# Patient Record
Sex: FEMALE | Race: WHITE | NOT HISPANIC OR LATINO | Employment: OTHER | URBAN - METROPOLITAN AREA
[De-identification: names, ages, dates, MRNs, and addresses within clinical notes are randomized per-mention and may not be internally consistent; named-entity substitution may affect disease eponyms.]

---

## 2017-10-16 ENCOUNTER — OFFICE VISIT - RIVER FALLS (OUTPATIENT)
Dept: FAMILY MEDICINE | Facility: CLINIC | Age: 67
End: 2017-10-16

## 2017-10-16 ASSESSMENT — MIFFLIN-ST. JEOR: SCORE: 933.86

## 2017-10-17 LAB
CREAT SERPL-MCNC: 0.77 MG/DL (ref 0.5–0.99)
GLUCOSE BLD-MCNC: 83 MG/DL (ref 65–99)

## 2017-10-18 ENCOUNTER — AMBULATORY - RIVER FALLS (OUTPATIENT)
Dept: FAMILY MEDICINE | Facility: CLINIC | Age: 67
End: 2017-10-18

## 2017-11-10 ENCOUNTER — AMBULATORY - RIVER FALLS (OUTPATIENT)
Dept: FAMILY MEDICINE | Facility: CLINIC | Age: 67
End: 2017-11-10

## 2017-11-13 LAB
CHOLEST SERPL-MCNC: 216 MG/DL
CHOLEST/HDLC SERPL: 2.8 {RATIO}
HDLC SERPL-MCNC: 76 MG/DL
LDLC SERPL CALC-MCNC: 126 MG/DL
NONHDLC SERPL-MCNC: 140 MG/DL
TRIGL SERPL-MCNC: 58 MG/DL

## 2018-12-10 ENCOUNTER — OFFICE VISIT - RIVER FALLS (OUTPATIENT)
Dept: FAMILY MEDICINE | Facility: CLINIC | Age: 68
End: 2018-12-10

## 2018-12-10 ASSESSMENT — MIFFLIN-ST. JEOR: SCORE: 952

## 2018-12-11 ENCOUNTER — OFFICE VISIT - RIVER FALLS (OUTPATIENT)
Dept: FAMILY MEDICINE | Facility: CLINIC | Age: 68
End: 2018-12-11

## 2019-01-30 ENCOUNTER — AMBULATORY - RIVER FALLS (OUTPATIENT)
Dept: FAMILY MEDICINE | Facility: CLINIC | Age: 69
End: 2019-01-30

## 2019-01-31 LAB
ALT SERPL W P-5'-P-CCNC: 29 UNIT/L (ref 6–29)
CHOLEST SERPL-MCNC: 209 MG/DL
CHOLEST/HDLC SERPL: 2.3 {RATIO}
HDLC SERPL-MCNC: 89 MG/DL
LDLC SERPL CALC-MCNC: 105 MG/DL
NONHDLC SERPL-MCNC: 120 MG/DL
TRIGL SERPL-MCNC: 60 MG/DL

## 2019-05-05 ENCOUNTER — OFFICE VISIT - RIVER FALLS (OUTPATIENT)
Dept: FAMILY MEDICINE | Facility: CLINIC | Age: 69
End: 2019-05-05

## 2019-10-01 ENCOUNTER — OFFICE VISIT - RIVER FALLS (OUTPATIENT)
Dept: FAMILY MEDICINE | Facility: CLINIC | Age: 69
End: 2019-10-01

## 2019-10-01 ASSESSMENT — MIFFLIN-ST. JEOR: SCORE: 970.52

## 2019-11-11 ENCOUNTER — COMMUNICATION - RIVER FALLS (OUTPATIENT)
Dept: FAMILY MEDICINE | Facility: CLINIC | Age: 69
End: 2019-11-11

## 2019-11-12 ENCOUNTER — OFFICE VISIT - RIVER FALLS (OUTPATIENT)
Dept: FAMILY MEDICINE | Facility: CLINIC | Age: 69
End: 2019-11-12

## 2019-11-12 ASSESSMENT — MIFFLIN-ST. JEOR: SCORE: 961.62

## 2019-11-30 ENCOUNTER — OFFICE VISIT - RIVER FALLS (OUTPATIENT)
Dept: FAMILY MEDICINE | Facility: CLINIC | Age: 69
End: 2019-11-30

## 2019-11-30 ASSESSMENT — MIFFLIN-ST. JEOR: SCORE: 952

## 2019-12-02 ENCOUNTER — OFFICE VISIT - RIVER FALLS (OUTPATIENT)
Dept: FAMILY MEDICINE | Facility: CLINIC | Age: 69
End: 2019-12-02

## 2019-12-02 ASSESSMENT — MIFFLIN-ST. JEOR: SCORE: 952.91

## 2019-12-23 ENCOUNTER — OFFICE VISIT - RIVER FALLS (OUTPATIENT)
Dept: FAMILY MEDICINE | Facility: CLINIC | Age: 69
End: 2019-12-23

## 2019-12-23 ASSESSMENT — MIFFLIN-ST. JEOR: SCORE: 955.97

## 2019-12-24 ENCOUNTER — COMMUNICATION - RIVER FALLS (OUTPATIENT)
Dept: FAMILY MEDICINE | Facility: CLINIC | Age: 69
End: 2019-12-24

## 2019-12-24 LAB
CHOLEST SERPL-MCNC: 219 MG/DL
CHOLEST/HDLC SERPL: 2.5 {RATIO}
HDLC SERPL-MCNC: 87 MG/DL
LDLC SERPL CALC-MCNC: 117 MG/DL
NONHDLC SERPL-MCNC: 132 MG/DL
TRIGL SERPL-MCNC: 62 MG/DL

## 2020-01-20 ENCOUNTER — COMMUNICATION - RIVER FALLS (OUTPATIENT)
Dept: FAMILY MEDICINE | Facility: CLINIC | Age: 70
End: 2020-01-20

## 2020-01-22 ENCOUNTER — OFFICE VISIT - RIVER FALLS (OUTPATIENT)
Dept: FAMILY MEDICINE | Facility: CLINIC | Age: 70
End: 2020-01-22

## 2020-01-22 ASSESSMENT — MIFFLIN-ST. JEOR: SCORE: 967.77

## 2020-01-30 ENCOUNTER — COMMUNICATION - RIVER FALLS (OUTPATIENT)
Dept: FAMILY MEDICINE | Facility: CLINIC | Age: 70
End: 2020-01-30

## 2020-04-03 ENCOUNTER — OFFICE VISIT - RIVER FALLS (OUTPATIENT)
Dept: FAMILY MEDICINE | Facility: CLINIC | Age: 70
End: 2020-04-03

## 2020-04-03 ASSESSMENT — MIFFLIN-ST. JEOR: SCORE: 951.44

## 2020-04-04 ENCOUNTER — OFFICE VISIT - RIVER FALLS (OUTPATIENT)
Dept: FAMILY MEDICINE | Facility: CLINIC | Age: 70
End: 2020-04-04

## 2020-04-06 ENCOUNTER — OFFICE VISIT - RIVER FALLS (OUTPATIENT)
Dept: FAMILY MEDICINE | Facility: CLINIC | Age: 70
End: 2020-04-06

## 2020-04-06 ASSESSMENT — MIFFLIN-ST. JEOR: SCORE: 951.44

## 2020-04-07 LAB
A/G RATIO - HISTORICAL: 1.1 MG/DL
ALBUMIN SERPL-MCNC: 3.6 G/DL
ALP SERPL-CCNC: 103 UNIT/L
ALT SERPL W P-5'-P-CCNC: 15 UNIT/L
ANION GAP SERPL CALCULATED.3IONS-SCNC: 12 MEQ/L
AST SERPL W P-5'-P-CCNC: 17 UNIT/L
BASOPHILS # BLD MANUAL: 0 CELLS/UL
BASOPHILS NFR BLD AUTO: 0 %
BILIRUB SERPL-MCNC: 0.4 MG/DL
BUN SERPL-MCNC: 6 MG/DL
BUN/CREAT RATIO - HISTORICAL: 10
CALCIUM SERPL-MCNC: 9.1 MEQ/DL
CHLORIDE BLD-SCNC: 106 MEQ/L
CO2 SERPL-SCNC: 21 MEQ/L
CREAT SERPL-MCNC: 0.62 MG/DL
EOSINOPHIL # BLD MANUAL: 0 CELLS/UL
EOSINOPHIL NFR BLD AUTO: 0 %
ERYTHROCYTE [DISTWIDTH] IN BLOOD BY AUTOMATED COUNT: 14.5 %
GFR ESTIMATE EXT - HISTORICAL: 60 ML/MIN
GLOBULIN: 3.2 G/DL
GLUCOSE BLD-MCNC: 141 MG/DL
HCT VFR BLD AUTO: 33.2 %
HGB BLD-MCNC: 11.1 G/DL
LYMPHOCYTES # BLD MANUAL: 2.1 CELLS/UL
LYMPHOCYTES NFR BLD AUTO: 11 %
MCH RBC QN AUTO: 31.7 PG
MCHC RBC AUTO-ENTMCNC: 33.4 GM/DL
MCV RBC AUTO: 95 FL
MONOCYTES # BLD MANUAL: 1.5 CELLS/UL
MONOCYTES NFR BLD AUTO: 8 %
NEUTROPHILS # BLD MANUAL: 15.3 CELLS/UL
NEUTROPHILS NFR BLD AUTO: 81 %
PLATELET # BLD AUTO: 1104 X10
PMV BLD: 9.3 FL
POTASSIUM BLD-SCNC: 3.5 MEQ/L
PROT SERPL-MCNC: 6.8 GM/DL
RBC # BLD AUTO: 3.5 X10
SODIUM SERPL-SCNC: 139 MEQ/L
WBC # BLD AUTO: 18.9 X10

## 2020-04-13 ENCOUNTER — COMMUNICATION - RIVER FALLS (OUTPATIENT)
Dept: FAMILY MEDICINE | Facility: CLINIC | Age: 70
End: 2020-04-13

## 2020-04-14 ENCOUNTER — OFFICE VISIT - RIVER FALLS (OUTPATIENT)
Dept: FAMILY MEDICINE | Facility: CLINIC | Age: 70
End: 2020-04-14

## 2020-05-05 ENCOUNTER — AMBULATORY - RIVER FALLS (OUTPATIENT)
Dept: FAMILY MEDICINE | Facility: CLINIC | Age: 70
End: 2020-05-05

## 2020-05-05 ENCOUNTER — COMMUNICATION - RIVER FALLS (OUTPATIENT)
Dept: FAMILY MEDICINE | Facility: CLINIC | Age: 70
End: 2020-05-05

## 2020-05-05 LAB
ALBUMIN UR-MCNC: NEGATIVE G/DL
BACTERIA #/AREA URNS HPF: NORMAL /[HPF]
BILIRUB UR QL STRIP: NEGATIVE
EPITHELIAL CELLS UR: NORMAL
GLUCOSE UR STRIP-MCNC: NEGATIVE MG/DL
HGB UR QL STRIP: ABNORMAL
KETONES UR STRIP-MCNC: NEGATIVE MG/DL
LEUKOCYTE ESTERASE UR QL STRIP: NEGATIVE
NITRATE UR QL: NEGATIVE
PH UR STRIP: 7 [PH] (ref 5–8)
RBC #/AREA URNS AUTO: NORMAL /[HPF]
SP GR UR STRIP: 1.01 (ref 1–1.03)
WBC #/AREA URNS AUTO: NORMAL /[HPF]

## 2020-05-07 LAB — BACTERIA SPEC CULT: NORMAL

## 2020-05-13 ENCOUNTER — OFFICE VISIT - RIVER FALLS (OUTPATIENT)
Dept: FAMILY MEDICINE | Facility: CLINIC | Age: 70
End: 2020-05-13

## 2020-06-15 ENCOUNTER — OFFICE VISIT - RIVER FALLS (OUTPATIENT)
Dept: FAMILY MEDICINE | Facility: CLINIC | Age: 70
End: 2020-06-15

## 2020-09-10 ENCOUNTER — AMBULATORY - RIVER FALLS (OUTPATIENT)
Dept: FAMILY MEDICINE | Facility: CLINIC | Age: 70
End: 2020-09-10

## 2020-09-11 ENCOUNTER — COMMUNICATION - RIVER FALLS (OUTPATIENT)
Dept: FAMILY MEDICINE | Facility: CLINIC | Age: 70
End: 2020-09-11

## 2020-09-11 LAB
ERYTHROCYTE [DISTWIDTH] IN BLOOD BY AUTOMATED COUNT: 13.5 % (ref 11–15)
HCT VFR BLD AUTO: 43.5 % (ref 35–45)
HGB BLD-MCNC: 14.5 GM/DL (ref 11.7–15.5)
MCH RBC QN AUTO: 30.7 PG (ref 27–33)
MCHC RBC AUTO-ENTMCNC: 33.3 GM/DL (ref 32–36)
MCV RBC AUTO: 92.2 FL (ref 80–100)
PLATELET # BLD AUTO: 279 10*3/UL (ref 140–400)
PMV BLD: 10.5 FL (ref 7.5–12.5)
RBC # BLD AUTO: 4.72 10*6/UL (ref 3.8–5.1)
WBC # BLD AUTO: 13 10*3/UL (ref 3.8–10.8)

## 2020-11-02 ENCOUNTER — OFFICE VISIT - RIVER FALLS (OUTPATIENT)
Dept: FAMILY MEDICINE | Facility: CLINIC | Age: 70
End: 2020-11-02

## 2020-12-10 ENCOUNTER — AMBULATORY - RIVER FALLS (OUTPATIENT)
Dept: FAMILY MEDICINE | Facility: CLINIC | Age: 70
End: 2020-12-10

## 2020-12-11 ENCOUNTER — COMMUNICATION - RIVER FALLS (OUTPATIENT)
Dept: FAMILY MEDICINE | Facility: CLINIC | Age: 70
End: 2020-12-11

## 2020-12-11 LAB
BASOPHILS # BLD MANUAL: 82 10*3/UL (ref 0–200)
BASOPHILS NFR BLD MANUAL: 1.2 %
EOSINOPHIL # BLD MANUAL: 299 10*3/UL (ref 15–500)
EOSINOPHIL NFR BLD MANUAL: 4.4 %
ERYTHROCYTE [DISTWIDTH] IN BLOOD BY AUTOMATED COUNT: 12.9 % (ref 11–15)
HCT VFR BLD AUTO: 40.4 % (ref 35–45)
HGB BLD-MCNC: 14 GM/DL (ref 11.7–15.5)
LYMPHOCYTES # BLD MANUAL: 2264 10*3/UL (ref 850–3900)
LYMPHOCYTES NFR BLD MANUAL: 33.3 %
MCH RBC QN AUTO: 32.6 PG (ref 27–33)
MCHC RBC AUTO-ENTMCNC: 34.7 GM/DL (ref 32–36)
MCV RBC AUTO: 94.2 FL (ref 80–100)
MONOCYTES # BLD MANUAL: 687 10*3/UL (ref 200–950)
MONOCYTES NFR BLD MANUAL: 10.1 %
NEUTROPHILS # BLD MANUAL: 3468 10*3/UL (ref 1500–7800)
NEUTROPHILS NFR BLD MANUAL: 51 %
PLATELET # BLD AUTO: 209 10*3/UL (ref 140–400)
PMV BLD: 10.7 FL (ref 7.5–12.5)
RBC # BLD AUTO: 4.29 10*6/UL (ref 3.8–5.1)
WBC # BLD AUTO: 6.8 10*3/UL (ref 3.8–10.8)

## 2020-12-29 ENCOUNTER — OFFICE VISIT - RIVER FALLS (OUTPATIENT)
Dept: FAMILY MEDICINE | Facility: CLINIC | Age: 70
End: 2020-12-29

## 2021-01-28 ENCOUNTER — VIRTUAL VISIT (OUTPATIENT)
Dept: BEHAVIORAL HEALTH | Facility: CLINIC | Age: 71
End: 2021-01-28
Payer: MEDICARE

## 2021-01-28 DIAGNOSIS — F43.22 ADJUSTMENT DISORDER WITH ANXIOUS MOOD: Primary | ICD-10-CM

## 2021-01-28 PROCEDURE — 90834 PSYTX W PT 45 MINUTES: CPT | Mod: 95 | Performed by: SOCIAL WORKER

## 2021-01-28 NOTE — PROGRESS NOTES
"[facility]: Integrated Behavioral Health  January 28, 2021    Shannan Koenig is a 70 year old female who is being evaluated via a telephone visit.      The patient has been notified of the following:     \"We have found that certain health care needs can be provided without the need for a face to face visit.  This service lets us provide the care you need with a short phone conversation.      I will have full access to your [facility] medical record during this entire phone call.   I will be taking notes for your medical record.     Since this is like an office visit, we will bill your insurance company for this service.  Please check with your medical insurance if this type of telephone visit/virtual care is covered.  You may be responsible for the cost of this service if insurance coverage is denied.      There are potential benefits and risks of telephone visits (e.g. limits to patient confidentiality) that differ from in-person visits.?  Confidentiality still applies for telephone services, and nobody will record the visit.  It is important to be in a quiet, private space that is free of distractions (including cell phone or other devices) during the visit.??     If during the course of the call I believe a telephone visit is not appropriate, you will not be charged for this service\"    Consent has been obtained for this service by care team member: yes.    Behavioral Health Clinician Progress Note    Patient Name: Shannan Koenig           Service Type:  Individual      Service Location:   Phone call (patient / identified key support person reached)     Session Start Time: 2:02pm  Session End Time: 2:43pm      Session Length: 38 - 52      Attendees: Client    Visit Activities (Refresh list every visit): NEW and [facility] Only    Diagnostic Assessment Date: Next Session  Treatment Plan Review Date: NA  See Flowsheets for today's PHQ-9 and MILTON-7 results  Previous PHQ-9: No flowsheet " data found.  Previous MILTON-7: No flowsheet data found.    SPIKE LEVEL:  No flowsheet data found.    DATA  Extended Session (60+ minutes): No  Interactive Complexity: No  Crisis: No  [facility] Patient: No    Treatment Objective(s) Addressed in This Session:  Target Behavior(s): disease management/lifestyle changes related to mental health    Anxiety: will experience a reduction in anxiety, will develop more effective coping skills to manage anxiety symptoms, will develop healthy cognitive patterns and beliefs and will increase ability to function adaptively  PTSD Symptom Management  Psychological distress related to Sleep Disturbance    Current Stressors / Issues:  [facility] introduced self and role to patient. Patient reported that she was referred because she is struggling with insomnia and other symptoms after a traumatic medical event that took place in April of 2020. Patient reported that she is physically fine, but mentally struggling. [facility] and patient spent the session processing through her symptoms and the traumatic event. [facility] encouraged patient to continue utilizing strategies that have been helpful such as working out.     Progress on Treatment Objective(s) / Homework:  New Objective established this session - ACTION (Actively working towards change); Intervened by reinforcing change plan / affirming steps taken    Motivational Interviewing    MI Intervention: Expressed Empathy/Understanding, Supported Autonomy, Collaboration, Evocation, Permission to raise concern or advise, Open-ended questions and Reflections: simple and complex     Change Talk Expressed by the Patient: Desire to change Ability to change Reasons to change Need to change Committment to change Activation Taking steps    Provider Response to Change Talk: E - Evoked more info from patient about behavior change, A - Affirmed patient's thoughts, decisions, or attempts at behavior change, R - Reflected patient's change talk and S - Summarized patient's change  talk statements    Also provided psychoeducation about behavioral health condition, symptoms, and treatment options    Care Plan review completed: Yes    Medication Review:  No changes to current psychiatric medication(s)    Medication Compliance:  Yes    Changes in Health Issues:   None reported    Chemical Use Review:   Substance Use: Chemical use reviewed, no active concerns identified      Tobacco Use: No current tobacco use.      Assessment: Current Emotional / Mental Status (status of significant symptoms):  Risk status (Self / Other harm or suicidal ideation)  Patient denies a history of suicidal ideation, suicide attempts, self-injurious behavior, homicidal ideation, homicidal behavior and and other safety concerns  Patient denies current fears or concerns for personal safety.  Patient denies current or recent suicidal ideation or behaviors.  Patient denies current or recent homicidal ideation or behaviors.  Patient denies current or recent self injurious behavior or ideation.  Patient denies other safety concerns.  A safety and risk management plan has not been developed at this time, however patient was encouraged to call [place] [phone] should there be a change in any of these risk factors.    Appearance:   Unable to gather due to phone visit   Eye Contact:   Unable to gather due to phone visit   Psychomotor Behavior: Unable to gather due to phone visit   Attitude:   Cooperative  Friendly  Orientation:   All  Speech   Rate / Production: Normal    Volume:  Normal   Mood:    Anxious  Normal Sad   Affect:    Appropriate   Thought Content:  Clear   Thought Form:  Coherent  Logical   Insight:    Good     Diagnoses:  1. Adjustment disorder with anxious mood        Collateral Reports Completed:  Not Applicable    Plan: (Homework, other):  Patient was given information about behavioral services and encouraged to schedule a follow up appointment with the clinic [facility] as needed.  She was also given information  about mental health symptoms and treatment options .  CD Recommendations: No indications of CD issues. DA to be completed at next session. MARIO Richardson, [facility]      ______________________________________________________________________

## 2021-02-01 ENCOUNTER — AMBULATORY - RIVER FALLS (OUTPATIENT)
Dept: FAMILY MEDICINE | Facility: CLINIC | Age: 71
End: 2021-02-01

## 2021-02-02 LAB
CHOLEST SERPL-MCNC: 212 MG/DL
CHOLEST/HDLC SERPL: 2.5 {RATIO}
HDLC SERPL-MCNC: 84 MG/DL
LDLC SERPL CALC-MCNC: 112 MG/DL
NONHDLC SERPL-MCNC: 128 MG/DL
TRIGL SERPL-MCNC: 70 MG/DL

## 2021-02-03 ENCOUNTER — COMMUNICATION - RIVER FALLS (OUTPATIENT)
Dept: FAMILY MEDICINE | Facility: CLINIC | Age: 71
End: 2021-02-03

## 2021-02-04 ENCOUNTER — VIRTUAL VISIT (OUTPATIENT)
Dept: BEHAVIORAL HEALTH | Facility: CLINIC | Age: 71
End: 2021-02-04
Payer: MEDICARE

## 2021-02-04 DIAGNOSIS — F43.22 ADJUSTMENT DISORDER WITH ANXIOUS MOOD: Primary | ICD-10-CM

## 2021-02-04 PROCEDURE — 90791 PSYCH DIAGNOSTIC EVALUATION: CPT | Mod: 95 | Performed by: SOCIAL WORKER

## 2021-02-04 ASSESSMENT — ANXIETY QUESTIONNAIRES
4. TROUBLE RELAXING: SEVERAL DAYS
GAD7 TOTAL SCORE: 6
GAD7 TOTAL SCORE: 6
7. FEELING AFRAID AS IF SOMETHING AWFUL MIGHT HAPPEN: SEVERAL DAYS
5. BEING SO RESTLESS THAT IT IS HARD TO SIT STILL: SEVERAL DAYS
1. FEELING NERVOUS, ANXIOUS, OR ON EDGE: SEVERAL DAYS
7. FEELING AFRAID AS IF SOMETHING AWFUL MIGHT HAPPEN: SEVERAL DAYS
2. NOT BEING ABLE TO STOP OR CONTROL WORRYING: SEVERAL DAYS
3. WORRYING TOO MUCH ABOUT DIFFERENT THINGS: SEVERAL DAYS
6. BECOMING EASILY ANNOYED OR IRRITABLE: NOT AT ALL
GAD7 TOTAL SCORE: 6

## 2021-02-04 ASSESSMENT — COLUMBIA-SUICIDE SEVERITY RATING SCALE - C-SSRS
5. HAVE YOU STARTED TO WORK OUT OR WORKED OUT THE DETAILS OF HOW TO KILL YOURSELF? DO YOU INTEND TO CARRY OUT THIS PLAN?: NO
TOTAL  NUMBER OF ABORTED OR SELF INTERRUPTED ATTEMPTS PAST LIFETIME: NO
4. HAVE YOU HAD THESE THOUGHTS AND HAD SOME INTENTION OF ACTING ON THEM?: NO
TOTAL  NUMBER OF ABORTED OR SELF INTERRUPTED ATTEMPTS PAST 3 MONTHS: NO
3. HAVE YOU BEEN THINKING ABOUT HOW YOU MIGHT KILL YOURSELF?: NO
ATTEMPT LIFETIME: NO
5. HAVE YOU STARTED TO WORK OUT OR WORKED OUT THE DETAILS OF HOW TO KILL YOURSELF? DO YOU INTEND TO CARRY OUT THIS PLAN?: NO
1. IN THE PAST MONTH, HAVE YOU WISHED YOU WERE DEAD OR WISHED YOU COULD GO TO SLEEP AND NOT WAKE UP?: NO
2. HAVE YOU ACTUALLY HAD ANY THOUGHTS OF KILLING YOURSELF LIFETIME?: NO
1. IN THE PAST MONTH, HAVE YOU WISHED YOU WERE DEAD OR WISHED YOU COULD GO TO SLEEP AND NOT WAKE UP?: NO
4. HAVE YOU HAD THESE THOUGHTS AND HAD SOME INTENTION OF ACTING ON THEM?: NO
ATTEMPT PAST THREE MONTHS: NO
2. HAVE YOU ACTUALLY HAD ANY THOUGHTS OF KILLING YOURSELF?: NO
6. HAVE YOU EVER DONE ANYTHING, STARTED TO DO ANYTHING, OR PREPARED TO DO ANYTHING TO END YOUR LIFE?: NO
TOTAL  NUMBER OF INTERRUPTED ATTEMPTS LIFETIME: NO
TOTAL  NUMBER OF INTERRUPTED ATTEMPTS PAST 3 MONTHS: NO
6. HAVE YOU EVER DONE ANYTHING, STARTED TO DO ANYTHING, OR PREPARED TO DO ANYTHING TO END YOUR LIFE?: NO

## 2021-02-04 ASSESSMENT — PATIENT HEALTH QUESTIONNAIRE - PHQ9
10. IF YOU CHECKED OFF ANY PROBLEMS, HOW DIFFICULT HAVE THESE PROBLEMS MADE IT FOR YOU TO DO YOUR WORK, TAKE CARE OF THINGS AT HOME, OR GET ALONG WITH OTHER PEOPLE: SOMEWHAT DIFFICULT
SUM OF ALL RESPONSES TO PHQ QUESTIONS 1-9: 3
SUM OF ALL RESPONSES TO PHQ QUESTIONS 1-9: 3

## 2021-02-04 NOTE — PROGRESS NOTES
[facility]  Provider Name:  Annetta Stockton     Credentials:  [facility], [facility]    PATIENT'S NAME: Shannan Koenig  PREFERRED NAME: Aileen  PRONOUNS:       MRN: [id number]  : 1950  ADDRESS: [address] [place], [place] [address]   ACCT. NUMBER:  [id number]  DATE OF SERVICE: 21  START TIME: 10:03am  END TIME: 11:00am  PREFERRED PHONE: [phone]  May we leave a program related message: Yes  SERVICE MODALITY:  Video Visit:      Provider verified identity through the following two step process.  Patient provided:  Patient     Telemedicine Visit: The patient's condition can be safely assessed and treated via synchronous audio and visual telemedicine encounter.      Reason for Telemedicine Visit: Services only offered telehealth    Originating Site (Patient Location): Patient's home    Distant Site (Provider Location): [facility] MENTAL [facility] & ADDICTION [facility]    Consent:  The patient/guardian has verbally consented to: the potential risks and benefits of telemedicine (video visit) versus in person care; bill my insurance or make self-payment for services provided; and responsibility for payment of non-covered services.     Patient would like the video invitation sent by:  My Chart    Mode of Communication:  Video Conference via [employer]    As the provider I attest to compliance with applicable laws and regulations related to telemedicine.    UNIVERSAL ADULT Mental Health DIAGNOSTIC ASSESSMENT      Identifying Information:  Patient is a 70 year old, .  The pronoun use throughout this assessment reflects the patient's chosen pronoun.  Patient was referred for an assessment by primary care provider.  Patient attended the session alone.     Chief Complaint:   The reason for seeking services at this time is: Patient reported that she was referred because she is struggling with insomnia and other symptoms after a traumatic medical  "event that took place in April of 2020. Patient reported that she is physically fine, but mentally struggling. Patient reported that she used to struggle with nightmares, but has not had them in a few months.     The problem(s) began in spring of 2020 after traumatic medical experience. Patient has attempted to resolve these concerns in the past through outpatient therapy.    Social/Family History:  Patient reported they grew up in [place] until age 5 and then moved up to [place] after.  They were raised by biological parents until age 5. Patient's father passed away when patient was 5 years old. Patient's mother remarried when patient was 14 years old and then step-father passed away when patient was 21 years old. Patient's mother passed away when she was 88 years old. Patient reported having 5 siblings, but her brother passed away when patient was 22 years old. Patient reported that their childhood had \"struggles\".  Patient described their current relationships with family of origin as \"really close\" and that they are always in contact with each other.      The patient describes their cultural background as: Yazdanism and Sabianist.  Cultural influences and impact on patient's life structure, values, norms, and healthcare: none reported.  Contextual influences on patient's health include: Covid-19 and recent medical event.    These factors will be addressed in the Preliminary Treatment plan.  Patient identified their preferred language to be English. Patient reported they does not need the assistance of an  or other support involved in therapy.     Patient reported had no significant delays in developmental tasks.   Patient's highest education level was college graduate. Patient identified the following learning problems: none reported.  Modifications will not be used to assist communication in therapy.  Patient reports they are not  able to understand written materials.    Patient reported the " following relationship history: previous divorce in 1992 and 7 year relationship following.  Patient's current relationship status is  for 16 years.   Patient identified their sexual orientation as heterosexual.  Patient reported having two daughters from first marriage. Patient identified siblings, adult child, friends and spouse as part of their support system.  Patient identified the quality of these relationships as stable and meaningful.     Patient's current living/housing situation involves staying in own home/apartment.  They live with her  and they report that housing is stable.     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 Patient reports their finances are obtained through employment and spouse.  Patient does not identify finances as a current stressor.      Patient reported that they have not been involved with the legal system.  Patient denies being on probation / parole / under the jurisdiction of the court.    Patient's Strengths and Limitations:  Patient identified the following strengths or resources that will help them succeed in treatment: Christian / Buddhism, commitment to health and well being, community involvement, exercise routine, roni / spirituality, friends / good social support, family support, insight, intelligence and motivation. Things that may interfere with the patient's success in treatment include: none identified.     Personal and Family Medical History:  Patient does report a family history of mental health concerns.  Patient reports family history is not on file.     Patient does report Mental Health Diagnosis and/or Treatment.  Patient Patient reported the following previous diagnoses which include(s): an Eating Disorder. Patient reported that she had anorexia when was she was 11 years old. Patient's current reported symptoms began after a medical event in spring of 2020.   Patient has received mental health services in the  past: hospitalization and outpatient therapy.  Psychiatric Hospitalizations: when patient was 11 years old for an eating disorder.  Patient denies a history of civil commitment.  Currently, patient is not receiving other mental health services.  These include none.     Patient has had a physical exam to rule out medical causes for current symptoms.  Date of last physical exam was within the past year. Client was encouraged to follow up with PCP if symptoms were to develop. The patient has a [facility] Primary Care Provider, who is named No primary care provider on file...  Patient reports no current medical concerns.  Patient denies any issues with pain..   There are not significant appetite / nutritional concerns / weight changes.   Patient does not report a history of head injury / trauma / cognitive impairment.    Patient reports current meds as:   No outpatient medications have been marked as taking for the 2/4/21 encounter (Virtual Visit) with nAnetta Stockton LICSW.       Medication Adherence:  Patient reports taking prescribed medications as prescribed.    Patient Allergies:  Not on File    Medical History:  No past medical history on file.      Current Mental Status Exam:   Appearance:  Appropriate    Eye Contact:  Good   Psychomotor:  Normal       Gait / station:  no problem  Attitude / Demeanor: Cooperative  Friendly Pleasant  Speech      Rate / Production: Normal/ Responsive      Volume:  Normal  volume      Language:  intact  Mood:   Normal  Affect:   Appropriate    Thought Content: Clear  Rumination   Thought Process: Coherent       Associations: No loosening of associations  Insight:   Good   Judgment:  Intact   Orientation:  All  Attention/concentration: Good    Rating Scales:    PHQ9:    PHQ-9 SCORE 2/4/2021   PHQ-9 Total Score MyChart 3 (Minimal depression)   PHQ-9 Total Score 3   ;    GAD7:    MILTON-7 SCORE 2/4/2021   Total Score 6 (mild anxiety)   Total Score 6     CGI:     First:Considering your  total clinical experience with this particular patient population, how severe are the patient's symptoms at this time?: 5 (2/4/2021  3:13 PM)  ;    Most recentNo data recorded    Substance Use:  Patient did report a family history of substance use concerns; see medical history section for details.  Patient has not received chemical dependency treatment in the past.  Patient has not ever been to detox.      Patient is not currently receiving any chemical dependency treatment. Patient reported the following problems as a result of their substance use: NA.    Patient reports using alcohol 1 times per day and has 1 glasses of wine at a time.   Patient denies using tobacco.  Patient denies using marijuana.  Patient reports using caffeine 3 times per day and drinks 1 at a time.   Patient reports using/abusing the following substance(s). Patient reported no other substance use.     CAGE- AID:  No flowsheet data found.    Substance Use: No symptoms    Based on the negative CAGE score and clinical interview there  are not indications of drug or alcohol abuse.      Significant Losses / Trauma / Abuse / Neglect Issues:   Patient did not serve in the .    There are indications or report of significant loss, trauma, abuse or neglect issues related to: recent medical event in spring 2020, father's death when patient was 5, brother's death when patient was 22, step-father's death when patient was 21, mother's death in 2008, and sister's hospitalizations over the years due to mental health.    Concerns for possible neglect are not present.     Safety Assessment:   Current Safety Concerns:  [place] Suicide Severity Rating Scale (Lifetime/Recent)  [place] Suicide Severity Rating (Lifetime/Recent) 2/4/2021   1. Wish to be Dead (Lifetime) No   1. Wish to be Dead (Recent) No   2. Non-Specific Active Suicidal Thoughts (Lifetime) No   2. Non-Specific Active Suicidal Thoughts (Recent) No   3. Active Suicidal Ideation with any  Methods (Not Plan) Without Intent to Act (Lifetime) No   3. Active Suicidal Ideation with any Methods (Not Plan) Without Intent to Act (Recent) No   4. Active Suicidal Ideation with Some Intent to Act, Without Specific Plan (Lifetime) No   4. Active Suicidal Ideation with Some Intent to Act, Without Specific Plan (Recent) No   5. Active Suicidal Ideation with Specific Plan and Intent (Lifetime) No   5. Active Suicidal Ideation with Specific Plan and Intent (Recent) No   Actual Attempt (Lifetime) No   Actual Attempt (Past 3 Months) No   Has subject engaged in non-suicidal self-injurious behavior? (Lifetime) No   Has subject engaged in non-suicidal self-injurious behavior? (Past 3 Months) No   Interrupted Attempts (Lifetime) No   Interrupted Attempts (Past 3 Months) No   Aborted or Self-Interrupted Attempt (Lifetime) No   Aborted or Self-Interrupted Attempt (Past 3 Months) No   Preparatory Acts or Behavior (Lifetime) No   Preparatory Acts or Behavior (Past 3 Months) No     Patient denies current homicidal ideation and behaviors.  Patient denies current self-injurious ideation and behaviors.    Patient denied risk behaviors associated with substance use.  Patient denies any high risk behaviors associated with mental health symptoms.  Patient reports the following current concerns for their personal safety: None.  Patient reports there are not  firearms in the house.      History of Safety Concerns:  Patient denied a history of homicidal ideation.     Patient denied a history of personal safety concerns.    Patient denied a history of assaultive behaviors.    Patient denied a history of sexual assault behaviors.     Patient denied a history of risk behaviors associated with substance use.  Patient denies any history of high risk behaviors associated with mental health symptoms.  Patient reports the following protective factors: positive relationships positive social network and positive family connections,  forward/future oriented thinking, dedication to family/friends, daily obligations, effective problem-solving skills, committment to well-being, sense of meaning, positive social skills and strong sense of self-worth/esteem    Risk Plan:  See Recommendations for Safety and Risk Management Plan    Review of Symptoms per patient report:  Depression: Change in sleep and Ruminations  Pat:  No Symptoms  Psychosis: No Symptoms  Anxiety: Excessive worry, Nervousness, Fears/phobias  , Sleep disturbance and Ruminations  Panic:  No symptoms  Post Traumatic Stress Disorder:  Experienced traumatic event  , Reexperiencing of trauma and Increased arousal   Eating Disorder: No Symptoms  ADD / ADHD:  No symptoms  Conduct Disorder: No symptoms  Autism Spectrum Disorder: No symptoms  Obsessive Compulsive Disorder: No Symptoms    Patient reports the following compulsive behaviors and treatment history: No Symptoms.      Diagnostic Criteria:   A. The development of emotional or behavioral symptoms in response to an identifiable stressor(s) occurring within 3 months of the onset of the stressor(s)  B. These symptoms or behaviors are clinically significant, as evidenced by one or both of the following:       - Marked distress that is out of proportion to the severity/intensity of the stressor (with consideration for external context & culture)  C. The stress-related disturbance does not meet criteria for another disorder & is not not an exacerbation of another mental disorder  D. The symptoms do not represent normal bereavement  E. Once the stressor or its consequences have terminated, the symptoms do not persist for more than an additional 6 months       * Adjustment Disorder with Anxiety: The predominant manfestations are symptoms such as nervousness, worry, or jitteriness, or, in children separation anxiety from major attachment figures    Functional Status:  Patient reports the following functional impairments: health maintenance  and self-care.     WHODAS: No flowsheet data found.  Nonprogrammatic care:  Patient is requesting basic services to address current mental health concerns.    Clinical Summary:  1. Reason for assessment: worsening of symptoms.  2. Psychosocial, Cultural and Contextual Factors: recent medical event and Covid-19.  3. Principal DSM5 Diagnoses  (Sustained by DSM5 Criteria Listed Above):   Adjustment Disorders  309.24 (F43.22) With anxiety.  4. Other Diagnoses that is relevant to services:   NA.  5. Provisional Diagnosis:  Adjustment Disorders  309.24 (F43.22) With anxiety as evidenced by symptoms present . RULE OUT of PTSD.  6. Prognosis: Expect Improvement.  7. Likely consequences of symptoms if not treated: worsening of symptoms.  8. Client strengths include:  caring, creative, educated, empathetic, employed, goal-focused, good listener, has a previous history of therapy, insightful, intelligent, motivated, open to learning, open to suggestions / feedback, responsible parent, support of family, friends and providers, supportive, wants to learn, willing to ask questions and willing to relate to others .     Recommendations:     1. Plan for Safety and Risk Management:   Recommended that patient call 911 or go to the local ED should there be a change in any of these risk factors..          Report to child / adult protection services was NA.     2. Patient's identified mental health concerns with a cultural influence will be addressed by agreed upon treatment plan.     3. Initial Treatment will focus on:    Anxiety - decrease in anxious symptoms  Adjustment Difficulties related to: medical event.     4. Resources/Service Plan:    services are not indicated.   Modifications to assist communication are not indicated.   Additional disability accommodations are not indicated.      5. Collaboration:   Collaboration / coordination of treatment will be initiated with the following  support professionals: primary care  physician.      6.  Referrals:   The following referral(s) will be initiated: Outpatient [facility].     A Release of Information has been obtained for the following: NA.    7. JUNA:    JUAN:  Discussed the general effects of drugs and alcohol on health and well-being. Provider gave patient printed information about the effects of chemical use on their health and well being.      8. Records:   These were not available for review at time of assessment.   Information in this assessment was obtained from the medical record and  provided by patient who is a good historian.    Patient will have open access to their mental health medical record.        Provider Name/ Credentials:  MARIO Richardson, [facility]  February 4, 2021

## 2021-02-05 ASSESSMENT — PATIENT HEALTH QUESTIONNAIRE - PHQ9: SUM OF ALL RESPONSES TO PHQ QUESTIONS 1-9: 3

## 2021-02-05 ASSESSMENT — ANXIETY QUESTIONNAIRES: GAD7 TOTAL SCORE: 6

## 2021-02-11 ENCOUNTER — VIRTUAL VISIT (OUTPATIENT)
Dept: BEHAVIORAL HEALTH | Facility: CLINIC | Age: 71
End: 2021-02-11
Payer: MEDICARE

## 2021-02-11 DIAGNOSIS — F43.22 ADJUSTMENT DISORDER WITH ANXIOUS MOOD: Primary | ICD-10-CM

## 2021-02-11 PROCEDURE — 90832 PSYTX W PT 30 MINUTES: CPT | Mod: 95 | Performed by: SOCIAL WORKER

## 2021-02-11 NOTE — PROGRESS NOTES
"[facility]: Integrated Behavioral Health  February 11, 2021    Shannan Koenig is a 70 year old female who is being evaluated via a telephone visit.      The patient has been notified of the following:     \"We have found that certain health care needs can be provided without the need for a face to face visit.  This service lets us provide the care you need with a short phone conversation.      I will have full access to your [place] medical record during this entire phone call.   I will be taking notes for your medical record.     Since this is like an office visit, we will bill your insurance company for this service.  Please check with your medical insurance if this type of telephone visit/virtual care is covered.  You may be responsible for the cost of this service if insurance coverage is denied.      There are potential benefits and risks of telephone visits (e.g. limits to patient confidentiality) that differ from in-person visits.?  Confidentiality still applies for telephone services, and nobody will record the visit.  It is important to be in a quiet, private space that is free of distractions (including cell phone or other devices) during the visit.??     If during the course of the call I believe a telephone visit is not appropriate, you will not be charged for this service\"    Consent has been obtained for this service by care team member: yes.    Behavioral Health Clinician Progress Note    Patient Name: Shannan Koenig           Service Type:  Individual      Service Location:   Phone call (patient / identified key support person reached)     Session Start Time: 8:38am  Session End Time: 9:06am      Session Length: 16 - 37      Attendees: Client    Visit Activities (Refresh list every visit): [facility] Only    Diagnostic Assessment Date: 2/4/2021  Treatment Plan Review Date: Next Session  See Flowsheets for today's PHQ-9 and MILTON-7 results  Previous PHQ-9:   PHQ-9 SCORE " 2/4/2021   PHQ-9 Total Score MyChart 3 (Minimal depression)   PHQ-9 Total Score 3     Previous MILTON-7:   MILTON-7 SCORE 2/4/2021   Total Score 6 (mild anxiety)   Total Score 6       SPIKE LEVEL:  No flowsheet data found.    DATA  Extended Session (60+ minutes): No  Interactive Complexity: No  Crisis: No  [facility] Patient: No    Treatment Objective(s) Addressed in This Session:  Target Behavior(s): disease management/lifestyle changes related to mental health    Anxiety: will experience a reduction in anxiety, will develop more effective coping skills to manage anxiety symptoms, will develop healthy cognitive patterns and beliefs and will increase ability to function adaptively  PTSD Symptom Management  Psychological distress related to Sleep Disturbance    Current Stressors / Issues:  Patient reported that she has been doing okay. She reported she has been paying attention to her triggers. Patient also reported that her  wants her to just talk to an , but patient really doesn't want to, so she is processing this decision. [facility] and patient spent session processing where she is at and affirming where she is at.     Progress on Treatment Objective(s) / Homework:  Satisfactory progress - ACTION (Actively working towards change); Intervened by reinforcing change plan / affirming steps taken    Motivational Interviewing    MI Intervention: Expressed Empathy/Understanding, Supported Autonomy, Collaboration, Evocation, Permission to raise concern or advise, Open-ended questions and Reflections: simple and complex     Change Talk Expressed by the Patient: Desire to change Ability to change Reasons to change Need to change Committment to change Activation Taking steps    Provider Response to Change Talk: E - Evoked more info from patient about behavior change, A - Affirmed patient's thoughts, decisions, or attempts at behavior change, R - Reflected patient's change talk and S - Summarized patient's change talk  statements    Also provided psychoeducation about behavioral health condition, symptoms, and treatment options    Care Plan review completed: Yes    Medication Review:  No changes to current psychiatric medication(s)    Medication Compliance:  Yes    Changes in Health Issues:   None reported    Chemical Use Review:   Substance Use: Chemical use reviewed, no active concerns identified      Tobacco Use: No current tobacco use.      Assessment: Current Emotional / Mental Status (status of significant symptoms):  Risk status (Self / Other harm or suicidal ideation)  Patient denies a history of suicidal ideation, suicide attempts, self-injurious behavior, homicidal ideation, homicidal behavior and and other safety concerns  Patient denies current fears or concerns for personal safety.  Patient denies current or recent suicidal ideation or behaviors.  Patient denies current or recent homicidal ideation or behaviors.  Patient denies current or recent self injurious behavior or ideation.  Patient denies other safety concerns.  A safety and risk management plan has not been developed at this time, however patient was encouraged to call [place] [id number] should there be a change in any of these risk factors.    Appearance:   Unable to gather due to phone visit   Eye Contact:   Unable to gather due to phone visit   Psychomotor Behavior: Unable to gather due to phone visit   Attitude:   Cooperative  Friendly  Orientation:   All  Speech   Rate / Production: Normal    Volume:  Normal   Mood:    Anxious  Normal Sad   Affect:    Appropriate   Thought Content:  Clear   Thought Form:  Coherent  Logical   Insight:    Good     Diagnoses:  1. Adjustment disorder with anxious mood        Collateral Reports Completed:  Not Applicable    Plan: (Homework, other):  Patient was given information about behavioral services and encouraged to schedule a follow up appointment with the clinic [facility] as needed.  She was also given information about  mental health symptoms and treatment options .  CD Recommendations: No indications of CD issues. Treatment Plan to be completed at next session. MARIO Richardson, [facility]      ______________________________________________________________________

## 2021-02-25 ENCOUNTER — VIRTUAL VISIT (OUTPATIENT)
Dept: BEHAVIORAL HEALTH | Facility: CLINIC | Age: 71
End: 2021-02-25
Payer: MEDICARE

## 2021-02-25 DIAGNOSIS — F43.22 ADJUSTMENT DISORDER WITH ANXIOUS MOOD: Primary | ICD-10-CM

## 2021-02-25 PROCEDURE — 90832 PSYTX W PT 30 MINUTES: CPT | Mod: 95 | Performed by: SOCIAL WORKER

## 2021-02-25 NOTE — PROGRESS NOTES
"[facility]: Integrated Behavioral Health  February 25, 2021    Shannan Koenig is a 70 year old female who is being evaluated via a telephone visit.      The patient has been notified of the following:     \"We have found that certain health care needs can be provided without the need for a face to face visit.  This service lets us provide the care you need with a short phone conversation.      I will have full access to your [place] medical record during this entire phone call.   I will be taking notes for your medical record.     Since this is like an office visit, we will bill your insurance company for this service.  Please check with your medical insurance if this type of telephone visit/virtual care is covered.  You may be responsible for the cost of this service if insurance coverage is denied.      There are potential benefits and risks of telephone visits (e.g. limits to patient confidentiality) that differ from in-person visits.?  Confidentiality still applies for telephone services, and nobody will record the visit.  It is important to be in a quiet, private space that is free of distractions (including cell phone or other devices) during the visit.??     If during the course of the call I believe a telephone visit is not appropriate, you will not be charged for this service\"    Consent has been obtained for this service by care team member: yes.    Behavioral Health Clinician Progress Note    Patient Name: Shannan Koenig           Service Type:  Individual      Service Location:   Phone call (patient / identified key support person reached)     Session Start Time: 10:03am  Session End Time: 10:30am      Session Length: 16 - 37      Attendees: Client    Visit Activities (Refresh list every visit): [facility] Only    Diagnostic Assessment Date: 2/4/2021  Treatment Plan Review Date: 5/25/2021  See Flowsheets for today's PHQ-9 and MILTON-7 results  Previous PHQ-9:   PHQ-9 SCORE " 2/4/2021   PHQ-9 Total Score MyChart 3 (Minimal depression)   PHQ-9 Total Score 3     Previous MILTON-7:   MILTON-7 SCORE 2/4/2021   Total Score 6 (mild anxiety)   Total Score 6       SPIKE LEVEL:  No flowsheet data found.    DATA  Extended Session (60+ minutes): No  Interactive Complexity: No  Crisis: No  [facility] Patient: No    Treatment Objective(s) Addressed in This Session:  Target Behavior(s): disease management/lifestyle changes related to mental health    Anxiety: will experience a reduction in anxiety, will develop more effective coping skills to manage anxiety symptoms, will develop healthy cognitive patterns and beliefs and will increase ability to function adaptively  PTSD Symptom Management  Psychological distress related to Sleep Disturbance    Current Stressors / Issues:  Patient reported that her  and herself were able to get the first round of the covid-19 vaccine, which has decreased a lot of patient's fears. Patient reported that over the past few days she has struggling with wanting answers about what happened again and is starting to worry about the anniversary coming up soon. C and patient spent session processing through her desire to have answers and about what to expect. C encouraged patient to continue utilizing strategies that have been helpful to patient to reduce anxiety and stress.     Progress on Treatment Objective(s) / Homework:  Satisfactory progress - ACTION (Actively working towards change); Intervened by reinforcing change plan / affirming steps taken    Motivational Interviewing    MI Intervention: Expressed Empathy/Understanding, Supported Autonomy, Collaboration, Evocation, Permission to raise concern or advise, Open-ended questions and Reflections: simple and complex     Change Talk Expressed by the Patient: Desire to change Ability to change Reasons to change Need to change Committment to change Activation Taking steps    Provider Response to Change Talk: E - Evoked more info  from patient about behavior change, A - Affirmed patient's thoughts, decisions, or attempts at behavior change, R - Reflected patient's change talk and S - Summarized patient's change talk statements    Also provided psychoeducation about behavioral health condition, symptoms, and treatment options    Care Plan review completed: Yes    Medication Review:  No changes to current psychiatric medication(s)    Medication Compliance:  Yes    Changes in Health Issues:   None reported    Chemical Use Review:   Substance Use: Chemical use reviewed, no active concerns identified      Tobacco Use: No current tobacco use.      Assessment: Current Emotional / Mental Status (status of significant symptoms):  Risk status (Self / Other harm or suicidal ideation)  Patient denies a history of suicidal ideation, suicide attempts, self-injurious behavior, homicidal ideation, homicidal behavior and and other safety concerns  Patient denies current fears or concerns for personal safety.  Patient denies current or recent suicidal ideation or behaviors.  Patient denies current or recent homicidal ideation or behaviors.  Patient denies current or recent self injurious behavior or ideation.  Patient denies other safety concerns.  A safety and risk management plan has not been developed at this time, however patient was encouraged to call [place] [phone] should there be a change in any of these risk factors.    Appearance:   Unable to gather due to phone visit   Eye Contact:   Unable to gather due to phone visit   Psychomotor Behavior: Unable to gather due to phone visit   Attitude:   Cooperative  Friendly  Orientation:   All  Speech   Rate / Production: Normal    Volume:  Normal   Mood:    Anxious  Normal Sad   Affect:    Appropriate   Thought Content:  Clear   Thought Form:  Coherent  Logical   Insight:    Good     Diagnoses:  1. Adjustment disorder with anxious mood        Collateral Reports Completed:  Not Applicable    Plan:  (Homework, other):  Patient was given information about behavioral services and encouraged to schedule a follow up appointment with the clinic [facility] as needed.  She was also given information about mental health symptoms and treatment options .  CD Recommendations: No indications of CD issues. Treatment Plan to be completed at next session. Annetta Stockton, [facility], [facility]      ______________________________________________________________________                                                Treatment Plan    Client's Name: Shannan Koenig  YOB: 1950    Date: 2/25/2021    DSM-V Diagnoses: Adjustment Disorders  309.24 (F43.22) With anxiety  Psychosocial / Contextual Factors: Covid-19 and traumatic medical experience  WHODAS: No flowsheet data found.    Referral / Collaboration:  Referral to another professional/service is not indicated at this time..    Anticipated number of session or this episode of care: As needed      MeasurableTreatment Goal(s) related to diagnosis / functional impairment(s)  Goal 1: Client will have a reduction in intrusive symptoms    I will know I've met my goal when symptoms have been manageable for 4 consecutive weeks.      Objective #A (Client Action)    Client will have a reduction in triggers that remind herself of trauma.  Status: New - Date: 2/25/2021     Intervention(s)  Therapist will help patient identify and understand triggers.    Objective #B  Client will use thought-stopping strategy daily to reduce intrusive thoughts.  Status: New - Date: 2/25/2021     Intervention(s)  Therapist will teach patient thought-stopping strategies to reduce intrusive thoughts through discussion, role-play, and/or other activities.    Objective #C  Client will use relaxation strategies three times per day to reduce the physical symptoms of anxiety.  Status: New - Date: 2/25/2021     Intervention(s)  Therapist will teach patient relaxation strategies to practice through teaching, modeling,  and/or other activities.      Parent / Guardian has reviewed and agreed to the above plan.      Annetta Stockton, [facility]  February 25, 2021

## 2021-03-07 ENCOUNTER — HEALTH MAINTENANCE LETTER (OUTPATIENT)
Age: 71
End: 2021-03-07

## 2021-03-15 ENCOUNTER — AMBULATORY - RIVER FALLS (OUTPATIENT)
Dept: FAMILY MEDICINE | Facility: CLINIC | Age: 71
End: 2021-03-15

## 2021-03-15 ENCOUNTER — OFFICE VISIT - RIVER FALLS (OUTPATIENT)
Dept: FAMILY MEDICINE | Facility: CLINIC | Age: 71
End: 2021-03-15

## 2021-03-17 LAB — SARS-COV-2 RNA RESP QL NAA+PROBE: NEGATIVE

## 2021-04-23 ENCOUNTER — OFFICE VISIT - RIVER FALLS (OUTPATIENT)
Dept: FAMILY MEDICINE | Facility: CLINIC | Age: 71
End: 2021-04-23

## 2021-06-01 ENCOUNTER — OFFICE VISIT - RIVER FALLS (OUTPATIENT)
Dept: FAMILY MEDICINE | Facility: CLINIC | Age: 71
End: 2021-06-01

## 2021-06-01 ASSESSMENT — MIFFLIN-ST. JEOR: SCORE: 989.09

## 2021-09-08 ENCOUNTER — COMMUNICATION - RIVER FALLS (OUTPATIENT)
Dept: FAMILY MEDICINE | Facility: CLINIC | Age: 71
End: 2021-09-08

## 2021-10-11 ENCOUNTER — COMMUNICATION - RIVER FALLS (OUTPATIENT)
Dept: FAMILY MEDICINE | Facility: CLINIC | Age: 71
End: 2021-10-11

## 2021-10-11 ENCOUNTER — HEALTH MAINTENANCE LETTER (OUTPATIENT)
Age: 71
End: 2021-10-11

## 2021-10-12 ENCOUNTER — OFFICE VISIT - RIVER FALLS (OUTPATIENT)
Dept: FAMILY MEDICINE | Facility: CLINIC | Age: 71
End: 2021-10-12

## 2021-11-09 ENCOUNTER — AMBULATORY - RIVER FALLS (OUTPATIENT)
Dept: FAMILY MEDICINE | Facility: CLINIC | Age: 71
End: 2021-11-09

## 2021-12-03 ENCOUNTER — TRANSFERRED RECORDS (OUTPATIENT)
Dept: MULTI SPECIALTY CLINIC | Facility: CLINIC | Age: 71
End: 2021-12-03

## 2021-12-03 ENCOUNTER — OFFICE VISIT - RIVER FALLS (OUTPATIENT)
Dept: FAMILY MEDICINE | Facility: CLINIC | Age: 71
End: 2021-12-03

## 2021-12-03 ASSESSMENT — MIFFLIN-ST. JEOR: SCORE: 956.09

## 2022-01-03 ENCOUNTER — COMMUNICATION - RIVER FALLS (OUTPATIENT)
Dept: FAMILY MEDICINE | Facility: CLINIC | Age: 72
End: 2022-01-03

## 2022-01-03 ENCOUNTER — LAB REQUISITION (OUTPATIENT)
Dept: LAB | Facility: CLINIC | Age: 72
End: 2022-01-03
Payer: MEDICARE

## 2022-01-03 ENCOUNTER — OFFICE VISIT - RIVER FALLS (OUTPATIENT)
Dept: FAMILY MEDICINE | Facility: CLINIC | Age: 72
End: 2022-01-03

## 2022-01-03 ENCOUNTER — AMBULATORY - RIVER FALLS (OUTPATIENT)
Dept: FAMILY MEDICINE | Facility: CLINIC | Age: 72
End: 2022-01-03

## 2022-01-03 DIAGNOSIS — U07.1 COVID-19: ICD-10-CM

## 2022-01-03 PROCEDURE — U0005 INFEC AGEN DETEC AMPLI PROBE: HCPCS | Mod: ORL | Performed by: INTERNAL MEDICINE

## 2022-01-05 LAB — SARS-COV-2 RNA RESP QL NAA+PROBE: NEGATIVE

## 2022-01-07 LAB — SARS-COV-2 RNA RESP QL NAA+PROBE: NEGATIVE

## 2022-01-18 ENCOUNTER — OFFICE VISIT - RIVER FALLS (OUTPATIENT)
Dept: FAMILY MEDICINE | Facility: CLINIC | Age: 72
End: 2022-01-18

## 2022-01-18 ENCOUNTER — LAB REQUISITION (OUTPATIENT)
Dept: LAB | Facility: CLINIC | Age: 72
End: 2022-01-18
Payer: MEDICARE

## 2022-01-18 ENCOUNTER — AMBULATORY - RIVER FALLS (OUTPATIENT)
Dept: FAMILY MEDICINE | Facility: CLINIC | Age: 72
End: 2022-01-18

## 2022-01-18 DIAGNOSIS — U07.1 COVID-19: ICD-10-CM

## 2022-01-18 PROCEDURE — U0005 INFEC AGEN DETEC AMPLI PROBE: HCPCS | Mod: ORL | Performed by: FAMILY MEDICINE

## 2022-01-19 LAB — SARS-COV-2 RNA RESP QL NAA+PROBE: POSITIVE

## 2022-01-20 ENCOUNTER — COMMUNICATION - RIVER FALLS (OUTPATIENT)
Dept: FAMILY MEDICINE | Facility: CLINIC | Age: 72
End: 2022-01-20

## 2022-01-20 LAB — SARS-COV-2 RNA RESP QL NAA+PROBE: POSITIVE

## 2022-02-12 VITALS
TEMPERATURE: 98.7 F | DIASTOLIC BLOOD PRESSURE: 80 MMHG | WEIGHT: 115.12 LBS | HEART RATE: 75 BPM | TEMPERATURE: 97.6 F | HEART RATE: 106 BPM | SYSTOLIC BLOOD PRESSURE: 124 MMHG | SYSTOLIC BLOOD PRESSURE: 132 MMHG | DIASTOLIC BLOOD PRESSURE: 70 MMHG | HEIGHT: 61 IN | BODY MASS INDEX: 21.74 KG/M2 | WEIGHT: 117.08 LBS | HEIGHT: 61 IN | OXYGEN SATURATION: 97 % | BODY MASS INDEX: 22.11 KG/M2

## 2022-02-12 VITALS
OXYGEN SATURATION: 96 % | HEIGHT: 61 IN | HEIGHT: 61 IN | SYSTOLIC BLOOD PRESSURE: 136 MMHG | HEART RATE: 98 BPM | BODY MASS INDEX: 21.37 KG/M2 | HEIGHT: 61 IN | SYSTOLIC BLOOD PRESSURE: 122 MMHG | HEART RATE: 87 BPM | DIASTOLIC BLOOD PRESSURE: 78 MMHG | TEMPERATURE: 98.8 F | RESPIRATION RATE: 16 BRPM | TEMPERATURE: 96.7 F | OXYGEN SATURATION: 96 % | HEART RATE: 105 BPM | HEART RATE: 72 BPM | WEIGHT: 115.6 LBS | TEMPERATURE: 100.3 F | BODY MASS INDEX: 21.34 KG/M2 | DIASTOLIC BLOOD PRESSURE: 78 MMHG | TEMPERATURE: 98.5 F | DIASTOLIC BLOOD PRESSURE: 74 MMHG | SYSTOLIC BLOOD PRESSURE: 130 MMHG | WEIGHT: 113.2 LBS | OXYGEN SATURATION: 95 % | HEIGHT: 61 IN | DIASTOLIC BLOOD PRESSURE: 88 MMHG | SYSTOLIC BLOOD PRESSURE: 128 MMHG | WEIGHT: 113 LBS | BODY MASS INDEX: 21.34 KG/M2 | BODY MASS INDEX: 21.83 KG/M2 | WEIGHT: 113 LBS

## 2022-02-12 VITALS
HEIGHT: 61 IN | HEART RATE: 81 BPM | SYSTOLIC BLOOD PRESSURE: 104 MMHG | BODY MASS INDEX: 21.34 KG/M2 | WEIGHT: 121.2 LBS | RESPIRATION RATE: 16 BRPM | TEMPERATURE: 98.1 F | OXYGEN SATURATION: 97 % | DIASTOLIC BLOOD PRESSURE: 72 MMHG | BODY MASS INDEX: 23.09 KG/M2

## 2022-02-12 VITALS
WEIGHT: 113 LBS | HEIGHT: 61 IN | BODY MASS INDEX: 21.34 KG/M2 | SYSTOLIC BLOOD PRESSURE: 134 MMHG | HEART RATE: 93 BPM | DIASTOLIC BLOOD PRESSURE: 83 MMHG

## 2022-02-12 VITALS
HEART RATE: 88 BPM | SYSTOLIC BLOOD PRESSURE: 140 MMHG | BODY MASS INDEX: 22.71 KG/M2 | TEMPERATURE: 98.7 F | DIASTOLIC BLOOD PRESSURE: 82 MMHG | OXYGEN SATURATION: 97 % | HEIGHT: 61 IN | WEIGHT: 120.3 LBS

## 2022-02-12 VITALS
SYSTOLIC BLOOD PRESSURE: 122 MMHG | OXYGEN SATURATION: 98 % | DIASTOLIC BLOOD PRESSURE: 80 MMHG | WEIGHT: 114 LBS | BODY MASS INDEX: 21.9 KG/M2 | HEART RATE: 86 BPM | TEMPERATURE: 97.7 F

## 2022-02-12 VITALS
BODY MASS INDEX: 21.14 KG/M2 | HEIGHT: 61 IN | TEMPERATURE: 99.9 F | HEIGHT: 61 IN | HEART RATE: 82 BPM | SYSTOLIC BLOOD PRESSURE: 121 MMHG | SYSTOLIC BLOOD PRESSURE: 119 MMHG | BODY MASS INDEX: 21.34 KG/M2 | WEIGHT: 112 LBS | SYSTOLIC BLOOD PRESSURE: 113 MMHG | BODY MASS INDEX: 21.34 KG/M2 | DIASTOLIC BLOOD PRESSURE: 73 MMHG | TEMPERATURE: 98.4 F | HEIGHT: 61 IN | HEIGHT: 61 IN | HEART RATE: 99 BPM | BODY MASS INDEX: 21.14 KG/M2 | HEIGHT: 61 IN | HEART RATE: 107 BPM | OXYGEN SATURATION: 96 % | DIASTOLIC BLOOD PRESSURE: 80 MMHG | BODY MASS INDEX: 21.34 KG/M2 | TEMPERATURE: 99 F | WEIGHT: 112 LBS | DIASTOLIC BLOOD PRESSURE: 70 MMHG

## 2022-02-12 VITALS
TEMPERATURE: 98 F | BODY MASS INDEX: 21.5 KG/M2 | DIASTOLIC BLOOD PRESSURE: 80 MMHG | SYSTOLIC BLOOD PRESSURE: 114 MMHG | WEIGHT: 113.9 LBS | OXYGEN SATURATION: 97 % | HEART RATE: 97 BPM | HEIGHT: 61 IN

## 2022-02-12 VITALS
WEIGHT: 109 LBS | SYSTOLIC BLOOD PRESSURE: 114 MMHG | TEMPERATURE: 97.9 F | HEIGHT: 61 IN | BODY MASS INDEX: 20.58 KG/M2 | HEART RATE: 88 BPM | DIASTOLIC BLOOD PRESSURE: 88 MMHG | RESPIRATION RATE: 16 BRPM

## 2022-02-12 VITALS — BODY MASS INDEX: 21.34 KG/M2 | HEIGHT: 61 IN

## 2022-02-12 VITALS — BODY MASS INDEX: 22.92 KG/M2 | HEIGHT: 61 IN

## 2022-02-15 NOTE — PROGRESS NOTES
Patient:   GUNNAR DOLL            MRN: [id number]            FIN: [id number]               Age:   71 years     Sex:  Female     :  1950   Associated Diagnoses:   GERD (gastroesophageal reflux disease); Genital HSV; MILTON (generalized anxiety disorder); Chronic insomnia; Wellness examination; Osteoporosis; Family history of colon cancer; Asthma; Urge incontinence; Thrombocytosis; MVP (mitral valve prolapse); Dyslipidemia   Author:   Pollo Terry MD      Visit Information   Visit type:  Annual exam.    Accompanied by:  No one.    Source of history:  Self.    History limitation:  None.       Chief Complaint   12/3/2021 10:16 AM [facility]   Medicare AWV.      History of Present Illness    The patient is a healthy 71-year-old with history of gastroesophageal reflux disease, genital HSV on suppressive therapy, anxiety disorder, chronic insomnia, osteoporosis, family history of colon cancer in two sisters (one at a young age and one older), asthma currently not requiring any medication, mitral valve prolapse, and dyslipidemia.    She complains of incontinence with urgency that can happen quite suddenly.  She does not describe stress type incontinence; symptoms are nocturnal problems.  Nocturia once per night.       On complete review of systems she has chronic bilateral tinnitus, brushes and flosses daily, occasional heartburn, joint pain, allergies, history of blood transfusion, eczema since childhood which she treats.       No significant positives on the Health Risk Assessment form.  She has some stress about her  s progressive dementia.    GDS short form score is 0.  She drinks two glasses of wine 5 or 6 times per week.    Health History is reviewed.    She has not had Reclast for her osteoporosis in a couple of years.     She is due for colonoscopy.     Patient had COVID booster and flu shot this year.           Review of Systems          See HPI       Health Status   Allergies:    Allergic Reactions  (Selected)  Severity Not Documented  Codeine (No reactions were documented)  Latex (No reactions were documented)  Macrobid (Rash)  Nonallergic Reactions (Selected)  Mild  Augmentin (Vomiting)   Medications:  (Selected)   Prescriptions  Prescribed  Clobetasol (Eqv-Temovate) 0.05% topical cream: 1 shantel, Topical, bid, # 45 gm, 0 Refill(s), Pharmacy: [facility], APPLY 1 APPLICATION  TOPICALLY TWICE DAILY, 60.75, in, 06/01/21 10:37:00 CDT, Height Measured, 120.3, lb, 06/01/21 10:37:00 CDT, Weight Measured  Myrbetriq 25 mg oral tablet, extended release: = 1 tab(s) ( 25 mg ), Oral, daily, # 30 tab(s), 5 Refill(s), Type: Maintenance, Pharmacy: [facility] #[id number], 1 tab(s) Oral daily, 60.5, in, 12/03/21 10:16:00 CST, Height Measured, 113.9, lb, 12/03/21 10:16:00 CST, Weight Measured  Spacer: Spacer, See Instructions, Instructions: use as directed with inhaler., Supply, # 1 EA, 0 Refill(s), Type: Maintenance, Pharmacy: [facility] #[id number], use as directed with inhaler.  acyclovir 400 mg oral tablet: = 1 tab(s), Oral, bid, # 180 tab(s), 3 Refill(s), Pharmacy: [facility], TAKE 1 TABLET BY MOUTH  TWICE DAILY, 60.75, in, 10/12/21 16:29:00 CDT, Height Measured, 120.3, lb, 06/01/21 10:37:00 CDT, Weight Measured  acyclovir 400 mg oral tablet: = 1 tab(s), Oral, bid, # 180 tab(s), 3 Refill(s), Pharmacy: [facility], TAKE 1 TABLET BY MOUTH  TWICE DAILY, 60.75, in, 10/12/21 16:29:00 CDT, Height Measured, 120.3, lb, 06/01/21 10:37:00 CDT, Weight Measured  busPIRone 7.5 mg oral tablet: = 1 tab(s) ( 7.5 mg ), Oral, bid, # 180 tab(s), 3 Refill(s), Type: Maintenance, [id number], 1 tab(s) Oral bid,x90 day(s), 60.75, in, 10/12/21 16:29:00 CDT, Height Measured, 120.3, lb, 06/01/21 10:37:00 CDT, Weight Measured  lovastatin 40 mg oral tablet: = 1 tab(s), Oral, daily, # 90 tab(s), 0 Refill(s), Type: Maintenance, Pharmacy: [facility], 1 tab(s) Oral daily, 60.75, in,  10/12/21 16:29:00 CDT, Height Measured, 120.3, lb, 06/01/21 10:37:00 CDT, Weight Measured  Documented Medications  Documented  Caltrate Gummy Bites 250 mg-400 intl units oral tablet, chewable: See Instructions, Instructions: 5 gummies Chewed, 0 Refill(s), Type: Maintenance  Reclast 5 mg/100 mL intravenous solution: ( 5 mg ), IV, once, Instructions: Annual in January, 0 Refill(s), Type: Maintenance  copper: 0 Refill(s), Type: Maintenance  melatonin 10 mg oral tablet: = 1 tab(s) ( 10 mg ), Oral, hs, 0 Refill(s), Type: Maintenance,    Medications          *denotes recorded medication          Spacer: See Instructions, use as directed with inhaler., 1 EA, 0 Refill(s).          acyclovir 400 mg oral tablet: 1 tab(s), Oral, bid, 180 tab(s), 3 Refill(s).          acyclovir 400 mg oral tablet: 1 tab(s), Oral, bid, 180 tab(s), 3 Refill(s).          busPIRone 7.5 mg oral tablet: 7.5 mg, 1 tab(s), Oral, bid, for 90 day(s), 180 tab(s), 3 Refill(s).          *Caltrate Gummy Bites 250 mg-400 intl units oral tablet, chewable: See Instructions, 5 gummies Chewed, 0 Refill(s).          Clobetasol (Eqv-Temovate) 0.05% topical cream: 1 shantel, Topical, bid, 45 gm, 0 Refill(s).          *copper: 0 Refill(s).          lovastatin 40 mg oral tablet: 1 tab(s), Oral, daily, 90 tab(s), 0 Refill(s).          *melatonin 10 mg oral tablet: 10 mg, 1 tab(s), Oral, hs, 0 Refill(s).          Myrbetriq 25 mg oral tablet, extended release: 25 mg, 1 tab(s), Oral, daily, 30 tab(s), 5 Refill(s).          *Reclast 5 mg/100 mL intravenous solution: 5 mg, IV, once, Annual in January, 0 Refill(s).       Problem list:    All Problems  Asthma / SNOMED CT 208870298 / Confirmed  Chronic insomnia / SNOMED CT 818926373 / Confirmed  Dyslipidemia / SNOMED CT 2938397927 / Confirmed  Eczema / SNOMED CT 52526586 / Confirmed  Family history of colon cancer / SNOMED CT 750184067 / Confirmed  MILTON (generalized anxiety disorder) / SNOMED CT 30512453 / Confirmed  Genital HSV /  SNOMED CT 13451140 / Confirmed  GERD (gastroesophageal reflux disease) / SNOMED CT 357231261 / Confirmed  MVP (mitral valve prolapse) / SNOMED CT 1736216214 / Confirmed  Osteoporosis / SNOMED CT 710294585 / Confirmed  Thickened endometrium / SNOMED CT 6924209048 / Confirmed  Thrombocytosis / SNOMED CT 59413511 / Confirmed  Resolved: Inpatient stay / SNOMED CT 989652807  Resolved: Pregnancy / SNOMED CT 347562619  Resolved: Pregnancy / SNOMED CT 874667197  Resolved: Smoking 1/2 pack a day or less / SNOMED CT 665638301  Resolved: Stress-induced cardiomyopathy / SNOMED CT 5864423689      Histories   Past Medical History:    Active  GERD (gastroesophageal reflux disease) (741648354)  Chronic insomnia (584664777)  Asthma (685866563)  MVP (mitral valve prolapse) (3829952669)  Eczema (24922397)  Genital HSV (70174795)  Osteoporosis (172550676)  Dyslipidemia (4511722333)  Resolved  Inpatient stay (006874563): Onset on 3/22/2020 at 69 years.  Resolved on 3/23/2020 at 69 years.  Comments:  3/23/2020 CDT 1:08 PM CDT - [facility] [place], [place] - Acute appendicitis, transferred to [place], [place].  Pregnancy (379323269): Onset on 12/5/1978 at 28 years.  Resolved on 9/5/1979 at 28 years.  Pregnancy (478536897): Onset on 4/1/1975 at 24 years.  Resolved on 12/30/1975 at 25 years.  Smoking 1/2 pack a day or less (037366501):  Resolved.  Stress-induced cardiomyopathy (8995270124):  Resolved.   Family History:    Dementia  Mother  Hypertension  Sister  Bleeding disorder  Brother  Allergic rhinitis  Sister  High blood pressure  Sister  Arthritis  Aunt (M)  Mother  Sister  Alcoholism  Sister  Cancer of colon  Sister (Ghislaine): onset at 55 years.  Sister: onset at 82 .  Hypercholesterolemia  Sister  Kidney disease  Grandmother (P)  Sister  Depression  Sister  Mother  Seizure  Brother  Obesity  Sister  Breast Cancer  Mother: onset at 72 .     Procedure history:    Appendectomy (580434368) on 3/23/2020 at 69  Years.  Extracapsular cataract extraction and insertion of intraocular lens (860643310) on 11/2/2017 at 67 Years.  Comments:  12/14/2017 10:40 AM CST - [facility] Gayatri  Right.  Extracapsular cataract extraction and insertion of intraocular lens (807171270) on 10/19/2017 at 67 Years.  Comments:  11/21/2017 12:20 PM CST - [facility]  Left.  Colonoscopy (416142701) on 6/10/2016 at 65 Years.  Comments:  10/16/2017 2:21 PM CDT - Pollo Terry MD  Every 5 years for family history  Colonoscopy (236482375) on 4/11/2001 at 50 Years.  H/O: tubal ligation (887444842).  Caesarean section (83312880).  Comments:  10/16/2017 2:09 PM CDT - Dayanara SELBY, [place]  x 2  Extraction of wisdom tooth (521380980).  History of tonsillectomy (6064192972).  H/O: blood transfusion (371874978).   Social History:        Electronic Cigarette/Vaping Assessment            Electronic Cigarette Use: Never.      Alcohol Assessment: Current            Wine (5 oz), Daily, Ready to change: No.      Tobacco Assessment: Current            Cigarettes, 2 per day.  Ready to change: Yes.            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      Home and Environment Assessment            Marital status:  (Living together).  Spouse/Partner name: Bebo.  Living situation:               Home/Independent.  Injuries/Abuse/Neglect in household: No.  Feels unsafe at home: No.  Family/Friends               available for support: Yes.      Nutrition and Health Assessment            Type of diet: Regular.  Wants to lose weight: No.  Sleeping concerns: No.  Feels highly stressed: No.      Exercise and Physical Activity Assessment: Regular exercise            Exercise  frequency: 3-4 times/week.  Exercise type: Curves.      Sexual Assessment            Sexually active: Yes.  Identifies as female, Sexual orientation: Straight or heterosexual.  History of STD:               Yes.  Contraceptive Use Details: None.  History of sexual abuse: No.        Physical Examination   Vital Signs   12/3/2021 10:16 AM CST Temperature Tympanic 98.0 DegF    Peripheral Pulse Rate 97 bpm    HR Method Electronic    Systolic Blood Pressure 114 mmHg    Diastolic Blood Pressure 80 mmHg    Mean Arterial Pressure 91 mmHg    BP Site Right arm    BP Method Manual    Oxygen Saturation 97 %      Measurements from flowsheet : Measurements   12/3/2021 10:16 AM CST Height Measured - Standard 60.5 in    Weight Measured - Standard 113.9 lb    BSA 1.48 m2    Body Mass Index 21.88 kg/m2      General:  Alert and oriented, No acute distress.    Eye:  Normal conjunctiva.    HENT:  Normocephalic, Normal hearing.    Neck:  Supple, Non-tender, No carotid bruit, No lymphadenopathy, No thyromegaly.    Respiratory:  Lungs are clear to auscultation, Respirations are non-labored.    Cardiovascular:  Normal rate, Regular rhythm, No murmur, No gallop, Normal peripheral perfusion, No edema.         Arterial pulses: Bilateral, Carotid, Posterior tibial, Within normal limits.    Gastrointestinal:  Soft, Non-tender, No organomegaly.    Lymphatics:  No lymphadenopathy neck, axilla, groin.    Musculoskeletal:  No deformity, Normal gait.    Integumentary:  No pallor.    Neurologic:  No focal deficits.    Cognition and Speech:  Speech clear and coherent, Functional cognition intact.    Psychiatric:  Cooperative, Appropriate mood & affect.       Review / Management   ECG interpretation:  Time  10/16/2017 2:56:00 PM, Rate  1  beats per minute, Within normal limits.       Impression and Plan   Diagnosis     GERD (gastroesophageal reflux disease) (GRW20-MR K21.9).     Genital HSV (OYG39-AK A60.00).     MILTON (generalized anxiety disorder)  (NIO84-BD F41.1).     Chronic insomnia (SNE46-ST F51.04).     Wellness examination (GSI60-YE Z00.00).     Osteoporosis (GKI47-PG M81.0).     Family history of colon cancer (SJK60-MO Z80.0).     Asthma (FVX26-HH J45.909).     Urge incontinence (YGW46-JF N39.41).     Thrombocytosis (ZCV26-VB D47.3).     MVP (mitral valve prolapse) (IJA32-EQ I34.1).     Dyslipidemia (IDT12-RZ E78.5).     Course:  Progressing as expected.    Patient Instructions:       Counseled: Patient, Regarding diagnosis, Regarding treatment, Regarding medications, Verbalized understanding.    Summary:  Urge incontinence requesting treatment. .    Orders     Orders (Selected)   Outpatient Orders  Ordered  Referral (Request): 12/03/21 10:43:00 CST, Referred to: Urology, Reason for referral: In, Urge incontinence  Return to Clinic (Request): RFV: Annual CBC and lipids, Lipids one time  Return to Clinic (Request): RFV: Wellness exam, Return in one year  Prescriptions  Prescribed  Clobetasol (Eqv-Temovate) 0.05% topical cream: 1 shantel, Topical, bid, # 45 gm, 0 Refill(s), Pharmacy: [facility], APPLY 1 APPLICATION  TOPICALLY TWICE DAILY, 60.75, in, 06/01/21 10:37:00 CDT, Height Measured, 120.3, lb, 06/01/21 10:37:00 CDT, Weight Measured  Myrbetriq 25 mg oral tablet, extended release: = 1 tab(s) ( 25 mg ), Oral, daily, # 30 tab(s), 5 Refill(s), Type: Maintenance, Pharmacy: [facility] #[id number], 1 tab(s) Oral daily, 60.5, in, 12/03/21 10:16:00 CST, Height Measured, 113.9, lb, 12/03/21 10:16:00 CST, Weight Measured  Spacer: Spacer, See Instructions, Instructions: use as directed with inhaler., Supply, # 1 EA, 0 Refill(s), Type: Maintenance, Pharmacy: [facility] #[id number], use as directed with inhaler.  acyclovir 400 mg oral tablet: = 1 tab(s), Oral, bid, # 180 tab(s), 3 Refill(s), [facility], TAKE 1 TABLET BY MOUTH  TWICE DAILY, 60.75, in, 10/12/21 16:29:00 CDT, Height Measured, 120.3, lb, 06/01/21 10:37:00 CDT,  Weight Measured  acyclovir 400 mg oral tablet: = 1 tab(s), Oral, bid, # 180 tab(s), 3 Refill(s), Pharmacy: [facility], TAKE 1 TABLET BY MOUTH  TWICE DAILY, 60.75, in, 10/12/21 16:29:00 CDT, Height Measured, 120.3, lb, 06/01/21 10:37:00 CDT, Weight Measured  busPIRone 7.5 mg oral tablet: = 1 tab(s) ( 7.5 mg ), Oral, bid, # 180 tab(s), 3 Refill(s), Type: Maintenance, Pharmacy: [facility] #[id number], 1 tab(s) Oral bid,x90 day(s), 60.75, in, 10/12/21 16:29:00 CDT, Height Measured, 120.3, lb, 06/01/21 10:37:00 CDT, Weight Measured  lovastatin 40 mg oral tablet: = 1 tab(s), Oral, daily, # 90 tab(s), 0 Refill(s), Type: Maintenance, Pharmacy: [facility], 1 tab(s) Oral daily, 60.75, in, 10/12/21 16:29:00 CDT, Height Measured, 120.3, lb, 06/01/21 10:37:00 CDT, Weight Measured  Documented Medications  Documented  Caltrate Gummy Bites 250 mg-400 intl units oral tablet, chewable: See Instructions, Instructions: 5 gummies Chewed, 0 Refill(s), Type: Maintenance  Reclast 5 mg/100 mL intravenous solution: ( 5 mg ), IV, once, Instructions: Annual in January, 0 Refill(s), Type: Maintenance  copper: 0 Refill(s), Type: Maintenance  melatonin 10 mg oral tablet: = 1 tab(s) ( 10 mg ), Oral, hs, 0 Refill(s), Type: Maintenance.     Annual Reclast.     Annual Flu and Mammo.     Shingrix and Adacel recommended.     Referral for colonoscopy.

## 2022-02-15 NOTE — NURSING NOTE
Infusion order faxed to [facility] [phone] at [phone]. Confirmation received at 4832

## 2022-02-15 NOTE — PROGRESS NOTES
Chief Complaint    f/u anxiety. Verbal consent given for video visit.  History of Present Illness       Visit Information       Visit type: Video Visit via [employer] or [employer].       Participants in room during visit: 1       Location of patient: Home       Location of provider: [address] office (Clinic office or Home office)       Video Start Time: 1010       Video End Time:   1030       Today's visit was conducted via video conference due to the COVID-19 pandemic. The patient's consent to proceed with a video visit has been obtained and documented.       Shannan is doing better.  She has had anxiety since a critical illness with perforated appendicitis in late March 2020.  MILTON-7 score currently 4.  Generally sleeping well but had a sleepless night last night with rumination.  Denies depressed mood or anhedonia.  Needs her BuSpar refilled though she has contemplated tapering off of it she is decided to wait till spring.  Has a history of HSV and dyslipidemia on medication.  She feels that her health is currently good.  She has some distress due to the coronavirus epidemic.  Review of Systems       No fever, chills, cough, dyspnea, abdominal pain, headache, myalgia, abdominal pain.  Physical Exam   Vitals & Measurements    HT: 60.75 in        Patient appears comfortable and in no distress.  Alert and oriented.  Affect normal.  No increased work of breathing.  Assessment/Plan       Dyslipidemia (E78.5)          Stable         Ordered:          66047 office outpatient visit 25 minutes (Charge), Quantity: 1, Genital HSV  Dyslipidemia  MILTON (generalized anxiety disorder)                MILTON (generalized anxiety disorder) (F41.1)          Associated with a critical illness.  Improved with BuSpar.         Ordered:          40972 office outpatient visit 25 minutes (Charge), Quantity: 1, Genital HSV  Dyslipidemia  MILTON (generalized anxiety disorder)                Genital HSV (A60.00)          On suppressive therapy          Ordered:          04075 office outpatient visit 25 minutes (Charge), Quantity: 1, Genital HSV  Dyslipidemia  MILTON (generalized anxiety disorder)                Orders:         acyclovir, See Instructions, Instructions: TAKE 1 TABLET BY MOUTH TWO TIMES DAILY, # 180 tab(s), 3 Refill(s), Type: Soft Stop, Pharmacy: [facility], TAKE 1 TABLET BY MOUTH TWO TIMES DAILY, 60.75, in, 11/02/20 9:52:00 CST, Height Measured, 112, lb, 04..., (Ordered)         busPIRone, = 1 tab(s) ( 15 mg ), Oral, bid, # 180 tab(s), 3 Refill(s), Type: Maintenance, Pharmacy: [facility], Dosage Change, 1 tab(s) Oral bid,x90 day(s), 60.75, in, 11/02/20 9:52:00 CST, Height Measured, 112, lb, 04/06/20 10:57:00 CDT, Weight Measured, (Ordered)         lovastatin, = 1 tab(s) ( 40 mg ), PO, Daily, # 90 tab(s), 3 Refill(s), Type: Maintenance, Pharmacy: [facility], 1 tab(s) Oral daily, 60.75, in, 11/02/20 9:52:00 CST, Height Measured, 112, lb, 04/06/20 10:57:00 CDT, Weight Measured, (Ordered)  Patient Information     Name:GUNNAR DOLL      Address:      [address] [place] [id number]     Sex:Female     YOB: 1950     [phone]     Emergency Contact:YUKI DOLL     MRN:[id number]     FIN:[id number]     Location:[facility]     Date of Service:11/02/2020      Primary Care Physician:       Pollo Terry MD, [phone]      Attending Physician:       Pollo Terry MD, [phone]  Problem List/Past Medical History    Ongoing     Asthma     Chronic insomnia     Dyslipidemia     Eczema     Family history of colon cancer     MILTON (generalized anxiety disorder)     Genital HSV     GERD (gastroesophageal reflux disease)     MVP (mitral valve prolapse)     Osteoporosis     Thickened endometrium     Thrombocytosis    Historical     Inpatient stay       Comments: [place], [place] - Acute appendicitis, transferred to [place], [place].     Pregnancy      Pregnancy     Smoking 1/2 pack a day or less     Stress-induced cardiomyopathy  Procedure/Surgical History     Appendectomy (03/23/2020)     Extracapsular cataract extraction and insertion of intraocular lens (11/02/2017)      Comments: Right..     Extracapsular cataract extraction and insertion of intraocular lens (10/19/2017)      Comments: Left..     Colonoscopy (06/10/2016)      Comments: Every 5 years for family history.     Colonoscopy (04/11/2001)     Caesarean section      Comments: x 2.     Extraction of wisdom tooth     H/O: blood transfusion     H/O: tubal ligation     History of tonsillectomy  Medications    acyclovir 400 mg oral tablet, See Instructions, 3 refills    albuterol 90 mcg/inh inhalation aerosol, 2 puff(s), Inhale, qid, PRN, 1 refills    Benadryl    busPIRone 15 mg oral tablet, 15 mg= 1 tab(s), Oral, bid, 3 refills    clobetasol 0.05% topical cream, 1 shantel, Topical, bid    copper    fluconazole 150 mg oral tablet, 150 mg= 1 tab(s), Oral, daily    hydrocortisone 1% topical cream, 1 shantel, Topical, tid    lovastatin 40 mg oral tablet, 40 mg= 1 tab(s), Oral, daily, 3 refills    Spacer, See Instructions  Allergies    Augmentin (Vomiting)    Latex    Macrobid (rash)    codeine  Social History    Smoking Status     Former smoker     Alcohol - Current      Wine (5 oz), Daily, Ready to change: No.     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     Exercise - Regular exercise      Exercise frequency: 3-4 times/week. Exercise type: Curves.     Home/Environment      Marital status:  (Living together). Spouse/Partner name: Bebo. Living situation: Home/Independent. Injuries/Abuse/Neglect in household: No. Feels unsafe at home: No. Family/Friends available for support: Yes.     Nutrition/Health      Type of diet: Regular. Wants to lose weight: No. Sleeping concerns: No. Feels highly stressed: No.     Sexual       Sexually active: Yes. Identifies as female, Sexual orientation: Straight or heterosexual. History of STD: Yes. Contraceptive Use Details: None. History of sexual abuse: No.     Substance Abuse - Denies Substance Abuse      Never     Tobacco - Current      Cigarettes, 2 per day. Ready to change: Yes.  Family History    Alcoholism: Sister.    Allergic rhinitis: Sister.    Arthritis: Mother, Sister and Aunt (M).    Bleeding disorder: Brother.    Breast Cancer: Mother (Dx at 72).    Cancer of colon: Sister (Dx at 55 years).    Dementia: Mother.    Depression: Mother and Sister.    High blood pressure: Sister.    Hypercholesterolemia: Sister.    Hypertension: Sister.    Kidney disease: Sister and Grandmother (P).    Obesity: Sister.    Seizure: Brother.  Immunizations      Vaccine Date Status          pneumococcal (PPSV23) 12/23/2019 Given          influenza virus vaccine, inactivated 10/03/2019 Recorded          influenza 10/22/2018 Recorded              Comments : [10/24/2018] [place] [facility]          influenza virus vaccine, inactivated 10/12/2017 Recorded          ZOS, shingles 03/16/2010 Recorded          ZOS, shingles 03/04/2010 Recorded          tetanus/diphth/pertuss (Tdap) adult/adol 01/07/2009 Recorded          ZOS, shingles 03/16/2004 Recorded  Lab Results          Lab Results (Last 4 results within 90 days)           WBC: 13 High [3.8  - 10.8] (09/10/20 10:09:00)          RBC: 4.72 [3.8  - 5.1] (09/10/20 10:09:00)          Hgb: 14.5 gm/dL [11.7 gm/dL - 15.5 gm/dL] (09/10/20 10:09:00)          Hct: 43.5 % [35 % - 45 %] (09/10/20 10:09:00)          MCV: 92.2 fL [80 fL - 100 fL] (09/10/20 10:09:00)          MCH: 30.7 pg [27 pg - 33 pg] (09/10/20 10:09:00)          MCHC: 33.3 gm/dL [32 gm/dL - 36 gm/dL] (09/10/20 10:09:00)          RDW: 13.5 % [11 % - 15 %] (09/10/20 10:09:00)          Platelet: 279 [140  - 400] (09/10/20 10:09:00)          MPV: 10.5 fL [7.5 fL - 12.5 fL] (09/10/20  10:09:00)

## 2022-02-15 NOTE — NURSING NOTE
Comprehensive Intake Entered On:  4/4/2020 1:28 PM CDT    Performed On:  4/4/2020 1:28 PM CDT by Barbara Michel               Summary   Chief Complaint :   Discuss pain meds.  Had telemedicine visit yesterday with JDL.     Height Measured :   60.75 in(Converted to: 5 ft 1 in, 154.30 cm)    Barbara Michel - 4/4/2020 1:28 PM CDT

## 2022-02-15 NOTE — TELEPHONE ENCOUNTER
---------------------  From: Kisha Baca CMA (eRx Pool ([id number]_[facility]))   To: RAYMUNDO Message Pool ([address]_[place]);     Sent: 10/6/2021 1:54:29 PM CDT  Subject: FW: Medication Management   Due Date/Time: 10/6/2021 10:20:00 AM CDT     Last visit 6/1/21 [facility] November    Last filled 2/22/21 #45gm      ------------------------------------------  From: [facility]  To: Pollo Terry MD  Sent: October 5, 2021 10:20:04 AM CDT  Subject: Medication Management  Due: October 2, 2021 3:21:24 PM CDT     ** On Hold Pending Signature **     Drug: clobetasol topical (Clobetasol (Eqv-Temovate) 0.05% topical cream), APPLY 1 APPLICATION TOPICALLY TWICE DAILY  Quantity: 45 gm  Days Supply: 90  Refills: 0  Substitutions Allowed  Notes from Pharmacy: 3 * 15 GM = 45 GM Total - Ref: [id number]     Dispensed Drug: clobetasol topical (Clobetasol (Eqv-Temovate) 0.05% topical cream), APPLY 1 APPLICATION TOPICALLY TWICE DAILY  Quantity: 45 gm  Days Supply: 90  Refills: 0  Substitutions Allowed  Notes from Pharmacy:  ---------------------------------------------------------------  From: Chico/Nell ENCISO ([facility] Message Pool ([id number]_[place]))   To: Pollo Terry MD;     Sent: 10/7/2021 6:51:34 AM CDT  Subject: FW: Medication Management   Due Date/Time: 10/7/2021 10:20:00 AM CDT---------------------  From: Pollo Terry MD   To: [facility]    Sent: 10/7/2021 8:03:36 AM CDT  Subject: FW: Medication Management     ** Submitted: **  Complete:clobetasol topical (clobetasol 0.05% topical cream)   Signed by Pollo Terry MD  10/7/2021 1:03:00 PM [facility]    ** Approved with modifications: **  clobetasol topical (CLOBETASOL  0.05%  CRE)  APPLY 1 APPLICATION  TOPICALLY TWICE DAILY  Qty:  45 gm        Days Supply:  90        Refills:  0          Substitutions Allowed     Route To Pharmacy - [facility]

## 2022-02-15 NOTE — TELEPHONE ENCOUNTER
GUNNAR DOLL : 1950  2020 MRN: [id number]  AFTER HOURS PHONE NOTE: [phone]  Time Paged: 7:25 a.m.   Time Call Retuned: 7:28 a.m.   S: I spoke with the patient.  She reports that she requested refills of her medications but her amitriptyline was not refilled.  She reports that she has been taking three tablets each day.  Her prescription instructions say two tablets each day.    P: I have approved 30 day supply but notified her that she will need to talk with her primary physician about ongoing refills and whether increased dose is appropriate.    D:  2019  T:  2019                                                 Jl Hand MD/sq

## 2022-02-15 NOTE — TELEPHONE ENCOUNTER
---------------------  From: Holly Rosa LPN   To: [facility] Message Pool (32224_[place]);     Sent: 4/9/2020 11:56:51 AM CDT  Subject: Call back     PCP:   Dagoberto SANDS    Time of Call:  _ 1129       Person Calling:  _ Pt  Phone number:  _[phone]      Note:    patient calling back.     Please advise message for patient    Last office visit and reason:  _

## 2022-02-15 NOTE — TELEPHONE ENCOUNTER
---------------------  From: Nola Pacheco LPN (Phone Messages Pool (32224_[facility]))   Sent: 12/15/2020 10:14:08 AM CST  Subject: General Message       Phone Message Template      PCP:   [facility]     Time of Call:  0956       Person Calling:  Aileen  Phone number:  [phone]  Returned call at: 10:00, notified patient that they were printed and should have been sent out on 12/11/20    Note:   Patient stated that she did labs last week and has not received a letter with results yet.     Last office visit and reason:  11/02/20 medication refill.

## 2022-02-15 NOTE — PROGRESS NOTES
"Chief Complaint    Pt c/o feeling a sensation upper abdominal under rib cage x one month ago and off and on.  She thinks she had a CT scan that showed a hiatal hernia and wonders if that could be it.  History of Present Illness       Patient has had 4 episodes in the past month of a sensation in her upper upper abdomen \"like something rolling over.\"  Not painful.  Its very brief.  No diarrhea, constipation, nausea, vomiting, weight loss, dysuria, frequency, urgency.  No rectal bleeding.  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  Physical Exam   Vitals & Measurements    T: 98.1  F (Tympanic)  HR: 81 (Peripheral)  RR: 16  BP: 104/72  SpO2: 97%     WT: 121.2 lb        Patient appears comfortable.  Alert and oriented.  In good spirits.  Chest clear Picardi exam regular no murmur.  No edema.  Abdomen soft nontender no mass organomegaly.  Lower midline contact me..  Assessment/Plan       Sensation of gaseous abdominal fullness (R14.0)         Symptoms do not sound worrisome.  History and exam otherwise benign.  Patient will call if symptoms continue and would work it up further with a CT scan.         Ordered:          16071 office o/p est hi 40-54 min (Charge), Quantity: 1, Sensation of gaseous abdominal fullness                Orders:         busPIRone, See Instructions, Instructions: 0.5 tab(s) Oral bid 90 day(s), # 90 EA, 3 Refill(s), Type: Maintenance, Pharmacy: [facility], Dosage Change, 60.75, in, 11/02/20 9:52:00 CST, Height Measured, 112, lb, 04/06/20 10:57:00 CDT, Weight Measured, (Ordered)  Patient Information     Name:GUNNAR DOLL      Address:      [address] [place] [address] [address] [place], [place] [id number]     Sex:Female     YOB: 1950     [phone]     Emergency Contact:YUKI DOLL     MRN:[id number]     FIN:[id number]     Location:[facility]     Date of Service:04/23/2021    "   Primary Care Physician:       Pollo Terry MD, [phone]      Attending Physician:       Pollo Terry MD, [phone]  Problem List/Past Medical History    Ongoing     Asthma     Chronic insomnia     Dyslipidemia     Eczema     Family history of colon cancer     MILTON (generalized anxiety disorder)     Genital HSV     GERD (gastroesophageal reflux disease)     MVP (mitral valve prolapse)     Osteoporosis     Thickened endometrium     Thrombocytosis    Historical     Inpatient stay       Comments: [facility], [place] - Acute appendicitis, transferred to [place], [place].     Pregnancy     Pregnancy     Smoking 1/2 pack a day or less     Stress-induced cardiomyopathy  Procedure/Surgical History     Appendectomy (03/23/2020)     Extracapsular cataract extraction and insertion of intraocular lens (11/02/2017)      Comments: Right..     Extracapsular cataract extraction and insertion of intraocular lens (10/19/2017)      Comments: Left..     Colonoscopy (06/10/2016)      Comments: Every 5 years for family history.     Colonoscopy (04/11/2001)     Caesarean section      Comments: x 2.     Extraction of wisdom tooth     H/O: blood transfusion     H/O: tubal ligation     History of tonsillectomy  Medications    acyclovir 400 mg oral tablet, See Instructions, 3 refills    albuterol 90 mcg/inh inhalation aerosol, 2 puff(s), Inhale, qid, PRN, 1 refills    busPIRone 15 mg oral tablet, See Instructions, 3 refills    Caltrate Gummy Bites 250 mg-400 intl units oral tablet, chewable, See Instructions    clobetasol 0.05% topical cream, 1 shantel, Topical, bid    copper    lovastatin 40 mg oral tablet, 40 mg= 1 tab(s), Oral, daily, 3 refills    melatonin 10 mg oral tablet, 10 mg= 1 tab(s), Oral, hs    Spacer, See Instructions  Allergies    Augmentin (Vomiting)    Latex    Macrobid (rash)    codeine  Social History    Smoking Status     Former smoker     Alcohol - Current      Wine (5 oz), Daily, Ready to  change: No.     Electronic Cigarette/Vaping      Electronic Cigarette Use: Never.     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     Exercise - Regular exercise      Exercise frequency: 3-4 times/week. Exercise type: Curves.     Home/Environment      Marital status:  (Living together). Spouse/Partner name: Bebo. Living situation: Home/Independent. Injuries/Abuse/Neglect in household: No. Feels unsafe at home: No. Family/Friends available for support: Yes.     Nutrition/Health      Type of diet: Regular. Wants to lose weight: No. Sleeping concerns: No. Feels highly stressed: No.     Sexual      Sexually active: Yes. Identifies as female, Sexual orientation: Straight or heterosexual. History of STD: Yes. Contraceptive Use Details: None. History of sexual abuse: No.     Substance Abuse - Denies Substance Abuse      Never     Tobacco - Current      Former smoker, quit more than 30 days ago  Family History    Alcoholism: Sister.    Allergic rhinitis: Sister.    Arthritis: Mother, Sister and Aunt (M).    Bleeding disorder: Brother.    Breast Cancer: Mother (Dx at 72).    Cancer of colon: Sister (Dx at 55 years).    Dementia: Mother.    Depression: Mother and Sister.    High blood pressure: Sister.    Hypercholesterolemia: Sister.    Hypertension: Sister.    Kidney disease: Sister and Grandmother (P).    Obesity: Sister.    Seizure: Brother.  Immunizations      Vaccine Date Status          SARS-CoV-2 (COVID-19) Moderna-1273 02/17/2021 Recorded          influenza virus vaccine, inactivated 09/09/2020 Recorded          pneumococcal (PPSV23) 12/23/2019 Given          influenza virus vaccine, inactivated 10/03/2019 Recorded          influenza 10/22/2018 Recorded              Comments : [10/24/2018] Received at [facility]/pharmacy, [place], [place]          influenza virus vaccine, inactivated 10/12/2017 Recorded          ZOS, shingles 03/16/2010  Recorded          ZOS, shingles 03/04/2010 Recorded          tetanus/diphth/pertuss (Tdap) adult/adol 01/07/2009 Recorded          ZOS, shingles 03/16/2004 Recorded  Lab Results          Lab Results (Last 4 results within 90 days)           Cholesterol: 212 mg/dL High (02/01/21 09:04:00)          Non-HDL Cholesterol: 128 (02/01/21 09:04:00)          HDL: 84 mg/dL (02/01/21 09:04:00)          Cholesterol/HDL Ratio: 2.5 (02/01/21 09:04:00)          LDL: 112 High (02/01/21 09:04:00)          Triglyceride: 70 mg/dL (02/01/21 09:04:00)          Coronavirus SARS-CoV-2 (COVID-19) TR: Negative (03/15/21 14:00:00)

## 2022-02-15 NOTE — TELEPHONE ENCOUNTER
---------------------  From: Pollo Terry MD   To: Appointment Pool ([id number]_[place]);     Sent: 5/12/2020 3:45:06 PM CDT  Subject: General Message   Actions: Notify the patient for appointment      Schedule  GYN with Dr. Guillen.

## 2022-02-15 NOTE — PROGRESS NOTES
Chief Complaint    Sore throat, headache, congestion, drainage, sinus pressure.  Ongoing x7 days.  History of Present Illness      Having a headache. Has some laryngitis. Has pressure frontal area. Has post nasal drip. Decreased appetite. Last sinus infection was 30 years ago. Has a cough with sneezing and blowing her nose.  Review of Systems      No fevers      No vomiting      No tooth pain  Physical Exam   Vitals & Measurements    T: 97.7   F (Tympanic)  HR: 86(Peripheral)  BP: 122/80  SpO2: 98%     WT: 114.0 lb       General: No acute distress.      HENT: Tympanic membranes are clear, No pharyngeal erythema. Nares normal      Neck: No lymphadenopathy.      Respiratory: Lungs are clear to auscultation.      Cardiovascular: Normal rate, Regular rhythm.      Musculoskeletal: Normal gait.      Vomited with augmentin  Assessment/Plan      Sinusitis: Discussed NETI pot with Sudafed and chlorpheniramine.  If not improving over the next several days could start doxycycline.  Follow-up if not improving.  Patient Information     Name:GUNNAR DOLL      Address:      [address] [place] [address]       [place] [address]     Sex:Female     YOB: 1950     [phone]     Emergency Contact:[facility], CONTACT     MRN:[id number]     FIN:[id number]     Location:[facility]     Date of Service:05/05/2019      Primary Care Physician:       Pollo Terry MD, [phone]      Attending Physician:       Sanket Jaeger MD, [phone]  Problem List/Past Medical History    Ongoing     Asthma     Chronic insomnia     Eczema     Family history of colon cancer     Genital HSV     GERD (gastroesophageal reflux disease)     MVP (mitral valve prolapse)     Osteoporosis     Smoking 1/2 pack a day or less    Historical     Pregnancy     Pregnancy  Medications        Selenium  mcg oral tablet: 0 Refill(s).        Zantac 150: 150 mg, po, bid, 0 Refill(s).        copper: 0 Refill(s).         ibuprofen 400 mg oral tablet: 400 mg, 1 tab(s), po, q6 hrs, 0 Refill(s).        hydrocortisone 1% topical cream: 1 shantel, top, tid, PRN for eczema, 0 Refill(s).        nortriptyline 10 mg oral capsule: 20 mg, 2 cap(s), po, hs, 20 mg daily, 180 cap(s), 3 Refill(s).        lovastatin 40 mg oral tablet: 40 mg, 1 tab(s), PO, Daily, 90 tab(s), 3 Refill(s).        acyclovir 400 mg oral tablet: See Instructions, TAKE 1 TABLET BY MOUTH TWO  TIMES DAILY, 180 tab(s), 3 Refill(s).        aspirin 81 mg oral tablet: 81 mg, 1 tab(s), Oral, daily, 30 tab(s), 0 Refill(s).        Reclast 5 mg/100 mL intravenous solution: 5 mg, IV, qyear, infused over at least 15 minutes.  Annually in January starting 2019, 1 EA, 0 Refill(s).         Allergies    Augmentin (Vomiting)    Latex    Macrobid (rash)    codeine

## 2022-02-15 NOTE — TELEPHONE ENCOUNTER
GUNNAR DOLL. : 1950  2020 MEN [id number]  PHONE NOTE:  I spoke with the patient on the phone.  Her white count is 13,000 and CBC is otherwise normal.  This was done in follow up to elevated white count and platelets.  Her platelets are now normal and her white count is improved.  She tells me she feels  marvelous .  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    P:  We will repeat CBC in a few months.    D:  2020  T:  2020                                                Pollo Terry MD, FACP/sq

## 2022-02-15 NOTE — TELEPHONE ENCOUNTER
---------------------  From: Marley SELBYLiz   Sent: 4/27/2020 9:51:44 AM CDT  Subject: Trazadone request     PCP:   [facility]      Time of Call:  0916       Person Calling:  Patient  Phone number:  [phone]    Returned call at: 0935    Note:   Patient called stating she received a small supply of Trazodone from the ED and requests [facility] fill. Patient was informed [facility] is furloughed and offered visit w/ another provider. She declined at this time, but will call back if need be. She was seen in ED this weekend for UTI and was put on Keflex x7 days. Still c/o urgency, although improving.    Last office visit and reason:  4/14/20 Anxiety w/ [facility], was to f/u in 2 weeks.

## 2022-02-15 NOTE — TELEPHONE ENCOUNTER
---------------------  From: Flower Wang CMA   Sent: 1/25/2021 11:34:45 AM CST  Subject: MC call     Pt LM at 0955 in regards to last appt. Wondering if was AWV. Also questions on COVID vaccine. LM for pt per her request at 1115. RTC updated in chart for next November since [facility] filled all meds x 1 year last Nov at med check. Also LM with info on how to get updates on vaccine via website and phone #.

## 2022-02-15 NOTE — TELEPHONE ENCOUNTER
---------------------  From: Chico/Nell ENCISO (Phone [facility] (02824_[place]))   To: Pollo Terry MD;     Sent: 4/15/2021 9:31:10 AM CDT  Subject: General Message     Phone Message    PCP: [facility]    Time of Call: 0921    Phone Number: [phone]    Returned call at: n/a    Note: Patient calls stating that she has been decreasing Buspar over the last few weeks. Patient states that as on 4/17, pt will be starting to take 1.5 tabs or 7.5mg for a week. Patient is wondering where she needs to go with it from here- if she stops it?    Please advise.     [facility]armacy[facility]    Last office visit and reason: 3/15/21 phone visit-cough     Transferred to: [facility]---------------------  From: Pollo Terry MD   To: Phone Messages Pool (32224_[place]);     Sent: 4/15/2021 10:06:47 AM CDT  Subject: RE: General Message     ok to stop at that pointPatient notified.

## 2022-02-15 NOTE — TELEPHONE ENCOUNTER
Entered by Kisha Baca CMA on December 03, 2019 9:26:24 AM CST  ---------------------  From: Kisha Baca CMA   To: [facility] #[id number]    Sent: 12/3/2019 9:26:24 AM CST  Subject: Medication Management     ** Not Approved: Patient needs appointment, due for appt **  fluticasone nasal (FLUTICASONE 50MCG NASAL SP (120) RX)  SHAKE LIQUID AND USE 2 SPRAYS IN EACH NOSTRIL DAILY  Qty:  48 mL        Days Supply:  30        Refills:  0          Substitutions Allowed     Route To Pharmacy - [facility][id number]   Note from Pharmacy:  **Patient requests 90 days supply**  Signed by Kisha Baca CMA            Entered by Kisha Baca CMA on December 02, 2019 12:21:28 PM CST  1:45pm appt with GTG 12/2/19      ------------------------------------------  From: [facility] #[id number]  To: Sanket Gill MD  Sent: November 30, 2019 1:32:02 PM CST  Subject: Medication Management  Due: December 1, 2019 1:32:02 PM CST    ** On Hold Pending Signature **  Drug: fluticasone nasal (fluticasone 50 mcg/inh nasal spray)  2 SPRAY(S) NASAL DAILY  Quantity: 1 unknown unit  Days Supply: 0  Refills: 0  Substitutions Allowed  Notes from Pharmacy: **Patient requests 90 days supply**    Dispensed Drug: fluticasone nasal (fluticasone 50 mcg/inh nasal spray)  SHAKE LIQUID AND USE 2 SPRAYS IN EACH NOSTRIL DAILY  Quantity: 48 mL  Days Supply: 30  Refills: 0  Substitutions Allowed  Notes from Pharmacy: **Patient requests 90 days supply**  ------------------------------------------

## 2022-02-15 NOTE — NURSING NOTE
Quick Intake Entered On:  12/23/2019 10:25 AM CST    Performed On:  12/23/2019 10:25 AM CST by Pollo Terry MD               Summary   Height Measured :   60.75 in(Converted to: 5 ft 1 in, 154.30 cm)    Systolic Blood Pressure :   130 mmHg   Diastolic Blood Pressure :   78 mmHg   Mean Arterial Pressure :   95 mmHg   BP Site :   Right arm   BP Method :   Manual   Pollo Terry MD - 12/23/2019 10:25 AM CST

## 2022-02-15 NOTE — TELEPHONE ENCOUNTER
---------------------  From: Nola Mccullough CMA (Phone Messages Pool (32224_[facility]))   To: [facility] Message Pool ([address]);     Sent: 3/22/2021 9:10:07 AM CDT  Subject: phone note- Weaning off Buspirone     Phone Message    PCP:    [facility]      Time of Call:  9:06 am       Person Calling:  Shannan  Phone number:  [phone]    Note:  Patient called asking if she could wean off her Buspirone? Wondering how she can wean off?  Thinks it is messing with her sleep.     Last office visit and reason:  3/15/2021 cough    Transferred to: [facility] message pool---------------------  From: Nell Galloway ([facility] Message Pool ([facility]224[facility]))   To: Pollo Terry MD;     Sent: 3/22/2021 9:38:12 AM CDT  Subject: FW: phone note- Weaning off Buspirone---------------------  From: Pollo Terry MD   To: [facility] Message Pool (32224_[place]);     Sent: 3/22/2021 11:46:11 AM CDT  Subject: RE: phone note- Weaning off Buspirone     Decrease from one BID to 1/2 TID, then BID in 1-2 weeksPatient notified and agreed.

## 2022-02-15 NOTE — TELEPHONE ENCOUNTER
---------------------  From: Holly Rosa LPN   Sent: 6/15/2020 8:28:17 AM CDT  Subject: Rash     PCP:   Dagoberto SANDS     Time of Call:  _ 0759       Person Calling:  _ Pt  Phone number:  _ [phone]    Returned call at: _ 2872    Note:   _ Pt LM stating that she has a rash on her chest that started Friday. It started as a bug bite and now is a raised itchy red rash. She is taking Benadryl and states this is not even helping.     advised a video visit and transferred to scheduling.

## 2022-02-15 NOTE — NURSING NOTE
Comprehensive Intake Entered On:  3/15/2021 10:50 AM CDT    Performed On:  3/15/2021 10:49 AM CDT by Sharon Cuevas CMA               Summary   Chief Complaint :   Discuss COVID sx from this weekend- c/o productive cough, sneezing, nasal congestion, dizziness and HA. Verbal consent obtained for a phone visit.    Height Measured :   60.75 in(Converted to: 5 ft 1 in, 154.30 cm)    Sharon Cuevas CMA - 3/15/2021 10:49 AM CDT   Health Status   Allergies Verified? :   Yes   Medication History Verified? :   Yes   Pre-Visit Planning Status :   Completed   Tobacco Use? :   Former smoker   Sharon Cuevas CMA - 3/15/2021 10:49 AM CDT   Consents   Consent for Immunization Exchange :   Consent Granted   Consent for Immunizations to Providers :   Consent Granted   Sharon Cuevas CMA - 3/15/2021 10:49 AM CDT   Meds / Allergies   (As Of: 3/15/2021 10:50:34 AM CDT)   Allergies (Active)   Augmentin  Estimated Onset Date:   Unspecified ; Reactions:   Vomiting ; Created By:   Barbara Michel; Reaction Status:   Active ; Category:   Drug ; Substance:   Augmentin ; Type:   Sensitivity ; Severity:   Mild ; Updated By:   Barbara Michle; Reviewed Date:   12/29/2020 1:30 PM CST      codeine  Estimated Onset Date:   Unspecified ; Created By:   Travon Nichole CMA; Reaction Status:   Active ; Category:   Drug ; Substance:   codeine ; Type:   Allergy ; Updated By:   Travon Nichole CMA; Reviewed Date:   12/29/2020 1:30 PM CST      Latex  Estimated Onset Date:   Unspecified ; Created By:   Travon Nichole CMA; Reaction Status:   Active ; Category:   Drug ; Substance:   Latex ; Type:   Allergy ; Updated By:   Travon Nichole CMA; Reviewed Date:   12/29/2020 1:30 PM CST      Macrobid  Estimated Onset Date:   Unspecified ; Reactions:   rash ; Created By:   Travon Nichole CMA; Reaction Status:   Active ; Category:   Drug ; Substance:   Macrobid ; Type:   Allergy ; Updated By:   Travon Nichole CMA; Reviewed Date:   12/29/2020 1:30 PM CST         Medication List   (As Of: 3/15/2021 10:50:34 AM CDT)   Prescription/Discharge Order    clobetasol topical  :   clobetasol topical ; Status:   Prescribed ; Ordered As Mnemonic:   clobetasol 0.05% topical cream ; Simple Display Line:   1 shantel, Topical, bid, 45 gm, 0 Refill(s) ; Ordering Provider:   Pollo Terry MD; Catalog Code:   clobetasol topical ; Order Dt/Tm:   2/22/2021 10:32:16 AM CST          acyclovir  :   acyclovir ; Status:   Prescribed ; Ordered As Mnemonic:   acyclovir 400 mg oral tablet ; Simple Display Line:   See Instructions, TAKE 1 TABLET BY MOUTH TWO  TIMES DAILY, 180 tab(s), 3 Refill(s) ; Ordering Provider:   Pollo Terry MD; Catalog Code:   acyclovir ; Order Dt/Tm:   11/2/2020 10:23:21 AM CST          lovastatin  :   lovastatin ; Status:   Prescribed ; Ordered As Mnemonic:   lovastatin 40 mg oral tablet ; Simple Display Line:   40 mg, 1 tab(s), PO, Daily, 90 tab(s), 3 Refill(s) ; Ordering Provider:   Pollo Terry MD; Catalog Code:   lovastatin ; Order Dt/Tm:   11/2/2020 10:22:50 AM CST          busPIRone  :   busPIRone ; Status:   Prescribed ; Ordered As Mnemonic:   busPIRone 15 mg oral tablet ; Simple Display Line:   15 mg, 1 tab(s), Oral, bid, for 90 day(s), 180 tab(s), 3 Refill(s) ; Ordering Provider:   Pollo Terry MD; Catalog Code:   busPIRone ; Order Dt/Tm:   11/2/2020 10:21:27 AM CST          albuterol  :   albuterol ; Status:   Prescribed ; Ordered As Mnemonic:   albuterol 90 mcg/inh inhalation aerosol ; Simple Display Line:   2 puff(s), Inhale, qid, PRN: for shortness of breath or wheezing, 1 EA, 1 Refill(s) ; Ordering Provider:   Sanket Gill MD; Catalog Code:   albuterol ; Order Dt/Tm:   1/22/2020 3:17:33 PM CST          Miscellaneous Rx Supply  :   Miscellaneous Rx Supply ; Status:   Prescribed ; Ordered As Mnemonic:   Spacer ; Simple Display Line:   See Instructions, use as directed with inhaler., 1 EA, 0 Refill(s) ; Ordering Provider:   Sanket Gill MD;  Catalog Code:   Miscellaneous Rx Supply ; Order Dt/Tm:   1/22/2020 3:17:33 PM CST            Home Meds    calcium-vitamin D  :   calcium-vitamin D ; Status:   Documented ; Ordered As Mnemonic:   Caltrate Gummy Bites 250 mg-400 intl units oral tablet, chewable ; Simple Display Line:   See Instructions, 5 gummies Chewed, 0 Refill(s) ; Catalog Code:   calcium-vitamin D ; Order Dt/Tm:   12/29/2020 1:50:17 PM CST          copper  :   copper ; Status:   Documented ; Ordered As Mnemonic:   copper ; Simple Display Line:   0 Refill(s) ; Catalog Code:   copper ; Order Dt/Tm:   10/16/2017 2:04:52 PM CDT          hydrocortisone topical  :   hydrocortisone topical ; Status:   Completed ; Ordered As Mnemonic:   hydrocortisone 1% topical cream ; Simple Display Line:   1 shantel, top, tid, PRN for eczema, 0 Refill(s) ; Catalog Code:   hydrocortisone topical ; Order Dt/Tm:   10/16/2017 2:05:31 PM CDT            ID Risk Screen   Recent Travel History :   No recent travel   Family Member Travel History :   No recent travel   Other Exposure to Infectious Disease :   Unknown   COVID-19 Testing Status :   Positive COVID-19 test greater than 30 days ago   Sharon Cuevas CMA - 3/15/2021 10:49 AM CDT

## 2022-02-15 NOTE — NURSING NOTE
Comprehensive Intake Entered On:  5/13/2020 1:11 PM CDT    Performed On:  5/13/2020 1:10 PM CDT by Flower Wang CMA               Summary   Chief Complaint :   Consult per JDL for abdominal pressure/noted thickness of endometrial lining last month.    Height Measured :   60.75 in(Converted to: 5 ft 1 in, 154.30 cm)    Systolic Blood Pressure :   121 mmHg   Diastolic Blood Pressure :   80 mmHg   Mean Arterial Pressure :   94 mmHg   Peripheral Pulse Rate :   99 bpm   BP Site :   Left arm   Pulse Site :   Radial artery   BP Method :   Electronic   HR Method :   Electronic   Temperature Tympanic :   99.0 DegF(Converted to: 37.2 DegC)    Flower Wang CMA - 5/13/2020 1:10 PM CDT   Health Status   Allergies Verified? :   Yes   Medication History Verified? :   Yes   Pre-Visit Planning Status :   Completed   Flower Wang CMA - 5/13/2020 1:10 PM CDT   Meds / Allergies   (As Of: 5/13/2020 1:11:49 PM CDT)   Allergies (Active)   Augmentin  Estimated Onset Date:   Unspecified ; Reactions:   Vomiting ; Created By:   Barbara Michel; Reaction Status:   Active ; Category:   Drug ; Substance:   Augmentin ; Type:   Sensitivity ; Severity:   Mild ; Updated By:   Barbara Michel; Reviewed Date:   4/14/2020 8:57 AM CDT      codeine  Estimated Onset Date:   Unspecified ; Created By:   Travon Nichole CMA; Reaction Status:   Active ; Category:   Drug ; Substance:   codeine ; Type:   Allergy ; Updated By:   Travon Nichole CMA; Reviewed Date:   4/14/2020 8:57 AM CDT      Latex  Estimated Onset Date:   Unspecified ; Created By:   Travon Nichole CMA; Reaction Status:   Active ; Category:   Drug ; Substance:   Latex ; Type:   Allergy ; Updated By:   Travon Nichole CMA; Reviewed Date:   4/14/2020 8:57 AM CDT      Macrobid  Estimated Onset Date:   Unspecified ; Reactions:   rash ; Created By:   Travon Nichole CMA; Reaction Status:   Active ; Category:   Drug ; Substance:   Macrobid ; Type:   Allergy ; Updated By:   Travon Nichole CMA;  Reviewed Date:   4/14/2020 8:57 AM CDT        Medication List   (As Of: 5/13/2020 1:11:49 PM CDT)   Prescription/Discharge Order    acyclovir  :   acyclovir ; Status:   Prescribed ; Ordered As Mnemonic:   acyclovir 400 mg oral tablet ; Simple Display Line:   See Instructions, TAKE 1 TABLET BY MOUTH TWO  TIMES DAILY, 180 tab(s), 3 Refill(s) ; Ordering Provider:   Pollo Terry MD; Catalog Code:   acyclovir ; Order Dt/Tm:   12/23/2019 10:26:09 AM CST          albuterol  :   albuterol ; Status:   Prescribed ; Ordered As Mnemonic:   albuterol 90 mcg/inh inhalation aerosol ; Simple Display Line:   2 puff(s), Inhale, qid, PRN: for shortness of breath or wheezing, 1 EA, 1 Refill(s) ; Ordering Provider:   Sanket Gill MD; Catalog Code:   albuterol ; Order Dt/Tm:   1/22/2020 3:17:33 PM CST          busPIRone  :   busPIRone ; Status:   Prescribed ; Ordered As Mnemonic:   busPIRone 10 mg oral tablet ; Simple Display Line:   10 mg, 1 tab(s), Oral, bid, for 30 day(s), 60 tab(s), 4 Refill(s) ; Ordering Provider:   Pollo Terry MD; Catalog Code:   busPIRone ; Order Dt/Tm:   5/12/2020 12:59:04 PM CDT          lovastatin  :   lovastatin ; Status:   Prescribed ; Ordered As Mnemonic:   lovastatin 40 mg oral tablet ; Simple Display Line:   40 mg, 1 tab(s), PO, Daily, 90 tab(s), 3 Refill(s) ; Ordering Provider:   Pollo Terry MD; Catalog Code:   lovastatin ; Order Dt/Tm:   12/23/2019 10:26:41 AM CST          Miscellaneous Rx Supply  :   Miscellaneous Rx Supply ; Status:   Prescribed ; Ordered As Mnemonic:   Spacer ; Simple Display Line:   See Instructions, use as directed with inhaler., 1 EA, 0 Refill(s) ; Ordering Provider:   Sanket Gill MD; Catalog Code:   Miscellaneous Rx Supply ; Order Dt/Tm:   1/22/2020 3:17:33 PM CST          zoledronic acid  :   zoledronic acid ; Status:   Prescribed ; Ordered As Mnemonic:   Reclast 5 mg/100 mL intravenous solution ; Simple Display Line:   5 mg, IV, qyear, infused over at  least 15 minutes.  Annually in January starting 2019, 1 EA, 0 Refill(s) ; Ordering Provider:   Pollo Terry MD; Catalog Code:   zoledronic acid ; Order Dt/Tm:   12/14/2018 3:50:21 PM CST            Home Meds    clobetasol topical  :   clobetasol topical ; Status:   Documented ; Ordered As Mnemonic:   clobetasol 0.025% topical cream ; Simple Display Line:   Topical, bid, 0 Refill(s) ; Catalog Code:   clobetasol topical ; Order Dt/Tm:   11/12/2019 12:10:20 PM CST          copper  :   copper ; Status:   Documented ; Ordered As Mnemonic:   copper ; Simple Display Line:   0 Refill(s) ; Catalog Code:   copper ; Order Dt/Tm:   10/16/2017 2:04:52 PM CDT          diphenhydrAMINE  :   diphenhydrAMINE ; Status:   Documented ; Ordered As Mnemonic:   Benadryl ; Simple Display Line:   0 Refill(s) ; Catalog Code:   diphenhydrAMINE ; Order Dt/Tm:   12/23/2019 10:10:57 AM CST          fluconazole  :   fluconazole ; Status:   Documented ; Ordered As Mnemonic:   fluconazole 150 mg oral tablet ; Simple Display Line:   150 mg, 1 tab(s), Oral, daily, for 14 day(s), 14 tab(s), 0 Refill(s) ; Catalog Code:   fluconazole ; Order Dt/Tm:   4/2/2020 11:16:20 AM CDT          hydrocortisone topical  :   hydrocortisone topical ; Status:   Documented ; Ordered As Mnemonic:   hydrocortisone 1% topical cream ; Simple Display Line:   1 shantel, top, tid, PRN for eczema, 0 Refill(s) ; Catalog Code:   hydrocortisone topical ; Order Dt/Tm:   10/16/2017 2:05:31 PM CDT          selenium  :   selenium ; Status:   Documented ; Ordered As Mnemonic:   Selenium  mcg oral tablet ; Simple Display Line:   0 Refill(s) ; Catalog Code:   selenious acid ; Order Dt/Tm:   10/16/2017 2:04:59 PM CDT            ID Risk Screen   Recent Travel History :   No recent travel   Family Member Travel History :   No recent travel   Other Exposure to Infectious Disease :   Unknown   Flower Wang CMA - 5/13/2020 1:10 PM CDT

## 2022-02-15 NOTE — NURSING NOTE
Comprehensive Intake Entered On:  4/6/2020 11:05 AM CDT    Performed On:  4/6/2020 10:57 AM CDT by Nell Galloway               Summary   Chief Complaint :   Pt here today to get sutures and staples out. [facility] lab work. Patient is getting better, pain is still there but not as severe.   Weight Measured :   112.0 lb(Converted to: 112 lb 0 oz, 50.80 kg)    Height Measured :   60.75 in(Converted to: 5 ft 1 in, 154.30 cm)    Body Mass Index :   21.33 kg/m2   Body Surface Area :   1.47 m2   Systolic Blood Pressure :   119 mmHg   Diastolic Blood Pressure :   70 mmHg   Mean Arterial Pressure :   86 mmHg   Nell Galloway - 4/6/2020 10:57 AM CDT   Peripheral Pulse Rate :   82 bpm   Nell Galloway - 4/6/2020 11:07 AM CDT     BP Site :   Right arm   BP Method :   Electronic   HR Method :   Manual   Temperature Tympanic :   98.4 DegF(Converted to: 36.9 DegC)    Nell Galloway - 4/6/2020 10:57 AM CDT   Health Status   Allergies Verified? :   Yes   Medication History Verified? :   Yes   Medical History Verified? :   Yes   Pre-Visit Planning Status :   Completed   Tobacco Use? :   Former smoker   Nell Galloway - 4/6/2020 10:57 AM CDT   Consents   Consent for Immunization Exchange :   Consent Granted   Consent for Immunizations to Providers :   Consent Granted   Nell Galloway - 4/6/2020 10:57 AM CDT   Meds / Allergies   (As Of: 4/6/2020 11:05:54 AM CDT)   Allergies (Active)   Augmentin  Estimated Onset Date:   Unspecified ; Reactions:   Vomiting ; Created By:   Barbara Michel; Reaction Status:   Active ; Category:   Drug ; Substance:   Augmentin ; Type:   Sensitivity ; Severity:   Mild ; Updated By:   Barbara Michel; Reviewed Date:   4/6/2020 11:01 AM CDT      codeine  Estimated Onset Date:   Unspecified ; Created By:   Travon Nichole CMA; Reaction Status:   Active ; Category:   Drug ; Substance:   codeine ; Type:   Allergy ; Updated By:   Travon Nichole CMA; Reviewed Date:   4/6/2020 11:01 AM  CDT      Latex  Estimated Onset Date:   Unspecified ; Created By:   Travon Nichole CMA; Reaction Status:   Active ; Category:   Drug ; Substance:   Latex ; Type:   Allergy ; Updated By:   Travon Nichole CMA; Reviewed Date:   4/6/2020 11:01 AM CDT      Macrobid  Estimated Onset Date:   Unspecified ; Reactions:   rash ; Created By:   Travon Nichole CMA; Reaction Status:   Active ; Category:   Drug ; Substance:   Macrobid ; Type:   Allergy ; Updated By:   Travon Nichole CMA; Reviewed Date:   4/6/2020 11:01 AM CDT        Medication List   (As Of: 4/6/2020 11:05:54 AM CDT)   Prescription/Discharge Order    ALPRAZolam  :   ALPRAZolam ; Status:   Prescribed ; Ordered As Mnemonic:   Xanax 0.25 mg oral tablet ; Simple Display Line:   0.25 mg, 1 tab(s), Oral, hs, for 5 day(s), PRN: for anxiety, 5 tab(s), 0 Refill(s) ; Ordering Provider:   Olga Francois MD; Catalog Code:   ALPRAZolam ; Order Dt/Tm:   4/4/2020 2:07:54 PM CDT          albuterol  :   albuterol ; Status:   Prescribed ; Ordered As Mnemonic:   albuterol 90 mcg/inh inhalation aerosol ; Simple Display Line:   2 puff(s), Inhale, qid, PRN: for shortness of breath or wheezing, 1 EA, 1 Refill(s) ; Ordering Provider:   Sanket Gill MD; Catalog Code:   albuterol ; Order Dt/Tm:   1/22/2020 3:17:33 PM CST          Miscellaneous Rx Supply  :   Miscellaneous Rx Supply ; Status:   Prescribed ; Ordered As Mnemonic:   Spacer ; Simple Display Line:   See Instructions, use as directed with inhaler., 1 EA, 0 Refill(s) ; Ordering Provider:   Sanket Gill MD; Catalog Code:   Miscellaneous Rx Supply ; Order Dt/Tm:   1/22/2020 3:17:33 PM CST          acyclovir  :   acyclovir ; Status:   Prescribed ; Ordered As Mnemonic:   acyclovir 400 mg oral tablet ; Simple Display Line:   See Instructions, TAKE 1 TABLET BY MOUTH TWO  TIMES DAILY, 180 tab(s), 3 Refill(s) ; Ordering Provider:   Pollo Terry MD; Catalog Code:   acyclovir ; Order Dt/Tm:   12/23/2019 10:26:09 AM CST           lovastatin  :   lovastatin ; Status:   Prescribed ; Ordered As Mnemonic:   lovastatin 40 mg oral tablet ; Simple Display Line:   40 mg, 1 tab(s), PO, Daily, 90 tab(s), 3 Refill(s) ; Ordering Provider:   Pollo Terry MD; Catalog Code:   lovastatin ; Order Dt/Tm:   12/23/2019 10:26:41 AM CST          zoledronic acid  :   zoledronic acid ; Status:   Prescribed ; Ordered As Mnemonic:   Reclast 5 mg/100 mL intravenous solution ; Simple Display Line:   5 mg, IV, qyear, infused over at least 15 minutes.  Annually in January starting 2019, 1 EA, 0 Refill(s) ; Ordering Provider:   Pollo Terry MD; Catalog Code:   zoledronic acid ; Order Dt/Tm:   12/14/2018 3:50:21 PM CST            Home Meds    amoxicillin-clavulanate  :   amoxicillin-clavulanate ; Status:   Documented ; Ordered As Mnemonic:   Augmentin 875 mg-125 mg oral tablet ; Simple Display Line:   1 tab(s), Oral, q12 hrs, for 14, 28 tab(s), 0 Refill(s) ; Catalog Code:   amoxicillin-clavulanate ; Order Dt/Tm:   4/2/2020 11:15:58 AM CDT          fluconazole  :   fluconazole ; Status:   Documented ; Ordered As Mnemonic:   fluconazole 150 mg oral tablet ; Simple Display Line:   150 mg, 1 tab(s), Oral, daily, for 14 day(s), 14 tab(s), 0 Refill(s) ; Catalog Code:   fluconazole ; Order Dt/Tm:   4/2/2020 11:16:20 AM CDT          oxyCODONE  :   oxyCODONE ; Status:   Documented ; Ordered As Mnemonic:   oxyCODONE 5 mg oral tablet ; Simple Display Line:   1-2 tab(s), Oral, q4 hrs, 0 Refill(s) ; Catalog Code:   oxyCODONE ; Order Dt/Tm:   4/2/2020 11:17:09 AM CDT          diphenhydrAMINE  :   diphenhydrAMINE ; Status:   Documented ; Ordered As Mnemonic:   Benadryl ; Simple Display Line:   0 Refill(s) ; Catalog Code:   diphenhydrAMINE ; Order Dt/Tm:   12/23/2019 10:10:57 AM CST          clobetasol topical  :   clobetasol topical ; Status:   Documented ; Ordered As Mnemonic:   clobetasol 0.025% topical cream ; Simple Display Line:   Topical, bid, 0 Refill(s) ; Catalog Code:    clobetasol topical ; Order Dt/Tm:   11/12/2019 12:10:20 PM CST          copper  :   copper ; Status:   Documented ; Ordered As Mnemonic:   copper ; Simple Display Line:   0 Refill(s) ; Catalog Code:   copper ; Order Dt/Tm:   10/16/2017 2:04:52 PM CDT          hydrocortisone topical  :   hydrocortisone topical ; Status:   Documented ; Ordered As Mnemonic:   hydrocortisone 1% topical cream ; Simple Display Line:   1 shantel, top, tid, PRN for eczema, 0 Refill(s) ; Catalog Code:   hydrocortisone topical ; Order Dt/Tm:   10/16/2017 2:05:31 PM CDT          selenium  :   selenium ; Status:   Documented ; Ordered As Mnemonic:   Selenium  mcg oral tablet ; Simple Display Line:   0 Refill(s) ; Catalog Code:   selenious acid ; Order Dt/Tm:   10/16/2017 2:04:59 PM CDT

## 2022-02-15 NOTE — PROGRESS NOTES
Chief Complaint    c/o R wrist pain x 2 days.  History of Present Illness       Shannan was in her usual state of good health until she developed right wrist pain and swelling at 10 PM on May 30, 2021.  She is treated with ibuprofen and ice which have both been helpful.  She has a history of osteoarthritis.  She had previous surgery on the right wrist.  She tapered off antianxiety medications without further problems.  Review of Systems       No fever, chills, headache, myalgia.  Physical Exam   Vitals & Measurements    T: 98.7  F (Tympanic)  HR: 88 (Peripheral)  BP: 140/82  SpO2: 97%     HT: 60.75 in  WT: 120.3 lb  BMI: 22.92        Patient is alert and oriented.  In good spirits.  Osteoarthritic changes of both hands and wrists.  Tenderness at the base of the right thumb and associated wrist area with some swelling and minimal erythema.  Assessment/Plan       Acute pain of right wrist (M25.531)         Acute wrist pain with X-ray showing OA and soft tissue abnormalities. Ortho referral.         Ordered:          95269 office o/p est mod 30-39 min (Charge), Quantity: 1, Acute pain of right wrist  Chronic insomnia  MILTON (generalized anxiety disorder)          XR Wrist Min 3 Views Right (Request), Priority: STAT, Acute pain of right wrist                Chronic insomnia (F51.04)         Improved.         Ordered:          [id number] office o/p est mod 30-39 min (Charge), Quantity: 1, Acute pain of right wrist  Chronic insomnia  MILTON (generalized anxiety disorder)                MILTON (generalized anxiety disorder) (F41.1)          Doing well off medications.         Ordered:          [id number] office o/p est mod 30-39 min (Charge), Quantity: 1, Acute pain of right wrist  Chronic insomnia  MILTON (generalized anxiety disorder)                Orders:         busPIRone, See Instructions, Instructions: 0.5 tab(s) Oral bid 90 day(s), # 90 EA, 3 Refill(s), Type: Maintenance, Pharmacy: [facility], Dosage Change, 60.75, in,  11/02/20 9:52:00 CST, Height Measured, 112, lb, 04/06/20 10:57:00 CDT, Weight Measured, (Completed)  Patient Information     Name:GUNNAR DOLL      Address:      [address] [place] [address]       [place], [place] [id number]     Sex:Female     YOB: 1950     [phone]     Emergency Contact:YUKI DOLL     MRN:[id number]     FIN:[id number]     Location:[facility]     Date of Service:06/01/2021      Primary Care Physician:       Pollo Terry MD, [phone]      Attending Physician:       Pollo Terry MD, [phone]  Problem List/Past Medical History    Ongoing     Asthma     Chronic insomnia     Dyslipidemia     Eczema     Family history of colon cancer     MILTON (generalized anxiety disorder)     Genital HSV     GERD (gastroesophageal reflux disease)     MVP (mitral valve prolapse)     Osteoporosis     Thickened endometrium     Thrombocytosis    Historical     Inpatient stay       Comments: [place], [place] - Acute appendicitis, transferred to [place], [place].     Pregnancy     Pregnancy     Smoking 1/2 pack a day or less     Stress-induced cardiomyopathy  Procedure/Surgical History     Appendectomy (03/23/2020)     Extracapsular cataract extraction and insertion of intraocular lens (11/02/2017)      Comments: Right..     Extracapsular cataract extraction and insertion of intraocular lens (10/19/2017)      Comments: Left..     Colonoscopy (06/10/2016)      Comments: Every 5 years for family history.     Colonoscopy (04/11/2001)     Caesarean section      Comments: x 2.     Extraction of wisdom tooth     H/O: blood transfusion     H/O: tubal ligation     History of tonsillectomy  Medications    acyclovir 400 mg oral tablet, See Instructions, 3 refills    albuterol 90 mcg/inh inhalation aerosol, 2 puff(s), Inhale, qid, PRN, 1 refills    Caltrate Gummy Bites 250 mg-400 intl units oral tablet, chewable, See Instructions    clobetasol 0.05% topical cream, 1 shantel, Topical,  bid    copper    lovastatin 40 mg oral tablet, 40 mg= 1 tab(s), Oral, daily, 3 refills    melatonin 10 mg oral tablet, 10 mg= 1 tab(s), Oral, hs    Spacer, See Instructions  Allergies    Augmentin (Vomiting)    Latex    Macrobid (rash)    codeine  Social History    Smoking Status     Former smoker     Alcohol - Current      Wine (5 oz), Daily, Ready to change: No.     Electronic Cigarette/Vaping      Electronic Cigarette Use: Never.     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     Exercise - Regular exercise      Exercise frequency: 3-4 times/week. Exercise type: Curves.     Home/Environment      Marital status:  (Living together). Spouse/Partner name: Bebo. Living situation: Home/Independent. Injuries/Abuse/Neglect in household: No. Feels unsafe at home: No. Family/Friends available for support: Yes.     Nutrition/Health      Type of diet: Regular. Wants to lose weight: No. Sleeping concerns: No. Feels highly stressed: No.     Sexual      Sexually active: Yes. Identifies as female, Sexual orientation: Straight or heterosexual. History of STD: Yes. Contraceptive Use Details: None. History of sexual abuse: No.     Substance Abuse - Denies Substance Abuse      Never     Tobacco - Current      Former smoker, quit more than 30 days ago  Family History    Alcoholism: Sister.    Allergic rhinitis: Sister.    Arthritis: Mother, Sister and Aunt (M).    Bleeding disorder: Brother.    Breast Cancer: Mother (Dx at 72).    Cancer of colon: Sister (Dx at 55 years).    Dementia: Mother.    Depression: Mother and Sister.    High blood pressure: Sister.    Hypercholesterolemia: Sister.    Hypertension: Sister.    Kidney disease: Sister and Grandmother (P).    Obesity: Sister.    Seizure: Brother.  Lab Results          Lab Results (Last 4 results within 90 days)          Coronavirus SARS-CoV-2 (COVID-19) TR: Negative (03/15/21  14:00:00)  Immunizations          Scheduled Immunizations          Dose Date(s)          influenza virus vaccine, inactivated          10/03/2019, 09/09/2020          SARS-CoV-2 (COVID-19) Moderna-1273          02/17/2021          tetanus/diphth/pertuss (Tdap) adult/adol          01/07/2009          ZOS, shingles          03/04/2010, 03/16/2010          Other Immunizations          influenza          10/22/2018          influenza virus vaccine, inactivated          10/12/2017          pneumococcal (PPSV23)          12/23/2019          ZOS, shingles          03/16/2004  Diagnostic Results    Soft tissue swelling, possible ligament injury, OA, hardware.

## 2022-02-15 NOTE — PROGRESS NOTES
Patient:   GUNNAR DOLL            MRN: [id number]            FIN: [id number]               Age:   70 years     Sex:  Female     :  1950   Associated Diagnoses:   Exposure to viral disease; Cough   Author:   Jl Hand MD      Visit Information      Date of Service: 03/15/2021 08:37 am  Performing Location: [facility]  Encounter#: [id number]      Primary Care Provider (PCP):  Pollo Terry MD    NPI# [id number]      Referring Provider:  Jl Hand MD    NPI# [id number]   Visit type:  Telephone Encounter.    Source of history:  Patient.    Location of patient:  home  Call Start Time:   1052 am  Call End Time:    _1058 am      Chief Complaint   3/15/2021 10:49 AM CDT   Discuss COVID sx from this weekend- c/o productive cough, sneezing, nasal congestion, dizziness and HA. Verbal consent obtained for a phone visit.   _      History of Present Illness   Today's visit was conducted via telephone due to the COVID-19 pandemic. Patient's consent to telephone visit was obtained and documented.      Reason for visit:    patient with cough that was productive starting yesterday  no high fevers  sneezing noted  feels lightheaded and dizzy  no change in smell or taste  notes that she had COVID in November  symptoms are milder than when she had COVID  notes she has gatherings with family scheduled so would like to be tested      Impression and Plan   Diagnosis     Exposure to viral disease ([id number]-FF Z20.828).     Cough ([id number] R05).     Course:  new symptoms, agree with COVID testing, will remain isolated and follow up as needed, will await results and assess symptoms and have patient determine if she can do second COVID vaccine this Wednesday based on those results..    Orders     Orders (Selected)   Outpatient Orders  Completed  SARS-CoV-2 RNA (COVID-19), Qualitative NAAT (Request): Exposure to viral disease  Cough  Runny nose  Headache.        Health Status   Allergies:    Allergic  Reactions (Selected)  Severity Not Documented  Codeine (No reactions were documented)  Latex (No reactions were documented)  Macrobid (Rash)  Nonallergic Reactions (Selected)  Mild  Augmentin (Vomiting)   Medications:  (Selected)   Prescriptions  Prescribed  Spacer: Spacer, See Instructions, Instructions: use as directed with inhaler., Supply, # 1 EA, 0 Refill(s), Type: Maintenance, [facility]: [id number], use as directed with inhaler.  acyclovir 400 mg oral tablet: See Instructions, Instructions: TAKE 1 TABLET BY MOUTH TWO  TIMES DAILY, # 180 tab(s), 3 Refill(s), Type: Soft Stop, Pharmacy: [facility], TAKE 1 TABLET BY MOUTH TWO  TIMES DAILY, 60.75, in, 11/02/20 9:52:00 CST, Height Measured, 112, lb, 04...  albuterol 90 mcg/inh inhalation aerosol: 2 puff(s), Inhale, qid, PRN: for shortness of breath or wheezing, # 1 EA, 1 Refill(s), Type: Maintenance, [facility]acy: [facility] #[id number], 2 puff(s) Inhale qid,PRN:for shortness of breath or wheezing  busPIRone 15 mg oral tablet: = 1 tab(s) ( 15 mg ), Oral, bid, # 180 tab(s), 3 Refill(s), Type: Maintenance, Pharmacy: [facility], Dosage Change, 1 tab(s) Oral bid,x90 day(s), 60.75, in, 11/02/20 9:52:00 CST, Height Measured, 112, lb, 04/06/20 10:57:00 CDT, Weight Measured...  clobetasol 0.05% topical cream: 1 shantel, Topical, bid, # 45 gm, 0 Refill(s), Type: Maintenance, Pharmacy: [facility], 1 shantel Topical bid, 60.75, in, 11/02/20 9:52:00 CST, Height Measured, 112, lb, 04/06/20 10:57:00 CDT, Weight Measured  lovastatin 40 mg oral tablet: = 1 tab(s) ( 40 mg ), PO, Daily, # 90 tab(s), 3 Refill(s), Type: Maintenance, Pharmacy: [facility], 1 tab(s) Oral daily, 60.75, in, 11/02/20 9:52:00 CST, Height Measured, 112, lb, 04/06/20 10:57:00 CDT, Weight Measured  Documented Medications  Documented  Caltrate Gummy Bites 250 mg-400 intl units oral tablet, chewable: See Instructions, Instructions: 5 gummies Chewed, 0 Refill(s),  Type: Maintenance  copper: 0 Refill(s), Type: Maintenance   Problem list:    All Problems  Asthma / SNOMED CT 892413074 / Confirmed  Dyslipidemia / SNOMED CT 0979599282 / Confirmed  Eczema / SNOMED CT 22728233 / Confirmed  Thickened endometrium / SNOMED CT 3977191364 / Confirmed  Family history of colon cancer / SNOMED CT 404148421 / Confirmed  GERD (gastroesophageal reflux disease) / SNOMED CT 515576991 / Confirmed  MILTON (generalized anxiety disorder) / SNOMED CT 39618366 / Confirmed  Genital HSV / SNOMED 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 / Confirmed  MVP (mitral valve prolapse) / SNOMED CT 6657413847 / Confirmed  Osteoporosis / SNOMED CT 519511940 / Confirmed  Chronic insomnia / SNOMED CT 420343785 / Confirmed  Thrombocytosis / SNOMED CT 97030219 / Confirmed      Histories   Past Medical History:    Active  GERD (gastroesophageal reflux disease) (SNOMED CT 106980855)  Chronic insomnia (SNOMED CT 820857670)  Asthma (SNOMED CT 272969015)  MVP (mitral valve prolapse) (SNOMED CT 4821704906)  Eczema (SNOMED CT 54910058)  Genital HSV (SNOMED CT 33402694)  Osteoporosis (SNOMED CT 024220004)  Dyslipidemia (SNOMED CT 9294822328)  Resolved  Inpatient stay (SNOMED CT 836724287): Onset on 3/22/2020 at 69 years.  Resolved on 3/23/2020 at 69 years.  Comments:  3/23/2020 CDT 1:08 PM CDT - [place], [place] - Acute appendicitis, transferred to [place], [place].  Pregnancy (SNOMED CT 274954713): Onset on 12/5/1978 at 28 years.  Resolved on 9/5/1979 at 28 years.  Pregnancy (SNOMED CT 729184764): Onset on 4/1/1975 at 24 years.  Resolved on 12/30/1975 at 25 years.  Smoking 1/2 pack a day or less (SNOMED CT 802868446):  Resolved.  Stress-induced cardiomyopathy (SNOMED CT 7877772179):  Resolved.   Family History:    Dementia  Mother  Hypertension  Sister  Bleeding disorder  Brother  Allergic rhinitis  Sister  High blood pressure  Sister  Arthritis  Aunt (M)  Mother  Sister  Alcoholism  Sister  Cancer of colon  Sister (Ghislaine):  onset at 55 years.  Hypercholesterolemia  Sister  Kidney disease  Grandmother (P)  Sister  Depression  Sister  Mother  Seizure  Brother  Obesity  Sister  Breast Cancer  Mother: onset at 72 .     Procedure history:    Appendectomy ([id number]) on 3/23/2020 at 69 Years.  Extracapsular cataract extraction and insertion of intraocular lens ([id number]) on 11/2/2017 at 67 Years.  Comments:  12/14/2017 10:40 AM CST - Gayatri Mckinnon  Right.  Extracapsular cataract extraction and insertion of intraocular lens ([id number]) on 10/19/2017 at 67 Years.  Comments:  11/21/2017 12:20 PM Gayatri Stephenson  Left.  Colonoscopy ([id number]) on 6/10/2016 at 65 Years.  Comments:  10/16/2017 2:21 PM CDT - Pollo Terry MD  Every 5 years for family history  Colonoscopy ([id number]) on 4/11/2001 at 50 Years.  H/O: tubal ligation (926763567).  Caesarean section (94178861).  Comments:  10/16/2017 2:09 PM CDT - Dayanara SELBY, Travon  x 2  Extraction of wisdom tooth (542134269).  History of tonsillectomy (9268631650).  H/O: blood transfusion (207908332).   Social History:        Electronic Cigarette/Vaping Assessment            Electronic Cigarette Use: Never.      Alcohol Assessment: Current            Wine (5 oz), Daily, Ready to change: No.      Tobacco Assessment: Current            Cigarettes, 2 per day.  Ready to change: Yes.            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      Home and Environment Assessment            Marital status:  (Living together).  Spouse/Partner name: Bebo.  Living situation:               Home/Independent.  Injuries/Abuse/Neglect in household: No.  Feels unsafe at home: No.  Family/Friends                available for support: Yes.      Nutrition and Health Assessment            Type of diet: Regular.  Wants to lose weight: No.  Sleeping concerns: No.  Feels highly stressed: No.      Exercise and Physical Activity Assessment: Regular exercise            Exercise frequency: 3-4 times/week.  Exercise type: Curves.      Sexual Assessment            Sexually active: Yes.  Identifies as female, Sexual orientation: Straight or heterosexual.  History of STD:               Yes.  Contraceptive Use Details: None.  History of sexual abuse: No.        Physical Examination   Measurements from flowsheet : Measurements   3/15/2021 10:49 AM CDT   Height Measured - Standard                60.75 in        Review / Management   Results review:  Lab results   2/1/2021 9:04 AM CST Cholesterol 212 mg/dL  HI    Non-    HDL 84 mg/dL    Chol/HDL Ratio 2.5      HI    Triglyceride 70 mg/dL   12/10/2020 10:54 AM CST WBC 6.8    RBC 4.29    Hgb 14.0 gm/dL    Hct 40.4 %    MCV 94.2 fL    MCH 32.6 pg    MCHC 34.7 gm/dL    RDW 12.9 %    Platelet 209    MPV 10.7 fL    Lymphs 33.3 %    Abs Lymphs 2,264    Neutrophils 51 %    Abs Neutrophils 3,468    Monocytes 10.1 %    Abs Monocytes 687    Eosinophils 4.4 %    Abs Eosinophils 299    Basophils 1.2 %    Abs Basophils 82   .

## 2022-02-15 NOTE — NURSING NOTE
Medicare Visit Entered On:  12/23/2019 10:15 AM CST    Performed On:  12/23/2019 10:04 AM CST by Travon Nichole CMA               Summary   Chief Complaint :   Pt here for an AWV and fasting labwork.   Weight Measured :   113 lb(Converted to: 113 lb 0 oz, 51.26 kg)    Height Measured :   60.75 in(Converted to: 5 ft 1 in, 154.30 cm)    Body Mass Index :   21.52 kg/m2   Body Surface Area :   1.48 m2   Systolic Blood Pressure :   142 mmHg (HI)    Diastolic Blood Pressure :   82 mmHg (HI)    Mean Arterial Pressure :   102 mmHg   Peripheral Pulse Rate :   72 bpm   BP Site :   Right arm   Pulse Site :   Radial artery   Temperature Tympanic :   98.8 DegF(Converted to: 37.1 DegC)    Respiratory Rate :   16 br/min   Travon Nichole CMA - 12/23/2019 10:04 AM CST   Health Status   Allergies Verified? :   Yes   Medication History Verified? :   Yes   Medical History Verified? :   Yes   Pre-Visit Planning Status :   Completed   Tobacco Use? :   Current every day smoker   Tobacco Cessation Review :   Not ready to quit   Travon Nichole CMA - 12/23/2019 10:04 AM CST   Meds / Allergies   (As Of: 12/23/2019 10:15:48 AM CST)   Allergies (Active)   Augmentin  Estimated Onset Date:   Unspecified ; Reactions:   Vomiting ; Created By:   Barbara Michel; Reaction Status:   Active ; Category:   Drug ; Substance:   Augmentin ; Type:   Sensitivity ; Severity:   Mild ; Updated By:   Barbara Michel; Reviewed Date:   12/23/2019 10:11 AM CST      codeine  Estimated Onset Date:   Unspecified ; Created By:   Travon Nichole CMA; Reaction Status:   Active ; Category:   Drug ; Substance:   codeine ; Type:   Allergy ; Updated By:   Travon Nichole CMA; Reviewed Date:   12/23/2019 10:11 AM CST      Latex  Estimated Onset Date:   Unspecified ; Created By:   Travon Nichole CMA; Reaction Status:   Active ; Category:   Drug ; Substance:   Latex ; Type:   Allergy ; Updated By:   Travon Nichole CMA; Reviewed Date:   12/23/2019 10:11 AM CST      Macrobid   Estimated Onset Date:   Unspecified ; Reactions:   rash ; Created By:   Travon Nichole CMA; Reaction Status:   Active ; Category:   Drug ; Substance:   Macrobid ; Type:   Allergy ; Updated By:   Travon Nichole CMA; Reviewed Date:   12/23/2019 10:11 AM CST        Medication List   (As Of: 12/23/2019 10:15:48 AM CST)   Prescription/Discharge Order    aspirin  :   aspirin ; Status:   Prescribed ; Ordered As Mnemonic:   aspirin 81 mg oral tablet ; Simple Display Line:   81 mg, 1 tab(s), Oral, daily, 30 tab(s), 0 Refill(s) ; Ordering Provider:   Pollo Terry MD; Catalog Code:   aspirin ; Order Dt/Tm:   12/10/2018 12:12:54 PM CST          fluticasone nasal  :   fluticasone nasal ; Status:   Prescribed ; Ordered As Mnemonic:   fluticasone 50 mcg/inh nasal spray ; Simple Display Line:   2 spray(s), Nasal, daily, 1 EA, 0 Refill(s) ; Ordering Provider:   Sanket Gill MD; Catalog Code:   fluticasone nasal ; Order Dt/Tm:   11/30/2019 1:16:20 PM CST          nortriptyline  :   nortriptyline ; Status:   Prescribed ; Ordered As Mnemonic:   nortriptyline 10 mg oral capsule ; Simple Display Line:   See Instructions, 3 cap(s) Oral qhs, 270 EA, 3 Refill(s) ; Ordering Provider:   Pollo Terry MD; Catalog Code:   nortriptyline ; Order Dt/Tm:   10/21/2019 7:33:18 AM CDT          zoledronic acid  :   zoledronic acid ; Status:   Prescribed ; Ordered As Mnemonic:   Reclast 5 mg/100 mL intravenous solution ; Simple Display Line:   5 mg, IV, qyear, infused over at least 15 minutes.  Annually in January starting 2019, 1 EA, 0 Refill(s) ; Ordering Provider:   Pollo Terry MD; Catalog Code:   zoledronic acid ; Order Dt/Tm:   12/14/2018 3:50:21 PM CST          acyclovir  :   acyclovir ; Status:   Prescribed ; Ordered As Mnemonic:   acyclovir 400 mg oral tablet ; Simple Display Line:   See Instructions, TAKE 1 TABLET BY MOUTH TWO  TIMES DAILY, 180 tab(s), 3 Refill(s) ; Ordering Provider:   Pollo Terry MD; Catalog Code:    acyclovir ; Order Dt/Tm:   12/10/2018 11:43:28 AM CST          lovastatin  :   lovastatin ; Status:   Prescribed ; Ordered As Mnemonic:   lovastatin 40 mg oral tablet ; Simple Display Line:   40 mg, 1 tab(s), PO, Daily, 90 tab(s), 3 Refill(s) ; Ordering Provider:   Pollo Terry MD; Catalog Code:   lovastatin ; Order Dt/Tm:   12/10/2018 11:42:38 AM CST            Home Meds    raNITIdine  :   raNITIdine ; Status:   Completed ; Ordered As Mnemonic:   Zantac 150 ; Simple Display Line:   150 mg, po, bid, 0 Refill(s) ; Catalog Code:   raNITIdine ; Order Dt/Tm:   10/16/2017 2:05:09 PM CDT          diphenhydrAMINE  :   diphenhydrAMINE ; Status:   Documented ; Ordered As Mnemonic:   Benadryl ; Simple Display Line:   0 Refill(s) ; Catalog Code:   diphenhydrAMINE ; Order Dt/Tm:   12/23/2019 10:10:57 AM CST          omeprazole  :   omeprazole ; Status:   Documented ; Ordered As Mnemonic:   PriLOSEC OTC 20 mg oral delayed release tablet ; Simple Display Line:   20 mg, 1 tab(s), Oral, daily, 0 Refill(s) ; Catalog Code:   omeprazole ; Order Dt/Tm:   12/23/2019 10:10:52 AM CST          clobetasol topical  :   clobetasol topical ; Status:   Documented ; Ordered As Mnemonic:   clobetasol 0.025% topical cream ; Simple Display Line:   Topical, bid, 0 Refill(s) ; Catalog Code:   clobetasol topical ; Order Dt/Tm:   11/12/2019 12:10:20 PM CST          copper  :   copper ; Status:   Documented ; Ordered As Mnemonic:   copper ; Simple Display Line:   0 Refill(s) ; Catalog Code:   copper ; Order Dt/Tm:   10/16/2017 2:04:52 PM CDT          selenium  :   selenium ; Status:   Documented ; Ordered As Mnemonic:   Selenium  mcg oral tablet ; Simple Display Line:   0 Refill(s) ; Catalog Code:   selenious acid ; Order Dt/Tm:   10/16/2017 2:04:59 PM CDT          hydrocortisone topical  :   hydrocortisone topical ; Status:   Documented ; Ordered As Mnemonic:   hydrocortisone 1% topical cream ; Simple Display Line:   1 shantel, top, tid, PRN for  eczema, 0 Refill(s) ; Catalog Code:   hydrocortisone topical ; Order Dt/Tm:   10/16/2017 2:05:31 PM CDT          ibuprofen  :   ibuprofen ; Status:   Documented ; Ordered As Mnemonic:   ibuprofen 400 mg oral tablet ; Simple Display Line:   400 mg, 1 tab(s), po, q6 hrs, 0 Refill(s) ; Catalog Code:   ibuprofen ; Order Dt/Tm:   10/16/2017 2:05:19 PM CDT            Social History   Social History   (As Of: 12/23/2019 10:15:48 AM CST)   Alcohol:        Current, Wine (5 oz), 1 glass with dinner   (Last Updated: 10/18/2017 10:44:20 AM CDT by Clair Wilkinson)          Tobacco:        Former smoker, quit more than 30 days ago, Cigarettes   Comments:  12/23/2019 10:11 AM - Travon Nichole CMA: Pt quit 11/23/19   (Last Updated: 12/23/2019 10:11:47 AM CST by Travon Nichole CMA)   5-9 cigarettes (between 1/4 to 1/2 pack)/day in last 30 days   (Last Updated: 10/16/2017 2:17:53 PM CDT by Pollo Terry MD)          Substance Abuse:  Denies Substance Abuse      (Last Updated: 12/10/2018 2:56:27 PM CST by Cherelle Bateman )         Employment/School:        Employed, 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   (Last Updated: 10/18/2017 10:43:49 AM CDT by Clair Wilkinson)          Home/Environment:        Marital status:  (Living together).  Spouse/Partner name: Bebo.   (Last Updated: 10/18/2017 10:43:59 AM CDT by Clair Wilkinson)          Exercise:  Regular exercise      Exercise frequency: 3-4 times/week.  Exercise type: Curves.   (Last Updated: 12/10/2018 2:56:57 PM CST by Cherelle Bateman)            Geriatric Depression Screening   Geriatric Depression Satisfied Life :   Yes   Geriatric Depression Dropped Activities :   No   Geriatric Depression Life Empty :   No   Geriatric Depression Bored :   No   Geriatric Depression Good Spirits :   Yes   Geriatric Depression Afraid Bad Things :   No   Geriatric Depression Feel Happy :   Yes   Geriatric Depression Feel Helpless :   No   Geriatric Depression Prefer to Stay Home :    No   Geriatric Depression Memory Problems :   No   Geriatric Depression Wonderful Be Alive :   Yes   Geriatric Depression Feel Worthless :   No   Geriatric Depression Situation Hopeless :   No   Geriatric Depression Others Better Off :   No   Geriatric Depression Full of Energy :   Yes   Geriatric Depression Total Score :   0    Travon Nichole CMA - 12/23/2019 10:04 AM CST   Hearing and Vision Screening   Audiogram Result Right Ear :   Fail   Audiogram Result Left Ear :   Fail   Hearing Screen Comments :   Pt declines any further eval at this time   Aftab Nichole CMAle - 12/23/2019 10:04 AM CST   Home Safety Screen   Emergency Numbers Kept/Updated :   Yes   Aware of Smoking Dangers :   Yes   Smoke Alarms/Fire Extinguisher Available :   Yes   Household Members Fire Safety Knowledge :   Yes   Firearms Unloaded and Secure :   Yes   Floor Rugs Removed or Fastened :   No   Mats in Bathtub/Shower :   No   Stairway Rails or Banisters :   Yes   Outdoor Clutter Safety :   Yes   Indoor Clutter Safety :   Yes   Electrical Cord Safety :   Yes   Aftab iNchole CMAle - 12/23/2019 10:04 AM CST   Thompson Fall Risk   History of Fall in Last 3 Months Thompson :   No   Aftab Nichole CMAle - 12/23/2019 10:04 AM CST   Functional Assessment   Focused Functional Assessment Grid   Bathing :   Independent (2)   Dressing :   Independent (2)   Toileting :   Independent (2)   Transferring Bed or Chair :   Independent (2)   Continence :   Independent (2)   Feeding :   Independent (2)   Dayanara SELBY Kyle - 12/23/2019 10:04 AM CST   ADL Index Score :   12    Capable of Shopping :   Yes   Capable of Walking :   Yes   Capable of Housekeeping :   Yes   Capable of Managing Medications :   Yes   Capable of Handling Finances :   Yes   Travon Nichole CMA - 12/23/2019 10:04 AM CST

## 2022-02-15 NOTE — PROGRESS NOTES
Patient:   GUNNAR DOLL            MRN: [id number]            FIN: [id number]               Age:   67 years     Sex:  Female     :  1950   Associated Diagnoses:   Chronic insomnia; Genital HSV; GERD (gastroesophageal reflux disease); Bilateral cataracts; MVP (mitral valve prolapse); Eczema; BPPV (benign paroxysmal positional vertigo); Family history of colon cancer   Author:   Pollo Terry MD      Visit Information      Date of Service: 10/16/2017 01:52 pm  Performing Location: [facility]  Encounter#: [id number]      Primary Care Provider (PCP):  Pollo Terry MD    NPI# [id number]      Referring Provider:  Pollo Terry MD    NPI# [id number]   Visit type:  New patient evaluation, Preoperative.    Accompanied by:  No one.    Source of history:  Self.    History limitation:  None.       Chief Complaint   10/16/2017 1:58 PM CDT   New pt here for Preop Px for Cataracts surgery Left eye will be done 10/19 and Right eye on  by Dr Amaya at [facility].  Pt also requesting PPD testing for employment.      History of Present Illness   The patient is a generally healthy 67 year old here for a preop for cataract surgery in both eyes.  She may have macular holes as well.  She takes amitriptyline for chronic insomnia.  She has episodic BPPV.  She has treated it at home predominantly.  Her sister had colon cancer, and she has had a colonoscopy every five years.  She has mitral valve prolapse.  She has never had chest pain, dyspnea, or other associated symptoms.  She has occasional heartburn due to acid reflux.  She is on chronic suppressive therapy for genital herpes.  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  No history of coronary artery disease, congestive heart failure, snoring, or sleep apnea.  No history of problems with anesthesia or sedation or surgical bleeding.    On complete review of systems she has tinnitus, wears glasses or  contacts, has a history of asthma, urinary urgency and occasional incontinence, history of genital herpes, prior blood transfusion, chicken pox disease and vaccination, and joint and muscle pain.  Review of systems is otherwise negative.         Review of Systems          See HPI       Health Status   Allergies:    Allergic Reactions (Selected)  Severity Not Documented  Codeine (No reactions were documented)  Latex (No reactions were documented)  Macrobid (Rash)   Medications:  (Selected)   Documented Medications  Documented  Selenium  mcg oral tablet: 0 Refill(s), Type: Maintenance  Zantac 150: ( 150 mg ), po, bid, 0 Refill(s), Type: Maintenance  acyclovir 400 mg oral tablet: 1 tab(s) ( 400 mg ), po, tid, 0 Refill(s), Type: Maintenance  copper: 0 Refill(s), Type: Maintenance  hydrocortisone 1% topical cream: 1 shantel, top, tid, Instructions: PRN for eczema, 0 Refill(s), Type: Maintenance  ibuprofen 400 mg oral tablet: 1 tab(s) ( 400 mg ), po, q6 hrs, 0 Refill(s), Type: Maintenance  lovastatin 20 mg oral tablet: 1 tab(s) ( 20 mg ), PO, Daily, # 30 tab(s), 0 Refill(s), Type: Maintenance  nortriptyline 10 mg oral capsule: 1 cap(s) ( 10 mg ), po, qid, Instructions: 20 mg daily, 0 Refill(s), Type: Maintenance   Problem list:    All Problems  Asthma / SNOMED CT 652977928 / Confirmed  Eczema / SNOMED CT 07300442 / Confirmed  Family history of colon cancer / SNOMED CT 544975806 / Confirmed  GERD (gastroesophageal reflux disease) / SNOMED CT 738732634 / Confirmed  Genital HSV / SNOMED CT 40630679 / Confirmed  MVP (mitral valve prolapse) / SNOMED CT 7747455256 / Confirmed  Chronic insomnia / SNOMED CT 799834519 / Confirmed      Histories   Past Medical History:    No active or resolved past medical history items have been selected or recorded.   Family History:    Cancer of colon  Sister (Ghislaine): onset at 55 years.  Breast Cancer  Mother     Procedure history:    Colonoscopy (647621397) in 2016 at 66  Years.  Comments:  10/16/2017 2:21 PM - Pollo Terry MD  Every 5 years for family history  H/O: tubal ligation (863627562).  Caesarean section (13200117).  Comments:  10/16/2017 2:09 PM - Dayanara SELBYTravon  x 2  Extraction of wisdom tooth (111253908).  History of tonsillectomy (8961246789).  H/O: blood transfusion (767128173).   Social History:        Alcohol Assessment            Current, Wine (5 oz), Daily                     Comments:                      10/16/2017 - Dayanara SELBYTravon                     with dinner      Tobacco Assessment            5-9 cigarettes (between 1/4 to 1/2 pack)/day in last 30 days            10 or more cigarettes (1/2 pack or more)/day in last 30 days, Cigarettes      Employment and Education Assessment            Employed                     Comments:                      10/16/2017 - Pollo Terry MD                     Nurse at [employer]      Home and Environment Assessment            Marital status:  (Living together).        Physical Examination   Vital Signs   10/16/2017 1:58 PM CDT Temperature Tympanic 97.9 DegF    Peripheral Pulse Rate 88 bpm    Pulse Site Radial artery    Respiratory Rate 16 br/min    Systolic Blood Pressure 114 mmHg    Diastolic Blood Pressure 88 mmHg    Mean Arterial Pressure 97 mmHg    BP Site Right arm      Measurements from flowsheet : Measurements   10/16/2017 1:58 PM CDT Height Measured - Standard 60.5 in    Weight Measured - Standard 109 lb    BSA 1.45 m2    Body Mass Index 20.93 kg/m2      General:  Alert and oriented, No acute distress.    Eye:  Normal conjunctiva.    HENT:  Normocephalic, Normal hearing, Oral mucosa is moist, No pharyngeal erythema.    Neck:  Supple, Non-tender, No carotid bruit, No jugular venous distention, No lymphadenopathy, No thyromegaly.    Respiratory:  Lungs are clear to auscultation, Respirations are non-labored.    Cardiovascular:  Normal rate, Regular rhythm, No murmur, No gallop, Normal peripheral  perfusion, No edema.    Gastrointestinal:  Soft, Non-tender.    Musculoskeletal:  No deformity, Normal gait.    Integumentary:  No pallor.    Neurologic:  Normal sensory, Normal motor function, No focal deficits, Cranial Nerves II-XII are grossly intact.    Cognition and Speech:  Speech clear and coherent, Functional cognition intact.    Psychiatric:  Cooperative, Appropriate mood & affect.       Review / Management   ECG interpretation:  Time  10/16/2017 2:56:00 PM, Rate  1  beats per minute, Within normal limits.       Impression and Plan   Diagnosis     Chronic insomnia (SKY35-EO F51.04).     Genital HSV (VTH74-FE A60.00).     GERD (gastroesophageal reflux disease) (EWO75-VZ K21.9).     Bilateral cataracts (YVU36-JP H26.9).     MVP (mitral valve prolapse) (QKK05-RJ I34.1).     Eczema (CDE66-AH L30.9).     BPPV (benign paroxysmal positional vertigo) (ZWA68-PF H81.10).     Family history of colon cancer (OBE47-TB Z80.0).     Course:  Progressing as expected.    Summary:  Stable for cataract surgery.    Orders     Orders (Selected)   Outpatient Orders  Ordered  Physical Therapy (Request): Instructions: Canalith repositioining for BPPV, BPPV (benign paroxysmal positional vertigo)  Return to Clinic (Request): RFV: Fasting lipids  Ordered (In Transit)  Basic Metabolic Panel* ([facility]): Specimen Type: Serum, Collection Date: 10/16/17 14:18:00 CDT  Documented Medications  Documented  Selenium  mcg oral tablet: 0 Refill(s), Type: Maintenance  Zantac 150: ( 150 mg ), po, bid, 0 Refill(s), Type: Maintenance  acyclovir 400 mg oral tablet: 1 tab(s) ( 400 mg ), po, tid, 0 Refill(s), Type: Maintenance  copper: 0 Refill(s), Type: Maintenance  hydrocortisone 1% topical cream: 1 shantel, top, tid, Instructions: PRN for eczema, 0 Refill(s), Type: Maintenance  ibuprofen 400 mg oral tablet: 1 tab(s) ( 400 mg ), po, q6 hrs, 0 Refill(s), Type: Maintenance  lovastatin 20 mg oral tablet: 1 tab(s) ( 20 mg ), PO, Daily, # 30 tab(s), 0  Refill(s), Type: Maintenance  nortriptyline 10 mg oral capsule: 1 cap(s) ( 10 mg ), po, qid, Instructions: 20 mg daily, 0 Refill(s), Type: Maintenance.

## 2022-02-15 NOTE — LETTER
(Inserted Image. Unable to display)   December 24, 2019      GUNNAR DOLL      [address]   [place], [place] [id number]        Dear GUNNAR,    Thank you for selecting [facility] ([facility]viously [facility] for your healthcare needs.  Below you will find the results of your recent tests done at our clinic.     Results are acceptable.      Result Name Current Result Previous Result Reference Range   Cholesterol (mg/dL) ((H)) 219 12/23/2019 ((H)) 209 1/30/2019  - <200   Non-HDL Cholesterol ((H)) 132 12/23/2019  120 1/30/2019  - <130   HDL (mg/dL)  87 12/23/2019  89 1/30/2019 >50 -    Cholesterol/HDL Ratio  2.5 12/23/2019  2.3 1/30/2019  - <5.0   LDL ((H)) 117 12/23/2019 ((H)) 105 1/30/2019    Triglyceride (mg/dL)  62 12/23/2019  60 1/30/2019  - <150       Please contact me or my assistant at [phone] if you have any questions.     Sincerely,        Pollo Terry MD

## 2022-02-15 NOTE — NURSING NOTE
Comprehensive Intake Entered On:  5/5/2019 9:05 AM CDT    Performed On:  5/5/2019 9:02 AM CDT by Barbara Michel               Summary   Chief Complaint :   Sore throat, headache, congestion, drainage, sinus pressure.  Ongoing x7 days.    Weight Measured :   114.0 lb(Converted to: 114 lb 0 oz, 51.71 kg)    Systolic Blood Pressure :   122 mmHg   Diastolic Blood Pressure :   80 mmHg   Mean Arterial Pressure :   94 mmHg   Peripheral Pulse Rate :   86 bpm   Temperature Tympanic :   97.7 DegF(Converted to: 36.5 DegC)  (LOW)    Oxygen Saturation :   98 %   Barbara Michel - 5/5/2019 9:02 AM CDT   Health Status   Allergies Verified? :   Yes   Medication History Verified? :   Yes   Tobacco Use? :   Current every day smoker   Barbara Michel - 5/5/2019 9:02 AM CDT   Meds / Allergies   (As Of: 5/5/2019 9:05:58 AM CDT)   Allergies (Active)   Augmentin  Estimated Onset Date:   Unspecified ; Reactions:   Vomiting ; Created By:   Barbara Michel; Reaction Status:   Active ; Category:   Drug ; Substance:   Augmentin ; Type:   Sensitivity ; Severity:   Mild ; Updated By:   Barbara Michel; Reviewed Date:   5/5/2019 9:04 AM CDT      codeine  Estimated Onset Date:   Unspecified ; Created By:   Travon Nichole CMA; Reaction Status:   Active ; Category:   Drug ; Substance:   codeine ; Type:   Allergy ; Updated By:   Travon Nichole CMA; Reviewed Date:   10/16/2017 2:52 PM CDT      Latex  Estimated Onset Date:   Unspecified ; Created By:   Travon Nichole CMA; Reaction Status:   Active ; Category:   Drug ; Substance:   Latex ; Type:   Allergy ; Updated By:   Travon Nichole CMA; Reviewed Date:   10/16/2017 2:52 PM CDT      Macrobid  Estimated Onset Date:   Unspecified ; Reactions:   rash ; Created By:   Travon Nichole CMA; Reaction Status:   Active ; Category:   Drug ; Substance:   Macrobid ; Type:   Allergy ; Updated By:   Travon Nichole CMA; Reviewed Date:   10/16/2017 2:52 PM CDT        Medication List   (As Of: 5/5/2019  9:05:58 AM CDT)   Prescription/Discharge Order    zoledronic acid  :   zoledronic acid ; Status:   Prescribed ; Ordered As Mnemonic:   Reclast 5 mg/100 mL intravenous solution ; Simple Display Line:   5 mg, IV, qyear, infused over at least 15 minutes.  Annually in January starting 2019, 1 EA, 0 Refill(s) ; Ordering Provider:   Polol Terry MD; Catalog Code:   zoledronic acid ; Order Dt/Tm:   12/14/2018 3:50:21 PM          aspirin  :   aspirin ; Status:   Prescribed ; Ordered As Mnemonic:   aspirin 81 mg oral tablet ; Simple Display Line:   81 mg, 1 tab(s), Oral, daily, 30 tab(s), 0 Refill(s) ; Ordering Provider:   Pollo Terry MD; Catalog Code:   aspirin ; Order Dt/Tm:   12/10/2018 12:12:54 PM          acyclovir  :   acyclovir ; Status:   Prescribed ; Ordered As Mnemonic:   acyclovir 400 mg oral tablet ; Simple Display Line:   See Instructions, TAKE 1 TABLET BY MOUTH TWO  TIMES DAILY, 180 tab(s), 3 Refill(s) ; Ordering Provider:   Pollo Terry MD; Catalog Code:   acyclovir ; Order Dt/Tm:   12/10/2018 11:43:28 AM          nortriptyline  :   nortriptyline ; Status:   Prescribed ; Ordered As Mnemonic:   nortriptyline 10 mg oral capsule ; Simple Display Line:   20 mg, 2 cap(s), po, hs, 20 mg daily, 180 cap(s), 3 Refill(s) ; Ordering Provider:   Pollo Terry MD; Catalog Code:   nortriptyline ; Order Dt/Tm:   12/10/2018 11:43:01 AM          buPROPion  :   buPROPion ; Status:   Completed ; Ordered As Mnemonic:   Zyban 150 mg/12 hours oral tablet, extended release ; Simple Display Line:   See Instructions, 1 tab(s) PO daily for one week then BID, 180 EA, 0 Refill(s) ; Ordering Provider:   Pollo Terry MD; Catalog Code:   buPROPion ; Order Dt/Tm:   12/10/2018 11:44:12 AM          lovastatin  :   lovastatin ; Status:   Prescribed ; Ordered As Mnemonic:   lovastatin 40 mg oral tablet ; Simple Display Line:   40 mg, 1 tab(s), PO, Daily, 90 tab(s), 3 Refill(s) ; Ordering Provider:   Pollo Terry MD; Catalog  Code:   lovastatin ; Order Dt/Tm:   12/10/2018 11:42:38 AM            Home Meds    copper  :   copper ; Status:   Documented ; Ordered As Mnemonic:   copper ; Simple Display Line:   0 Refill(s) ; Catalog Code:   copper ; Order Dt/Tm:   10/16/2017 2:04:52 PM          hydrocortisone topical  :   hydrocortisone topical ; Status:   Documented ; Ordered As Mnemonic:   hydrocortisone 1% topical cream ; Simple Display Line:   1 shantel, top, tid, PRN for eczema, 0 Refill(s) ; Catalog Code:   hydrocortisone topical ; Order Dt/Tm:   10/16/2017 2:05:31 PM          ibuprofen  :   ibuprofen ; Status:   Documented ; Ordered As Mnemonic:   ibuprofen 400 mg oral tablet ; Simple Display Line:   400 mg, 1 tab(s), po, q6 hrs, 0 Refill(s) ; Catalog Code:   ibuprofen ; Order Dt/Tm:   10/16/2017 2:05:19 PM          raNITIdine  :   raNITIdine ; Status:   Documented ; Ordered As Mnemonic:   Zantac 150 ; Simple Display Line:   150 mg, po, bid, 0 Refill(s) ; Catalog Code:   raNITIdine ; Order Dt/Tm:   10/16/2017 2:05:09 PM          selenium  :   selenium ; Status:   Documented ; Ordered As Mnemonic:   Selenium  mcg oral tablet ; Simple Display Line:   0 Refill(s) ; Catalog Code:   selenious acid ; Order Dt/Tm:   10/16/2017 2:04:59 PM

## 2022-02-15 NOTE — TELEPHONE ENCOUNTER
Entered by Erika Beltran RN on November 02, 2021 4:06:55 PM CDT  ---------------------  From: Erika Beltran RN   To: [facility]    Sent: 11/2/2021 4:06:54 PM CDT  Subject: Medication Management     ** Submitted: **  Order:lovastatin (lovastatin 40 mg oral tablet)  1 tab(s)  Oral  daily  Qty:  90 tab(s)        Days Supply:  90        Refills:  0          Substitutions Allowed     Route To Pharmacy - [facility]    Signed by Erika Beltran RN  11/2/2021 9:06:00 PM [facility]    ** Submitted: **  Complete:lovastatin (lovastatin 40 mg oral tablet)   Signed by Erika Beltran RN  11/2/2021 9:06:00 PM [facility]    ** Not Approved:  **  lovastatin (Lovastatin 40 MG Oral Tablet)  TAKE 1 TABLET BY MOUTH  DAILY  Qty:  90 tab(s)        Days Supply:  90        Refills:  3          Substitutions Allowed     Route To Pharmacy - [facility]   Note from Pharmacy:  Requesting 1 year supply  Signed by Erika Beltran RN            ------------------------------------------  From: [facility]  To: Pollo Terry MD  Sent: October 31, 2021 8:27:03 PM CDT  Subject: Medication Management  Due: October 21, 2021 8:18:07 PM CDT     ** On Hold Pending Signature **     Drug: lovastatin (lovastatin 40 mg oral tablet), TAKE 1 TABLET BY MOUTH DAILY  Quantity: 90 tab(s)  Days Supply: 90  Refills: 3  Substitutions Allowed  Notes from Pharmacy: - First Attempt Ref: [id number]     Dispensed Drug: lovastatin (lovastatin 40 mg oral tablet), TAKE 1 TABLET BY MOUTH DAILY  Quantity: 90 tab(s)  Days Supply: 90  Refills: 3  Substitutions Allowed  Notes from Pharmacy: Requesting 1 year supply  ------------------------------------------Medication Refill    PCP:   WICHO    Name of med requested:  Lovastatin    Date of last office visit and reason:  10/12/21 [facility]  Date of last Med Check / Px:   10/12/21    Date of last labs pertaining to med:  2/1/21    RTC order in chart:  yes, up to date    For Protocol refill, has patient been  contacted:

## 2022-02-15 NOTE — PROGRESS NOTES
Chief Complaint    Was treated for sinus infection with antibiotics.  Now feeling worse.  History of Present Illness      Patient is here with persistent sinus pressure, congestion, postnasal drainage, cough since treatment for a sinus infection with cefuroxime.  She also has some mild vertiginous sensations.  She denies fever or chills.  She has been fatigued.  She is quite frustrated with her failure to improve.  Review of Systems      See HPI.  All other review of systems negative.  Physical Exam   Vitals & Measurements    T: 98.5   F (Tympanic)  HR: 98(Peripheral)  BP: 122/74  SpO2: 96%     HT: 60.5 in  WT: 113.0 lb  BMI: 21.7       Alert, oriented, no acute distress       Normal extraocular movements       Ear canals patent, TMs clear       Slight maxillary tenderness        Throat is clear with posterior pharyngeal drainage        Neck is supple without adenopathy        Lungs are clear        Heart has a regular rate and rhythm        Negative Romberg        Cooperative, appropriate affect  Assessment/Plan       Acute maxillary sinusitis (J01.00)        Persistent sinus symptoms despite treatment antibiotics.  Will change antibiotics to doxycycline 100 mg twice daily for 10 days as this worked fairly well for the past.  I have also prescribed fluticasone nasal spray.  She will follow-up if symptoms not improving.  May need CT scan of the sinuses if not improving        Orders:         doxycycline, = 1 cap(s), Oral, bid, # 20 cap(s), 0 Refill(s), Type: Soft Stop, Pharmacy: [facility] [id number], 1 cap(s) po bid, (Ordered)         fluticasone nasal, = 2 spray(s), Nasal, daily, # 1 EA, 0 Refill(s), Type: Soft Stop, Pharmacy: [facility] [id number], 2 spray(s) Nasal daily, (Ordered)  Patient Information     Name:GUNNAR DOLL      Address:      [address] [place] [address] [address] [place], [place] [id number]     Sex:Female     YOB: 1950     [phone]     Emergency Contact:[facility] EMERGENC[facility],  CONTACT     MRN:[id number]     FIN:[id number]     Location:[facility]     Date of Service:11/30/2019      Primary Care Physician:       Pollo Terry MD, [phone]      Attending Physician:       Sanket Gill MD, [phone]  Problem List/Past Medical History    Ongoing     Asthma     Chronic insomnia     Eczema     Family history of colon cancer     Genital HSV     GERD (gastroesophageal reflux disease)     MVP (mitral valve prolapse)     Osteoporosis     Smoking 1/2 pack a day or less    Historical     Pregnancy     Pregnancy  Procedure/Surgical History     Extracapsular cataract extraction and insertion of intraocular lens (11/02/2017)      Comments: Right..     Extracapsular cataract extraction and insertion of intraocular lens (10/19/2017)      Comments: Left..     Colonoscopy (2016)      Comments: Every 5 years for family history.     Caesarean section      Comments: x 2.     Extraction of wisdom tooth     H/O: blood transfusion     H/O: tubal ligation     History of tonsillectomy  Medications    acyclovir 400 mg oral tablet, See Instructions, 3 refills    aspirin 81 mg oral tablet, 81 mg= 1 tab(s), Oral, daily    clobetasol 0.025% topical cream, Topical, bid    copper    doxycycline monohydrate 100 mg oral capsule, 1 cap(s), Oral, bid    fluticasone 50 mcg/inh nasal spray, 2 spray(s), Nasal, daily    hydrocortisone 1% topical cream, 1 shantel, Topical, tid    ibuprofen 400 mg oral tablet, 400 mg= 1 tab(s), Oral, q6 hrs    lovastatin 40 mg oral tablet, 40 mg= 1 tab(s), Oral, daily, 3 refills    nortriptyline 10 mg oral capsule, See Instructions, 3 refills    Reclast 5 mg/100 mL intravenous solution, 5 mg, IV, qyear    Selenium  mcg oral tablet    Zantac 150, 150 mg, Oral, bid  Allergies    Augmentin (Vomiting)    Latex    Macrobid (rash)    codeine  Social History    Smoking Status - 11/12/2019     Current every day smoker     Alcohol      Current, Wine (5 oz), 1 glass with  dinner, 10/18/2017     Employment/School      Employed, 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 Exercise type: [employer]., 12/10/2018     Home/Environment      Marital status:  (Living together). Spouse/Partner name: Bebo., 10/18/2017     Substance Abuse - Denies Substance Abuse, 12/10/2018     Tobacco      5-9 cigarettes (between 1/4 to 1/2 pack)/day in last 30 days, 10/16/2017      10 or more cigarettes (1/2 pack or more)/day in last 30 days, Cigarettes, 10/16/2017  Family History    Arthritis: Aunt (M).    Breast Cancer: Mother.    Cancer of colon: Sister (Dx at 55 years).    High blood pressure: Sister.    Kidney disease: Sister and Grandmother (P).  Immunizations      Vaccine Date Status Comments      influenza virus vaccine, inactivated 10/03/2019 Recorded      influenza 10/22/2018 Recorded [10/24/2018] Received at [facility]/pharmacy, [place], [place]      influenza virus vaccine, inactivated 10/12/2017 Recorded      ZOS, shingles 03/16/2010 Recorded      ZOS, shingles 03/04/2010 Recorded      tetanus/diphth/pertuss (Tdap) adult/adol 01/07/2009 Recorded      ZOS, shingles 03/16/2004 Recorded

## 2022-02-15 NOTE — TELEPHONE ENCOUNTER
---------------------  From: Che Maldonado LPN (eRx Pool (32224_[facility]))   To: [facility] Message Pool ([address]_[place]);     Sent: 12/1/2021 10:26:32 AM CST  Subject: FW: Medication Management   Due Date/Time: 12/1/2021 8:23:00 AM CST     Medication Refill needing approval    PCP:   [facility]    Medication:   acyclovir 400mg 1 tab PO BID  Last Filled:  11/2/20    Quantity:  180  Refills:  3  CSA on file?   n/a     Date of last office visit and reason:   10/12/21 [place]  Date of last labs pertaining to condition:  n/a    Note:  Please advise on refill    Return to Clinic order placed?  yes- pt has appt with [facility] 12/3/21    Resource:   pharmacy          ------------------------------------------  From: [facility]  To: Pollo Terry MD  Sent: November 25, 2021 8:23:52 AM CST  Subject: Medication Management  Due: November 16, 2021 3:37:48 PM CST     ** On Hold Pending Signature **     Drug: acyclovir (acyclovir 400 mg oral tablet), TAKE 1 TABLET BY MOUTH TWICE DAILY  Quantity: 180 tab(s)  Days Supply: 90  Refills: 3  Substitutions Allowed  Notes from Pharmacy: - Ref: [id number]     Dispensed Drug: acyclovir (acyclovir 400 mg oral tablet), TAKE 1 TABLET BY MOUTH TWICE DAILY  Quantity: 180 tab(s)  Days Supply: 90  Refills: 0  Substitutions Allowed  Notes from Pharmacy:     ** On Hold Pending Signature **     Drug: acyclovir (acyclovir 400 mg oral tablet), TAKE 1 TABLET BY MOUTH TWICE DAILY  Quantity: 180 tab(s)  Days Supply: 90  Refills: 3  Substitutions Allowed  Notes from Pharmacy: - 2nd Attempt Ref: [id number]     Dispensed Drug: acyclovir (acyclovir 400 mg oral tablet), TAKE 1 TABLET BY MOUTH TWICE DAILY  Quantity: 180 tab(s)  Days Supply: 90  Refills: 0  Substitutions Allowed  Notes from Pharmacy:  ---------------------------------------------------------------  From: Chico/Nell ENCISO ([facility] Message Pool ([address]_[place]))   To: Pollo Terry MD;     Sent: 12/2/2021 6:59:21 AM CST  Subject: FW:  Medication Management   Due Date/Time: 12/2/2021 8:23:00 AM CST---------------------  From: Pollo Terry MD   To: [facility]    Sent: 12/2/2021 7:53:28 AM CST  Subject: FW: Medication Management     ** Submitted: **  Complete:acyclovir (acyclovir 400 mg oral tablet)   Signed by Pollo Terry MD  12/2/2021 1:53:00 PM [facility]    ** Approved with modifications: **  acyclovir (ACYCLOVIR  400MG  TAB)  TAKE 1 TABLET BY MOUTH  TWICE DAILY  Qty:  180 tab(s)        Days Supply:  90        Refills:  3          Substitutions Allowed     Route To Pharmacy - [facility]       ** Approved with modifications: **  acyclovir (ACYCLOVIR  400MG  TAB)  TAKE 1 TABLET BY MOUTH  TWICE DAILY  Qty:  180 tab(s)        Days Supply:  90        Refills:  3          Substitutions Allowed     Route To Pharmacy - [facility]

## 2022-02-15 NOTE — PROGRESS NOTES
Patient:   GUNNAR DOLL            MRN: [id number]            FIN: [id number]               Age:   69 years     Sex:  Female     :  1950   Associated Diagnoses:   Endometrial hyperplasia   Author:   Tristen Guillen MD      Visit Information      Date of Service: 2020 12:57 pm  Performing Location: [facility]  Encounter#: [id number]      Primary Care Provider (PCP):  Pollo Terry MD    NPI# [id number]      Referring Provider:  Tristen Guillen MD    NPI# [id number]      Chief Complaint   2020 1:10 PM CDT    Consult per [facility] for abdominal pressure/noted thickness of endometrial lining last month.      Interval History   The patient is a 69-year-old, para 2, delivered by  x two female who is in today for evaluation of endometrial thickening on CT scan.  She has been treated for ruptured appendicitis with fairly long-term antibiotics.  Had urinary tract infection type symptoms but recently had negative UA.  Her urinary tract infection symptoms are resolving.  She had CT scan on two occasions to evaluate some right-sided pain at the lower rib margin laterally and a bit anterior.  She had fairly extensive evaluation and the pain seems to be resolving without certain diagnosis.  Incidentally, the thickened endometrium was seen on CT scan and she is referred for further consultation.  She states that she has had some minimal clear discharge over the past day but otherwise has not been troubled by discharge such as blood or anything irritating to suggest yeast infection after her long-term antibiotics.  She has never been on HRT.  Her last period was probably at about age 49.  She currently has no GYN symptoms or pelvic pain.        Review of Systems   Review  of systems is negative except as documented under interval history.      Health Status   Allergies:    Allergic Reactions (Selected)  Severity Not Documented  Codeine (No reactions were documented)  Latex (No reactions were  documented)  Macrobid (Rash)  Nonallergic Reactions (Selected)  Mild  Augmentin (Vomiting)   Medications:  (Selected)   Prescriptions  Prescribed  Reclast 5 mg/100 mL intravenous solution: ( 5 mg ), IV, qyear, Instructions: infused over at least 15 minutes.  Annually in January starting 2019, # 1 EA, 0 Refill(s), Type: Soft Stop, other reason (Rx)  Spacer: Spacer, See Instructions, Instructions: use as directed with inhaler., Supply, # 1 EA, 0 Refill(s), Type: Maintenance, Pharmacy: [facility] #[id number], use as directed with inhaler.  acyclovir 400 mg oral tablet: See Instructions, Instructions: TAKE 1 TABLET BY MOUTH TWO  TIMES DAILY, # 180 tab(s), 3 Refill(s), Type: Soft Stop, Pharmacy: [facility], TAKE 1 TABLET BY MOUTH TWO  TIMES DAILY  albuterol 90 mcg/inh inhalation aerosol: 2 puff(s), Inhale, qid, PRN: for shortness of breath or wheezing, # 1 EA, 1 Refill(s), Type: Maintenance, Pharmacy: [facility] #[id number], 2 puff(s) Inhale qid,PRN:for shortness of breath or wheezing  busPIRone 10 mg oral tablet: = 1 tab(s) ( 10 mg ), Oral, bid, # 60 tab(s), 4 Refill(s), Type: Maintenance, Pharmacy: [facility], 1 tab(s) Oral bid,x30 day(s)  lovastatin 40 mg oral tablet: = 1 tab(s) ( 40 mg ), PO, Daily, # 90 tab(s), 3 Refill(s), Type: Maintenance, Pharmacy: [facility], 1 tab(s) Oral daily  Documented Medications  Documented  Benadryl: 0 Refill(s), Type: Maintenance  Selenium  mcg oral tablet: 0 Refill(s), Type: Maintenance  clobetasol 0.025% topical cream: Topical, bid, 0 Refill(s), Type: Maintenance  copper: 0 Refill(s), Type: Maintenance  fluconazole 150 mg oral tablet: = 1 tab(s) ( 150 mg ), Oral, daily, # 14 tab(s), 0 Refill(s), Type: Maintenance  hydrocortisone 1% topical cream: 1 shantel, top, tid, Instructions: PRN for eczema, 0 Refill(s), Type: Maintenance   Problem list:    All Problems  GERD (gastroesophageal reflux disease) / SNOMED CT 062530932 / Confirmed  Chronic  insomnia / SNOMED CT 421177999 / Confirmed  Asthma / SNOMED CT 810624354 / Confirmed  MVP (mitral valve prolapse) / SNOMED CT 9744229957 / Confirmed  Eczema / SNOMED CT 19774164 / Confirmed  Genital HSV / SNOMED CT 88929563 / Confirmed  Family history of colon cancer / SNOMED CT 086644821 / Confirmed  Osteoporosis / SNOMED CT 555105994 / Confirmed  Dyslipidemia / SNOMED CT 2342157122 / Confirmed  Thrombocytosis / SNOMED CT 12807164 / Confirmed  Thickened endometrium / SNOMED CT 9623283233 / Confirmed  Resolved: Smoking 1/2 pack a day or less / SNOMED CT 966641868  Resolved: Pregnancy / SNOMED CT 075041840  Resolved: Pregnancy / SNOMED CT 432667424  Resolved: Inpatient stay / SNOMED CT 150262058  Resolved: Stress-induced cardiomyopathy / SNOMED CT 6345517287      Histories   Past Medical History:    Active  GERD (gastroesophageal reflux disease) (929166446)  Chronic insomnia (778170953)  Asthma (907723345)  MVP (mitral valve prolapse) (4778894700)  Eczema (18203123)  Genital HSV (73680171)  Osteoporosis (912186909)  Dyslipidemia (2099764992)  Resolved  Inpatient stay (089852979): Onset on 3/22/2020 at 69 years.  Resolved on 3/23/2020 at 69 years.  Comments:  3/23/2020 CDT 1:08 PM CDT - Pr[facility], [place] - Acute appendicitis, transferred to [place], [place].  Pregnancy (975034073): Onset on 12/5/1978 at 28 years.  Resolved on 9/5/1979 at 28 years.  Pregnancy (563320848): Onset on 4/1/1975 at 24 years.  Resolved on 12/30/1975 at 25 years.  Smoking 1/2 pack a day or less (423261118):  Resolved.  Stress-induced cardiomyopathy (2142025382):  Resolved.   Family History:    Dementia  Mother  Hypertension  Sister  Bleeding disorder  Brother  Allergic rhinitis  Sister  High blood pressure  Sister  Arthritis  Aunt (M)  Mother  Sister  Alcoholism  Sister  Cancer of colon  Sister (Ghislaine): onset at 55 years.  Hypercholesterolemia  Sister  Kidney disease  Grandmother  (P)  Sister  Depression  Sister  Mother  Seizure  Brother  Obesity  Sister  Breast Cancer  Mother: onset at 72 .     Procedure history:    Appendectomy (623627537) on 3/23/2020 at 69 Years.  Extracapsular cataract extraction and insertion of intraocular lens (311389896) on 11/2/2017 at 67 Years.  Comments:  12/14/2017 10:40 AM [facility] - Pratibha Gayatri  Right.  Extracapsular cataract extraction and insertion of intraocular lens (785084708) on 10/19/2017 at 67 Years.  Comments:  11/21/2017 12:20 PM Gayatri Stephenson  Left.  Colonoscopy (821897120) on 6/10/2016 at 65 Years.  Comments:  10/16/2017 2:21 PM CDT - Pollo Terry MD  Every 5 years for family history  Colonoscopy (733452418) on 4/11/2001 at 50 Years.  H/O: tubal ligation (745228157).  Caesarean section (74203402).  Comments:  10/16/2017 2:09 PM [facility] - Travon Nichole CMA  x 2  Extraction of wisdom tooth (134196512).  History of tonsillectomy (4723226456).  H/O: blood transfusion (699701807).   Social History:        Alcohol Assessment: Current            Wine (5 oz), Daily, Ready to change: No.      Tobacco Assessment: Current            Cigarettes, 2 per day.  Ready to change: Yes.            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      Home and Environment Assessment            Marital status:  (Living together).  Spouse/Partner name: Bebo.  Living situation:               Home/Independent.  Injuries/Abuse/Neglect in household: No.  Feels unsafe at home: No.  Family/Friends               available for support: Yes.      Nutrition and Health Assessment            Type of diet: Regular.  Wants  to lose weight: No.  Sleeping concerns: No.  Feels highly stressed: No.      Exercise and Physical Activity Assessment: Regular exercise            Exercise frequency: 3-4 times/week.  Exercise type: Curves.      Sexual Assessment            Sexually active: Yes.  Identifies as female, Sexual orientation: Straight or heterosexual.  History of STD:               Yes.  Contraceptive Use Details: None.  History of sexual abuse: No.        Physical Examination   Vital Signs   2020 1:10 PM CDT Temperature Tympanic 99.0 DegF    Peripheral Pulse Rate 99 bpm    Pulse Site Radial artery    HR Method Electronic    Systolic Blood Pressure 121 mmHg    Diastolic Blood Pressure 80 mmHg    Mean Arterial Pressure 94 mmHg    BP Site Left arm    BP Method Electronic      General:  CT scans were reviewed.  One of the studies suggested that the thickened endometrium could have been fluid rather than being able to differentiate it from more solid tissue.  I performed ultrasound to characterize the uterus..       Review / Management   Radiology results   Ultrasound, Vaginal probe pelvic ultrasound is performed.  The uterus is normal menopausal size at about 7 cm.  It is retroflexed and there is obvious  scar.  Beyond the  scar is an echolucency with very thin surrounding endometrium, 1 mm or less, with no projecting echoes within the echolucency which measures 9 mm across.  The myometrium otherwise appears normal.  The ovaries are not seen with certainty with no pelvic masses seen.     Vaginal examination finds no fluid at this time.  The cervix appears to be closed chronically.      Impression and Plan   Diagnosis     Endometrial hyperplasia (FHR78-LK N85.00).     Fluid in endometrial cavity related to stenotic cervix with no suspicious endometrium seen on ultrasound..     Plan:  The patient was advised that no endometrial biopsy is indicated.  She seemed to understand her situation well and no further pursuit of  this fluid or endometrial finding on CT scan is necessary..    Patient Instructions:       Counseled: Patient, Regarding diagnosis, Regarding treatment, Verbalized understanding.

## 2022-02-15 NOTE — NURSING NOTE
Comprehensive Intake Entered On:  4/14/2020 8:57 AM CDT    Performed On:  4/14/2020 8:49 AM CDT by Nell Galloway               Summary   Chief Complaint :   Verbal consent given. f/u ED stay- saturday. 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 c/o high anxiety- not being able to sleep, pt is very emotional. Patient states that healing is going well. She cannot eat very much. Still has discomfort in ribs.    Height Measured :   60.75 in(Converted to: 5 ft 1 in, 154.30 cm)    Nell Galloway - 4/14/2020 8:49 AM CDT   Health Status   Allergies Verified? :   Yes   Medication History Verified? :   Yes   Medical History Verified? :   Yes   Pre-Visit Planning Status :   N/A   Tobacco Use? :   Former smoker   Nell Galloway - 4/14/2020 8:49 AM CDT   Consents   Consent for Immunization Exchange :   Consent Granted   Consent for Immunizations to Providers :   Consent Granted   Nell Galloway - 4/14/2020 8:49 AM CDT   Meds / Allergies   (As Of: 4/14/2020 8:57:29 AM CDT)   Allergies (Active)   Augmentin  Estimated Onset Date:   Unspecified ; Reactions:   Vomiting ; Created By:   Barbara Michel; Reaction Status:   Active ; Category:   Drug ; Substance:   Augmentin ; Type:   Sensitivity ; Severity:   Mild ; Updated By:   Barbara Michel; Reviewed Date:   4/14/2020 8:57 AM CDT      codeine  Estimated Onset Date:   Unspecified ; Created By:   rTavon Nichole CMA; Reaction Status:   Active ; Category:   Drug ; Substance:   codeine ; Type:   Allergy ; Updated By:   Travon Nichole CMA; Reviewed Date:   4/14/2020 8:57 AM CDT      Latex  Estimated Onset Date:   Unspecified ; Created By:   Travon Nichole CMA; Reaction Status:   Active ; Category:   Drug ; Substance:   Latex ; Type:   Allergy ; Updated By:   Travon Nichole CMA; Reviewed Date:   4/14/2020 8:57 AM CDT      Macrobid  Estimated Onset Date:   Unspecified ; Reactions:   rash ; Created By:   Travon Nichole CMA; Reaction Status:   Active ; Category:   Drug ;  Substance:   Macrobid ; Type:   Allergy ; Updated By:   Travon Nichole CMA; Reviewed Date:   4/14/2020 8:57 AM CDT        Medication List   (As Of: 4/14/2020 8:57:29 AM CDT)   Prescription/Discharge Order    albuterol  :   albuterol ; Status:   Prescribed ; Ordered As Mnemonic:   albuterol 90 mcg/inh inhalation aerosol ; Simple Display Line:   2 puff(s), Inhale, qid, PRN: for shortness of breath or wheezing, 1 EA, 1 Refill(s) ; Ordering Provider:   Sanket Gill MD; Catalog Code:   albuterol ; Order Dt/Tm:   1/22/2020 3:17:33 PM CST          Miscellaneous Rx Supply  :   Miscellaneous Rx Supply ; Status:   Prescribed ; Ordered As Mnemonic:   Spacer ; Simple Display Line:   See Instructions, use as directed with inhaler., 1 EA, 0 Refill(s) ; Ordering Provider:   Sanket Gill MD; Catalog Code:   Miscellaneous Rx Supply ; Order Dt/Tm:   1/22/2020 3:17:33 PM CST          acyclovir  :   acyclovir ; Status:   Prescribed ; Ordered As Mnemonic:   acyclovir 400 mg oral tablet ; Simple Display Line:   See Instructions, TAKE 1 TABLET BY MOUTH TWO  TIMES DAILY, 180 tab(s), 3 Refill(s) ; Ordering Provider:   Pollo Terry MD; Catalog Code:   acyclovir ; Order Dt/Tm:   12/23/2019 10:26:09 AM CST          lovastatin  :   lovastatin ; Status:   Prescribed ; Ordered As Mnemonic:   lovastatin 40 mg oral tablet ; Simple Display Line:   40 mg, 1 tab(s), PO, Daily, 90 tab(s), 3 Refill(s) ; Ordering Provider:   Pollo Terry MD; Catalog Code:   lovastatin ; Order Dt/Tm:   12/23/2019 10:26:41 AM CST          zoledronic acid  :   zoledronic acid ; Status:   Prescribed ; Ordered As Mnemonic:   Reclast 5 mg/100 mL intravenous solution ; Simple Display Line:   5 mg, IV, qyear, infused over at least 15 minutes.  Annually in January starting 2019, 1 EA, 0 Refill(s) ; Ordering Provider:   Pollo Terry MD; Catalog Code:   zoledronic acid ; Order Dt/Tm:   12/14/2018 3:50:21 PM CST            Home Meds    amoxicillin-clavulanate  :    amoxicillin-clavulanate ; Status:   Processing ; Ordered As Mnemonic:   Augmentin 875 mg-125 mg oral tablet ; Action Display:   Complete ; Catalog Code:   amoxicillin-clavulanate ; Order Dt/Tm:   4/14/2020 8:53:12 AM CDT          fluconazole  :   fluconazole ; Status:   Documented ; Ordered As Mnemonic:   fluconazole 150 mg oral tablet ; Simple Display Line:   150 mg, 1 tab(s), Oral, daily, for 14 day(s), 14 tab(s), 0 Refill(s) ; Catalog Code:   fluconazole ; Order Dt/Tm:   4/2/2020 11:16:20 AM CDT          oxyCODONE  :   oxyCODONE ; Status:   Processing ; Ordered As Mnemonic:   oxyCODONE 5 mg oral tablet ; Action Display:   Complete ; Catalog Code:   oxyCODONE ; Order Dt/Tm:   4/14/2020 8:53:34 AM CDT          diphenhydrAMINE  :   diphenhydrAMINE ; Status:   Documented ; Ordered As Mnemonic:   Benadryl ; Simple Display Line:   0 Refill(s) ; Catalog Code:   diphenhydrAMINE ; Order Dt/Tm:   12/23/2019 10:10:57 AM CST          clobetasol topical  :   clobetasol topical ; Status:   Documented ; Ordered As Mnemonic:   clobetasol 0.025% topical cream ; Simple Display Line:   Topical, bid, 0 Refill(s) ; Catalog Code:   clobetasol topical ; Order Dt/Tm:   11/12/2019 12:10:20 PM CST          copper  :   copper ; Status:   Documented ; Ordered As Mnemonic:   copper ; Simple Display Line:   0 Refill(s) ; Catalog Code:   copper ; Order Dt/Tm:   10/16/2017 2:04:52 PM CDT          hydrocortisone topical  :   hydrocortisone topical ; Status:   Documented ; Ordered As Mnemonic:   hydrocortisone 1% topical cream ; Simple Display Line:   1 shantel, top, tid, PRN for eczema, 0 Refill(s) ; Catalog Code:   hydrocortisone topical ; Order Dt/Tm:   10/16/2017 2:05:31 PM CDT          selenium  :   selenium ; Status:   Documented ; Ordered As Mnemonic:   Selenium  mcg oral tablet ; Simple Display Line:   0 Refill(s) ; Catalog Code:   selenious acid ; Order Dt/Tm:   10/16/2017 2:04:59 PM [facility]

## 2022-02-15 NOTE — TELEPHONE ENCOUNTER
---------------------  From: Valerie Thrasher CMA (Phone Messages Pool ([address]))   To: [email or web address] [facility] ([address]);     Sent: 8/3/2020 8:34:34 AM CDT  Subject: medication refill request     Phone message    PCP: [facility]     Person calling: Shannan  Phone number: [phone]  Time message left: 0812  Return call time: _    Reason: Shannan called and left a message requesting a refill on her  clobetasol propionate topical cream 0.05%  45gm tube and it can be sent to either [facility] or "[facility]. She is requesting a call back when done and to let her know   which pharmacy it was sent to. Please advise on refill, medication is only documented on med list and with a different strength.    LOV: 6/15/20 rash with GTG         Transferred to: [employer] [place]            HARSHIL Thrasher CMA---------------------  From: Nell Galloway ([employer] Message Pool ([address]224_[place] [facility] [place]))   To: Pollo Terry MD;     Sent: 8/3/2020 8:46:06 AM CDT  Subject: FW: medication refill request---------------------  From: Pollo Terry MD   To: [facility] Message Pool ([address]);     Sent: 8/3/2020 9:14:38 AM CDT  Subject: RE: medication refill request     okMedication sent to pharmacy. Patient notified.

## 2022-02-15 NOTE — LETTER
(Inserted Image. Unable to display)   December 11, 2020      GUNNAR DLOL      [address]   [place], [place] [id number]        Dear GUNNAR,    Thank you for selecting [facility] ([facility]viously [facility]) for your healthcare needs.  Below you will find the results of your recent tests done at our clinic.     Normal. Lisa Gillespie!      Result Name Current Result Previous Result Reference Range   WBC  6.8 12/10/2020 ((H)) 13.0 9/10/2020 3.8 - 10.8   RBC  4.29 12/10/2020  4.72 9/10/2020 3.80 - 5.10   Hgb (gm/dL)  14.0 12/10/2020  14.5 9/10/2020 11.7 - 15.5   Hct (%)  40.4 12/10/2020  43.5 9/10/2020 35.0 - 45.0   MCV (fL)  94.2 12/10/2020  92.2 9/10/2020 80.0 - 100.0   MCH (pg)  32.6 12/10/2020  30.7 9/10/2020 27.0 - 33.0   MCHC (gm/dL)  34.7 12/10/2020  33.3 9/10/2020 32.0 - 36.0   RDW (%)  12.9 12/10/2020  13.5 9/10/2020 11.0 - 15.0   Platelet  209 12/10/2020  279 9/10/2020 140 - 400   MPV (fL)  10.7 12/10/2020  10.5 9/10/2020 7.5 - 12.5   Lymphocytes (%)  33.3 12/10/2020     Abs Lymphocytes  2,264 12/10/2020  850 - 3,900   Neutrophils (%)  51 12/10/2020     Abs Neutrophils  3,468 12/10/2020  1,500 - 7,800   Monocytes (%)  10.1 12/10/2020     Abs Monocytes  687 12/10/2020  200 - 950   Eosinophils (%)  4.4 12/10/2020     Abs Eosinophils  299 12/10/2020  15 - 500   Basophils (%)  1.2 12/10/2020     Abs Basophils  82 12/10/2020  0 - 200       Please contact me or my assistant at [phone] if you have any questions.     Sincerely,        Pollo Terry MD

## 2022-02-15 NOTE — NURSING NOTE
Comprehensive Intake Entered On:  10/1/2019 4:06 PM CDT    Performed On:  10/1/2019 3:58 PM CDT by Nell Galloway               Summary   Chief Complaint :   c/o lesions on head/neck, itchiness    Weight Measured :   117.08 lb(Converted to: 117 lb 1 oz, 53.11 kg)    Height Measured :   60.5 in(Converted to: 5 ft 0 in, 153.67 cm)    Body Mass Index :   22.49 kg/m2   Body Surface Area :   1.5 m2   Systolic Blood Pressure :   124 mmHg   Diastolic Blood Pressure :   80 mmHg   Mean Arterial Pressure :   95 mmHg   Peripheral Pulse Rate :   75 bpm   BP Site :   Right arm   Pulse Site :   Radial artery   BP Method :   Manual   HR Method :   Manual   Temperature Tympanic :   97.6 DegF(Converted to: 36.4 DegC)  (LOW)    Nell Galloway - 10/1/2019 3:58 PM CDT   Health Status   Allergies Verified? :   Yes   Medication History Verified? :   Yes   Medical History Verified? :   Yes   Pre-Visit Planning Status :   Completed   Tobacco Use? :   Current every day smoker   Nell Galloway - 10/1/2019 3:58 PM CDT   Consents   Consent for Immunization Exchange :   Consent Granted   Consent for Immunizations to Providers :   Consent Granted   Nell Galloway - 10/1/2019 3:58 PM CDT   Meds / Allergies   (As Of: 10/1/2019 4:06:20 PM CDT)   Allergies (Active)   Augmentin  Estimated Onset Date:   Unspecified ; Reactions:   Vomiting ; Created By:   Barbara Michel; Reaction Status:   Active ; Category:   Drug ; Substance:   Augmentin ; Type:   Sensitivity ; Severity:   Mild ; Updated By:   Barbara Michel; Reviewed Date:   10/1/2019 4:01 PM CDT      codeine  Estimated Onset Date:   Unspecified ; Created By:   Travon Nichole CMA; Reaction Status:   Active ; Category:   Drug ; Substance:   codeine ; Type:   Allergy ; Updated By:   Travon Nichole CMA; Reviewed Date:   10/1/2019 4:01 PM CDT      Latex  Estimated Onset Date:   Unspecified ; Created By:   Travon Nichole CMA; Reaction Status:   Active ; Category:   Drug ; Substance:    Latex ; Type:   Allergy ; Updated By:   Travon Nichole CMA; Reviewed Date:   10/1/2019 4:01 PM CDT      Macrobid  Estimated Onset Date:   Unspecified ; Reactions:   rash ; Created By:   Travon Nichole CMA; Reaction Status:   Active ; Category:   Drug ; Substance:   Macrobid ; Type:   Allergy ; Updated By:   Travon Nichole CMA; Reviewed Date:   10/1/2019 4:01 PM CDT        Medication List   (As Of: 10/1/2019 4:06:20 PM CDT)   Prescription/Discharge Order    nortriptyline  :   nortriptyline ; Status:   Prescribed ; Ordered As Mnemonic:   nortriptyline 10 mg oral capsule ; Simple Display Line:   30 mg, 3 cap(s), po, hs, 20 mg daily, 21 cap(s), 0 Refill(s) ; Ordering Provider:   Jl Hand MD; Catalog Code:   nortriptyline ; Order Dt/Tm:   8/17/2019 7:31:35 AM          buPROPion 150 mg/12 hours (SR) oral tablet, extended release  :   buPROPion 150 mg/12 hours (SR) oral tablet, extended release ; Status:   Processing ; Ordered As Mnemonic:   buPROPion 150 mg/12 hours (SR) oral tablet, extended release ; Ordering Provider:   Pollo Terry MD; Action Display:   Complete ; Catalog Code:   buPROPion ; Order Dt/Tm:   10/1/2019 4:03:54 PM          doxycycline  :   doxycycline ; Status:   Processing ; Ordered As Mnemonic:   doxycycline hyclate 100 mg oral tablet ; Ordering Provider:   Sanket Jaeger MD; Action Display:   Complete ; Catalog Code:   doxycycline ; Order Dt/Tm:   10/1/2019 4:03:38 PM          zoledronic acid  :   zoledronic acid ; Status:   Prescribed ; Ordered As Mnemonic:   Reclast 5 mg/100 mL intravenous solution ; Simple Display Line:   5 mg, IV, qyear, infused over at least 15 minutes.  Annually in January starting 2019, 1 EA, 0 Refill(s) ; Ordering Provider:   Pollo Terry MD; Catalog Code:   zoledronic acid ; Order Dt/Tm:   12/14/2018 3:50:21 PM          aspirin  :   aspirin ; Status:   Prescribed ; Ordered As Mnemonic:   aspirin 81 mg oral tablet ; Simple Display Line:   81 mg, 1 tab(s), Oral,  daily, 30 tab(s), 0 Refill(s) ; Ordering Provider:   Pollo Terry MD; Catalog Code:   aspirin ; Order Dt/Tm:   12/10/2018 12:12:54 PM          acyclovir  :   acyclovir ; Status:   Prescribed ; Ordered As Mnemonic:   acyclovir 400 mg oral tablet ; Simple Display Line:   See Instructions, TAKE 1 TABLET BY MOUTH TWO  TIMES DAILY, 180 tab(s), 3 Refill(s) ; Ordering Provider:   Pollo Terry MD; Catalog Code:   acyclovir ; Order Dt/Tm:   12/10/2018 11:43:28 AM          lovastatin  :   lovastatin ; Status:   Prescribed ; Ordered As Mnemonic:   lovastatin 40 mg oral tablet ; Simple Display Line:   40 mg, 1 tab(s), PO, Daily, 90 tab(s), 3 Refill(s) ; Ordering Provider:   Pollo Terry MD; Catalog Code:   lovastatin ; Order Dt/Tm:   12/10/2018 11:42:38 AM            Home Meds    copper  :   copper ; Status:   Documented ; Ordered As Mnemonic:   copper ; Simple Display Line:   0 Refill(s) ; Catalog Code:   copper ; Order Dt/Tm:   10/16/2017 2:04:52 PM          hydrocortisone topical  :   hydrocortisone topical ; Status:   Documented ; Ordered As Mnemonic:   hydrocortisone 1% topical cream ; Simple Display Line:   1 shantel, top, tid, PRN for eczema, 0 Refill(s) ; Catalog Code:   hydrocortisone topical ; Order Dt/Tm:   10/16/2017 2:05:31 PM          ibuprofen  :   ibuprofen ; Status:   Documented ; Ordered As Mnemonic:   ibuprofen 400 mg oral tablet ; Simple Display Line:   400 mg, 1 tab(s), po, q6 hrs, 0 Refill(s) ; Catalog Code:   ibuprofen ; Order Dt/Tm:   10/16/2017 2:05:19 PM          raNITIdine  :   raNITIdine ; Status:   Documented ; Ordered As Mnemonic:   Zantac 150 ; Simple Display Line:   150 mg, po, bid, 0 Refill(s) ; Catalog Code:   raNITIdine ; Order Dt/Tm:   10/16/2017 2:05:09 PM          selenium  :   selenium ; Status:   Documented ; Ordered As Mnemonic:   Selenium  mcg oral tablet ; Simple Display Line:   0 Refill(s) ; Catalog Code:   selenious acid ; Order Dt/Tm:   10/16/2017 2:04:59 PM

## 2022-02-15 NOTE — NURSING NOTE
Sources of Information:  [X ] Patient, family member, or caregiver (Please list):  [ ] Hospital discharge summary  [ ] Hospital fax  [ ] List of recent hospitalizations or ED visits  [ ] Other:     Discharged From: [facility]  Discharge Date: 4/25/2020    Diagnosis/Problem: Dysuria    Medication Changes: [ X] Yes [ ] No  Keflex for UTI   Medication List Updated: [ ] Yes [X ] No    Needs Referral or Lab: [ ] Yes [X ] No    Needs Follow-up Appointment:  [ ] Within 7 days of discharge (highly complex visit)  [ ] Within 14 days of discharge (moderately complex visit)    Appointment Made With: Will follow-up after antibiotic, if symptoms persist.   Date:    Additional Information Needed and Requested:  [ ] Yes:  [ ] No

## 2022-02-15 NOTE — LETTER
(Inserted Image. Unable to display)   January 22, 2019      GUNNAR DOLL  [address]   [place], [place] [id number]        Dear GUNNAR,      Thank you for selecting [facility] for your healthcare needs.      Our records indicate you are due for the following services:     Fasting Lab Tests ~ Please do not eat or drink anything 10 hours prior to your scheduled appointment time.  (Water and any medications that you may need are allowed unless directed otherwise.)    If you had your labs done at another facility or with Direct Access Lab Testing at [facility], please bring in a copy of the results to your next visit, mail a copy, or drop off a copy of your results to your Healthcare Provider.      To schedule an appointment or if you have further questions, please contact your primary clinic:   [facility]       [phone]   [facility]       [phone]              [facility]     [phone]      Powered by [employer]    Sincerely,    Pollo Teryr MD, FACP

## 2022-02-15 NOTE — TELEPHONE ENCOUNTER
---------------------  From: Chico/Nell ENCISO ([employer] Message Pool ([id number] - [place]))   To: Pollo Terry MD;     Sent: 4/3/2020 3:28:47 PM CDT  Subject: Suture/ Staple Removal     Patient called at 1527. Patient stated that in the Ed they did tests and they all came back normal. Patient has Pleural Effusion from surgery that was causing all the pain and pt states she is doing much better now. Patient states she was suppose to et her sutures and staples removed today but they weren't. Patient wondering if she should come back in next week to get them removed. Please Advise.---------------------  From: Pollo Terry MD   To: Melinta Pool ([id number] - [place]);     Sent: 4/3/2020 3:31:29 PM CDT  Subject: RE: Suture/ Staple Removal     YesPatient notified and transferred to scheduling.

## 2022-02-15 NOTE — PROGRESS NOTES
"Chief Complaint    Verbal consent for video visit. Goes my \"Aileen\". Insomnia medication, serious currently, but been a problem since March. PTSD referral.  History of Present Illness       Visit Information       Visit type: Video Visit via [employer] or [employer].       Participants in room during visit: 1       Location of patient: Home       Location of provider: [address] provider office    (Clinic office or Home office)       Video Start Time: 1420       Video End Time:   1440       Today's visit was conducted via video conference due to the COVID-19 pandemic. The patient's consent to proceed with a video visit has been obtained and documented.       Patient has had insomnia since March 2020 after a traumatic hospitalization about which she is still think so frequently.  She was doing better she tells me until October and has had increased problems.  She thinks she is sleeping about 3 hours a night.  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  Generally goes to bed at 9 or 10 occasionally is exhausted and goes to bed earlier to ill effect.  She was troubled by anxiety and earlier this year was started on buspirone to good effect.  She does not feel anxious during the daytime.  She does get sleepy during the daytime and will nod off if she is reading in the late afternoon or evening.  She enjoys reading.  The lockdown has been hard for her.  Review of Systems       No fever, chills, cough, dyspnea, headache, myalgia, suicidal thoughts, and depressed mood.  Physical Exam       Patient appears comfortable and in no distress.  Alert and oriented.  Speech is fluent.  Thinking is clear.  Eyes appear normal.  No facial asymmetry.  No increased work of breathing.  Assessment/Plan       Chronic insomnia (F51.04)          Discussed that the treatment of choice for insomnia is CBT-I and we will try to arrange this for her.         Ordered:          18147 office outpatient " visit 15 minutes (Charge), Quantity: 1, Chronic insomnia  PTSD (post-traumatic stress disorder)  MILTON (generalized anxiety disorder)          Referral (Request), 12/29/20 14:33:00 CST, Referred to: Behavioral Health, Referred to: [facility], Reason for referral: PTSD and insomnia. Needs conitive and behavioral therapy for insomnia., Chronic insomnia  PTSD (post-traumatic stress disorder)  MILTON (generalized anxie...                MILTON (generalized anxiety disorder) (F41.1)          Continue BuSpar.  No significant association with insomnia for this drug.         Ordered:          [id number] office outpatient visit 15 minutes (Charge), Quantity: 1, Chronic insomnia  PTSD (post-traumatic stress disorder)  MILTON (generalized anxiety disorder)          Referral (Request), 12/29/20 14:33:00 CST, Referred to: Behavioral Health, Referred to: [facility], Reason for referral: PTSD and insomnia. Needs conitive and behavioral therapy for insomnia., Chronic insomnia  PTSD (post-traumatic stress disorder)  MILTON (generalized anxie...                PTSD (post-traumatic stress disorder) (F43.10)          Believe she is suffering from posttraumatic stress disorder.  Referred for mental health.         Ordered:          [id number] office outpatient visit 15 minutes (Charge), Quantity: 1, Chronic insomnia  PTSD (post-traumatic stress disorder)  MILTON (generalized anxiety disorder)          Referral (Request), 12/29/20 14:33:00 CST, Referred to: Behavioral Health, Referred to: [facility], Reason for referral: PTSD and insomnia. Needs conitive and behavioral therapy for insomnia., Chronic insomnia  PTSD (post-traumatic stress disorder)  MILTON (generalized anxie...           Patient Information     Name:GUNNAR DOLL      Address:      [address] [place] [address] [place], [place] [id number]     Sex:Female     YOB: 1950     [phone]     Emergency Contact:YUKI DOLL     MRN:[id number]     FIN:[id number]     Location:[facility]     Date of Service:12/29/2020      Primary Care Physician:       Pollo Terry MD, [phone]      Attending Physician:       Pollo Terry MD, [phone]  Problem List/Past Medical History    Ongoing     Asthma     Chronic insomnia     Dyslipidemia     Eczema     Family history of colon cancer     MILTON (generalized anxiety disorder)     Genital HSV     GERD (gastroesophageal reflux disease)     MVP (mitral valve prolapse)     Osteoporosis     Thickened endometrium     Thrombocytosis    Historical     Inpatient stay       Comments: [facility], [place] - Acute appendicitis, transferred to [place], [place].     Pregnancy     Pregnancy     Smoking 1/2 pack a day or less     Stress-induced cardiomyopathy  Procedure/Surgical History     Appendectomy (03/23/2020)     Extracapsular cataract extraction and insertion of intraocular lens (11/02/2017)      Comments: Right..     Extracapsular cataract extraction and insertion of intraocular lens (10/19/2017)      Comments: Left..     Colonoscopy (06/10/2016)      Comments: Every 5 years for family history.     Colonoscopy (04/11/2001)     Caesarean section      Comments: x 2.     Extraction of wisdom tooth     H/O: blood transfusion     H/O: tubal ligation     History of tonsillectomy  Medications    acyclovir 400 mg oral tablet, See Instructions, 3 refills    albuterol 90 mcg/inh inhalation aerosol, 2 puff(s), Inhale, qid, PRN, 1 refills    busPIRone 15 mg oral tablet, 15 mg= 1 tab(s), Oral, bid, 3 refills    Caltrate Gummy Bites 250 mg-400 intl units oral tablet, chewable, See Instructions    clobetasol 0.05% topical cream, 1 shantel, Topical, bid    copper    hydrocortisone 1% topical cream, 1 shantel, Topical, tid    lovastatin 40 mg oral tablet, 40 mg= 1 tab(s), Oral, daily, 3 refills    Spacer, See Instructions  Allergies    Augmentin (Vomiting)    Latex    Macrobid (rash)    codeine  Social History    Smoking Status     Former smoker      Alcohol - Current      Wine (5 oz), Daily, Ready to change: No.     Electronic Cigarette/Vaping      Electronic Cigarette Use: Never.     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     Exercise - Regular exercise      Exercise frequency: 3-4 times/week. Exercise type: Curves.     Home/Environment      Marital status:  (Living together). Spouse/Partner name: Bebo. Living situation: Home/Independent. Injuries/Abuse/Neglect in household: No. Feels unsafe at home: No. Family/Friends available for support: Yes.     Nutrition/Health      Type of diet: Regular. Wants to lose weight: No. Sleeping concerns: No. Feels highly stressed: No.     Sexual      Sexually active: Yes. Identifies as female, Sexual orientation: Straight or heterosexual. History of STD: Yes. Contraceptive Use Details: None. History of sexual abuse: No.     Substance Abuse - Denies Substance Abuse      Never     Tobacco - Current      Former smoker, quit more than 30 days ago  Family History    Alcoholism: Sister.    Allergic rhinitis: Sister.    Arthritis: Mother, Sister and Aunt (M).    Bleeding disorder: Brother.    Breast Cancer: Mother (Dx at 72).    Cancer of colon: Sister (Dx at 55 years).    Dementia: Mother.    Depression: Mother and Sister.    High blood pressure: Sister.    Hypercholesterolemia: Sister.    Hypertension: Sister.    Kidney disease: Sister and Grandmother (P).    Obesity: Sister.    Seizure: Brother.  Immunizations      Vaccine Date Status          influenza virus vaccine, inactivated 09/09/2020 Recorded          pneumococcal (PPSV23) 12/23/2019 Given          influenza virus vaccine, inactivated 10/03/2019 Recorded          influenza 10/22/2018 Recorded              Comments : [10/24/2018] Received at [facility]/pharmacy, [place], [place]          influenza virus vaccine, inactivated 10/12/2017 Recorded          ZOS, shingles 03/16/2010 Recorded           ZOS, shingles 03/04/2010 Recorded          tetanus/diphth/pertuss (Tdap) adult/adol 01/07/2009 Recorded          ZOS shingles 03/16/2004 Recorded  Lab Results          Lab Results (Last 4 results within 90 days)           WBC: 6.8 [3.8  - 10.8] (12/10/20 10:54:00)          RBC: 4.29 [3.8  - 5.1] (12/10/20 10:54:00)          Hgb: 14 gm/dL [11.7 gm/dL - 15.5 gm/dL] (12/10/20 10:54:00)          Hct: 40.4 % [35 % - 45 %] (12/10/20 10:54:00)          MCV: 94.2 fL [80 fL - 100 fL] (12/10/20 10:54:00)          MCH: 32.6 pg [27 pg - 33 pg] (12/10/20 10:54:00)          MCHC: 34.7 gm/dL [32 gm/dL - 36 gm/dL] (12/10/20 10:54:00)          RDW: 12.9 % [11 % - 15 %] (12/10/20 10:54:00)          Platelet: 209 [140  - 400] (12/10/20 10:54:00)          MPV: 10.7 fL [7.5 fL - 12.5 fL] (12/10/20 10:54:00)          Lymphocytes: 33.3 % (12/10/20 10:54:00)          Abs Lymphocytes: 2264 [850  - 3900] (12/10/20 10:54:00)          Neutrophils: 51 % (12/10/20 10:54:00)          Abs Neutrophils: 3468 [1500  - 7800] (12/10/20 10:54:00)          Monocytes: 10.1 % (12/10/20 10:54:00)          Abs Monocytes: 687 [200  - 950] (12/10/20 10:54:00)          Eosinophils: 4.4 % (12/10/20 10:54:00)          Abs Eosinophils: 299 [15  - 500] (12/10/20 10:54:00)          Basophils: 1.2 % (12/10/20 10:54:00)          Abs Basophils: 82 [0  - 200] (12/10/20 10:54:00)

## 2022-02-15 NOTE — PROGRESS NOTES
Patient:   GUNNAR DOLL            MRN: [id number]            FIN: [id number]               Age:   69 years     Sex:  Female     :  1950   Associated Diagnoses:   Acute maxillary sinusitis   Author:   Pollo Terry MD      Visit Information   Visit type:  Acute visit.    Accompanied by:  No one.    Source of history:  Self.    History limitation:  None.       Chief Complaint   2019 12:04 PM CST  Patient is here today c/o sinus drainage, coughing, metalic tasting while eating        History of Present Illness             The patient presents with sinus problem.  The sinus problem is located in the maxillary sinus.  The sinus problem is characterized by nasal congestion, rhinorrhea and facial pain.  The severity of the sinus problem is moderate.  The sinus problem not is improving.  The sinus problem has lasted for 2 week(s).  Relieving factors consist of Neti pot.  Associated symptoms consist of ear pain, denies fever, denies cough, denies dizziness, denies eye pain and denies sore throat.        Review of Systems   Constitutional:  No fever, No chills.    Eye:  Negative.    Ear/Nose/Mouth/Throat:  Negative except as documented in history of present illness.    Respiratory:  Shortness of breath, No sputum production.       Health Status   Allergies:    Allergic Reactions (Selected)  Severity Not Documented  Codeine (No reactions were documented)  Latex (No reactions were documented)  Macrobid (Rash)  Nonallergic Reactions (Selected)  Mild  Augmentin (Vomiting)   Medications:  (Selected)   Prescriptions  Prescribed  Reclast 5 mg/100 mL intravenous solution: ( 5 mg ), IV, qyear, Instructions: infused over at least 15 minutes.  Annually in January starting 2019, # 1 EA, 0 Refill(s), Type: Soft Stop, other reason (Rx)  acyclovir 400 mg oral tablet: See Instructions, Instructions: TAKE 1 TABLET BY MOUTH TWO  TIMES DAILY, # 180 tab(s), 3 Refill(s), Type: Soft Stop, Pharmacy: [facility], TAKE 1  TABLET BY MOUTH TWO  TIMES DAILY  aspirin 81 mg oral tablet: = 1 tab(s) ( 81 mg ), Oral, daily, # 30 tab(s), 0 Refill(s), Type: Maintenance, OTC (Rx)  cefuroxime 250 mg oral tablet: = 1 tab(s) ( 250 mg ), Oral, bid, x 14 day(s), # 28 tab(s), 0 Refill(s), Type: Acute, Pharmacy: [facility] #[id number], 1 tab(s) Oral bid,x14 day(s)  lovastatin 40 mg oral tablet: = 1 tab(s) ( 40 mg ), PO, Daily, # 90 tab(s), 3 Refill(s), Type: Maintenance, Pharmacy: [email or web address], 1 tab(s) Oral daily  nortriptyline 10 mg oral capsule: See Instructions, Instructions: 3 cap(s) Oral qhs, # 270 EA, 3 Refill(s), Type: Maintenance, Pharmacy: [facility]  Documented Medications  Documented  Selenium  mcg oral tablet: 0 Refill(s), Type: Maintenance  Zantac 150: ( 150 mg ), po, bid, 0 Refill(s), Type: Maintenance  clobetasol 0.025% topical cream: Topical, bid, 0 Refill(s), Type: Maintenance  copper: 0 Refill(s), Type: Maintenance  hydrocortisone 1% topical cream: 1 shantel, top, tid, Instructions: PRN for eczema, 0 Refill(s), Type: Maintenance  ibuprofen 400 mg oral tablet: 1 tab(s) ( 400 mg ), po, q6 hrs, 0 Refill(s), Type: Maintenance   Problem list:    All Problems  Asthma / SNOMED CT 596833327 / Confirmed  Chronic insomnia / SNOMED CT 184622302 / Confirmed  Eczema / SNOMED CT 45088337 / Confirmed  Family history of colon cancer / SNOMED CT 782697163 / Confirmed  Genital HSV / SNOMED CT 35949559 / Confirmed  GERD (gastroesophageal reflux disease) / SNOMED CT 755962734 / Confirmed  MVP (mitral valve prolapse) / SNOMED CT 7624514415 / Confirmed  Osteoporosis / SNOMED CT 156291467 / Confirmed  Smoking 1/2 pack a day or less / SNOMED CT 576336619 / Confirmed  Resolved: Pregnancy / SNOMED CT 024181066  Resolved: Pregnancy / SNOMED CT 719824059      Histories   Past Medical History:    Active  GERD (gastroesophageal reflux disease) (333636968)  Chronic insomnia (606048139)  Asthma (881572545)  MVP (mitral valve prolapse)  (9466533663)  Eczema (44312495)  Genital HSV (08970423)  Resolved  Pregnancy (134756724): Onset on 4/3/1978 at 27 years.  Resolved in 1979 at 28 years.  Pregnancy (500164643): Onset on 4/3/1974 at 23 years.  Resolved in 1975 at 24 years.   Family History:    High blood pressure  Sister  Arthritis  Aunt (M)  Cancer of colon  Sister (Ghislaine): onset at 55 years.  Kidney disease  Grandmother (P)  Sister  Breast Cancer  Mother     Procedure history:    Extracapsular cataract extraction and insertion of intraocular lens (562078652) on 11/2/2017 at 67 Years.  Comments:  12/14/2017 10:40 AM CST - Gayatri Mckinnon  Right.  Extracapsular cataract extraction and insertion of intraocular lens (171588765) on 10/19/2017 at 67 Years.  Comments:  11/21/2017 12:20 PM CST - Gayatri Mckinnon  Left.  Colonoscopy (138009331) in 2016 at 66 Years.  Comments:  10/16/2017 2:21 PM CDT - Pollo Terry MD  Every 5 years for family history  H/O: tubal ligation (041292894).  Caesarean section (32462491).  Comments:  10/16/2017 2:09 PM CDT - Dayanara CMA, [place]  x 2  Extraction of wisdom tooth (839624033).  History of tonsillectomy (6731767451).  H/O: blood transfusion (416368277).   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      Home and Environment Assessment            Marital status:  (Living together).  Spouse/Partner name: Bebo.      Exercise and Physical Activity Assessment: Regular exercise            Exercise frequency: 3-4 times/week.  Exercise type: Curves.        Physical Examination   Vital Signs   11/12/2019 12:04 PM CST Temperature  Tympanic 98.7 DegF    Peripheral Pulse Rate 106 bpm  HI    HR Method Electronic    Systolic Blood Pressure 132 mmHg  HI    Diastolic Blood Pressure 70 mmHg    Mean Arterial Pressure 91 mmHg    BP Site Right arm    BP Method Manual    Oxygen Saturation 97 %      Measurements from flowsheet : Measurements   11/12/2019 12:04 PM CST Height Measured - Standard 60.5 in    Weight Measured - Standard 115.12 lb    BSA 1.49 m2    Body Mass Index 22.11 kg/m2      General:  Alert and oriented, No acute distress.    Eye:  Normal conjunctiva.    HENT:  Normocephalic, Tympanic membranes are clear, Normal hearing, Oral mucosa is moist, No pharyngeal erythema, No sinus tenderness.    Neck:  Supple, Non-tender, No lymphadenopathy, No thyromegaly.    Respiratory:  Lungs are clear to auscultation, Respirations are non-labored.       Impression and Plan   Diagnosis     Acute maxillary sinusitis ([id number]-XR J01.00).     Course:  Not improving.    Orders     Orders (Selected)   Prescriptions  Prescribed  cefuroxime 250 mg oral tablet: = 1 tab(s) ( 250 mg ), Oral, bid, x 14 day(s), # 28 tab(s), 0 Refill(s), Type: Acute, Pharmacy: [facility] #[id number], 1 tab(s) Oral bid,x14 day(s).     Reviewed symptomatic measures including decongestants. Return if fever or not improving..

## 2022-02-15 NOTE — TELEPHONE ENCOUNTER
---------------------  From: Liz Roach CMA   To: [facility] Pool ([id number]_[facility]);     Cc: Care Coordinators Pool ([facility]);      Sent: 4/3/2020 8:56:57 AM CDT  Subject: Right side pain     PCP:   WICHO      Time of Call:  0838       Person Calling:  Patient  Phone number:  [phone]    Note:   Patient states she was discharged on 4/1/20 following ruptured appendix. She is now experiencing discomfort on the right side of abdomen. Describes this as a stabbing pain. Wonders if she should come in sooner than 1340 today?    Last office visit and reason:  1/22/20 SOB/Asthma w/ GTG---------------------  From: Chico/Nell ENCISO ([employer] Message Pool ([id number]_[place]))   To: Pollo Terry MD;     Sent: 4/3/2020 9:07:00 AM CDT  Subject: FW: Right side pain---------------------  From: Pollo Terry MD   To: [facility] Pool ([id number]_[place]);     Sent: 4/3/2020 9:40:04 AM CDT  Subject: RE: Right side pain     Has she called her surgeon. He may wish to see her.

## 2022-02-15 NOTE — NURSING NOTE
Received Med request from [facility] for Lovastatin 40 mg tabs. Called patient at 0857 and notified her that she is due for an annual exam. Patient stated she has about 2-3 weeks left of her med and can wait until she is seen by [facility] for refills. Patient transferred to scheduling.

## 2022-02-15 NOTE — PROGRESS NOTES
Chief Complaint    Verbal consetn given for video visit. c/o rash that started on right shoulder over the weekend.  Does take benadryl at night. Tried applying ice and hydrocoritsone cream on area   Visit Information   Visit type: Video Visit via [employer] or [employer].   Participants in room during visit: Patient   Location of patient: Home   Location of provider: Home office    Video Start Time: 0922   Video End Time:   0935   Today's visit was conducted via video conference due to the COVID-19 pandemic. The patient's consent to proceed with a video visit has been obtained and documented.  History of Present Illness      Patient has a 3-day history of an erythematous, slightly raised, pruritic rash on her right anterior chest.  Started Friday evening.  She thought she may have a standing insect bite.  The erythematous area has spread.  She has been using clobetasol and diphenhydramine without much success.  She denies any new exposures.  She started buspirone and mid April.  She does not recall any possible exposures to plants.  The rash was not preceded by any pain.  There are no vesicles on the rash.  Review of Systems      See HPI.  All other review of systems negative.  Physical Exam   Vitals & Measurements    HT: 60.75 in       She appears well, no distress       There appears to be an erythematous patch on the right anterior chest from sternum to slightly above the clavicle  Assessment/Plan       Pruritic rash (L28.2)         Etiology unclear         Since she has had no improvement with topical steroids and trial of prednisone 20 mg daily along with hydroxyzine 25 3 times daily will be initiated.  If no improvement over the next 72 hours she will need a clinic visit.         Ordered:          hydrOXYzine, = 1 tab(s) ( 25 mg ), Oral, tid, x 7 day(s), PRN: for itching, # 21 tab(s), 0 Refill(s), Type: Acute, Pharmacy: [facility] #[id number], 1 tab(s) Oral tid,x7 day(s),PRN:for itching, 60.75, in,  06/15/20 9:11:00 CDT, Height Measured, 112, lb, 04/06/..., (Ordered)          predniSONE, = 1 tab(s) ( 20 mg ), Oral, daily, x 7 day(s), # 7 tab(s), 0 Refill(s), Type: Acute, Pharmacy: [facility] #[id number], 1 tab(s) Oral daily,x7 day(s), 60.75, in, 06/15/20 9:11:00 CDT, Height Measured, 112, lb, 04/06/20 10:57:00 CDT, Weight Measured, (Ordered)           Patient Information     Name:GUNNAR DOLL      Address:      [address] [place] [address]       [place], [place] [id number]     Sex:Female     YOB: 1950     [phone]     Emergency Contact:YUKI DOLL     MRN:[id number]     FIN:[id number]     Location[facility]     Date of Service:06/15/2020      Primary Care Physician:       Pollo Terry MD, [phone]      Attending Physician:       Sanket Gill MD, [phone]  Problem List/Past Medical History    Ongoing     Asthma     Chronic insomnia     Dyslipidemia     Eczema     Family history of colon cancer     Genital HSV     GERD (gastroesophageal reflux disease)     MVP (mitral valve prolapse)     Osteoporosis     Thickened endometrium     Thrombocytosis    Historical     Inpatient stay       Comments: [place], [place] - Acute appendicitis, transferred to [place], [place].     Pregnancy     Pregnancy     Smoking 1/2 pack a day or less     Stress-induced cardiomyopathy  Procedure/Surgical History     Appendectomy (03/23/2020)     Extracapsular cataract extraction and insertion of intraocular lens (11/02/2017)      Comments: Right..     Extracapsular cataract extraction and insertion of intraocular lens (10/19/2017)      Comments: Left..     Colonoscopy (06/10/2016)      Comments: Every 5 years for family history.     Colonoscopy (04/11/2001)     Caesarean section      Comments: x 2.     Extraction of wisdom tooth     H/O: blood transfusion     H/O: tubal ligation     History of tonsillectomy  Medications    acyclovir 400 mg oral tablet, See  Instructions, 3 refills    albuterol 90 mcg/inh inhalation aerosol, 2 puff(s), Inhale, qid, PRN, 1 refills    Benadryl    busPIRone 10 mg oral tablet, 10 mg= 1 tab(s), Oral, bid    clobetasol 0.025% topical cream, Topical, bid    copper    fluconazole 150 mg oral tablet, 150 mg= 1 tab(s), Oral, daily    hydrocortisone 1% topical cream, 1 shantel, Topical, tid    hydrOXYzine hydrochloride 25 mg oral tablet, 25 mg= 1 tab(s), Oral, tid, PRN    lovastatin 40 mg oral tablet, 40 mg= 1 tab(s), Oral, daily, 3 refills    predniSONE 20 mg oral tablet, 20 mg= 1 tab(s), Oral, daily    Spacer, See Instructions  Allergies    Augmentin (Vomiting)    Latex    Macrobid (rash)    codeine  Social History    Smoking Status - 04/14/2020     Former smoker     Alcohol - Current, 12/23/2019      Wine (5 oz), Daily, Ready to change: No., 12/23/2019     Employment/School      Part time, 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 Exercise type: Curves., 12/10/2018     Home/Environment      Marital status:  (Living together). Spouse/Partner name: Bebo. Living situation: Home/Independent. Injuries/Abuse/Neglect in household: No. Feels unsafe at home: No. Family/Friends available for support: Yes., 12/23/2019     Nutrition/Health      Type of diet: Regular. Wants to lose weight: No. Sleeping concerns: No. Feels highly stressed: No., 12/23/2019     Sexual      Sexually active: Yes. Identifies as female, Sexual orientation: Straight or heterosexual. History of STD: Yes. Contraceptive Use Details: None. History of sexual abuse: No., 12/23/2019     Substance Abuse - Denies Substance Abuse, 12/10/2018      Never, 12/23/2019     Tobacco - Current, 12/23/2019      Cigarettes, 2 per day. Ready to change: Yes., 12/23/2019      Former smoker, quit more than 30 days ago, Cigarettes, 12/23/2019  Family  History    Alcoholism: Sister.    Allergic rhinitis: Sister.    Arthritis: Mother, Sister and Aunt (M).    Bleeding disorder: Brother.    Breast Cancer: Mother (Dx at 72).    Cancer of colon: Sister (Dx at 55 years).    Dementia: Mother.    Depression: Mother and Sister.    High blood pressure: Sister.    Hypercholesterolemia: Sister.    Hypertension: Sister.    Kidney disease: Sister and Grandmother (P).    Obesity: Sister.    Seizure: Brother.  Immunizations      Vaccine Date Status          pneumococcal (PPSV23) 12/23/2019 Given          influenza virus vaccine, inactivated 10/03/2019 Recorded          influenza 10/22/2018 Recorded              Comments : [10/24/2018] [place] [facility]          influenza virus vaccine, inactivated 10/12/2017 Recorded          ZOS, shingles 03/16/2010 Recorded          ZOS, shingles 03/04/2010 Recorded          tetanus/diphth/pertuss (Tdap) adult/adol 01/07/2009 Recorded          ZOS, shingles 03/16/2004 Recorded  Lab Results          Lab Results (Last 4 results within 90 days)           Sodium Level TR: 139 mEq/L (04/04/20 14:46:00)          Potassium Level TR: 3.5 mEq/L (04/04/20 14:46:00)          Chloride TR: 106 mEq/L (04/04/20 14:46:00)          CO2 TR: 21 mEq/L (04/04/20 14:46:00)          Anion Gap TR: 12 mEq/L (04/04/20 14:46:00)          Glucose Level TR: 141 mg/dL (04/04/20 14:46:00)          BUN TR: 6 mg/dL (04/04/20 14:46:00)          Creatinine TR: 0.62 mg/dL (04/04/20 14:46:00)          BUN/Creatinine Ratio TR: 10 (04/04/20 14:46:00)          eGFR TR: 60 mL/min (04/04/20 14:46:00)          Calcium TR: 9.1 mEq/dL (04/04/20 14:46:00)          Bilirubin Total TR: 0.4 mg/dL (04/04/20 14:46:00)          Alkaline Phosphatase TR: 103 unit/L (04/04/20 14:46:00)          AST TR: 17 unit/L (04/04/20 14:46:00)          ALT TR: 15 unit/L (04/04/20 14:46:00)          Protein Total TR: 6.8 gm/dL (04/04/20 14:46:00)          Albumin Level TR: 3.6 g/dL (04/04/20  14:46:00)          Globulin TR: 3.2 g/dL (04/04/20 14:46:00)          A/G Ratio TR: 1.1 mg/dL (04/04/20 14:46:00)          WBC TR: 18.9 x10^3/uL (04/04/20 14:46:00)          RBC TR: 3.5 x10^6/uL (04/04/20 14:46:00)          Hgb TR: 11.1 g/dL (04/04/20 14:46:00)          Hct TR: 33.2 % (04/04/20 14:46:00)          MCV TR: 95 fL (04/04/20 14:46:00)          MCH TR: 31.7 pg (04/04/20 14:46:00)          MCHC TR: 33.4 gm/dL (04/04/20 14:46:00)          RDW TR: 14.5 % (04/04/20 14:46:00)          Platelet TR: 1104 x10^3/uL (04/04/20 14:46:00)          MPV (Mean Platelet Volume) TR: 9.3 fL (04/04/20 14:46:00)          Lymphocytes TR: 11 % (04/04/20 14:46:00)          Absolute Lymphocytes TR: 2.1 cells/uL (04/04/20 14:46:00)          Neutrophils TR: 81 % (04/04/20 14:46:00)          Absolute Neutrophils TR: 15.3 cells/uL (04/04/20 14:46:00)          Monocytes TR: 8 % (04/04/20 14:46:00)          Absolute Monocytes TR: 1.5 cells/uL (04/04/20 14:46:00)          Eosinophils TR: 0 % (04/04/20 14:46:00)          Absolute Eosinophils TR: 0 cells/uL (04/04/20 14:46:00)          Basophils TR: 0 % (04/04/20 14:46:00)          Absolute Basophils TR: 0 cells/uL (04/04/20 14:46:00)          UA pH: 7 [5  - 8] (05/05/20 16:45:00)          UA Specific Gravity: 1.01 [1.001  - 1.035] (05/05/20 16:45:00)          UA Glucose: NEGATIVE [NEGATIVE  - NEGATIVE] (05/05/20 16:45:00)          UA Bilirubin: NEGATIVE [NEGATIVE  - NEGATIVE] (05/05/20 16:45:00)          UA Ketones: NEGATIVE [NEGATIVE  - NEGATIVE] (05/05/20 16:45:00)          Urine Occult Blood: TRACE Abnormal [NEGATIVE  - NEGATIVE] (05/05/20 16:45:00)          UA Protein: NEGATIVE [NEGATIVE  - NEGATIVE] (05/05/20 16:45:00)          UA Nitrite: NEGATIVE [NEGATIVE  - NEGATIVE] (05/05/20 16:45:00)          UA Leukocyte Esterase: NEGATIVE [NEGATIVE  - NEGATIVE] (05/05/20 16:45:00)          UA Epithelial Cells: Few [None  - Few] (05/05/20 16:59:00)          UA WBC: 0-2 [None  - 5] (05/05/20  16:59:00)          UA RBC: None Seen [None  - 2] (05/05/20 16:59:00)          UA Bacteria: Moderate [None  - Few] (05/05/20 16:59:00)          Culture Urine: See comment (05/05/20 16:49:00)

## 2022-02-15 NOTE — TELEPHONE ENCOUNTER
---------------------  From: Nell Galloway ([facility] Message Pool ([id number]_[place]))   To: Pollo Terry MD;     Sent: 5/12/2020 10:14:17 AM CDT  Subject: Multiple     Spoke to pt at 1011. Patient stated that she only has a few pills left of Buspirone. Patient states that she knows she has refills at [facility] if she fills through [facility], they are free. Patient also wondering that since her urine culture came back negative if [facility] should see the gyn sooner as maybe the pressure is due to her uterine lining thickening. Patient stated that [facility] had told her that this can cause cancer. Patient wondering if she could get a referral to see a gyn sooner as she is getting worried that something is going on.---------------------  From: Pollo Terry MD   To: [facility] Message Pool ([id number]_[place]);     Sent: 5/12/2020 10:20:15 AM CDT  Subject: RE: Multiple     OK to refill Buspirone at pharmacy of choice. When is GYN scheduled?Not scheduled yet. Patient told me [facility] wanted her to wait a while due to COVID but pt is getting worried and does not think it should wait much longer.---------------------  From: Nell Galloway ([employer] Pool ([address]_[place]))   To: Pollo Terry MD;     Sent: 5/12/2020 10:24:08 AM CDT  Subject: FW: Multiple---------------------  From: Pollo Terry MD   To: [facility] Message Pool ([id number]_[place]);     Sent: 5/12/2020 11:12:24 AM CDT  Subject: RE: Multiple     Schedule GYN visit.Patient notified and transferred to  to schedule appointment with Dr. Guillen.

## 2022-02-15 NOTE — NURSING NOTE
Comprehensive Intake Entered On:  4/3/2020 9:41 AM CDT    Performed On:  4/3/2020 9:30 AM CDT by Nell Galloway               Summary   Chief Complaint :   f/u hospital- acute appendicitis. c/o severe pain by right ribs that started last night- cannot take deep breaths. Rash on back.    Weight Measured :   112 lb(Converted to: 112 lb 0 oz, 50.80 kg)    Height Measured :   60.75 in(Converted to: 5 ft 1 in, 154.30 cm)    Body Mass Index :   21.33 kg/m2   Body Surface Area :   1.47 m2   Systolic Blood Pressure :   113 mmHg   Diastolic Blood Pressure :   73 mmHg   Mean Arterial Pressure :   86 mmHg   Peripheral Pulse Rate :   107 bpm (HI)    BP Site :   Right arm   BP Method :   Electronic   HR Method :   Electronic   Temperature Tympanic :   99.9 DegF(Converted to: 37.7 DegC)    Oxygen Saturation :   96 %   Nell Galloway - 4/3/2020 9:30 AM CDT   Health Status   Allergies Verified? :   Yes   Medication History Verified? :   Yes   Medical History Verified? :   Yes   Pre-Visit Planning Status :   Completed   Tobacco Use? :   Former smoker   Nell Galloway - 4/3/2020 9:30 AM CDT   Consents   Consent for Immunization Exchange :   Consent Granted   Consent for Immunizations to Providers :   Consent Granted   Nell Galloway - 4/3/2020 9:30 AM CDT   Meds / Allergies   (As Of: 4/3/2020 9:41:21 AM CDT)   Allergies (Active)   Augmentin  Estimated Onset Date:   Unspecified ; Reactions:   Vomiting ; Created By:   Barbara Michel; Reaction Status:   Active ; Category:   Drug ; Substance:   Augmentin ; Type:   Sensitivity ; Severity:   Mild ; Updated By:   Barbara Michel; Reviewed Date:   4/3/2020 9:37 AM CDT      codeine  Estimated Onset Date:   Unspecified ; Created By:   Travon Nichole CMA; Reaction Status:   Active ; Category:   Drug ; Substance:   codeine ; Type:   Allergy ; Updated By:   Travon Nichole CMA; Reviewed Date:   4/3/2020 9:37 AM CDT      Latex  Estimated Onset Date:   Unspecified ; Created By:    Travon Nichole CMA; Reaction Status:   Active ; Category:   Drug ; Substance:   Latex ; Type:   Allergy ; Updated By:   Travon Nichole CMA; Reviewed Date:   4/3/2020 9:37 AM CDT      Macrobid  Estimated Onset Date:   Unspecified ; Reactions:   rash ; Created By:   Travon Nichole CMA; Reaction Status:   Active ; Category:   Drug ; Substance:   Macrobid ; Type:   Allergy ; Updated By:   Travon Nichole CMA; Reviewed Date:   4/3/2020 9:37 AM CDT        Medication List   (As Of: 4/3/2020 9:41:21 AM CDT)   Prescription/Discharge Order    albuterol  :   albuterol ; Status:   Prescribed ; Ordered As Mnemonic:   albuterol 90 mcg/inh inhalation aerosol ; Simple Display Line:   2 puff(s), Inhale, qid, PRN: for shortness of breath or wheezing, 1 EA, 1 Refill(s) ; Ordering Provider:   Sanket Gill MD; Catalog Code:   albuterol ; Order Dt/Tm:   1/22/2020 3:17:33 PM CST          Miscellaneous Rx Supply  :   Miscellaneous Rx Supply ; Status:   Prescribed ; Ordered As Mnemonic:   Spacer ; Simple Display Line:   See Instructions, use as directed with inhaler., 1 EA, 0 Refill(s) ; Ordering Provider:   Sanket Gill MD; Catalog Code:   Miscellaneous Rx Supply ; Order Dt/Tm:   1/22/2020 3:17:33 PM CST          acyclovir  :   acyclovir ; Status:   Prescribed ; Ordered As Mnemonic:   acyclovir 400 mg oral tablet ; Simple Display Line:   See Instructions, TAKE 1 TABLET BY MOUTH TWO  TIMES DAILY, 180 tab(s), 3 Refill(s) ; Ordering Provider:   Pollo Terry MD; Catalog Code:   acyclovir ; Order Dt/Tm:   12/23/2019 10:26:09 AM CST          lovastatin  :   lovastatin ; Status:   Prescribed ; Ordered As Mnemonic:   lovastatin 40 mg oral tablet ; Simple Display Line:   40 mg, 1 tab(s), PO, Daily, 90 tab(s), 3 Refill(s) ; Ordering Provider:   Pollo Terry MD; Catalog Code:   lovastatin ; Order Dt/Tm:   12/23/2019 10:26:41 AM CST          nortriptyline  :   nortriptyline ; Status:   Processing ; Ordered As Mnemonic:    nortriptyline 10 mg oral capsule ; Ordering Provider:   Pollo Terry MD; Action Display:   Complete ; Catalog Code:   nortriptyline ; Order Dt/Tm:   4/3/2020 9:36:28 AM CDT          zoledronic acid  :   zoledronic acid ; Status:   Prescribed ; Ordered As Mnemonic:   Reclast 5 mg/100 mL intravenous solution ; Simple Display Line:   5 mg, IV, qyear, infused over at least 15 minutes.  Annually in January starting 2019, 1 EA, 0 Refill(s) ; Ordering Provider:   Pollo Terry MD; Catalog Code:   zoledronic acid ; Order Dt/Tm:   12/14/2018 3:50:21 PM CST          aspirin  :   aspirin ; Status:   Processing ; Ordered As Mnemonic:   aspirin 81 mg oral tablet ; Ordering Provider:   Pollo Terry MD; Action Display:   Complete ; Catalog Code:   aspirin ; Order Dt/Tm:   4/3/2020 9:35:39 AM CDT            Home Meds    amoxicillin-clavulanate  :   amoxicillin-clavulanate ; Status:   Documented ; Ordered As Mnemonic:   Augmentin 875 mg-125 mg oral tablet ; Simple Display Line:   1 tab(s), Oral, q12 hrs, for 14, 28 tab(s), 0 Refill(s) ; Catalog Code:   amoxicillin-clavulanate ; Order Dt/Tm:   4/2/2020 11:15:58 AM CDT          fluconazole  :   fluconazole ; Status:   Documented ; Ordered As Mnemonic:   fluconazole 150 mg oral tablet ; Simple Display Line:   150 mg, 1 tab(s), Oral, daily, for 14 day(s), 14 tab(s), 0 Refill(s) ; Catalog Code:   fluconazole ; Order Dt/Tm:   4/2/2020 11:16:20 AM CDT          oxyCODONE  :   oxyCODONE ; Status:   Documented ; Ordered As Mnemonic:   oxyCODONE 5 mg oral tablet ; Simple Display Line:   1-2 tab(s), Oral, q4 hrs, 0 Refill(s) ; Catalog Code:   oxyCODONE ; Order Dt/Tm:   4/2/2020 11:17:09 AM CDT          diphenhydrAMINE  :   diphenhydrAMINE ; Status:   Documented ; Ordered As Mnemonic:   Benadryl ; Simple Display Line:   0 Refill(s) ; Catalog Code:   diphenhydrAMINE ; Order Dt/Tm:   12/23/2019 10:10:57 AM CST          clobetasol topical  :   clobetasol topical ; Status:   Documented ;  Ordered As Mnemonic:   clobetasol 0.025% topical cream ; Simple Display Line:   Topical, bid, 0 Refill(s) ; Catalog Code:   clobetasol topical ; Order Dt/Tm:   11/12/2019 12:10:20 PM CST          copper  :   copper ; Status:   Documented ; Ordered As Mnemonic:   copper ; Simple Display Line:   0 Refill(s) ; Catalog Code:   copper ; Order Dt/Tm:   10/16/2017 2:04:52 PM CDT          hydrocortisone topical  :   hydrocortisone topical ; Status:   Documented ; Ordered As Mnemonic:   hydrocortisone 1% topical cream ; Simple Display Line:   1 shantel, top, tid, PRN for eczema, 0 Refill(s) ; Catalog Code:   hydrocortisone topical ; Order Dt/Tm:   10/16/2017 2:05:31 PM CDT          ibuprofen  :   ibuprofen ; Status:   Completed ; Ordered As Mnemonic:   ibuprofen 400 mg oral tablet ; Simple Display Line:   400 mg, 1 tab(s), po, q6 hrs, 0 Refill(s) ; Catalog Code:   ibuprofen ; Order Dt/Tm:   10/16/2017 2:05:19 PM CDT          selenium  :   selenium ; Status:   Documented ; Ordered As Mnemonic:   Selenium  mcg oral tablet ; Simple Display Line:   0 Refill(s) ; Catalog Code:   selenious acid ; Order Dt/Tm:   10/16/2017 2:04:59 PM CDT

## 2022-02-15 NOTE — TELEPHONE ENCOUNTER
---------------------  From: Dinora Vallejo RN (Phone Messages Pool ([id number]_[facility]))   To: [employer] Message Pool ([id number]);     Sent: 9/8/2021 11:23:14 AM CDT  Subject: Buspirone     Time of Call:  1115       Person Calling:  patient  Phone number:  [phone]    Note:   Patient wanted JDL to know she restarted Buspirone 15 mg BID yesterday. She had some at home on hand. She did this due to the stress of care taking for her .    Last office visit and reason:  Wrist pain 6/1/22---------------------  From: Chico/Nell ENCISO ([employer] Message Pool ([id number]))   To: Pollo Terry MD;     Sent: 9/16/2021 11:40:18 AM CDT  Subject: FW: Buspirone?   ?   ---------------------  From: Pollo Terry  To: Nell Golden MA ([employer] Message Pool ([id number]_[facility]))  Sent: September 18, 2021 3:44 PM CEST  Subject: RE: Buspirone  ???  OK. I woulk like to have a visit in 4-6 weeks. Virtual Ok.RTC placed.

## 2022-02-15 NOTE — TELEPHONE ENCOUNTER
---------------------  From: Camryn Sandoval CMA   Sent: 3/15/2021 3:35:17 PM CDT  Subject: [facility] testing     Seen for COVID testing at [facility] per [facility]    O2 Sat = 97%  (Children under 12 do not require O2 sat)    Specimen sent to:  [facility]    PUI form faxed to: [facility].

## 2022-02-15 NOTE — TELEPHONE ENCOUNTER
---------------------  From: Che Maldonado LPN (Phone Messages Pool ([id number]_[facility]))   To: Advanced Practice Provider [place] ([id number]_[facility]);     Sent: 5/6/2020 11:34:16 AM CDT  Subject: UA results     JDL out of clinic until Monday.     Phone Message    PCP:   JDL      Time of Call:  10:33am       Person Calling:  pt  Phone number:  [phone] OK to LM     Note:   Pt LM stating she dropped off a urine specimen last night. Pt says on her [facility] chart it shows moderate bacteria. Pt asking if JDL is going to prescribe something.    Please advise    Last office visit and reason:  4/14/20 ED follow up anxiety---------------------  From: Bharat Horan PA-C (Advanced Practice Provider [place] ([id number]))   To: Phone [employer] Pool ([id number]_[place] [facility]);     Sent: 5/6/2020 2:19:55 PM CDT  Subject: RE: UA results     Unless fever or chills, flank pain etc., would prefer to wait for the culture before treating.    PEDRO for pt asking if she has fever/chills or flank pain.CORRECT PHONE # [phone]    Called pt and she does not have any fever/chills or flank pain. Pt says she only has bladder pain that goes away with Advil. Pt informed we will wait culture results and call her with appropriate treatment.

## 2022-02-15 NOTE — TELEPHONE ENCOUNTER
---------------------  From: Chico/Nell ENCISO (Phone Messages Pool ([id number]_[facility]))   Sent: 3/15/2021 8:36:50 AM CDT  Subject: covid sx     Phone Message    PCP: WICHO    Time of Call: 0830    Phone Number: [phone]    Returned call at: n/a    Note: Patient calls stating that she has a covid shot schedule for Wednesday, however, she has a stuffy nose, HA, cough. Patient wondering what to do. Told pt I would recommend a video visit with a provider. Told pt she is not going to want to get the shot if she is sick because it could potentially make her more sick. Patient agreed and was transferred to scheduling.

## 2022-02-15 NOTE — LETTER
(Inserted Image. Unable to display)   November 05, 2021  GUNNAR DOLL  [address] [place] [address]        Dear GUNNAR,    Thank you for selecting [facility] for your healthcare needs.    Our records indicate you are due for the following services:     Annual Wellness Visit    (FYI   Regarding office visits: In some instances, a video visit or telephone visit may be offered as an option.)    To schedule an appointment or if you have further questions, please contact your clinic at [phone].    Powered by [employer]    Sincerely,    Pollo Terry MD, FACP

## 2022-02-15 NOTE — PROGRESS NOTES
Chief Complaint    follow up from visit 11/30/19, developed fever over the weekend.  still taking doxycycline and nasal spray. would like CXR done.  History of Present Illness      Patient is here for a follow up from her last visit on 11/0/19.  She is currently being treated for a sinus infection with doxycycline and fluticasone nasal spray, has been treated for sinus infections for the past month.  She started to develop a fever over the weekend after being seen in clinic, increased fatigue and coughing more.  Also hurts to breath, has burning sensation when taking deep breaths.  She would like to have a CXR done to rule out pneumonia.  Review of Systems           See HPI.  All other review of systems negative.              Physical Exam   Vitals & Measurements    T: 100.3   F (Tympanic)  HR: 105(Peripheral)  BP: 136/88  SpO2: 95%     HT: 60.5 in  WT: 113.2 lb  BMI: 21.74       General: Alert and oriented, No acute distress.      Eye: Pupils are equal, round and reactive to light, Normal conjunctiva.      HENT: Tympanic membranes are clear, Oral mucosa is moist, No pharyngeal erythema.      Sinus: Bilateral, Maxillary sinus, Tenderness, Discharge.      Throat: Within normal limits.      Neck: Supple, No lymphadenopathy.      Respiratory: Lungs are clear to auscultation, Respirations are non-labored.      Cardiovascular: Normal rate, Regular rhythm.      Integumentary: Warm, No rash.      Psychiatric: Cooperative, Appropriate mood & affect, Normal judgment         Assessment/Plan       1. Cough (R05)         CXR ordered.       2. Pneumonia (J18.9)        CXR revealed possible right sided pneumonia.  Will switch from doxycycline to levofloxacin 750mg x5days, should also help with sinusitis.  Continue with ibuprofen for fever/pain relief, plenty of rest, drink fluids to keep hydrated.  Slowly return to normal routine when feeling better.  Follow up if not improving or symptoms worsen.      I, Madelyn Weber MA, acted  solely as a scribe for, and in the presence of Dr. Sanket Gill who performed the services.             I, Sanket Gill MD, personally performed the services described in this documentation.  The documentation was scribed in my presence and is both accurate and complete.                Patient Information     Name:GUNNAR DOLL      Address:      [address] [place] [address] [place], [place] [id number]     Sex:Female     YOB: 1950     [phone]     Emergency Contact:[facility], CONTACT     MRN:[id number]     FIN:[id number]     Location:[facility]     Date of Service:12/02/2019      Primary Care Physician:       Pollo Terry MD, [phone]      Attending Physician:       Sanket Gill MD, [phone]  Problem List/Past Medical History    Ongoing     Asthma     Chronic insomnia     Eczema     Family history of colon cancer     Genital HSV     GERD (gastroesophageal reflux disease)     MVP (mitral valve prolapse)     Osteoporosis     Smoking 1/2 pack a day or less    Historical     Pregnancy     Pregnancy  Procedure/Surgical History     Extracapsular cataract extraction and insertion of intraocular lens (11/02/2017)            Comments: Right..     Extracapsular cataract extraction and insertion of intraocular lens (10/19/2017)            Comments: Left..     Colonoscopy (2016)            Comments: Every 5 years for family history.     Caesarean section            Comments: x 2.     Extraction of wisdom tooth           H/O: blood transfusion           H/O: tubal ligation           History of tonsillectomy        Medications    acyclovir 400 mg oral tablet, See Instructions, 3 refills    aspirin 81 mg oral tablet, 81 mg= 1 tab(s), Oral, daily    clobetasol 0.025% topical cream, Topical, bid    copper    fluticasone 50 mcg/inh nasal spray, 2 spray(s), Nasal, daily    hydrocortisone 1% topical cream, 1 shantel, Topical, tid    ibuprofen 400 mg oral tablet, 400  mg= 1 tab(s), Oral, q6 hrs    levoFLOXacin 750 mg oral tablet, 750 mg= 1 tab(s), Oral, q 24 hrs    lovastatin 40 mg oral tablet, 40 mg= 1 tab(s), Oral, daily, 3 refills    nortriptyline 10 mg oral capsule, See Instructions, 3 refills    Reclast 5 mg/100 mL intravenous solution, 5 mg, IV, qyear    Selenium  mcg oral tablet    Zantac 150, 150 mg, Oral, bid  Allergies    Augmentin (Vomiting)    Latex    Macrobid (rash)    codeine  Social History    Smoking Status - 12/02/2019     Current every day smoker     Alcohol      Current, Wine (5 oz), 1 glass with dinner, 10/18/2017     Employment/School      Employed, 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 Exercise type: [employer]., 12/10/2018     Home/Environment      Marital status:  (Living together). Spouse/Partner name: Bebo., 10/18/2017     Substance Abuse - Denies Substance Abuse, 12/10/2018     Tobacco      5-9 cigarettes (between 1/4 to 1/2 pack)/day in last 30 days, 10/16/2017      10 or more cigarettes (1/2 pack or more)/day in last 30 days, Cigarettes, 10/16/2017  Family History    Arthritis: Aunt (M).    Breast Cancer: Mother.    Cancer of colon: Sister (Dx at 55 years).    High blood pressure: Sister.    Kidney disease: Sister and Grandmother (P).  Immunizations      Vaccine Date Status Comments      influenza virus vaccine, inactivated 10/03/2019 Recorded      influenza 10/22/2018 Recorded [10/24/2018] Received at [facility]/pharmacy, [place], [place]      influenza virus vaccine, inactivated 10/12/2017 Recorded      ZOS, shingles 03/16/2010 Recorded      ZOS, shingles 03/04/2010 Recorded      tetanus/diphth/pertuss (Tdap) adult/adol 01/07/2009 Recorded      ZOS, shingles 03/16/2004 Recorded

## 2022-02-15 NOTE — PROGRESS NOTES
Chief Complaint    Verbal consent given. f/u ED stay- saturday. 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 c/o high anxiety- not being able to sleep, pt is very emotional. Patient states that healing is going well. She cannot eat very much. Still has discomfort in ribs.  History of Present Illness      Today's visit was conducted via telephone due to the COVID-19 pandemic.  Patient's consent to telephone visit was obtained and documented.      Call Start Time:  0920      Call End Time:   1010      Patient was in the emergency room recently and overall is doing better.  She was diagnosed with adjustment disorder with anxiety.  She had a critical illness with ICU stay for ruptured appendicitis and postoperatively has had complications including anxiety, atypical chest pain, diarrhea due to antibiotics.  She was prescribed trazodone at bedtime and either gabapentin or buspirone recommended but not prescribed.  Pleuritic right chest pain and chest wall tenderness much improved.  Sleeping better with buspirone.  Wound is healed.  No fever or chills.  No nausea, vomiting, diarrhea.  She is out walking a couple of times per day.  He is restless in bed, but again sleeping better with the trazodone.  Review of Systems      No headache, myalgias, chest pressure, cough, dyspnea, edema.  Physical Exam   Vitals & Measurements    HT: 60.75 in   Assessment/Plan       Acute adjustment disorder with anxiety (F43.22)         Associated with acute illness and intensive care unit stay with delirium.  Improving.  Continue trazodone.  Will prescribe BuSpar 10 mg twice daily as recommended by psychiatry.  Recommended the patient start with a half a tablet.  Follow-up in 2 weeks.       Atypical chest pain (R07.89)         Improving.  Work-up negative.       Ruptured appendicitis (K35.32)         With associated peritonitis and sepsis.  Status post open appendectomy.  Improving.       Thickened endometrium (R93.89)        Gynecology  referral.        Thrombocytosis (D47.3)         Reactive.  CBC in a few weeks.  Patient Information     Name:GUNNAR DOLL      Address:      [address] [place] [address]       [place], [place] [id number]     Sex:Female     YOB: 1950     [phone]     Emergency Contact:YUKI DOLL     MRN:[id number]     FIN:[id number]     Location:[facility]     Date of Service:04/14/2020      Primary Care Physician:       Pollo Terry MD, [phone]      Attending Physician:       Pollo Terry MD, [phone]  Problem List/Past Medical History    Ongoing     Asthma     Chronic insomnia     Dyslipidemia     Eczema     Family history of colon cancer     Genital HSV     GERD (gastroesophageal reflux disease)     MVP (mitral valve prolapse)     Osteoporosis     Thickened endometrium     Thrombocytosis    Historical     Inpatient stay       Comments: [place], [place] - Acute appendicitis, transferred to [place], [place].     Pregnancy     Pregnancy     Smoking 1/2 pack a day or less     Stress-induced cardiomyopathy  Procedure/Surgical History     Extracapsular cataract extraction and insertion of intraocular lens (11/02/2017)      Comments: Right..     Extracapsular cataract extraction and insertion of intraocular lens (10/19/2017)      Comments: Left..     Colonoscopy (06/10/2016)      Comments: Every 5 years for family history.     Colonoscopy (04/11/2001)     Caesarean section      Comments: x 2.     Extraction of wisdom tooth     H/O: blood transfusion     H/O: tubal ligation     History of tonsillectomy  Medications    acyclovir 400 mg oral tablet, See Instructions, 3 refills    albuterol 90 mcg/inh inhalation aerosol, 2 puff(s), Inhale, qid, PRN, 1 refills    Benadryl    clobetasol 0.025% topical cream, Topical, bid    copper    fluconazole 150 mg oral tablet, 150 mg= 1 tab(s), Oral, daily    hydrocortisone 1% topical cream, 1 shantel, Topical, tid    lovastatin 40 mg  oral tablet, 40 mg= 1 tab(s), Oral, daily, 3 refills    Reclast 5 mg/100 mL intravenous solution, 5 mg, IV, qyear    Selenium  mcg oral tablet    Spacer, See Instructions  Allergies    Augmentin (Vomiting)    Latex    Macrobid (rash)    codeine  Social History    Smoking Status - 04/14/2020     Former smoker     Alcohol - Current, 12/23/2019      Wine (5 oz), Daily, Ready to change: No., 12/23/2019     Employment/School      Part time, 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 Highest education level: B.S in nursing., 12/23/2019     Exercise - Regular exercise, 12/10/2018      Exercise frequency: 3-4 times/week. Exercise type: Curves., 12/10/2018     Home/Environment      Marital status:  (Living together). Spouse/Partner name: Bebo. Living situation: Home/Independent. Injuries/Abuse/Neglect in household: No. Feels unsafe at home: No. Family/Friends available for support: Yes., 12/23/2019     Nutrition/Health      Type of diet: Regular. Wants to lose weight: No. Sleeping concerns: No. Feels highly stressed: No., 12/23/2019     Sexual      Sexually active: Yes. Identifies as female, Sexual orientation: Straight or heterosexual. History of STD: Yes. Contraceptive Use Details: None. History of sexual abuse: No., 12/23/2019     Substance Abuse - Denies Substance Abuse, 12/10/2018      Never, 12/23/2019     Tobacco - Current, 12/23/2019      Cigarettes, 2 per day. Ready to change: Yes., 12/23/2019      Former smoker, quit more than 30 days ago, Cigarettes, 12/23/2019  Family History    Alcoholism: Sister.    Allergic rhinitis: Sister.    Arthritis: Mother, Sister and Aunt (M).    Bleeding disorder: Brother.    Breast Cancer: Mother (Dx at 72).    Cancer of colon: Sister (Dx at 55 years).    Dementia: Mother.    Depression: Mother and Sister.    High blood pressure: Sister.    Hypercholesterolemia: Sister.    Hypertension: Sister.    Kidney disease: Sister and Grandmother (P).    Obesity:  Sister.    Seizure: Brother.  Immunizations      Vaccine Date Status          pneumococcal (PPSV23) 12/23/2019 Given          influenza virus vaccine, inactivated 10/03/2019 Recorded          influenza 10/22/2018 Recorded              Comments : [10/24/2018] [place] [facility]          influenza virus vaccine, inactivated 10/12/2017 Recorded          ZOS, shingles 03/16/2010 Recorded          ZOS, shingles 03/04/2010 Recorded          tetanus/diphth/pertuss (Tdap) adult/adol 01/07/2009 Recorded          ZOS, shingles 03/16/2004 Recorded  Lab Results          Lab Results (Last 4 results within 90 days)           Sodium Level TR: 139 mEq/L (04/04/20 14:46:00)          Potassium Level TR: 3.5 mEq/L (04/04/20 14:46:00)          Chloride TR: 106 mEq/L (04/04/20 14:46:00)          CO2 TR: 21 mEq/L (04/04/20 14:46:00)          Anion Gap TR: 12 mEq/L (04/04/20 14:46:00)          Glucose Level TR: 141 mg/dL (04/04/20 14:46:00)          BUN TR: 6 mg/dL (04/04/20 14:46:00)          Creatinine TR: 0.62 mg/dL (04/04/20 14:46:00)          BUN/Creatinine Ratio TR: 10 (04/04/20 14:46:00)          eGFR TR: 60 mL/min (04/04/20 14:46:00)          Calcium TR: 9.1 mEq/dL (04/04/20 14:46:00)          Bilirubin Total TR: 0.4 mg/dL (04/04/20 14:46:00)          Alkaline Phosphatase TR: 103 unit/L (04/04/20 14:46:00)          AST TR: 17 unit/L (04/04/20 14:46:00)          ALT TR: 15 unit/L (04/04/20 14:46:00)          Protein Total TR: 6.8 gm/dL (04/04/20 14:46:00)          Albumin Level TR: 3.6 g/dL (04/04/20 14:46:00)          Globulin TR: 3.2 g/dL (04/04/20 14:46:00)          A/G Ratio TR: 1.1 mg/dL (04/04/20 14:46:00)          WBC TR: 18.9 x10^3/uL (04/04/20 14:46:00)          RBC TR: 3.5 x10^6/uL (04/04/20 14:46:00)          Hgb TR: 11.1 g/dL (04/04/20 14:46:00)          Hct TR: 33.2 % (04/04/20 14:46:00)          MCV TR: 95 fL (04/04/20 14:46:00)          MCH TR: 31.7 pg (04/04/20 14:46:00)          MCHC TR: 33.4  gm/dL (04/04/20 14:46:00)          RDW TR: 14.5 % (04/04/20 14:46:00)          Platelet TR: 1104 x10^3/uL (04/04/20 14:46:00)          MPV (Mean Platelet Volume) TR: 9.3 fL (04/04/20 14:46:00)          Lymphocytes TR: 11 % (04/04/20 14:46:00)          Absolute Lymphocytes TR: 2.1 cells/uL (04/04/20 14:46:00)          Neutrophils TR: 81 % (04/04/20 14:46:00)          Absolute Neutrophils TR: 15.3 cells/uL (04/04/20 14:46:00)          Monocytes TR: 8 % (04/04/20 14:46:00)          Absolute Monocytes TR: 1.5 cells/uL (04/04/20 14:46:00)          Eosinophils TR: 0 % (04/04/20 14:46:00)          Absolute Eosinophils TR: 0 cells/uL (04/04/20 14:46:00)          Basophils TR: 0 % (04/04/20 14:46:00)          Absolute Basophils TR: 0 cells/uL (04/04/20 14:46:00)

## 2022-02-15 NOTE — NURSING NOTE
Comprehensive Intake Entered On:  12/2/2019 2:13 PM CST    Performed On:  12/2/2019 2:07 PM CST by Tia ENCISO, Madelyn               Summary   Chief Complaint :   follow up from visit 11/30/19, developed fever over the weekend.  still taking doxycycline and nasal spray. would like CXR done.   Weight Measured :   113.2 lb(Converted to: 113 lb 3 oz, 51.35 kg)    Height Measured :   60.5 in(Converted to: 5 ft 0 in, 153.67 cm)    Body Mass Index :   21.74 kg/m2   Body Surface Area :   1.48 m2   Systolic Blood Pressure :   136 mmHg (HI)    Diastolic Blood Pressure :   88 mmHg (HI)    Mean Arterial Pressure :   104 mmHg   Peripheral Pulse Rate :   105 bpm (HI)    BP Site :   Right arm   Pulse Site :   Radial artery   BP Method :   Manual   HR Method :   Manual   Temperature Tympanic :   100.3 DegF(Converted to: 37.9 DegC)    Oxygen Saturation :   95 %   Madelyn Weber MA - 12/2/2019 2:07 PM CST   Health Status   Allergies Verified? :   Yes   Medication History Verified? :   Yes   Medical History Verified? :   Yes   Pre-Visit Planning Status :   Not completed   Tobacco Use? :   Current every day smoker   Madelyn Weber MA - 12/2/2019 2:07 PM CST   Consents   Consent for Immunization Exchange :   Consent Granted   Consent for Immunizations to Providers :   Consent Granted   Madelyn Weber MA - 12/2/2019 2:07 PM CST   Meds / Allergies   (As Of: 12/2/2019 2:13:30 PM CST)   Allergies (Active)   Augmentin  Estimated Onset Date:   Unspecified ; Reactions:   Vomiting ; Created By:   Barbara Michel; Reaction Status:   Active ; Category:   Drug ; Substance:   Augmentin ; Type:   Sensitivity ; Severity:   Mild ; Updated By:   Barbara Michel; Reviewed Date:   11/12/2019 12:07 PM CST      codeine  Estimated Onset Date:   Unspecified ; Created By:   Travon Nichole CMA; Reaction Status:   Active ; Category:   Drug ; Substance:   codeine ; Type:   Allergy ; Updated By:   Travon Nichole CMA; Reviewed Date:   11/12/2019 12:07 PM  CST      Latex  Estimated Onset Date:   Unspecified ; Created By:   Travon Nichole CMA; Reaction Status:   Active ; Category:   Drug ; Substance:   Latex ; Type:   Allergy ; Updated By:   Travon Nichole CMA; Reviewed Date:   11/12/2019 12:07 PM CST      Macrobid  Estimated Onset Date:   Unspecified ; Reactions:   rash ; Created By:   Travon Nichole CMA; Reaction Status:   Active ; Category:   Drug ; Substance:   Macrobid ; Type:   Allergy ; Updated By:   Travon Nichole CMA; Reviewed Date:   11/12/2019 12:07 PM CST        Medication List   (As Of: 12/2/2019 2:13:30 PM CST)   Prescription/Discharge Order    acyclovir  :   acyclovir ; Status:   Prescribed ; Ordered As Mnemonic:   acyclovir 400 mg oral tablet ; Simple Display Line:   See Instructions, TAKE 1 TABLET BY MOUTH TWO  TIMES DAILY, 180 tab(s), 3 Refill(s) ; Ordering Provider:   Pollo Terry MD; Catalog Code:   acyclovir ; Order Dt/Tm:   12/10/2018 11:43:28 AM CST          aspirin  :   aspirin ; Status:   Prescribed ; Ordered As Mnemonic:   aspirin 81 mg oral tablet ; Simple Display Line:   81 mg, 1 tab(s), Oral, daily, 30 tab(s), 0 Refill(s) ; Ordering Provider:   Pollo Terry MD; Catalog Code:   aspirin ; Order Dt/Tm:   12/10/2018 12:12:54 PM CST          doxycycline  :   doxycycline ; Status:   Prescribed ; Ordered As Mnemonic:   doxycycline monohydrate 100 mg oral capsule ; Simple Display Line:   1 cap(s), Oral, bid, 20 cap(s), 0 Refill(s) ; Ordering Provider:   Sanket Gill MD; Catalog Code:   doxycycline ; Order Dt/Tm:   11/30/2019 1:16:20 PM CST          fluticasone nasal  :   fluticasone nasal ; Status:   Prescribed ; Ordered As Mnemonic:   fluticasone 50 mcg/inh nasal spray ; Simple Display Line:   2 spray(s), Nasal, daily, 1 EA, 0 Refill(s) ; Ordering Provider:   Sanket Gill MD; Catalog Code:   fluticasone nasal ; Order Dt/Tm:   11/30/2019 1:16:20 PM CST          lovastatin  :   lovastatin ; Status:   Prescribed ; Ordered As  Mnemonic:   lovastatin 40 mg oral tablet ; Simple Display Line:   40 mg, 1 tab(s), PO, Daily, 90 tab(s), 3 Refill(s) ; Ordering Provider:   Pollo Terry MD; Catalog Code:   lovastatin ; Order Dt/Tm:   12/10/2018 11:42:38 AM CST          nortriptyline  :   nortriptyline ; Status:   Prescribed ; Ordered As Mnemonic:   nortriptyline 10 mg oral capsule ; Simple Display Line:   See Instructions, 3 cap(s) Oral qhs, 270 EA, 3 Refill(s) ; Ordering Provider:   Pollo Terry MD; Catalog Code:   nortriptyline ; Order Dt/Tm:   10/21/2019 7:33:18 AM CDT          zoledronic acid  :   zoledronic acid ; Status:   Prescribed ; Ordered As Mnemonic:   Reclast 5 mg/100 mL intravenous solution ; Simple Display Line:   5 mg, IV, qyear, infused over at least 15 minutes.  Annually in January starting 2019, 1 EA, 0 Refill(s) ; Ordering Provider:   Pollo Terry MD; Catalog Code:   zoledronic acid ; Order Dt/Tm:   12/14/2018 3:50:21 PM CST            Home Meds    clobetasol topical  :   clobetasol topical ; Status:   Documented ; Ordered As Mnemonic:   clobetasol 0.025% topical cream ; Simple Display Line:   Topical, bid, 0 Refill(s) ; Catalog Code:   clobetasol topical ; Order Dt/Tm:   11/12/2019 12:10:20 PM CST          copper  :   copper ; Status:   Documented ; Ordered As Mnemonic:   copper ; Simple Display Line:   0 Refill(s) ; Catalog Code:   copper ; Order Dt/Tm:   10/16/2017 2:04:52 PM CDT          hydrocortisone topical  :   hydrocortisone topical ; Status:   Documented ; Ordered As Mnemonic:   hydrocortisone 1% topical cream ; Simple Display Line:   1 shantel, top, tid, PRN for eczema, 0 Refill(s) ; Catalog Code:   hydrocortisone topical ; Order Dt/Tm:   10/16/2017 2:05:31 PM CDT          ibuprofen  :   ibuprofen ; Status:   Documented ; Ordered As Mnemonic:   ibuprofen 400 mg oral tablet ; Simple Display Line:   400 mg, 1 tab(s), po, q6 hrs, 0 Refill(s) ; Catalog Code:   ibuprofen ; Order Dt/Tm:   10/16/2017 2:05:19 PM CDT           raNITIdine  :   raNITIdine ; Status:   Documented ; Ordered As Mnemonic:   Zantac 150 ; Simple Display Line:   150 mg, po, bid, 0 Refill(s) ; Catalog Code:   raNITIdine ; Order Dt/Tm:   10/16/2017 2:05:09 PM CDT          selenium  :   selenium ; Status:   Documented ; Ordered As Mnemonic:   Selenium  mcg oral tablet ; Simple Display Line:   0 Refill(s) ; Catalog Code:   selenious acid ; Order Dt/Tm:   10/16/2017 2:04:59 PM CDT            Social History   Social History   (As Of: 12/2/2019 2:13:30 PM CST)   Alcohol:        Current, Wine (5 oz), 1 glass with dinner   (Last Updated: 10/18/2017 10:44:20 AM CDT by Clair Wilkinson)          Tobacco:        10 or more cigarettes (1/2 pack or more)/day in last 30 days, Cigarettes   (Last Updated: 10/16/2017 2:07:00 PM CDT by Travon Nichole CMA)   5-9 cigarettes (between 1/4 to 1/2 pack)/day in last 30 days   (Last Updated: 10/16/2017 2:17:53 PM CDT by Pollo Terry MD)          Substance Abuse:  Denies Substance Abuse      (Last Updated: 12/10/2018 2:56:27 PM CST by Cherelle Bateman )         Employment/School:        Employed, 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   (Last Updated: 10/18/2017 10:43:49 AM CDT by Clair Wilkinson)          Home/Environment:        Marital status:  (Living together).  Spouse/Partner name: Bebo.   (Last Updated: 10/18/2017 10:43:59 AM CDT by Clair Wilkinson)          Exercise:  Regular exercise      Exercise frequency: 3-4 times/week.  Exercise type: Curves.   (Last Updated: 12/10/2018 2:56:57 PM CST by Cherelle Bateman)

## 2022-02-15 NOTE — NURSING NOTE
Comprehensive Intake Entered On:  6/15/2020 9:18 AM CDT    Performed On:  6/15/2020 9:11 AM CDT by Nola Pacheco LPN               Summary   Chief Complaint :   Verbal consetn given for video visit. c/o rash that started on right shoulder over the weekend.  Does take benadryl at night. Tried applying ice and hydrocoritsone cream on area   Height Measured :   60.75 in(Converted to: 5 ft 1 in, 154.30 cm)    Nola Pacheco LPN - 6/15/2020 9:11 AM CDT   Consents   Consent for Immunization Exchange :   Consent Granted   Consent for Immunizations to Providers :   Consent Granted   Nola aPcheco LPN - 6/15/2020 9:11 AM CDT   Meds / Allergies   (As Of: 6/15/2020 9:18:24 AM CDT)   Allergies (Active)   Augmentin  Estimated Onset Date:   Unspecified ; Reactions:   Vomiting ; Created By:   Barbara Michel; Reaction Status:   Active ; Category:   Drug ; Substance:   Augmentin ; Type:   Sensitivity ; Severity:   Mild ; Updated By:   Barbara Michel; Reviewed Date:   4/14/2020 8:57 AM CDT      codeine  Estimated Onset Date:   Unspecified ; Created By:   Travon Nichole CMA; Reaction Status:   Active ; Category:   Drug ; Substance:   codeine ; Type:   Allergy ; Updated By:   Travon Nichole CMA; Reviewed Date:   4/14/2020 8:57 AM CDT      Latex  Estimated Onset Date:   Unspecified ; Created By:   Travon Nichole CMA; Reaction Status:   Active ; Category:   Drug ; Substance:   Latex ; Type:   Allergy ; Updated By:   Travon Nichole CMA; Reviewed Date:   4/14/2020 8:57 AM CDT      Macrobid  Estimated Onset Date:   Unspecified ; Reactions:   rash ; Created By:   Travon Nichole CMA; Reaction Status:   Active ; Category:   Drug ; Substance:   Macrobid ; Type:   Allergy ; Updated By:   Travon Nichole CMA; Reviewed Date:   4/14/2020 8:57 AM CDT        Medication List   (As Of: 6/15/2020 9:18:24 AM CDT)   Prescription/Discharge Order    zoledronic acid  :   zoledronic acid ; Status:   Completed ; Ordered As Mnemonic:    Reclast 5 mg/100 mL intravenous solution ; Simple Display Line:   5 mg, IV, qyear, infused over at least 15 minutes.  Annually in January starting 2019, 1 EA, 0 Refill(s) ; Ordering Provider:   Pollo Terry MD; Catalog Code:   zoledronic acid ; Order Dt/Tm:   12/14/2018 3:50:21 PM CST          busPIRone  :   busPIRone ; Status:   Prescribed ; Ordered As Mnemonic:   busPIRone 10 mg oral tablet ; Simple Display Line:   10 mg, 1 tab(s), Oral, bid, for 5 day(s), 10 tab(s), 0 Refill(s) ; Ordering Provider:   Pollo Terry MD; Catalog Code:   busPIRone ; Order Dt/Tm:   5/15/2020 10:30:48 AM CDT          albuterol  :   albuterol ; Status:   Prescribed ; Ordered As Mnemonic:   albuterol 90 mcg/inh inhalation aerosol ; Simple Display Line:   2 puff(s), Inhale, qid, PRN: for shortness of breath or wheezing, 1 EA, 1 Refill(s) ; Ordering Provider:   Sanket Gill MD; Catalog Code:   albuterol ; Order Dt/Tm:   1/22/2020 3:17:33 PM CST          Miscellaneous Rx Supply  :   Miscellaneous Rx Supply ; Status:   Prescribed ; Ordered As Mnemonic:   Spacer ; Simple Display Line:   See Instructions, use as directed with inhaler., 1 EA, 0 Refill(s) ; Ordering Provider:   Sanket Gill MD; Catalog Code:   Miscellaneous Rx Supply ; Order Dt/Tm:   1/22/2020 3:17:33 PM CST          acyclovir  :   acyclovir ; Status:   Prescribed ; Ordered As Mnemonic:   acyclovir 400 mg oral tablet ; Simple Display Line:   See Instructions, TAKE 1 TABLET BY MOUTH TWO  TIMES DAILY, 180 tab(s), 3 Refill(s) ; Ordering Provider:   Pollo Terry MD; Catalog Code:   acyclovir ; Order Dt/Tm:   12/23/2019 10:26:09 AM CST          lovastatin  :   lovastatin ; Status:   Prescribed ; Ordered As Mnemonic:   lovastatin 40 mg oral tablet ; Simple Display Line:   40 mg, 1 tab(s), PO, Daily, 90 tab(s), 3 Refill(s) ; Ordering Provider:   Pollo Terry MD; Catalog Code:   lovastatin ; Order Dt/Tm:   12/23/2019 10:26:41 AM [facility]     hydrocortisone topical  :   hydrocortisone topical ; Status:   Documented ; Ordered As Mnemonic:   hydrocortisone 1% topical cream ; Simple Display Line:   1 shantel, top, tid, PRN for eczema, 0 Refill(s) ; Catalog Code:   hydrocortisone topical ; Order Dt/Tm:   10/16/2017 2:05:31 PM CDT          copper  :   copper ; Status:   Documented ; Ordered As Mnemonic:   copper ; Simple Display Line:   0 Refill(s) ; Catalog Code:   copper ; Order Dt/Tm:   10/16/2017 2:04:52 PM CDT          selenium  :   selenium ; Status:   Completed ; Ordered As Mnemonic:   Selenium  mcg oral tablet ; Simple Display Line:   0 Refill(s) ; Catalog Code:   selenious acid ; Order Dt/Tm:   10/16/2017 2:04:59 PM CDT          fluconazole  :   fluconazole ; Status:   Documented ; Ordered As Mnemonic:   fluconazole 150 mg oral tablet ; Simple Display Line:   150 mg, 1 tab(s), Oral, daily, for 14 day(s), 14 tab(s), 0 Refill(s) ; Catalog Code:   fluconazole ; Order Dt/Tm:   4/2/2020 11:16:20 AM CDT          diphenhydrAMINE  :   diphenhydrAMINE ; Status:   Documented ; Ordered As Mnemonic:   Benadryl ; Simple Display Line:   0 Refill(s) ; Catalog Code:   diphenhydrAMINE ; Order Dt/Tm:   12/23/2019 10:10:57 AM CST          clobetasol topical  :   clobetasol topical ; Status:   Documented ; Ordered As Mnemonic:   clobetasol 0.025% topical cream ; Simple Display Line:   Topical, bid, 0 Refill(s) ; Catalog Code:   clobetasol topical ; Order Dt/Tm:   11/12/2019 12:10:20 PM CST            ID Risk Screen   Recent Travel History :   No recent travel   Family Member Travel History :   No recent travel   Other Exposure to Infectious Disease :   Unknown   Nola Pacheco LPN - 6/15/2020 9:11 AM CDT

## 2022-02-15 NOTE — CARE COORDINATION
Patient:   GUNNAR DOLL            MRN: [id number]            FIN: [id number]               Age:   69 years     Sex:  Female     :  1950   Associated Diagnoses:   None   Author:   Sharon Cuevas CMA      Sources of Information:  [ ] Patient, family member, or caregiver (Please list):  [ x] Hospital discharge summary  [ ] Hospital fax  [ ] List of recent hospitalizations or ED visits  [ ] Other:     Discharged From: [facility]  Discharge Date: 2020    Diagnosis/Problem: Acute Appendicitis/ sepsis    Medication Changes: [x ] Yes [ ] No   Medication List Updated: [x ] Yes [ ] No  Hospital medication list reconciled with clinic medication list: Yes  Medications          *denotes recorded medication          Spacer: See Instructions, use as directed with inhaler., 1 EA, 0 Refill(s).          acyclovir 400 mg oral tablet: See Instructions, TAKE 1 TABLET BY MOUTH TWO  TIMES DAILY, 180 tab(s), 3 Refill(s).          albuterol 90 mcg/inh inhalation aerosol: 2 puff(s), Inhale, qid, PRN: for shortness of breath or wheezing, 1 EA, 1 Refill(s).          *Augmentin 875 mg-125 mg oral tablet: 1 tab(s), Oral, q12 hrs, for 14, 28 tab(s), 0 Refill(s).          aspirin 81 mg oral tablet: 81 mg, 1 tab(s), Oral, daily, 30 tab(s), 0 Refill(s).          *clobetasol 0.025% topical cream: Topical, bid, 0 Refill(s).          *copper: 0 Refill(s).          *Benadryl: 0 Refill(s).          *fluconazole 150 mg oral tablet: 150 mg, 1 tab(s), Oral, daily, for 14 day(s), 14 tab(s), 0 Refill(s).          *hydrocortisone 1% topical cream: 1 shantel, top, tid, PRN for eczema, 0 Refill(s).          lovastatin 40 mg oral tablet: 40 mg, 1 tab(s), PO, Daily, 90 tab(s), 3 Refill(s).          nortriptyline 10 mg oral capsule: See Instructions, 3 cap(s) Oral qhs, 270 EA, 3 Refill(s).          *oxyCODONE 5 mg oral tablet: 1-2 tab(s), Oral, q4 hrs, 0 Refill(s).          *Selenium  mcg oral tablet: 0 Refill(s).          Reclast 5 mg/100 mL intravenous  solution: 5 mg, IV, qyear, infused over at least 15 minutes.  Annually in January starting 2019, 1 EA, 0 Refill(s).        Needs Referral or Lab: [ x] Yes [ ] No  Needs CBC drawn at f/u visit  F/u w/ surgeon via phone call to limit clinic exposure    Needs Follow-up Appointment:  [x ] Within 7 days of discharge (highly complex visit)  [ ] Within 14 days of discharge (moderately complex visit)    Appointment Made With: [facility] (telemed visit)  Date: 4/6/2020    Called and spoke to pt-she is doing well considering-no fever and she and her  have been doing dressing changes without any issues. She is taking her abx/diflucan and I confirmed appt with [facility] on 4/6. Gave her urgent care hrs and advised to call the clinic/schedule an appt if any concerns arise prior to her appt-she expressed understanding.appt completed

## 2022-02-15 NOTE — NURSING NOTE
Comprehensive Intake Entered On:  1/22/2020 2:55 PM CST    Performed On:  1/22/2020 2:50 PM CST by Tia NECISO, Madelyn               Summary   Chief Complaint :   c/o SOB and chest tightness last night.  CXR done yesterday and was normal.  voice hoarse, sore throat today.   Weight Measured :   115.6 lb(Converted to: 115 lb 10 oz, 52.44 kg)    Height Measured :   60.75 in(Converted to: 5 ft 1 in, 154.30 cm)    Body Mass Index :   22.02 kg/m2   Body Surface Area :   1.5 m2   Systolic Blood Pressure :   128 mmHg   Diastolic Blood Pressure :   78 mmHg   Mean Arterial Pressure :   95 mmHg   Peripheral Pulse Rate :   87 bpm   BP Site :   Right arm   Pulse Site :   Radial artery   BP Method :   Manual   HR Method :   Manual   Temperature Tympanic :   96.7 DegF(Converted to: 35.9 DegC)  (LOW)    Oxygen Saturation :   96 %   Madelyn Weber MA - 1/22/2020 2:50 PM CST   Health Status   Allergies Verified? :   Yes   Medication History Verified? :   Yes   Medical History Verified? :   Yes   Pre-Visit Planning Status :   Not completed   Tobacco Use? :   Former smoker   Madelyn Weber MA - 1/22/2020 2:50 PM CST   Consents   Consent for Immunization Exchange :   Consent Granted   Consent for Immunizations to Providers :   Consent Granted   Madelyn Weber MA - 1/22/2020 2:50 PM CST   Meds / Allergies   (As Of: 1/22/2020 2:55:41 PM CST)   Allergies (Active)   Augmentin  Estimated Onset Date:   Unspecified ; Reactions:   Vomiting ; Created By:   Barbara Michel; Reaction Status:   Active ; Category:   Drug ; Substance:   Augmentin ; Type:   Sensitivity ; Severity:   Mild ; Updated By:   Barbara Michel; Reviewed Date:   12/23/2019 10:11 AM CST      codeine  Estimated Onset Date:   Unspecified ; Created By:   Travon Nichole CMA; Reaction Status:   Active ; Category:   Drug ; Substance:   codeine ; Type:   Allergy ; Updated By:   Travon Nichole CMA; Reviewed Date:   12/23/2019 10:11 AM CST      Latex  Estimated Onset Date:    Unspecified ; Created By:   Travon Nichole CMA; Reaction Status:   Active ; Category:   Drug ; Substance:   Latex ; Type:   Allergy ; Updated By:   Travon Nichole CMA; Reviewed Date:   12/23/2019 10:11 AM CST      Macrobid  Estimated Onset Date:   Unspecified ; Reactions:   rash ; Created By:   Travon Nichole CMA; Reaction Status:   Active ; Category:   Drug ; Substance:   Macrobid ; Type:   Allergy ; Updated By:   Travon Nichole CMA; Reviewed Date:   12/23/2019 10:11 AM CST        Medication List   (As Of: 1/22/2020 2:55:41 PM CST)   Prescription/Discharge Order    acyclovir  :   acyclovir ; Status:   Prescribed ; Ordered As Mnemonic:   acyclovir 400 mg oral tablet ; Simple Display Line:   See Instructions, TAKE 1 TABLET BY MOUTH TWO  TIMES DAILY, 180 tab(s), 3 Refill(s) ; Ordering Provider:   Pollo Terry MD; Catalog Code:   acyclovir ; Order Dt/Tm:   12/23/2019 10:26:09 AM CST          aspirin  :   aspirin ; Status:   Prescribed ; Ordered As Mnemonic:   aspirin 81 mg oral tablet ; Simple Display Line:   81 mg, 1 tab(s), Oral, daily, 30 tab(s), 0 Refill(s) ; Ordering Provider:   Pollo Terry MD; Catalog Code:   aspirin ; Order Dt/Tm:   12/10/2018 12:12:54 PM CST          lovastatin  :   lovastatin ; Status:   Prescribed ; Ordered As Mnemonic:   lovastatin 40 mg oral tablet ; Simple Display Line:   40 mg, 1 tab(s), PO, Daily, 90 tab(s), 3 Refill(s) ; Ordering Provider:   Pollo Terry MD; Catalog Code:   lovastatin ; Order Dt/Tm:   12/23/2019 10:26:41 AM CST          nortriptyline  :   nortriptyline ; Status:   Prescribed ; Ordered As Mnemonic:   nortriptyline 10 mg oral capsule ; Simple Display Line:   See Instructions, 3 cap(s) Oral qhs, 270 EA, 3 Refill(s) ; Ordering Provider:   Pollo Terry MD; Catalog Code:   nortriptyline ; Order Dt/Tm:   12/23/2019 10:26:55 AM CST          zoledronic acid  :   zoledronic acid ; Status:   Prescribed ; Ordered As Mnemonic:   Reclast 5 mg/100 mL intravenous solution  ; Simple Display Line:   5 mg, IV, qyear, infused over at least 15 minutes.  Annually in January starting 2019, 1 EA, 0 Refill(s) ; Ordering Provider:   Pollo Terry MD; Catalog Code:   zoledronic acid ; Order Dt/Tm:   12/14/2018 3:50:21 PM CST            Home Meds    clobetasol topical  :   clobetasol topical ; Status:   Documented ; Ordered As Mnemonic:   clobetasol 0.025% topical cream ; Simple Display Line:   Topical, bid, 0 Refill(s) ; Catalog Code:   clobetasol topical ; Order Dt/Tm:   11/12/2019 12:10:20 PM CST          copper  :   copper ; Status:   Documented ; Ordered As Mnemonic:   copper ; Simple Display Line:   0 Refill(s) ; Catalog Code:   copper ; Order Dt/Tm:   10/16/2017 2:04:52 PM CDT          diphenhydrAMINE  :   diphenhydrAMINE ; Status:   Documented ; Ordered As Mnemonic:   Benadryl ; Simple Display Line:   0 Refill(s) ; Catalog Code:   diphenhydrAMINE ; Order Dt/Tm:   12/23/2019 10:10:57 AM CST          hydrocortisone topical  :   hydrocortisone topical ; Status:   Documented ; Ordered As Mnemonic:   hydrocortisone 1% topical cream ; Simple Display Line:   1 shantel, top, tid, PRN for eczema, 0 Refill(s) ; Catalog Code:   hydrocortisone topical ; Order Dt/Tm:   10/16/2017 2:05:31 PM CDT          ibuprofen  :   ibuprofen ; Status:   Documented ; Ordered As Mnemonic:   ibuprofen 400 mg oral tablet ; Simple Display Line:   400 mg, 1 tab(s), po, q6 hrs, 0 Refill(s) ; Catalog Code:   ibuprofen ; Order Dt/Tm:   10/16/2017 2:05:19 PM CDT          selenium  :   selenium ; Status:   Documented ; Ordered As Mnemonic:   Selenium  mcg oral tablet ; Simple Display Line:   0 Refill(s) ; Catalog Code:   selenious acid ; Order Dt/Tm:   10/16/2017 2:04:59 PM CDT            Social History   Social History   (As Of: 1/22/2020 2:55:41 PM CST)   Alcohol:  Current      Wine (5 oz), Daily, Ready to change: No.   (Last Updated: 12/23/2019 1:42:16 PM CST by Gayatri Mckinnon)          Tobacco:  Current      Former  smoker, quit more than 30 days ago, Cigarettes   Comments:  12/23/2019 10:11 AM - Travon Nichole CMA: Pt quit 11/23/19   (Last Updated: 12/23/2019 10:11:47 AM CST by Travon Nichole CMA)   Cigarettes, 2 per day.  Ready to change: Yes.   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   (Last Updated: 12/23/2019 1:43:17 PM CST by Gayatri Mckinnon)          Home/Environment:        Marital status:  (Living together).  Spouse/Partner name: Bebo.  Living situation: Home/Independent.  Injuries/Abuse/Neglect in household: No.  Feels unsafe at home: No.  Family/Friends available for support: Yes.   (Last Updated: 12/23/2019 1:41:16 PM CST by Gayatri Mckinnon)          Nutrition/Health:        Type of diet: Regular.  Wants to lose weight: No.  Sleeping concerns: No.  Feels highly stressed: No.   (Last Updated: 12/23/2019 1:42:36 PM CST by Gayatri Mckinnon)          Exercise:  Regular exercise      Exercise frequency: 3-4 times/week.  Exercise type: Curves.   (Last Updated: 12/10/2018 2:56:57 PM CST by Cherelle Bateman)          Sexual:        Sexually active: Yes.  Identifies as female, Sexual orientation: Straight or heterosexual.  History of STD: Yes.  Contraceptive Use Details: None.  History of sexual abuse: No.   (Last Updated: 12/23/2019 1:41:34 PM CST by Gayatri Mckinnon)

## 2022-02-15 NOTE — TELEPHONE ENCOUNTER
---------------------  From: Coy Wood (Phone Messages Pool ([id number]_[facility]))   To: RAYMUNDO Message Pool ([id number] [phone] [id number]);     Sent: 11/11/2019 9:14:17 AM CST  Subject: Possible sinus infection     Phone Message    PCP:   [facility]                         Time of Call:  8:12am                                        Person Calling:  Pt  Phone number:  [phone]      Note:   Pt would only like to speak to [facility]'s nurse. Would like to get treated for possible sinus infection. Would like a call to discuss.     Last office visit and reason:  10/01/2019; dermatitisTCB.Number below [phone] wrong number. Contacted number in chart. Patient is using netti pot, a lot of stuff coming out while doing that, Patient is willing to do it until tomorrow. Transferred pt to scheduling.

## 2022-02-15 NOTE — NURSING NOTE
Comprehensive Intake Entered On:  10/12/2021 4:31 PM CDT    Performed On:  10/12/2021 4:29 PM CDT by Nell Galloway               Summary   Chief Complaint :   Verbal consent given for video visit. Medication refill- buspirone   Height Measured :   60.75 in(Converted to: 5 ft 1 in, 154.30 cm)    Nell Galloway - 10/12/2021 4:29 PM CDT   Health Status   Allergies Verified? :   Yes   Medication History Verified? :   Yes   Medical History Verified? :   Yes   Pre-Visit Planning Status :   Completed   Tobacco Use? :   Former smoker   Nell Galloway - 10/12/2021 4:29 PM CDT   Consents   Consent for Immunization Exchange :   Consent Granted   Consent for Immunizations to Providers :   Consent Granted   Nell Galloway - 10/12/2021 4:29 PM CDT   Meds / Allergies   (As Of: 10/12/2021 4:31:14 PM CDT)   Allergies (Active)   Augmentin  Estimated Onset Date:   Unspecified ; Reactions:   Vomiting ; Created By:   Barbara Michel; Reaction Status:   Active ; Category:   Drug ; Substance:   Augmentin ; Type:   Sensitivity ; Severity:   Mild ; Updated By:   Barbara Michel; Reviewed Date:   10/12/2021 4:30 PM CDT      codeine  Estimated Onset Date:   Unspecified ; Created By:   Travon Nichole CMA; Reaction Status:   Active ; Category:   Drug ; Substance:   codeine ; Type:   Allergy ; Updated By:   Travon Nichole CMA; Reviewed Date:   10/12/2021 4:30 PM CDT      Latex  Estimated Onset Date:   Unspecified ; Created By:   Travon Nichole CMA; Reaction Status:   Active ; Category:   Drug ; Substance:   Latex ; Type:   Allergy ; Updated By:   Travon Nichole CMA; Reviewed Date:   10/12/2021 4:30 PM CDT      Macrobid  Estimated Onset Date:   Unspecified ; Reactions:   rash ; Created By:   Travon Nichole CMA; Reaction Status:   Active ; Category:   Drug ; Substance:   Macrobid ; Type:   Allergy ; Updated By:   Travon Nichole CMA; Reviewed Date:   10/12/2021 4:30 PM CDT        Medication List   (As Of: 10/12/2021 4:31:14 PM  CDT)   Prescription/Discharge Order    acyclovir  :   acyclovir ; Status:   Prescribed ; Ordered As Mnemonic:   acyclovir 400 mg oral tablet ; Simple Display Line:   See Instructions, TAKE 1 TABLET BY MOUTH TWO  TIMES DAILY, 180 tab(s), 3 Refill(s) ; Ordering Provider:   Pollo Terry MD; Catalog Code:   acyclovir ; Order Dt/Tm:   11/2/2020 10:23:21 AM CST          albuterol  :   albuterol ; Status:   Prescribed ; Ordered As Mnemonic:   albuterol 90 mcg/inh inhalation aerosol ; Simple Display Line:   2 puff(s), Inhale, qid, PRN: for shortness of breath or wheezing, 1 EA, 1 Refill(s) ; Ordering Provider:   Sanket Gill MD; Catalog Code:   albuterol ; Order Dt/Tm:   1/22/2020 3:17:33 PM CST          clobetasol topical  :   clobetasol topical ; Status:   Prescribed ; Ordered As Mnemonic:   Clobetasol (Eqv-Temovate) 0.05% topical cream ; Simple Display Line:   1 shantel, Topical, bid, 45 gm, 0 Refill(s) ; Ordering Provider:   Pollo Terry MD; Catalog Code:   clobetasol topical ; Order Dt/Tm:   10/7/2021 8:03:32 AM CDT          lovastatin  :   lovastatin ; Status:   Prescribed ; Ordered As Mnemonic:   lovastatin 40 mg oral tablet ; Simple Display Line:   40 mg, 1 tab(s), PO, Daily, 90 tab(s), 3 Refill(s) ; Ordering Provider:   Pollo Terry MD; Catalog Code:   lovastatin ; Order Dt/Tm:   11/2/2020 10:22:50 AM CST          Miscellaneous Rx Supply  :   Miscellaneous Rx Supply ; Status:   Prescribed ; Ordered As Mnemonic:   Spacer ; Simple Display Line:   See Instructions, use as directed with inhaler., 1 EA, 0 Refill(s) ; Ordering Provider:   Sanket Gill MD; Catalog Code:   Miscellaneous Rx Supply ; Order Dt/Tm:   1/22/2020 3:17:33 PM CST            Home Meds    calcium-vitamin D  :   calcium-vitamin D ; Status:   Documented ; Ordered As Mnemonic:   Caltrate Gummy Bites 250 mg-400 intl units oral tablet, chewable ; Simple Display Line:   See Instructions, 5 gummies Chewed, 0 Refill(s) ; Catalog Code:    calcium-vitamin D ; Order Dt/Tm:   12/29/2020 1:50:17 PM CST          copper  :   copper ; Status:   Documented ; Ordered As Mnemonic:   copper ; Simple Display Line:   0 Refill(s) ; Catalog Code:   copper ; Order Dt/Tm:   10/16/2017 2:04:52 PM CDT          melatonin  :   melatonin ; Status:   Documented ; Ordered As Mnemonic:   melatonin 10 mg oral tablet ; Simple Display Line:   10 mg, 1 tab(s), Oral, hs, 0 Refill(s) ; Catalog Code:   melatonin ; Order Dt/Tm:   4/23/2021 10:37:09 AM CDT

## 2022-02-15 NOTE — PROGRESS NOTES
Chief Complaint    Pt here today to get sutures and staples out. Possible lab work. Patient is getting better, pain is still there but not as severe.  History of Present Illness      Shannan is here to have her sutures removed.  Since I last saw her April 3 and referred her to the emergency department she was seen in the ED both April 3 and 4 and had a tele-visit April 4 for insomnia.  The patient suffers from chronic insomnia and was prescribed short course of alprazolam.  She was sent to the ED because of tachycardia and and atypical right chest pain.  Pain was relieved by Toradol on both visits.  Generally feeling better.  Review of Systems      No fever, chills, cough, dyspnea, chest pressure, edema, nausea, vomiting.  Has had loose stools.  No headache or myalgia.  Physical Exam   Vitals & Measurements    T: 98.4   F (Tympanic)  HR: 82(Peripheral)  BP: 119/70     HT: 60.75 in  WT: 112.0 lb  BMI: 21.33       Patient appears comfortable.  Alert and oriented.  Not tachypneic.  Speaks in complete sentences.  HEENT exam reveals a moist oropharynx.  Chest is clear to auscultation and percussion.  Heart exam is regular no murmur or edema.  Abdomen has 19 staples in the midline scar which were removed.  A little bit of erythema at the staples above the umbilicus and a little bit of yellowish discharge about the umbilicus.  She has had this drainage persistently and it has been improving.  Assessment/Plan       Acute insomnia (G47.00)         Improved with medication.  Underlying chronic insomnia.         Ordered:          43234 office outpatient visit 25 minutes (Charge), Quantity: 1, Ruptured suppurative appendicitis  Atypical chest pain  Acute insomnia  Reactive thrombocytosis  Thickened endometrium  Acute neutrophilia  Chronic insomnia                Acute neutrophilia (D72.828)         No fever.  No evidence of peritonitis or pneumonia.  Repeat CBC in a few weeks.         Ordered:          18501 office  outpatient visit 25 minutes (Charge), Quantity: 1, Ruptured suppurative appendicitis  Atypical chest pain  Acute insomnia  Reactive thrombocytosis  Thickened endometrium  Acute neutrophilia  Chronic insomnia                Atypical chest pain (R07.89)         Likely related to her peritonitis.  Work-up including including chest CT with contrast significant only for small effusion and atelectasis at the right base.  Continue nonsteroidal anti-inflammatory as needed.         Ordered:          79167 office outpatient visit 25 minutes (Charge), Quantity: 1, Ruptured suppurative appendicitis  Atypical chest pain  Acute insomnia  Reactive thrombocytosis  Thickened endometrium  Acute neutrophilia  Chronic insomnia                Chronic insomnia (F51.04)         Ordered:          21311 office outpatient visit 25 minutes (Charge), Quantity: 1, Ruptured suppurative appendicitis  Atypical chest pain  Acute insomnia  Reactive thrombocytosis  Thickened endometrium  Acute neutrophilia  Chronic insomnia                Reactive thrombocytosis (R79.89)         Repeat a CBC in a few weeks.  Acute and likely related to her infectious process.         Ordered:          94232 office outpatient visit 25 minutes (Charge), Quantity: 1, Ruptured suppurative appendicitis  Atypical chest pain  Acute insomnia  Reactive thrombocytosis  Thickened endometrium  Acute neutrophilia  Chronic insomnia                Ruptured suppurative appendicitis (K35.32)         Staples removed after her appendectomy March 23.  Discharge about the antibiotic umbilicus improving.  Mild erythema at the upper upper staples.  Patient is to wash daily with soap and water.  Call if fever, vomiting, pain, not continue to improve.         Ordered:          73231 office outpatient visit 25 minutes (Charge), Quantity: 1, Ruptured suppurative appendicitis  Atypical chest pain  Acute insomnia  Reactive thrombocytosis  Thickened endometrium  Acute  neutrophilia  Chronic insomnia                Thickened endometrium (R93.89)         GYN consult.  Gynecologist name I can never remember Dr. Guillen's name         Ordered:          63167 office outpatient visit 25 minutes (Charge), Quantity: 1, Ruptured suppurative appendicitis  Atypical chest pain  Acute insomnia  Reactive thrombocytosis  Thickened endometrium  Acute neutrophilia  Chronic insomnia          Gynecology Consult (Request), Referred to: any, Reason: Incidental finding of thickened endometrium on CT, Thickened endometrium                Orders:         Return to Clinic (Request), RFV: CBC, Return in 4-8 weeks         Return to Clinic (Request), RFV: CXR PA and lat, Return in 4-6 weeks  Patient Information     Name:GUNNAR DOLL      Address:      [address] [place] [address]       [place], [place] [id number]     Sex:Female     YOB: 1950     [phone]     Emergency Contact:YUKI DOLL     MRN:[id number]     FIN:[id number]     Location:[facility]     Date of Service:04/06/2020      Primary Care Physician:       Pollo Terry MD, [phone]      Attending Physician:       Pollo Terry MD, [phone]  Problem List/Past Medical History    Ongoing     Asthma     Chronic insomnia     Dyslipidemia     Eczema     Family history of colon cancer     Genital HSV     GERD (gastroesophageal reflux disease)     MVP (mitral valve prolapse)     Osteoporosis     Stress-induced cardiomyopathy    Historical     Inpatient stay       Comments: [place], [place] - Acute appendicitis, transferred to [place], [place].     Pregnancy     Pregnancy     Smoking 1/2 pack a day or less  Procedure/Surgical History     Extracapsular cataract extraction and insertion of intraocular lens (11/02/2017)      Comments: Right..     Extracapsular cataract extraction and insertion of intraocular lens (10/19/2017)      Comments: Left..     Colonoscopy (06/10/2016)      Comments:  Every 5 years for family history.     Colonoscopy (04/11/2001)     Caesarean section      Comments: x 2.     Extraction of wisdom tooth     H/O: blood transfusion     H/O: tubal ligation     History of tonsillectomy  Medications    acyclovir 400 mg oral tablet, See Instructions, 3 refills    albuterol 90 mcg/inh inhalation aerosol, 2 puff(s), Inhale, qid, PRN, 1 refills    Augmentin 875 mg-125 mg oral tablet, 1 tab(s), Oral, q12 hrs    Benadryl    clobetasol 0.025% topical cream, Topical, bid    copper    fluconazole 150 mg oral tablet, 150 mg= 1 tab(s), Oral, daily    hydrocortisone 1% topical cream, 1 shantel, Topical, tid    lovastatin 40 mg oral tablet, 40 mg= 1 tab(s), Oral, daily, 3 refills    oxyCODONE 5 mg oral tablet, 1-2 tab(s), Oral, q4 hrs    Reclast 5 mg/100 mL intravenous solution, 5 mg, IV, qyear    Selenium  mcg oral tablet    Spacer, See Instructions    Xanax 0.25 mg oral tablet, 0.25 mg= 1 tab(s), Oral, hs, PRN  Allergies    Augmentin (Vomiting)    Latex    Macrobid (rash)    codeine  Social History    Smoking Status - 04/06/2020     Former smoker     Alcohol - Current, 12/23/2019      Wine (5 oz), Daily, Ready to change: No., 12/23/2019     Employment/School      Part time, 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 Highest education level: B.S in nursing., 12/23/2019     Exercise - Regular exercise, 12/10/2018      Exercise frequency: 3-4 times/week. Exercise type: Curves., 12/10/2018     Home/Environment      Marital status:  (Living together). Spouse/Partner name: Bebo. Living situation: Home/Independent. Injuries/Abuse/Neglect in household: No. Feels unsafe at home: No. Family/Friends available for support: Yes., 12/23/2019     Nutrition/Health      Type of diet: Regular. Wants to lose weight: No. Sleeping concerns: No. Feels highly stressed: No., 12/23/2019     Sexual      Sexually active: Yes. Identifies as female, Sexual orientation: Straight or heterosexual. History  "of STD: Yes. Contraceptive Use Details: None. History of sexual abuse: No., 12/23/2019     Substance Abuse - Denies Substance Abuse, 12/10/2018      Never, 12/23/2019     Tobacco - Current, 12/23/2019      Cigarettes, 2 per day. Ready to change: Yes., 12/23/2019      Former smoker, quit more than 30 days ago, Cigarettes, 12/23/2019  Family History    Alcoholism: Sister.    Allergic rhinitis: Sister.    Arthritis: Mother, Sister and Aunt (M).    Bleeding disorder: Brother.    Breast Cancer: Mother (Dx at 72).    Cancer of colon: Sister (Dx at 55 years).    Dementia: Mother.    Depression: Mother and Sister.    High blood pressure: Sister.    Hypercholesterolemia: Sister.    Hypertension: Sister.    Kidney disease: Sister and Grandmother (P).    Obesity: Sister.    Seizure: Brother.  Diagnostic Results   From April 4 glucose 141 basic metabolic profile otherwise unremarkable.  Hemoglobin 11.1, white count 18.9, platelets 1.1 million without significant change from the day before.   CT scan of the chest x-ray third revealed no PE, \"small right effusion with atelectasis\" thickened endometrium approximately 9 mm.   Chest x-ray April 4 unchanged linear atelectasis and small right effusion which is noted to have \"nearly completely resolved.  EKGs revealed sinus or sinus tachycardia unchanged.   Limited echocardiogram March 27 revealed normal left ventricular function with resolution of the LV dysfunction as previously noted.  "

## 2022-02-15 NOTE — TELEPHONE ENCOUNTER
---------------------  From: Oneida Roy CMA (Phone Messages Pool ([id number]_[facility]))   To: [employer] Message Pool ([id number]);     Sent: 10/5/2020 11:35:50 AM CDT  Subject: dose increase         Phone Message    PCP:   WICHO      Time of Call:  10:49am       Person Calling:  Aileen  Phone number:  [phone] ok to LM    Returned call at: _    Note:   Pt is wondering if she can increase her Buspirone dose. Currently takes 10mg daily but she is having extreme anxiety and not sleeping. Please advise.    Last office visit and reason:  6/15/20 rash---------------------  From: Nell Galloway ([employer] Message Pool ([id number]))   To: Pollo Terry MD;     Sent: 10/5/2020 11:36:16 AM CDT  Subject: FW: dose increase---------------------  From: Pollo Terry MD   To: [employer] Message Pool ([id number]_[place] [facility] [place]);     Sent: 10/5/2020 11:44:26 AM CDT  Subject: RE: dose increase   Actions: Notify the pharmacy with prescription(s)      Increase Buspirone to 15 mg BID. Video visit in one month.LVMTCB.Spoke to pt and patient notified. Update rx in chart.

## 2022-02-15 NOTE — PROGRESS NOTES
Chief Complaint    Discuss pain meds.  Had telemedicine visit yesterday with WICHO.  History of Present Illness      Today's visit was conducted via telephone due to the COVID-19 pandemic. Patient's consent to telephone visit was obtained and documented.      Call Start Time:   1:52pm      Call End Time:    2:11pm      total time on the phone 19 minutes      Patient is a 69-year-old female with a telemedicine visit today.       She was discharged from [facility] on April 1.  She had been hospitalized for a ruptured appendix and had been on pressors postop and developed a stress-induced cardiomyopathy.  She was discharged on a two-week course of Augmentin and fluconazole.       She was seen in clinic on April 3 (yesterday) for a postop visit and was having right sided rib pain.  She was sent to the emergency department.  That emergency department note is reviewed.  She had an EKG which had no acute ST-T changes.  She had a CT which had no PE.  Her white count was 19 she was given IV Toradol in the emergency room and that helped the right-sided rib pain but now since the medicine has worn off it is back.  She is using ice packs at home and that is helping.       She calls today because she is having trouble sleeping.  She reports that in the hospital she was given Xanax at night and that helped her sleep.  Now that she has been home she cannot sleep more than 2 to 3 hours at night.  She cannot slow her mind down.  She sometimes feels anxious.  She tried Benadryl last night but that did not help much.  She tried melatonin in the hospital and that did not do anything.  She is not sure of the dose of Xanax that she received in the hospital and I cannot access those records because she was not discharged on that medicine.  She had been on nortriptyline in the past for anxiety but had weaned herself off of it.  Review of Systems      see HPI  Physical Exam   Vitals & Measurements    HT: 60.75 in        telemedicine  Assessment/Plan       Anxiety (F41.9)         Ordered:          ALPRAZolam, = 1 tab(s) ( 0.25 mg ), Oral, hs, x 5 day(s), PRN: for anxiety, # 5 tab(s), 0 Refill(s), Type: Acute, Pharmacy: [facility] #[id number], 1 tab(s) Oral hs,x5 day(s),PRN:for anxiety, (Ordered)                Insomnia (G47.00)         chronic insomnia listed on her chart.        will try the xanax.        discussed with her that this should be a short term fix.  discussed the addictive potential of this medication.        suggested talking with her primary care provider this week regarding a long term plan.         she reports that she has a follow up visit in 2 days for staple removal         Ordered:          ALPRAZolam, = 1 tab(s) ( 0.25 mg ), Oral, hs, x 5 day(s), PRN: for anxiety, # 5 tab(s), 0 Refill(s), Type: Acute, Pharmacy: [facility] #[id number], 1 tab(s) Oral hs,x5 day(s),PRN:for anxiety, (Ordered)           Patient Information     Name:GUNNAR DOLL      Address:      [address] [place] [id number]     Sex:Female     YOB: 1950     [phone]     Emergency Contact:YUKI DOLL     MRN:[id number]     FIN:[id number]     Location:[facility]     Date of Service:04/04/2020      Primary Care Physician:       Pollo Terry MD, [phone]      Attending Physician:       Olga Francois MD, [phone]  Problem List/Past Medical History    Ongoing     Asthma     Chronic insomnia     Dyslipidemia     Eczema     Family history of colon cancer     Genital HSV     GERD (gastroesophageal reflux disease)     MVP (mitral valve prolapse)     Osteoporosis     Stress-induced cardiomyopathy    Historical     Inpatient stay       Comments: [place], [place] - Acute appendicitis, transferred to [place], [place].     Pregnancy     Pregnancy     Smoking 1/2 pack a day or less  Procedure/Surgical History     Extracapsular cataract extraction and  insertion of intraocular lens (11/02/2017)      Comments: Right..     Extracapsular cataract extraction and insertion of intraocular lens (10/19/2017)      Comments: Left..     Colonoscopy (06/10/2016)      Comments: Every 5 years for family history.     Colonoscopy (04/11/2001)     Caesarean section      Comments: x 2.     Extraction of wisdom tooth     H/O: blood transfusion     H/O: tubal ligation     History of tonsillectomy  Medications    acyclovir 400 mg oral tablet, See Instructions, 3 refills    albuterol 90 mcg/inh inhalation aerosol, 2 puff(s), Inhale, qid, PRN, 1 refills    Augmentin 875 mg-125 mg oral tablet, 1 tab(s), Oral, q12 hrs    Benadryl    clobetasol 0.025% topical cream, Topical, bid    copper    fluconazole 150 mg oral tablet, 150 mg= 1 tab(s), Oral, daily    hydrocortisone 1% topical cream, 1 shantel, Topical, tid    lovastatin 40 mg oral tablet, 40 mg= 1 tab(s), Oral, daily, 3 refills    oxyCODONE 5 mg oral tablet, 1-2 tab(s), Oral, q4 hrs    Reclast 5 mg/100 mL intravenous solution, 5 mg, IV, qyear    Selenium  mcg oral tablet    Spacer, See Instructions    Xanax 0.25 mg oral tablet, 0.25 mg= 1 tab(s), Oral, hs, PRN  Allergies    Augmentin (Vomiting)    Latex    Macrobid (rash)    codeine  Social History    Smoking Status - 04/03/2020     Former smoker     Alcohol - Current, 12/23/2019      Wine (5 oz), Daily, Ready to change: No., 12/23/2019     Employment/School      Part time, 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 Exercise type: Curves., 12/10/2018     Home/Environment      Marital status:  (Living together). Spouse/Partner name: Bebo. Living situation: Home/Independent. Injuries/Abuse/Neglect in household: No. Feels unsafe at home: No. Family/Friends available for support: Yes., 12/23/2019     Nutrition/Health      Type of  diet: Regular. Wants to lose weight: No. Sleeping concerns: No. Feels highly stressed: No., 12/23/2019     Sexual      Sexually active: Yes. Identifies as female, Sexual orientation: Straight or heterosexual. History of STD: Yes. Contraceptive Use Details: None. History of sexual abuse: No., 12/23/2019     Substance Abuse - Denies Substance Abuse, 12/10/2018      Never, 12/23/2019     Tobacco - Current, 12/23/2019      Cigarettes, 2 per day. Ready to change: Yes., 12/23/2019      Former smoker, quit more than 30 days ago, Cigarettes, 12/23/2019  Family History    Alcoholism: Sister.    Allergic rhinitis: Sister.    Arthritis: Mother, Sister and Aunt (M).    Bleeding disorder: Brother.    Breast Cancer: Mother (Dx at 72).    Cancer of colon: Sister (Dx at 55 years).    Dementia: Mother.    Depression: Mother and Sister.    High blood pressure: Sister.    Hypercholesterolemia: Sister.    Hypertension: Sister.    Kidney disease: Sister and Grandmother (P).    Obesity: Sister.    Seizure: Brother.  Immunizations      Vaccine Date Status          pneumococcal (PPSV23) 12/23/2019 Given          influenza virus vaccine, inactivated 10/03/2019 Recorded          influenza 10/22/2018 Recorded              Comments : [10/24/2018] [place] [facility]          influenza virus vaccine, inactivated 10/12/2017 Recorded          ZOS, shingles 03/16/2010 Recorded          ZOS, shingles 03/04/2010 Recorded          tetanus/diphth/pertuss (Tdap) adult/adol 01/07/2009 Recorded          ZOS, shingles 03/16/2004 Recorded

## 2022-02-15 NOTE — PROGRESS NOTES
Chief Complaint    f/u hospital- acute appendicitis. c/o severe pain by right ribs that started last night- cannot take deep breaths. Rash on back.  History of Present Illness      Shannan is here with her  for a hospital follow-up visit.  She complains of severe right-sided rib pain since yesterday worse with deep inspiration.  No chest pressure, dyspnea, palpitation, or fever.  No cough.  Patient underwent appendectomy March 23, 2024 acute perforated appendicitis with stool peritonitis.  Postoperatively her hospital stay was complicated by hypotension requiring Levophed and vasopressin, BiPAP after extubation, inflammatory abnormalities on chest x-ray and chest CT along with a small pleural effusion.  Ultimately was discharged on Augmentin and fluconazole.  Postoperatively echocardiogram showed an EF of 25% due to stress cardiomyopathy.  Also had a nonpruritic rash.  She complains of ongoing diffuse abdominal discomfort and has a bit of drainage from the midline incision from her surgery persisting.  No nausea or vomiting.  Diarrhea but not profuse.  Review of Systems      No fever or chills.  Appetite is poor.  Fatigue.  No headache or myalgia.  Physical Exam   Vitals & Measurements    T: 99.9   F (Tympanic)  HR: 107(Peripheral)  BP: 113/73  SpO2: 96%     HT: 60.75 in  WT: 112 lb  BMI: 21.33       Patient appears comfortable and in no acute distress.  Alert and oriented.  HEENT exam reveals a moist oropharynx.  Chest clear anteriorly.  Cardiac exam is regular no murmur or edema.  Exquisite tenderness over the right lower anterior ribs.  Abdomen is diffusely tender.  Dressing over midline incision when removed reveals a staple line with some nonpurulent drainage about the umbilicus.  Erythema around the port site on the right lower abdomen.  Assessment/Plan       Non-cardiac chest pain (R07.89)         Patient with stress cardiomyopathy and chest pain which seems to be atypical.  Patient is referred to work-up  to the emergency room.  Discussed case with Dr. Campuzano.         Ordered:          04739 transitional care manage service 7 day discharge (Charge), Quantity: 1, Ruptured suppurative appendicitis  Non-cardiac chest pain  Tachycardia                Ruptured suppurative appendicitis (K35.32)         Diffuse tenderness.  Referred to the emergency room.         Ordered:          90515 transitional care manage service 7 day discharge (Charge), Quantity: 1, Ruptured suppurative appendicitis  Non-cardiac chest pain  Tachycardia                Tachycardia (R00.0)         No fever.  Concern for sepsis.  Referred to the ED.  Discussed with Dr. Campuzano.         Ordered:          82404 transitional care manage service 7 day discharge (Charge), Quantity: 1, Ruptured suppurative appendicitis  Non-cardiac chest pain  Tachycardia                Orders:         aspirin, = 1 tab(s) ( 81 mg ), Oral, daily, # 30 tab(s), 0 Refill(s), Type: Maintenance, OTC (Rx), (Completed)         nortriptyline, See Instructions, Instructions: 3 cap(s) Oral qhs, # 270 EA, 3 Refill(s), Type: Maintenance, Pharmacy: [facility], 3 cap(s) Oral qhs, (Completed)  Patient Information     Name:GUNNAR DOLL      Address:      [address] [place] [address]       [place], [place] [id number]     Sex:Female     YOB: 1950     [phone]     Emergency Contact:YUKI DOLL     MRN:[id number]     FIN:[id number]     Location:[facility]     Date of Service:04/03/2020      Primary Care Physician:       Pollo Terry MD, [phone]      Attending Physician:       Pollo Terry MD, [phone]  Problem List/Past Medical History    Ongoing     Asthma     Chronic insomnia     Dyslipidemia     Eczema     Family history of colon cancer     Genital HSV     GERD (gastroesophageal reflux disease)     MVP (mitral valve prolapse)     Osteoporosis    Historical     Inpatient stay       Comments: [facility], [place] -  Acute appendicitis, transferred to [place], [place].     Pregnancy     Pregnancy     Smoking 1/2 pack a day or less  Procedure/Surgical History     Extracapsular cataract extraction and insertion of intraocular lens (11/02/2017)      Comments: Right..     Extracapsular cataract extraction and insertion of intraocular lens (10/19/2017)      Comments: Left..     Colonoscopy (06/10/2016)      Comments: Every 5 years for family history.     Colonoscopy (04/11/2001)     Caesarean section      Comments: x 2.     Extraction of wisdom tooth     H/O: blood transfusion     H/O: tubal ligation     History of tonsillectomy  Medications    acyclovir 400 mg oral tablet, See Instructions, 3 refills    albuterol 90 mcg/inh inhalation aerosol, 2 puff(s), Inhale, qid, PRN, 1 refills    Augmentin 875 mg-125 mg oral tablet, 1 tab(s), Oral, q12 hrs    Benadryl    clobetasol 0.025% topical cream, Topical, bid    copper    fluconazole 150 mg oral tablet, 150 mg= 1 tab(s), Oral, daily    hydrocortisone 1% topical cream, 1 shantel, Topical, tid    lovastatin 40 mg oral tablet, 40 mg= 1 tab(s), Oral, daily, 3 refills    oxyCODONE 5 mg oral tablet, 1-2 tab(s), Oral, q4 hrs    Reclast 5 mg/100 mL intravenous solution, 5 mg, IV, qyear    Selenium  mcg oral tablet    Spacer, See Instructions  Allergies    Augmentin (Vomiting)    Latex    Macrobid (rash)    codeine  Social History    Smoking Status - 04/03/2020     Former smoker     Alcohol - Current, 12/23/2019      Wine (5 oz), Daily, Ready to change: No., 12/23/2019     Employment/School      Part time, 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 Highest education level: B.S in nursing., 12/23/2019     Exercise - Regular exercise, 12/10/2018      Exercise frequency: 3-4 times/week. Exercise type: Curves., 12/10/2018     Home/Environment      Marital status:  (Living together). Spouse/Partner name: Bebo. Living situation: Home/Independent. Injuries/Abuse/Neglect in  household: No. Feels unsafe at home: No. Family/Friends available for support: Yes., 12/23/2019     Nutrition/Health      Type of diet: Regular. Wants to lose weight: No. Sleeping concerns: No. Feels highly stressed: No., 12/23/2019     Sexual      Sexually active: Yes. Identifies as female, Sexual orientation: Straight or heterosexual. History of STD: Yes. Contraceptive Use Details: None. History of sexual abuse: No., 12/23/2019     Substance Abuse - Denies Substance Abuse, 12/10/2018      Never, 12/23/2019     Tobacco - Current, 12/23/2019      Cigarettes, 2 per day. Ready to change: Yes., 12/23/2019      Former smoker, quit more than 30 days ago, Cigarettes, 12/23/2019  Family History    Alcoholism: Sister.    Allergic rhinitis: Sister.    Arthritis: Mother, Sister and Aunt (M).    Bleeding disorder: Brother.    Breast Cancer: Mother (Dx at 72).    Cancer of colon: Sister (Dx at 55 years).    Dementia: Mother.    Depression: Mother and Sister.    High blood pressure: Sister.    Hypercholesterolemia: Sister.    Hypertension: Sister.    Kidney disease: Sister and Grandmother (P).    Obesity: Sister.    Seizure: Brother.  Immunizations      Vaccine Date Status          pneumococcal (PPSV23) 12/23/2019 Given          influenza virus vaccine, inactivated 10/03/2019 Recorded          influenza 10/22/2018 Recorded              Comments : [10/24/2018] [place] [facility]          influenza virus vaccine, inactivated 10/12/2017 Recorded          ZOS, shingles 03/16/2010 Recorded          ZOS, shingles 03/04/2010 Recorded          tetanus/diphth/pertuss (Tdap) adult/adol 01/07/2009 Recorded          ZOS, shingles 03/16/2004 Recorded

## 2022-02-15 NOTE — TELEPHONE ENCOUNTER
---------------------  From: Camryn Sandoval CMA (Phone Messages Pool ([id number]_[facility]))   To: JDL Message Pool ([id number]_[place]);     Sent: 4/13/2020 9:41:29 AM CDT  Subject: Phone Message     Phone Message    PCP:   [facility] OC      Time of Call:  0916       Person Calling:  Pt  Phone number:  [phone]    Returned call at: 0933    Note:   Pt was seen in the ED on Saturday for panic attack and was prescribed a sleep medicine. Was told to follow up with [facility] for antianxiety medicine. Recovering from major abdominal surgery and would like to avoid the clinic at this time. Called pt back advised Dr Terry is OC today and will be back tomorrow. Not as anxious after the ED visit did visit with psychiatrist. Has a telemedicine visit scheduled for tomorrow with [facility] and okay with wait to hear from Dr Terry.    Last office visit and reason:  04/06/20 ED f/u and suture removal [facility]---------------------  From: Sharon Cuevas CMA (J Message Pool ([id number]_[place]))   To: Pollo Terry MD;     Sent: 4/13/2020 2:02:30 PM CDT  Subject: FW: Phone Message---------------------  From: Pollo Terry MD   To: [facility] Message Pool ([id number]_[place]);     Sent: 4/13/2020 4:34:03 PM CDT  Subject: RE: Phone Message     Visit tomorrow.Noted.

## 2022-02-15 NOTE — NURSING NOTE
Comprehensive Intake Entered On:  11/12/2019 12:12 PM CST    Performed On:  11/12/2019 12:04 PM CST by Nell Galloway               Summary   Chief Complaint :   Patient is here today c/o sinus drainage, coughing, metalic tasting while eating   Weight Measured :   115.12 lb(Converted to: 115 lb 2 oz, 52.22 kg)    Height Measured :   60.5 in(Converted to: 5 ft 0 in, 153.67 cm)    Body Mass Index :   22.11 kg/m2   Body Surface Area :   1.49 m2   Systolic Blood Pressure :   132 mmHg (HI)    Diastolic Blood Pressure :   70 mmHg   Mean Arterial Pressure :   91 mmHg   Peripheral Pulse Rate :   106 bpm (HI)    BP Site :   Right arm   BP Method :   Manual   HR Method :   Electronic   Temperature Tympanic :   98.7 DegF(Converted to: 37.1 DegC)    Oxygen Saturation :   97 %   Nell Galloway - 11/12/2019 12:04 PM CST   Health Status   Allergies Verified? :   Yes   Medication History Verified? :   Yes   Medical History Verified? :   Yes   Pre-Visit Planning Status :   Completed   Tobacco Use? :   Current every day smoker   Nell Galloway - 11/12/2019 12:04 PM CST   Consents   Consent for Immunization Exchange :   Consent Granted   Consent for Immunizations to Providers :   Consent Granted   Nell Galloway - 11/12/2019 12:04 PM CST   Meds / Allergies   (As Of: 11/12/2019 12:12:21 PM CST)   Allergies (Active)   Augmentin  Estimated Onset Date:   Unspecified ; Reactions:   Vomiting ; Created By:   Barbara Michel; Reaction Status:   Active ; Category:   Drug ; Substance:   Augmentin ; Type:   Sensitivity ; Severity:   Mild ; Updated By:   Barbara Michel; Reviewed Date:   11/12/2019 12:07 PM CST      codeine  Estimated Onset Date:   Unspecified ; Created By:   Travon Nichole CMA; Reaction Status:   Active ; Category:   Drug ; Substance:   codeine ; Type:   Allergy ; Updated By:   Travon Nichole CMA; Reviewed Date:   11/12/2019 12:07 PM CST      Latex  Estimated Onset Date:   Unspecified ; Created By:   Dayanara  Travon SELBY; Reaction Status:   Active ; Category:   Drug ; Substance:   Latex ; Type:   Allergy ; Updated By:   Travon Nichole CMA; Reviewed Date:   11/12/2019 12:07 PM CST      Macrobid  Estimated Onset Date:   Unspecified ; Reactions:   rash ; Created By:   Travon Nichole CMA; Reaction Status:   Active ; Category:   Drug ; Substance:   Macrobid ; Type:   Allergy ; Updated By:   Travon Nichole CMA; Reviewed Date:   11/12/2019 12:07 PM CST        Medication List   (As Of: 11/12/2019 12:12:21 PM CST)   Prescription/Discharge Order    acyclovir  :   acyclovir ; Status:   Prescribed ; Ordered As Mnemonic:   acyclovir 400 mg oral tablet ; Simple Display Line:   See Instructions, TAKE 1 TABLET BY MOUTH TWO  TIMES DAILY, 180 tab(s), 3 Refill(s) ; Ordering Provider:   Pollo Terry MD; Catalog Code:   acyclovir ; Order Dt/Tm:   12/10/2018 11:43:28 AM CST          aspirin  :   aspirin ; Status:   Prescribed ; Ordered As Mnemonic:   aspirin 81 mg oral tablet ; Simple Display Line:   81 mg, 1 tab(s), Oral, daily, 30 tab(s), 0 Refill(s) ; Ordering Provider:   Pollo Terry MD; Catalog Code:   aspirin ; Order Dt/Tm:   12/10/2018 12:12:54 PM CST          lovastatin  :   lovastatin ; Status:   Prescribed ; Ordered As Mnemonic:   lovastatin 40 mg oral tablet ; Simple Display Line:   40 mg, 1 tab(s), PO, Daily, 90 tab(s), 3 Refill(s) ; Ordering Provider:   Pollo Terry MD; Catalog Code:   lovastatin ; Order Dt/Tm:   12/10/2018 11:42:38 AM CST          nortriptyline  :   nortriptyline ; Status:   Prescribed ; Ordered As Mnemonic:   nortriptyline 10 mg oral capsule ; Simple Display Line:   See Instructions, 3 cap(s) Oral qhs, 270 EA, 3 Refill(s) ; Ordering Provider:   Pollo Terry MD; Catalog Code:   nortriptyline ; Order Dt/Tm:   10/21/2019 7:33:18 AM CDT          zoledronic acid  :   zoledronic acid ; Status:   Prescribed ; Ordered As Mnemonic:   Reclast 5 mg/100 mL intravenous solution ; Simple Display Line:   5 mg, IV,  qyear, infused over at least 15 minutes.  Annually in January starting 2019, 1 EA, 0 Refill(s) ; Ordering Provider:   Pollo Terry MD; Catalog Code:   zoledronic acid ; Order Dt/Tm:   12/14/2018 3:50:21 PM CST            Home Meds    clobetasol topical  :   clobetasol topical ; Status:   Documented ; Ordered As Mnemonic:   clobetasol 0.025% topical cream ; Simple Display Line:   Topical, bid, 0 Refill(s) ; Catalog Code:   clobetasol topical ; Order Dt/Tm:   11/12/2019 12:10:20 PM CST          copper  :   copper ; Status:   Documented ; Ordered As Mnemonic:   copper ; Simple Display Line:   0 Refill(s) ; Catalog Code:   copper ; Order Dt/Tm:   10/16/2017 2:04:52 PM CDT          hydrocortisone topical  :   hydrocortisone topical ; Status:   Documented ; Ordered As Mnemonic:   hydrocortisone 1% topical cream ; Simple Display Line:   1 shantel, top, tid, PRN for eczema, 0 Refill(s) ; Catalog Code:   hydrocortisone topical ; Order Dt/Tm:   10/16/2017 2:05:31 PM CDT          ibuprofen  :   ibuprofen ; Status:   Documented ; Ordered As Mnemonic:   ibuprofen 400 mg oral tablet ; Simple Display Line:   400 mg, 1 tab(s), po, q6 hrs, 0 Refill(s) ; Catalog Code:   ibuprofen ; Order Dt/Tm:   10/16/2017 2:05:19 PM CDT          raNITIdine  :   raNITIdine ; Status:   Documented ; Ordered As Mnemonic:   Zantac 150 ; Simple Display Line:   150 mg, po, bid, 0 Refill(s) ; Catalog Code:   raNITIdine ; Order Dt/Tm:   10/16/2017 2:05:09 PM CDT          selenium  :   selenium ; Status:   Documented ; Ordered As Mnemonic:   Selenium  mcg oral tablet ; Simple Display Line:   0 Refill(s) ; Catalog Code:   selenious acid ; Order Dt/Tm:   10/16/2017 2:04:59 PM CDT

## 2022-02-15 NOTE — NURSING NOTE
Peak Flow POC Entered On:  1/22/2020 3:15 PM CST    Performed On:  1/22/2020 3:15 PM CST by Madelyn Weber MA               Peak Flow POC   Peak Flow #1 :   210 L/min   Peak Flow #2 :   240 L/min   Peak Flow #3 :   240 L/min   Madelyn Weber MA - 1/22/2020 3:15 PM CST

## 2022-02-15 NOTE — TELEPHONE ENCOUNTER
Spoke to patient and she prefers to try the Meds that [facility] prescribed and see if they help.  If not will call back to proceed with scheduling

## 2022-02-15 NOTE — TELEPHONE ENCOUNTER
---------------------  From: Che Maldonado LPN (Phone Messages Pool ([address] [place] [address]))   To: [facility] Message Pool ([address] [place] [address]);     Sent: 5/15/2020 9:45:05 AM CDT  Subject: buspirone     Phone Message    PCP:   [facility]      Time of Call:  9:33am       Person Calling:  pt  Phone number:  [phone]    Note:   Pt LM stating she spoke with Nell earlier in the week and J was going to send in a refill of buspirone 10mg. Pt says on [facility]'s website it says it will not be arriving until Monday.     Pt is asking for a Rx for a few days worth of medication be sent to [facility].    Please advise if ok for an additional few days as medication requires provider approval.    Last office visit and reason:  _---------------------  From: Nell Galloway ([facility] Message Pool ([address] [place] [address]))   To: Pollo Terry MD;     Sent: 5/15/2020 9:52:42 AM CDT  Subject: FW: buspirone---------------------  From: Pollo Terry MD   To: [facility] Message Pool ([address] [place] [address]);     Sent: 5/15/2020 10:17:51 AM CDT  Subject: RE: buspirone     okI sent a proposal to you for this medication so you can sign off on---------------------  From: Nell Galloway ([facility] Message Pool ([address] [place] [address]))   To: Pollo Terry MD;     Sent: 5/15/2020 10:24:53 AM CDT  Subject: FW: buspirone

## 2022-02-15 NOTE — NURSING NOTE
Comprehensive Intake Entered On:  11/30/2019 12:33 PM CST    Performed On:  11/30/2019 12:32 PM CST by Barbara Michel               Summary   Chief Complaint :   Was treated for sinus infection with antibiotics.  Now feeling worse.    Weight Measured :   113.0 lb(Converted to: 113 lb 0 oz, 51.26 kg)    Height Measured :   60.5 in(Converted to: 5 ft 0 in, 153.67 cm)    Body Mass Index :   21.7 kg/m2   Body Surface Area :   1.48 m2   Systolic Blood Pressure :   122 mmHg   Diastolic Blood Pressure :   74 mmHg   Mean Arterial Pressure :   90 mmHg   Peripheral Pulse Rate :   98 bpm   Temperature Tympanic :   98.5 DegF(Converted to: 36.9 DegC)    Oxygen Saturation :   96 %   Barbara Michel - 11/30/2019 12:32 PM CST

## 2022-02-15 NOTE — PROGRESS NOTES
Chief Complaint    c/o SOB and chest tightness last night.  CXR done yesterday and was normal.  voice hoarse, sore throat today.  History of Present Illness      Patient is here with SOB and chest tightness last night.  She wasn't able to sleep well during the night.  Her O2 sats in clinic today was at 96%.  She was treated for pneumonia last month, finished antibiotics and had repeat CXR yesterday, which was normal, pneumonia resolved.  She does have history of asthma and does have concerns about recurrence, was using an inhaler years ago which seemed to help.  Denies fever or chills.  Review of Systems           See HPI.  All other review of systems negative.              Physical Exam   Vitals & Measurements    T: 96.7   F (Tympanic)  HR: 87(Peripheral)  BP: 128/78  SpO2: 96%     HT: 60.75 in  WT: 115.6 lb  BMI: 22.02           General:  Alert and oriented, No acute distress.            Eye:  Pupils are equal, round and reactive to light, Normal conjunctiva.            HENT:  Oral mucosa is moist.            Neck:  Supple.            Respiratory:  Respirations are non-labored.  LS:  clear.          Cardiovascular:  Normal rate, Regular rhythm, No edema.            Gastrointestinal:  Non-distended.            Musculoskeletal:  Normal gait.  No chest discomfort with palpation.          Integumentary:  Warm, No rash.            Psychiatric:  Cooperative, Appropriate mood & affect, Normal judgment.             Assessment/Plan       1. SOB (shortness of breath) (R06.02)       2. Asthma (J45.909)         Peak flow ordered with average of 230.  Will resume albuterol inhaler prn.  She is instructed to use inhaler with a spacer.  Prescriptions were sent in.  Follow up if SOB doesn't improve, may need to add a preventative inhaler.       Orders:         albuterol, 2 puff(s), Inhale, qid, PRN: for shortness of breath or wheezing, # 1 EA, 1 Refill(s), Type: Maintenance, Pharmacy: [facility] #[id number], 2 puff(s) Inhale  qid,PRN:for shortness of breath or wheezing, (Ordered)         Miscellaneous Rx Supply, Spacer, See Instructions, Instructions: use as directed with inhaler., Supply, # 1 EA, 0 Refill(s), Type: Maintenance, Pharmacy: [facility] #[id number], use as directed with inhaler., (Ordered)      IMadelyn MA, acted solely as a scribe for, and in the presence of Dr. Sanket Gill who performed the services.             I, Sanket Gill MD, personally performed the services described in this documentation.  The documentation was scribed in my presence and is both accurate and complete.                Patient Information     Name:GUNNAR DOLL      Address:      [address] [place] [address] [place], [place] [id number]     Sex:Female     YOB: 1950     [phone]     Emergency Contact:YUKI DOLL     MRN:[id number]     FIN:[id number]     [facility]     Date of Service:01/22/2020      Primary Care Physician:       Pollo Terry MD, [phone]      Attending Physician:       Sanket Gill MD, [phone]  Problem List/Past Medical History    Ongoing     Asthma     Chronic insomnia     Dyslipidemia     Eczema     Family history of colon cancer     Genital HSV     GERD (gastroesophageal reflux disease)     MVP (mitral valve prolapse)     Osteoporosis    Historical     Pregnancy     Pregnancy     Smoking 1/2 pack a day or less  Procedure/Surgical History     Extracapsular cataract extraction and insertion of intraocular lens (11/02/2017)      Comments: Right..     Extracapsular cataract extraction and insertion of intraocular lens (10/19/2017)      Comments: Left..     Colonoscopy (06/10/2016)      Comments: Every 5 years for family history.     Colonoscopy (04/11/2001)     Caesarean section      Comments: x 2.     Extraction of wisdom tooth     H/O: blood transfusion     H/O: tubal ligation     History of tonsillectomy  Medications    acyclovir 400 mg  oral tablet, See Instructions, 3 refills    albuterol 90 mcg/inh inhalation aerosol, 2 puff(s), Inhale, qid, PRN, 1 refills    aspirin 81 mg oral tablet, 81 mg= 1 tab(s), Oral, daily,   Not taking    Benadryl    clobetasol 0.025% topical cream, Topical, bid    copper    hydrocortisone 1% topical cream, 1 shantel, Topical, tid    ibuprofen 400 mg oral tablet, 400 mg= 1 tab(s), Oral, q6 hrs    lovastatin 40 mg oral tablet, 40 mg= 1 tab(s), Oral, daily, 3 refills    nortriptyline 10 mg oral capsule, See Instructions, 3 refills    Reclast 5 mg/100 mL intravenous solution, 5 mg, IV, qyear    Selenium  mcg oral tablet    Spacer, See Instructions  Allergies    Augmentin (Vomiting)    Latex    Macrobid (rash)    codeine  Social History    Smoking Status - 01/22/2020     Former smoker     Alcohol - Current, 12/23/2019      Wine (5 oz), Daily, Ready to change: No., 12/23/2019     Employment/School      Part time, 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 Highest education level: B.S in nursing., 12/23/2019     Exercise - Regular exercise, 12/10/2018      Exercise frequency: 3-4 times/week. Exercise type: Curves., 12/10/2018     Home/Environment      Marital status:  (Living together). Spouse/Partner name: Bebo. Living situation: Home/Independent. Injuries/Abuse/Neglect in household: No. Feels unsafe at home: No. Family/Friends available for support: Yes., 12/23/2019     Nutrition/Health      Type of diet: Regular. Wants to lose weight: No. Sleeping concerns: No. Feels highly stressed: No., 12/23/2019     Sexual      Sexually active: Yes. Identifies as female, Sexual orientation: Straight or heterosexual. History of STD: Yes. Contraceptive Use Details: None. History of sexual abuse: No., 12/23/2019     Substance Abuse - Denies Substance Abuse, 12/10/2018      Never, 12/23/2019     Tobacco - Current, 12/23/2019      Cigarettes, 2 per day. Ready to change: Yes., 12/23/2019      Former smoker, quit more  than 30 days ago, Cigarettes, 12/23/2019  Family History    Alcoholism: Sister.    Allergic rhinitis: Sister.    Arthritis: Mother, Sister and Aunt (M).    Bleeding disorder: Brother.    Breast Cancer: Mother (Dx at 72).    Cancer of colon: Sister (Dx at 55 years).    Dementia: Mother.    Depression: Mother and Sister.    High blood pressure: Sister.    Hypercholesterolemia: Sister.    Hypertension: Sister.    Kidney disease: Sister and Grandmother (P).    Obesity: Sister.    Seizure: Brother.  Immunizations      Vaccine Date Status          pneumococcal (PPSV23) 12/23/2019 Given          influenza virus vaccine, inactivated 10/03/2019 Recorded          influenza 10/22/2018 Recorded              Comments : [10/24/2018] [place] [facility]          influenza virus vaccine, inactivated 10/12/2017 Recorded          ZOS, shingles 03/16/2010 Recorded          ZOS, shingles 03/04/2010 Recorded          tetanus/diphth/pertuss (Tdap) adult/adol 01/07/2009 Recorded          ZOS, shingles 03/16/2004 Recorded  Lab Results       Lab Results (Last 4 results within 90 days)        Cholesterol: 219 mg/dL High (12/23/19 10:52:00)       Non-HDL Cholesterol: 132 High (12/23/19 10:52:00)       HDL: 87 mg/dL (12/23/19 10:52:00)       Cholesterol/HDL Ratio: 2.5 (12/23/19 10:52:00)       LDL: 117 High (12/23/19 10:52:00)       Triglyceride: 62 mg/dL (12/23/19 10:52:00)

## 2022-02-15 NOTE — TELEPHONE ENCOUNTER
---------------------  From: Erika Beltran RN (Phone Messages Pool ([id number]_[facility]))   Sent: 10/11/2021 2:36:45 PM CDT  Subject: buspirone refill       PCP:  WICHO     Time of Call: 2:20pm       Person Calling:  pt  Phone number:      Returned call at: 2:34pm    Note:  Vm received from pt, requesting refill of Buspirone.     Return call to pt, pt is due for visit for med refills; pt transferred to scheduling.    Last office visit and reason: 6/1/21 wrist pain JDL

## 2022-02-15 NOTE — TELEPHONE ENCOUNTER
---------------------  From: Chico/Nell ENCISO (Phone Messages Pool ([id number]_[place]))   To: Pollo Terry MD;     Sent: 11/12/2020 8:47:50 AM CST  Subject: FYI     Phone Message    PCP: WICHO    Time of Call: 0841    Phone Number: [phone]    Returned call at: n/a    Note: Aileen called stating that this is an FYI to JOSMAN. Patient stated that she got re tested for covid 19 and it came back positive today. Aileen states that if JDL has any questions he can call her.

## 2022-02-15 NOTE — TELEPHONE ENCOUNTER
---------------------From: Camryn Sandoval CMA (Phone Messages Pool ([id number]_[place])) To: Pollo Terry MD;   Sent: 1/20/2020 10:48:23 AM CSTSubject: Phone Message Phone MessagePCP:   JDL  Time of Call:  1040   Person Calling:  PatientPhone number:  [phone] VM okayNote:   Patient had right side pneumonia in December. Then had a physical after that visit. Dr Terry said he wanted a repeat chest x-ray, but patient hasn't heard back about when to have repeat chest x-ray done. Still having symptoms cough feel almost like asthma per patient. Please advise.Last office visit and reason:  12-23-19 annual physical with JDL.---------------------From: Pollo Terry MD To: Phone Messages [employer] ([id number]_[facility]);   Sent: 1/20/2020 1:21:54 PM CSTSubject: RE: Phone Message There is a return to clinic order for CXR. Needs to be seen if not better, but F/U CXR is usually in 4-6 weeks after a pneumonia.Spoke w/ patient who feels she does not need to be seen. CXR order printed and delivered to Amarilis Rice for scheduling.

## 2022-02-15 NOTE — TELEPHONE ENCOUNTER
---------------------  From: Jl Hand MD   To: Appointment Pool ([id number]_[place]);     Sent: 3/15/2021 10:58:04 AM CDT  Subject: needs COVID test today     patient needs to be scheduled for COVID testing this afternoonScheduled

## 2022-02-15 NOTE — NURSING NOTE
Comprehensive Intake Entered On:  6/1/2021 10:42 AM CDT    Performed On:  6/1/2021 10:37 AM CDT by Nell Galloway               Summary   Chief Complaint :   c/o R wrist pain x 2 days.    Weight Measured :   120.3 lb(Converted to: 120 lb 5 oz, 54.567 kg)    Height Measured :   60.75 in(Converted to: 5 ft 1 in, 154.30 cm)    Body Mass Index :   22.92 kg/m2   Body Surface Area :   1.53 m2   Systolic Blood Pressure :   140 mmHg (HI)    Diastolic Blood Pressure :   82 mmHg (HI)    Mean Arterial Pressure :   101 mmHg   Peripheral Pulse Rate :   88 bpm   BP Site :   Right arm   BP Method :   Electronic   HR Method :   Electronic   Temperature Tympanic :   98.7 DegF(Converted to: 37.1 DegC)    Oxygen Saturation :   97 %   Nell Galloway - 6/1/2021 10:37 AM CDT   Health Status   Allergies Verified? :   Yes   Medication History Verified? :   Yes   Medical History Verified? :   Yes   Pre-Visit Planning Status :   Completed   Tobacco Use? :   Former smoker   Nell Galloway - 6/1/2021 10:37 AM CDT   Consents   Consent for Immunization Exchange :   Consent Granted   Consent for Immunizations to Providers :   Consent Granted   Nell Galloway - 6/1/2021 10:37 AM CDT   Meds / Allergies   (As Of: 6/1/2021 10:42:47 AM CDT)   Allergies (Active)   Augmentin  Estimated Onset Date:   Unspecified ; Reactions:   Vomiting ; Created By:   Barbara Michel; Reaction Status:   Active ; Category:   Drug ; Substance:   Augmentin ; Type:   Sensitivity ; Severity:   Mild ; Updated By:   Barbara Michel; Reviewed Date:   6/1/2021 10:41 AM CDT      codeine  Estimated Onset Date:   Unspecified ; Created By:   Travon Nichole CMA; Reaction Status:   Active ; Category:   Drug ; Substance:   codeine ; Type:   Allergy ; Updated By:   Travon Nichole CMA; Reviewed Date:   6/1/2021 10:41 AM CDT      Latex  Estimated Onset Date:   Unspecified ; Created By:   Travon Nichole CMA; Reaction Status:   Active ; Category:   Drug ; Substance:    Latex ; Type:   Allergy ; Updated By:   Travon Nichole CMA; Reviewed Date:   6/1/2021 10:41 AM CDT      Macrobid  Estimated Onset Date:   Unspecified ; Reactions:   rash ; Created By:   Travon Nichole CMA; Reaction Status:   Active ; Category:   Drug ; Substance:   Macrobid ; Type:   Allergy ; Updated By:   Travon Nichole CMA; Reviewed Date:   6/1/2021 10:41 AM CDT        Medication List   (As Of: 6/1/2021 10:42:47 AM CDT)   Prescription/Discharge Order    acyclovir  :   acyclovir ; Status:   Prescribed ; Ordered As Mnemonic:   acyclovir 400 mg oral tablet ; Simple Display Line:   See Instructions, TAKE 1 TABLET BY MOUTH TWO  TIMES DAILY, 180 tab(s), 3 Refill(s) ; Ordering Provider:   Pollo Terry MD; Catalog Code:   acyclovir ; Order Dt/Tm:   11/2/2020 10:23:21 AM CST          albuterol  :   albuterol ; Status:   Prescribed ; Ordered As Mnemonic:   albuterol 90 mcg/inh inhalation aerosol ; Simple Display Line:   2 puff(s), Inhale, qid, PRN: for shortness of breath or wheezing, 1 EA, 1 Refill(s) ; Ordering Provider:   Sanket Gill MD; Catalog Code:   albuterol ; Order Dt/Tm:   1/22/2020 3:17:33 PM CST          busPIRone  :   busPIRone ; Status:   Processing ; Ordered As Mnemonic:   busPIRone 15 mg oral tablet ; Ordering Provider:   Pollo Terry MD; Action Display:   Complete ; Catalog Code:   busPIRone ; Order Dt/Tm:   6/1/2021 10:41:06 AM CDT          clobetasol topical  :   clobetasol topical ; Status:   Prescribed ; Ordered As Mnemonic:   clobetasol 0.05% topical cream ; Simple Display Line:   1 shantel, Topical, bid, 45 gm, 0 Refill(s) ; Ordering Provider:   Pollo Terry MD; Catalog Code:   clobetasol topical ; Order Dt/Tm:   2/22/2021 10:32:16 AM CST          lovastatin  :   lovastatin ; Status:   Prescribed ; Ordered As Mnemonic:   lovastatin 40 mg oral tablet ; Simple Display Line:   40 mg, 1 tab(s), PO, Daily, 90 tab(s), 3 Refill(s) ; Ordering Provider:   Pollo Terry MD; Catalog Code:    lovastatin ; Order Dt/Tm:   11/2/2020 10:22:50 AM CST          Miscellaneous Rx Supply  :   Miscellaneous Rx Supply ; Status:   Prescribed ; Ordered As Mnemonic:   Spacer ; Simple Display Line:   See Instructions, use as directed with inhaler., 1 EA, 0 Refill(s) ; Ordering Provider:   Sanket Gill MD; Catalog Code:   Miscellaneous Rx Supply ; Order Dt/Tm:   1/22/2020 3:17:33 PM CST            Home Meds    calcium-vitamin D  :   calcium-vitamin D ; Status:   Documented ; Ordered As Mnemonic:   Caltrate Gummy Bites 250 mg-400 intl units oral tablet, chewable ; Simple Display Line:   See Instructions, 5 gummies Chewed, 0 Refill(s) ; Catalog Code:   calcium-vitamin D ; Order Dt/Tm:   12/29/2020 1:50:17 PM CST          copper  :   copper ; Status:   Documented ; Ordered As Mnemonic:   copper ; Simple Display Line:   0 Refill(s) ; Catalog Code:   copper ; Order Dt/Tm:   10/16/2017 2:04:52 PM CDT          melatonin  :   melatonin ; Status:   Documented ; Ordered As Mnemonic:   melatonin 10 mg oral tablet ; Simple Display Line:   10 mg, 1 tab(s), Oral, hs, 0 Refill(s) ; Catalog Code:   melatonin ; Order Dt/Tm:   4/23/2021 10:37:09 AM CDT            ID Risk Screen   Recent Travel History :   No recent travel   Family Member Travel History :   No recent travel   Other Exposure to Infectious Disease :   Unknown   COVID-19 Testing Status :   No COVID-19 test performed   Nell Galloway 6/1/2021 10:37 AM CDT

## 2022-02-15 NOTE — TELEPHONE ENCOUNTER
"---------------------  From: Zelda Leonard RN (Phone Messages Pool ([address] [place] [address]))   To: JDL Message Pool ([address] [place] [address]);     Sent: 1/30/2020 8:34:09 AM CST  Subject: To be seen?     Phone message    PCP:   WICHO      Time of Call:  0826       Person Calling:  Pt \"Aileen\"  Phone number:  [phone]    Returned call at: _    Note:   Patient states she was in the ED on Monday and was told to f/u with PCP and ENT. Patient sees ENT tomorrow and is wondering if JDL wants to see her also.    ED note states:   Pt had CXR that was normal, given GI cocktail and this relieved her existing throat discomfort, discussed f/u with ENT and PCP as deeper conversation highlighted this has been ongoing for a month with her pneumonia.    Please notify pt if she needs to schedule with JDL.---------------------  From: Nell Galloway (JDL Message Pool ([id number]))   To: Pollo Terry MD;     Sent: 1/30/2020 8:43:16 AM CST  Subject: FW: To be seen?---------------------  From: Pollo Terry MD   To: [employer] Message Pool ([id number]);     Sent: 1/30/2020 9:06:37 AM CST  Subject: RE: To be seen?     sureCalled patient @ 0922. Patient explained that she woke up Monday morning feeling like she was chocking. Patient stated that her  brought her to ED. Patient states that she had chest XR done and it was negative. Patient states that ED doc stated that she needs to see ENT and f/u with JDL. Patient states that she feels like she does not need to f/u with JDL because there is nothing else to do. Advised patient to see ENT tomorrow and then f/u with JDL if ENT recommends it. Patient agreed and understood.  "

## 2022-02-15 NOTE — TELEPHONE ENCOUNTER
"---------------------  From: Nola Pacheco LPN   Sent: 4/20/2020 4:09:05 PM CDT  Subject: General Message       Phone Message Template      PCP:   WICHO      Time of Call:  1540       Person Calling:  \"Loreta"   Phone number:  [phone]    Returned call at: Contacted patient back to confirm that RAYMUNDODL stated 10mg BID. and to follow up in two weeks.     Note:   patient called and stated that she wanted to confirm that she could increase her Buspirone to 10mg BID. Is currently taking 5mg BID.     Acute adjustment disorder with anxiety (F43.22) Associated with acute illness and intensive care unit stay with delirium. Improving. Continue trazodone. Will prescribe BuSpar 10 mg twice daily as recommended by psychiatry. Recommended the patient start with a half a tablet. Follow-up in 2 weeks.    Last office visit and reason:  04/14/2020, telemed, anxiety.  "

## 2022-02-15 NOTE — TELEPHONE ENCOUNTER
---------------------  From: Felipe SELBY Flower   Sent: 6/25/2019 4:32:34 PM CDT  Subject: nortriptyline      Pt LM at 1614 stating [facility] does not have her refills on file for nortriptyline. She is going out of town and would like short supply sent to [facility]. Sent 1 mo to . [facility] was then called and they do have one 3 mo refill on file for nortriptyline. No further action needed at this time.Patient called back @ 1631 stating she found a full bottle of medication at home. [facility]

## 2022-02-15 NOTE — LETTER
(Inserted Image. Unable to display)   February 03, 2021      GUNNAR DOLL      [address]   [place], [place] [id number]        Dear GUNNAR,    Thank you for selecting [facility] ([facility] for your healthcare needs.  Below you will find the results of your recent tests done at our clinic.     Results are unchanged.      Result Name Current Result Previous Result Reference Range   Cholesterol (mg/dL) ((H)) 212 2/1/2021 ((H)) 219 12/23/2019  - <200   Non-HDL Cholesterol  128 2/1/2021 ((H)) 132 12/23/2019  - <130   HDL (mg/dL)  84 2/1/2021  87 12/23/2019 > OR = 50 -    Cholesterol/HDL Ratio  2.5 2/1/2021  2.5 12/23/2019  - <5.0   LDL ((H)) 112 2/1/2021 ((H)) 117 12/23/2019    Triglyceride (mg/dL)  70 2/1/2021  62 12/23/2019  - <150       Please contact me or my assistant at [phone] if you have any questions.     Sincerely,        Pollo Terry MD

## 2022-02-15 NOTE — TELEPHONE ENCOUNTER
---------------------  From: Che Maldonado LPN (Phone Messages Pool ([id number]_[place]))   To: Pollo Terry MD;     Sent: 1/22/2020 10:26:54 AM CST  Subject: results/SOB/cough     Phone Message    PCP:   WICHO      Time of Call:  10:21am       Person Calling:  pj Gallagher  Phone number:  [phone] OK to TRICIA     Returned call at: _    Note:   Pt TRICIA stating she was in yesterday afternoon for follow up chest x-ray following pneumonia back in December.     Pt asking if results are back because she is feeling SOB and her cough is back. Pt says she feels like she has asthma and might need an inhaler.    Please advise - I do see negative chest x-ray results and will notify when I call pt back. Advise eval for SOB/cough?    Last office visit and reason:  12/23/19 Annual Visit---------------------  From: Pollo Terry MD   To: Phone Messages Pool ([id number]_[place]);     Sent: 1/22/2020 10:28:59 AM CST  Subject: RE: results/SOB/cough     Needs to be seen.Phone number below incorrect. Called number in chart and spoke with pt- informed her of negative chest xray and that JDL said she should be seen for SOB/cough.    Pt asked to see GTG being JDL out of clinic- transferred to scheduling.

## 2022-02-15 NOTE — TELEPHONE ENCOUNTER
---------------------  From: Lino Short LPN   To: [facility] Message Pool ([id number]_[facility]);     Sent: 3/16/2021 4:00:54 PM CDT  Subject: General Message     Patient informed of negative covid test result.

## 2022-02-15 NOTE — TELEPHONE ENCOUNTER
---------------------  From: Erika Baugh (Phone Messages Pool ([id number]_[facility]))   Sent: 2/7/2019 8:32:11 AM CST  Subject: Health assessment form      Phone Message    PCP WICHO      Time of Call  8:19      Person Calling Pt  Phone number [phone]      Returned call at _8:22    Note  _ Pt left message stating that she is taking a new job at [employer] and needs her health assessment form filled out, sh had a physical 12/18 and they are stating that it would be fine to use that appointment.  She would like to drop form off, but wants to know how to do that.  Called pt, she states that it is an OCC health form, she starts tomorrow and will ask more questions, since there is a TB part on there and the last one she had was in August at the [facility].  Informed that she can drop it off at the  after she gets her questions and clarification answered.  If she needs to do anything different the  will let her know when she brings the form in.  Pt is okay with this and will bring in next week.    Last office visit and reason _12/10/2018 Annual visit

## 2022-02-15 NOTE — LETTER
(Inserted Image. Unable to display)   January 31, 2019      GUNNAR DOLL      [address] [place], [place] [id number]        Dear GUNNAR,    Thank you for selecting [facility] ([facility]) for your healthcare needs.  Below you will find the results of your recent tests done at our clinic.    Your results have improved.      Result Name Current Result Previous Result Reference Range   ALT/SGPT (unit/L)  29 1/30/2019  6 - 29   Cholesterol (mg/dL) ((H)) 209 1/30/2019 ((H)) 216 11/10/2017  - <200   Non-HDL Cholesterol  120 1/30/2019 ((H)) 140 11/10/2017  - <130   HDL (mg/dL)  89 1/30/2019  76 11/10/2017 >50 -    Cholesterol/HDL Ratio  2.3 1/30/2019  2.8 11/10/2017  - <5.0   LDL ((H)) 105 1/30/2019 ((H)) 126 11/10/2017    Triglyceride (mg/dL)  60 1/30/2019  58 11/10/2017  - <150       Please contact me or my assistant at [phone] if you have any questions.     Sincerely,        Pollo Terry MD

## 2022-02-15 NOTE — TELEPHONE ENCOUNTER
---------------------  From: Pollo Terry MD   To: WICHO Message Pool ([id number]_[place] - [place]);     Sent: 1/21/2020 4:09:22 PM CST  Subject: Patient Message - Results Notification   Actions: Notify the patient with results      Chest X-ray negative. Pneumonia resolved.Patient notified.

## 2022-02-15 NOTE — NURSING NOTE
Comprehensive Intake Entered On:  4/23/2021 10:40 AM CDT    Performed On:  4/23/2021 10:30 AM CDT by Malia Constantino               Summary   Temperature Tympanic :   98.1 DegF(Converted to: 36.7 DegC)    Chief Complaint :   Pt c/o feeling a sensation upper abdominal under rib cage x one month ago and off and on.  She thinks she had a CT scan that showed a hiatal hernia and wonders if that could be it.   Malia Constantino - 4/23/2021 10:45 AM CDT     Weight Measured :   121.2 lb(Converted to: 121 lb 3 oz, 54.975 kg)    Systolic Blood Pressure :   104 mmHg   Diastolic Blood Pressure :   72 mmHg   Mean Arterial Pressure :   83 mmHg   Peripheral Pulse Rate :   81 bpm   BP Site :   Right arm   BP Method :   Manual   Respiratory Rate :   16 br/min   Oxygen Saturation :   97 %   Malia Constantino - 4/23/2021 10:30 AM CDT   Health Status   Allergies Verified? :   Yes   Medication History Verified? :   Yes   Tobacco Use? :   Former smoker   Malia Constantino - 4/23/2021 10:30 AM CDT   Consents   Consent for Immunization Exchange :   Consent Granted   Consent for Immunizations to Providers :   Consent Granted   Malia Constantino - 4/23/2021 10:30 AM CDT   Meds / Allergies   (As Of: 4/23/2021 10:40:36 AM CDT)   Allergies (Active)   Augmentin  Estimated Onset Date:   Unspecified ; Reactions:   Vomiting ; Created By:   Barbara Michel; Reaction Status:   Active ; Category:   Drug ; Substance:   Augmentin ; Type:   Sensitivity ; Severity:   Mild ; Updated By:   Barbara Michel; Reviewed Date:   4/23/2021 10:35 AM CDT      codeine  Estimated Onset Date:   Unspecified ; Created By:   Travon Nichole CMA; Reaction Status:   Active ; Category:   Drug ; Substance:   codeine ; Type:   Allergy ; Updated By:   Travon Nichole CMA; Reviewed Date:   4/23/2021 10:35 AM CDT      Latex  Estimated Onset Date:   Unspecified ; Created By:   Travon Nichole CMA; Reaction Status:   Active ; Category:   Drug ; Substance:   Latex ; Type:   Allergy ;  Updated By:   Travon Nichole CMA; Reviewed Date:   4/23/2021 10:35 AM CDT      Macrobid  Estimated Onset Date:   Unspecified ; Reactions:   rash ; Created By:   Travon Nichole CMA; Reaction Status:   Active ; Category:   Drug ; Substance:   Macrobid ; Type:   Allergy ; Updated By:   Travon Nichole CMA; Reviewed Date:   4/23/2021 10:35 AM CDT        Medication List   (As Of: 4/23/2021 10:40:36 AM CDT)   Prescription/Discharge Order    clobetasol topical  :   clobetasol topical ; Status:   Prescribed ; Ordered As Mnemonic:   clobetasol 0.05% topical cream ; Simple Display Line:   1 shantel, Topical, bid, 45 gm, 0 Refill(s) ; Ordering Provider:   Pollo Terry MD; Catalog Code:   clobetasol topical ; Order Dt/Tm:   2/22/2021 10:32:16 AM CST          acyclovir  :   acyclovir ; Status:   Prescribed ; Ordered As Mnemonic:   acyclovir 400 mg oral tablet ; Simple Display Line:   See Instructions, TAKE 1 TABLET BY MOUTH TWO  TIMES DAILY, 180 tab(s), 3 Refill(s) ; Ordering Provider:   Pollo Terry MD; Catalog Code:   acyclovir ; Order Dt/Tm:   11/2/2020 10:23:21 AM CST          lovastatin  :   lovastatin ; Status:   Prescribed ; Ordered As Mnemonic:   lovastatin 40 mg oral tablet ; Simple Display Line:   40 mg, 1 tab(s), PO, Daily, 90 tab(s), 3 Refill(s) ; Ordering Provider:   Pollo Terry MD; Catalog Code:   lovastatin ; Order Dt/Tm:   11/2/2020 10:22:50 AM CST          busPIRone  :   busPIRone ; Status:   Prescribed ; Ordered As Mnemonic:   busPIRone 15 mg oral tablet ; Simple Display Line:   15 mg, 1 tab(s), Oral, bid, for 90 day(s), 180 tab(s), 3 Refill(s) ; Ordering Provider:   Pollo Terry MD; Catalog Code:   busPIRone ; Order Dt/Tm:   11/2/2020 10:21:27 AM CST          albuterol  :   albuterol ; Status:   Prescribed ; Ordered As Mnemonic:   albuterol 90 mcg/inh inhalation aerosol ; Simple Display Line:   2 puff(s), Inhale, qid, PRN: for shortness of breath or wheezing, 1 EA, 1 Refill(s) ; Ordering Provider:    Sanket Gill MD; Catalog Code:   albuterol ; Order Dt/Tm:   1/22/2020 3:17:33 PM CST          Miscellaneous Rx Supply  :   Miscellaneous Rx Supply ; Status:   Prescribed ; Ordered As Mnemonic:   Spacer ; Simple Display Line:   See Instructions, use as directed with inhaler., 1 EA, 0 Refill(s) ; Ordering Provider:   Sanket Gill MD; Catalog Code:   Miscellaneous Rx Supply ; Order Dt/Tm:   1/22/2020 3:17:33 PM CST            Home Meds    melatonin  :   melatonin ; Status:   Documented ; Ordered As Mnemonic:   melatonin 10 mg oral tablet ; Simple Display Line:   10 mg, 1 tab(s), Oral, hs, 0 Refill(s) ; Catalog Code:   melatonin ; Order Dt/Tm:   4/23/2021 10:37:09 AM CDT          calcium-vitamin D  :   calcium-vitamin D ; Status:   Documented ; Ordered As Mnemonic:   Caltrate Gummy Bites 250 mg-400 intl units oral tablet, chewable ; Simple Display Line:   See Instructions, 5 gummies Chewed, 0 Refill(s) ; Catalog Code:   calcium-vitamin D ; Order Dt/Tm:   12/29/2020 1:50:17 PM CST          copper  :   copper ; Status:   Documented ; Ordered As Mnemonic:   copper ; Simple Display Line:   0 Refill(s) ; Catalog Code:   copper ; Order Dt/Tm:   10/16/2017 2:04:52 PM CDT            ID Risk Screen   Recent Travel History :   No recent travel   Family Member Travel History :   No recent travel   Other Exposure to Infectious Disease :   Unknown   COVID-19 Testing Status :   No COVID-19 test performed   Malia Constantino - 4/23/2021 10:30 AM CDT   Social History   Social History   (As Of: 4/23/2021 10:40:36 AM CDT)   Alcohol:  Current      Wine (5 oz), Daily, Ready to change: No.   (Last Updated: 12/23/2019 1:42:16 PM CST by Gayatri Mckinnon)          Tobacco:  Current      Former smoker, quit more than 30 days ago, Cigarettes   Comments:  12/23/2019 10:11 AM - Travon Nichole CMA: Pt quit 11/23/19   (Last Updated: 12/23/2019 10:11:47 AM CST by Travon Nichole CMA)   Cigarettes, 2 per day.  Ready to change: Yes.   (Last  Updated: 12/23/2019 1:40:42 PM CST by Gayatri Mckinnon)   Former smoker, quit more than 30 days ago   (Last Updated: 12/29/2020 1:34:58 PM CST by Dinora Campuzano)          Electronic Cigarette/Vaping:        Electronic Cigarette Use: Never.   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   (Last Updated: 12/23/2019 1:43:17 PM CST by Gayatri Mckinnon)          Home/Environment:        Marital status:  (Living together).  Spouse/Partner name: Bebo.  Living situation: Home/Independent.  Injuries/Abuse/Neglect in household: No.  Feels unsafe at home: No.  Family/Friends available for support: Yes.   (Last Updated: 12/23/2019 1:41:16 PM CST by Gayatri Mciknnon)          Nutrition/Health:        Type of diet: Regular.  Wants to lose weight: No.  Sleeping concerns: No.  Feels highly stressed: No.   (Last Updated: 12/23/2019 1:42:36 PM CST by Gayatri Mckinnon)          Exercise:  Regular exercise      Exercise frequency: 3-4 times/week.  Exercise type: Curves.   (Last Updated: 12/10/2018 2:56:57 PM CST by Cherelle Bateman)          Sexual:        Sexually active: Yes.  Identifies as female, Sexual orientation: Straight or heterosexual.  History of STD: Yes.  Contraceptive Use Details: None.  History of sexual abuse: No.   (Last Updated: 12/23/2019 1:41:34 PM CST by Gayatri Mckinnon)

## 2022-02-15 NOTE — NURSING NOTE
Medicare Visit Entered On:  12/3/2021 10:23 AM CST    Performed On:  12/3/2021 10:16 AM CST by Nell Galloway               Summary   Chief Complaint :   Medicare AWV.    Weight Measured :   113.9 lb(Converted to: 113 lb 14 oz, 51.664 kg)    Height Measured :   60.5 in(Converted to: 5 ft 0 in, 153.67 cm)    Body Mass Index :   21.88 kg/m2   Body Surface Area :   1.48 m2   Systolic Blood Pressure :   114 mmHg   Diastolic Blood Pressure :   80 mmHg   Mean Arterial Pressure :   91 mmHg   Peripheral Pulse Rate :   97 bpm   BP Site :   Right arm   BP Method :   Manual   HR Method :   Electronic   Temperature Tympanic :   98.0 DegF(Converted to: 36.7 DegC)    Oxygen Saturation :   97 %   Nell Galloway - 12/3/2021 10:16 AM CST   Health Status   Allergies Verified? :   Yes   Medication History Verified? :   Yes   Medical History Verified? :   Yes   Pre-Visit Planning Status :   Completed   Tobacco Use? :   Former smoker   Nell Galloway - 12/3/2021 10:16 AM CST   Consents   Consent for Immunization Exchange :   Consent Granted   Consent for Immunizations to Providers :   Consent Granted   Nell Galloway - 12/3/2021 10:16 AM CST   Meds / Allergies   (As Of: 12/3/2021 10:23:31 AM CST)   Allergies (Active)   Augmentin  Estimated Onset Date:   Unspecified ; Reactions:   Vomiting ; Created By:   Barbara Michel; Reaction Status:   Active ; Category:   Drug ; Substance:   Augmentin ; Type:   Sensitivity ; Severity:   Mild ; Updated By:   Barbara Michel; Reviewed Date:   12/3/2021 10:20 AM CST      codeine  Estimated Onset Date:   Unspecified ; Created By:   Travon Nichole CMA; Reaction Status:   Active ; Category:   Drug ; Substance:   codeine ; Type:   Allergy ; Updated By:   Travon Nichole CMA; Reviewed Date:   12/3/2021 10:20 AM CST      Latex  Estimated Onset Date:   Unspecified ; Created By:   Travon Nichole CMA; Reaction Status:   Active ; Category:   Drug ; Substance:   Latex ; Type:   Allergy ;  Updated By:   Travon Nichole CMA; Reviewed Date:   12/3/2021 10:20 AM CST      Macrobid  Estimated Onset Date:   Unspecified ; Reactions:   rash ; Created By:   Travon Nichole CMA; Reaction Status:   Active ; Category:   Drug ; Substance:   Macrobid ; Type:   Allergy ; Updated By:   Travon Nichole CMA; Reviewed Date:   12/3/2021 10:20 AM CST        Medication List   (As Of: 12/3/2021 10:23:31 AM CST)   Prescription/Discharge Order    acyclovir  :   acyclovir ; Status:   Prescribed ; Ordered As Mnemonic:   acyclovir 400 mg oral tablet ; Simple Display Line:   1 tab(s), Oral, bid, 180 tab(s), 3 Refill(s) ; Ordering Provider:   Pollo Terry MD; Catalog Code:   acyclovir ; Order Dt/Tm:   12/2/2021 7:53:14 AM CST          acyclovir  :   acyclovir ; Status:   Prescribed ; Ordered As Mnemonic:   acyclovir 400 mg oral tablet ; Simple Display Line:   1 tab(s), Oral, bid, 180 tab(s), 3 Refill(s) ; Ordering Provider:   Pollo Terry MD; Catalog Code:   acyclovir ; Order Dt/Tm:   12/2/2021 7:53:14 AM CST          albuterol  :   albuterol ; Status:   Prescribed ; Ordered As Mnemonic:   albuterol 90 mcg/inh inhalation aerosol ; Simple Display Line:   2 puff(s), Inhale, qid, PRN: for shortness of breath or wheezing, 1 EA, 1 Refill(s) ; Ordering Provider:   Sanket Gill MD; Catalog Code:   albuterol ; Order Dt/Tm:   1/22/2020 3:17:33 PM CST          busPIRone  :   busPIRone ; Status:   Prescribed ; Ordered As Mnemonic:   busPIRone 7.5 mg oral tablet ; Simple Display Line:   7.5 mg, 1 tab(s), Oral, bid, for 90 day(s), 180 tab(s), 3 Refill(s) ; Ordering Provider:   Pollo Terry MD; Catalog Code:   busPIRone ; Order Dt/Tm:   10/12/2021 4:51:15 PM CDT          clobetasol topical  :   clobetasol topical ; Status:   Prescribed ; Ordered As Mnemonic:   Clobetasol (Eqv-Temovate) 0.05% topical cream ; Simple Display Line:   1 shantel, Topical, bid, 45 gm, 0 Refill(s) ; Ordering Provider:   Pollo Terry MD; Catalog Code:    clobetasol topical ; Order Dt/Tm:   10/7/2021 8:03:32 AM CDT          lovastatin  :   lovastatin ; Status:   Prescribed ; Ordered As Mnemonic:   lovastatin 40 mg oral tablet ; Simple Display Line:   1 tab(s), Oral, daily, 90 tab(s), 0 Refill(s) ; Ordering Provider:   Pollo Terry MD; Catalog Code:   lovastatin ; Order Dt/Tm:   11/2/2021 4:06:24 PM CDT          Miscellaneous Rx Supply  :   Miscellaneous Rx Supply ; Status:   Prescribed ; Ordered As Mnemonic:   Spacer ; Simple Display Line:   See Instructions, use as directed with inhaler., 1 EA, 0 Refill(s) ; Ordering Provider:   Sanket Gill MD; Catalog Code:   Miscellaneous Rx Supply ; Order Dt/Tm:   1/22/2020 3:17:33 PM CST            Home Meds    calcium-vitamin D  :   calcium-vitamin D ; Status:   Documented ; Ordered As Mnemonic:   Caltrate Gummy Bites 250 mg-400 intl units oral tablet, chewable ; Simple Display Line:   See Instructions, 5 gummies Chewed, 0 Refill(s) ; Catalog Code:   calcium-vitamin D ; Order Dt/Tm:   12/29/2020 1:50:17 PM CST          copper  :   copper ; Status:   Documented ; Ordered As Mnemonic:   copper ; Simple Display Line:   0 Refill(s) ; Catalog Code:   copper ; Order Dt/Tm:   10/16/2017 2:04:52 PM CDT          melatonin  :   melatonin ; Status:   Documented ; Ordered As Mnemonic:   melatonin 10 mg oral tablet ; Simple Display Line:   10 mg, 1 tab(s), Oral, hs, 0 Refill(s) ; Catalog Code:   melatonin ; Order Dt/Tm:   4/23/2021 10:37:09 AM CDT            Geriatric Depression Screening   Geriatric Depression Satisfied Life :   Yes   Geriatric Depression Dropped Activities :   No   Geriatric Depression Life Empty :   No   Geriatric Depression Bored :   No   Geriatric Depression Good Spirits :   Yes   Geriatric Depression Afraid Bad Things :   No   Geriatric Depression Feel Happy :   Yes   Geriatric Depression Feel Helpless :   No   Geriatric Depression Prefer to Stay Home :   No   Geriatric Depression Memory Problems :   No    Geriatric Depression Wonderful Be Alive :   Yes   Geriatric Depression Feel Worthless :   No   Geriatric Depression Situation Hopeless :   No   Geriatric Depression Others Better Off :   No   Geriatric Depression Full of Energy :   Yes   Geriatric Depression Total Score :   0    Nell Galloway  - 12/3/2021 10:16 AM CST   Hearing and Vision Screening   Audiogram Result Right Ear :   Pass   Audiogram Result Left Ear :   Pass   Nell Galloway  - 12/3/2021 10:16 AM CST   Home Safety Screen   Emergency Numbers Kept/Updated :   Yes   Aware of Smoking Dangers :   Yes   Smoke Alarms/Fire Extinguisher Available :   Yes   Household Members Fire Safety Knowledge :   Yes   Floor Rugs Removed or Fastened :   Yes   Mats in Bathtub/Shower :   No   Stairway Rails or Banisters :   Yes   Outdoor Clutter Safety :   Yes   Indoor Clutter Safety :   Yes   Electrical Cord Safety :   Yes   Nell Galloway  - 12/3/2021 10:16 AM CST   Advance Directives   Advance Directive :   No   Nell Galloway  - 12/3/2021 10:16 AM CST   Thompson Fall Risk   History of Fall in Last 3 Months Thompson :   No   Nell Galloway  - 12/3/2021 10:16 AM CST   Functional Assessment   Focused Functional Assessment Grid   Bathing :   Independent (2)   Dressing :   Independent (2)   Toileting :   Independent (2)   Transferring Bed or Chair :   Independent (2)   Continence :   Independent (2)   Feeding :   Independent (2)   Nell Galloway [facility] 12/3/2021 10:16 AM CST   ADL Index Score :   12    Capable of Shopping :   Yes   Capable of Walking :   Yes   Capable of Housekeeping :   Yes   Capable of Managing Medications :   Yes   Capable of Handling Finances :   Yes   Nell Galloway  - 12/3/2021 10:16 AM CST

## 2022-02-15 NOTE — PROGRESS NOTES
Patient:   GUNNAR DOLL            MRN: [id number]            FIN: [id number]               Age:   69 years     Sex:  Female     :  1950   Associated Diagnoses:   Wellness examination; Chronic insomnia; Family history of colon cancer; GERD (gastroesophageal reflux disease); RLL pneumonia; Eczema; Genital HSV; Osteoporosis; Dyslipidemia   Author:   Pollo Terry MD      Visit Information   Visit type:  Annual exam.    Accompanied by:  No one.    Source of history:  Self.    History limitation:  None.       Chief Complaint   2019 10:04 AM CST  Pt here for an AWV and fasting labwork.      History of Present Illness    The patient is here for a wellness visit.  She was treated for pneumonia on 2019 and her symptoms resolved.  No fever, chills, cough, or dyspnea.  She quit smoking at that time.  We discussed the fact she needs follow up chest x-ray in about a month.     GDS (short form) score is 0.    No significant positives on the Health Risk Assessment form.    Health History is reviewed.     On review of systems she had fever, chills, fatigue, cough and nasal congestion with the pneumonia that has since resolved.  She has chronic bilateral tinnitus, occasional heartburn though she is not taking any medication currently.  She has nocturia once per night.  She has eczema with rash and itching that she is treating with topical steroid.  She has joint pain at the base of her thumb.  Complete review of systems otherwise negative.     She is due for colonoscopy next year.           Review of Systems          See HPI       Health Status   Allergies:    Allergic Reactions (Selected)  Severity Not Documented  Codeine (No reactions were documented)  Latex (No reactions were documented)  Macrobid (Rash)  Nonallergic Reactions (Selected)  Mild  Augmentin (Vomiting)   Medications:  (Selected)   Prescriptions  Prescribed  Reclast 5 mg/100 mL intravenous solution: ( 5 mg ), IV, qyear, Instructions: infused over  at least 15 minutes.  Annually in January starting 2019, # 1 EA, 0 Refill(s), Type: Soft Stop, other reason (Rx)  acyclovir 400 mg oral tablet: See Instructions, Instructions: TAKE 1 TABLET BY MOUTH TWO  TIMES DAILY, # 180 tab(s), 3 Refill(s), Type: Soft Stop, Pharmacy: [facility], TAKE 1 TABLET BY MOUTH TWO  TIMES DAILY  aspirin 81 mg oral tablet: = 1 tab(s) ( 81 mg ), Oral, daily, # 30 tab(s), 0 Refill(s), Type: Maintenance, OTC (Rx)  lovastatin 40 mg oral tablet: = 1 tab(s) ( 40 mg ), PO, Daily, # 90 tab(s), 3 Refill(s), Type: Maintenance, Pharmacy: [facility], 1 tab(s) Oral daily  nortriptyline 10 mg oral capsule: See Instructions, Instructions: 3 cap(s) Oral qhs, # 270 EA, 3 Refill(s), Type: Maintenance, Pharmacy: [facility], 3 cap(s) Oral qhs  Documented Medications  Documented  Benadryl: 0 Refill(s), Type: Maintenance  Selenium  mcg oral tablet: 0 Refill(s), Type: Maintenance  clobetasol 0.025% topical cream: Topical, bid, 0 Refill(s), Type: Maintenance  copper: 0 Refill(s), Type: Maintenance  hydrocortisone 1% topical cream: 1 shantel, top, tid, Instructions: PRN for eczema, 0 Refill(s), Type: Maintenance  ibuprofen 400 mg oral tablet: 1 tab(s) ( 400 mg ), po, q6 hrs, 0 Refill(s), Type: Maintenance,    Medications          *denotes recorded medication          acyclovir 400 mg oral tablet: See Instructions, TAKE 1 TABLET BY MOUTH TWO  TIMES DAILY, 180 tab(s), 3 Refill(s).          aspirin 81 mg oral tablet: 81 mg, 1 tab(s), Oral, daily, 30 tab(s), 0 Refill(s).          *clobetasol 0.025% topical cream: Topical, bid, 0 Refill(s).          *copper: 0 Refill(s).          *Benadryl: 0 Refill(s).          *hydrocortisone 1% topical cream: 1 shantel, top, tid, PRN for eczema, 0 Refill(s).          *ibuprofen 400 mg oral tablet: 400 mg, 1 tab(s), po, q6 hrs, 0 Refill(s).          lovastatin 40 mg oral tablet: 40 mg, 1 tab(s), PO, Daily, 90 tab(s), 3 Refill(s).          nortriptyline 10  mg oral capsule: See Instructions, 3 cap(s) Oral qhs, 270 EA, 3 Refill(s).          *Selenium  mcg oral tablet: 0 Refill(s).          Reclast 5 mg/100 mL intravenous solution: 5 mg, IV, qyear, infused over at least 15 minutes.  Annually in January starting 2019, 1 EA, 0 Refill(s).       Problem list:    All Problems  Asthma / SNOMED CT 515566937 / Confirmed  Chronic insomnia / SNOMED CT 257808303 / Confirmed  Eczema / SNOMED CT 97701471 / Confirmed  Family history of colon cancer / SNOMED CT 660226168 / Confirmed  Genital HSV / SNOMED CT 16354336 / Confirmed  GERD (gastroesophageal reflux disease) / SNOMED CT 109601486 / Confirmed  MVP (mitral valve prolapse) / SNOMED CT 5455972513 / Confirmed  Osteoporosis / SNOMED CT 933290649 / Confirmed  Smoking 1/2 pack a day or less / SNOMED CT 685500809 / Confirmed  Resolved: Pregnancy / SNOMED CT 229509055  Resolved: Pregnancy / SNOMED CT 088021014      Histories   Past Medical History:    Active  GERD (gastroesophageal reflux disease) (738346091)  Chronic insomnia (606542773)  Asthma (240707701)  MVP (mitral valve prolapse) (2025373114)  Eczema (18912683)  Genital HSV (99536369)  Resolved  Pregnancy (431086679): Onset on 4/3/1978 at 27 years.  Resolved in 1979 at 28 years.  Pregnancy (889643839): Onset on 4/3/1974 at 23 years.  Resolved in 1975 at 24 years.  Smoking 1/2 pack a day or less (259261003):  Resolved.   Family History:    High blood pressure  Sister  Arthritis  Aunt (M)  Cancer of colon  Sister (Ghislaine): onset at 55 years.  Kidney disease  Grandmother (P)  Sister  Breast Cancer  Mother     Procedure history:    Extracapsular cataract extraction and insertion of intraocular lens (210593637) on 11/2/2017 at 67 Years.  Comments:  12/14/2017 10:40 AM Gayatri Stephenson  Right.  Extracapsular cataract extraction and insertion of intraocular lens (289698228) on 10/19/2017 at 67 Years.  Comments:  11/21/2017 12:20 PM Gayatri Stephenson  Left.  Colonoscopy  (998280385) in 2016 at 66 Years.  Comments:  10/16/2017 2:21 PM CDT - Pollo Terry MD  Every 5 years for family history  H/O: tubal ligation (395494360).  Caesarean section (64716160).  Comments:  10/16/2017 2:09 PM [facility] - Dayanara SELBY, Travon  x 2  Extraction of wisdom tooth (076827145).  History of tonsillectomy (1062949399).  H/O: blood transfusion (041931187).   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      Home and Environment Assessment            Marital status:  (Living together).  Spouse/Partner name: Bebo.      Exercise and Physical Activity Assessment: Regular exercise            Exercise frequency: 3-4 times/week.  Exercise type: Curves.        Physical Examination   Vital Signs   12/23/2019 10:25 AM CST Systolic Blood Pressure 130 mmHg    Diastolic Blood Pressure 78 mmHg    Mean Arterial Pressure 95 mmHg    BP Site Right arm    BP Method Manual   12/23/2019 10:04 AM CST Temperature Tympanic 98.8 DegF    Peripheral Pulse Rate 72 bpm    Pulse Site Radial artery    Respiratory Rate 16 br/min    Systolic Blood Pressure 142 mmHg  HI    Diastolic Blood Pressure 82 mmHg  HI    Mean Arterial Pressure 102 mmHg    BP Site Right arm      Measurements from flowsheet : Measurements   12/23/2019 10:25 AM CST Height Measured - Standard 60.75 in   12/23/2019 10:04 AM CST Height Measured - Standard 60.75 in    Weight Measured - Standard 113 lb    BSA 1.48 m2    Body Mass Index 21.52 kg/m2      General:  Alert and  oriented, No acute distress.    Eye:  Normal conjunctiva.    HENT:  Normocephalic, Normal hearing, Oral mucosa is moist, No pharyngeal erythema.    Neck:  Supple, Non-tender, No carotid bruit, No lymphadenopathy, No thyromegaly.    Respiratory:  Lungs are clear to auscultation, Respirations are non-labored.    Cardiovascular:  Normal rate, Regular rhythm, No murmur, No gallop, Normal peripheral perfusion, No edema.    Gastrointestinal:  Soft, Non-tender.    Lymphatics:  No lymphadenopathy neck, axilla, groin.    Musculoskeletal:  No deformity, Normal gait.    Integumentary:  No pallor.    Feet:  Normal pulses, Sensation intact, By monofilament exam, By vibration, Bunion right great toe.    Neurologic:  No focal deficits.    Cognition and Speech:  Speech clear and coherent, Functional cognition intact.    Psychiatric:  Cooperative, Appropriate mood & affect.       Review / Management   ECG interpretation:  Time  10/16/2017 2:56:00 PM, Rate  1  beats per minute, Within normal limits.       Impression and Plan   Diagnosis     Wellness examination (JFK55-MK Z00.00).     Chronic insomnia (VMD11-SU F51.04).     Family history of colon cancer (KNW17-BD Z80.0).     GERD (gastroesophageal reflux disease) (QLP57-YK K21.9).     RLL pneumonia (FWE33-MF J18.1).     Eczema (GGY73-MM L30.9).     Genital HSV (AOD47-CH A60.00).     Osteoporosis (XOM14-RK M81.0).     Dyslipidemia (WTI62-DS E78.5).     Course:  Progressing as expected.    Patient Instructions:       Counseled: Patient, Regarding medications, Diet, Activity, Verbalized understanding, Congratulated for quitting smoking.    Orders     Orders (Selected)   Outpatient Orders  Ordered  Return to Clinic (Request): RFV: CXR PA and lat, Return in 4-6 weeks  Return to Clinic (Request): RFV: Wellness exam, Return in 1 year  Ordered (Dispatched)  Lipid panel with reflex to direct ldl* ([facility]): Specimen Type: Serum, Collection Date: 12/23/19 10:31:00  CST  Prescriptions  Prescribed  Reclast 5 mg/100 mL intravenous solution: ( 5 mg ), IV, qyear, Instructions: infused over at least 15 minutes.  Annually in January starting 2019, # 1 EA, 0 Refill(s), Type: Soft Stop, other reason (Rx)  acyclovir 400 mg oral tablet: See Instructions, Instructions: TAKE 1 TABLET BY MOUTH TWO  TIMES DAILY, # 180 tab(s), 3 Refill(s), Type: Soft Stop, Pharmacy: [facility], TAKE 1 TABLET BY MOUTH TWO  TIMES DAILY  aspirin 81 mg oral tablet: = 1 tab(s) ( 81 mg ), Oral, daily, # 30 tab(s), 0 Refill(s), Type: Maintenance, OTC (Rx)  lovastatin 40 mg oral tablet: = 1 tab(s) ( 40 mg ), PO, Daily, # 90 tab(s), 3 Refill(s), Type: Maintenance, Pharmacy: [facility], 1 tab(s) Oral daily  nortriptyline 10 mg oral capsule: See Instructions, Instructions: 3 cap(s) Oral qhs, # 270 EA, 3 Refill(s), Type: Maintenance, Pharmacy: [facility], 3 cap(s) Oral qhs  Documented Medications  Documented  Benadryl: 0 Refill(s), Type: Maintenance  Selenium  mcg oral tablet: 0 Refill(s), Type: Maintenance  clobetasol 0.025% topical cream: Topical, bid, 0 Refill(s), Type: Maintenance  copper: 0 Refill(s), Type: Maintenance  hydrocortisone 1% topical cream: 1 shantel, top, tid, Instructions: PRN for eczema, 0 Refill(s), Type: Maintenance  ibuprofen 400 mg oral tablet: 1 tab(s) ( 400 mg ), po, q6 hrs, 0 Refill(s), Type: Maintenance.     Healthcare records from prior provider.     Annual Flu and Mammo.     Pneumovax.

## 2022-02-15 NOTE — TELEPHONE ENCOUNTER
---------------------  From: Flower Wang CMA (eRx Pool ([id number]_[place] - [place]))   To: Pollo Terry MD;     Sent: 10/18/2019 12:44:27 PM CDT  Subject: FW: Medication Management   Due Date/Time: 10/19/2019 9:04:00 AM CDT     Medication Refill needing approval    PCP:   [facility]    Medication:   nortriptyline 10mg  Last Filled:  8/17/19    Quantity:  21  Refills:  0    Date of last office visit and reason:   10/1/19 dermatitis JDL    Note:  KWL had last filled via [facility] and pt had told her she takes TID vs BID that it was originally rx'd as. Agree to continue refilling as TID?    Return to Clinic order placed?  12/2019            ** Patient matched by Flower Wang CMA on 10/18/2019 12:34:04 PM CDT **      ------------------------------------------  From: [facility]  To: Pollo Terry MD  Sent: October 18, 2019 9:04:37 AM CDT  Subject: Medication Management  Due: October 19, 2019 9:04:37 AM CDT    ** On Hold Pending Signature **  Drug: nortriptyline (Pamelor 10 mg oral capsule)  TAKE 2 CAPSULES BY MOUTH AT BEDTIME  Quantity: 180 cap(s)  Days Supply: 90  Refills: 0  Substitutions Allowed  Notes from Pharmacy: - Ref: [id number]    Dispensed Drug: nortriptyline (nortriptyline 10 mg oral capsule)  TAKE 2 CAPSULES BY MOUTH AT BEDTIME  Quantity: 180 cap(s)  Days Supply: 90  Refills: 0  Substitutions Allowed  Notes from Pharmacy:   ---------------------------------------------------------------  From: Pollo Terry MD   To: [facility]    Sent: 10/21/2019 7:33:48 AM CDT  Subject: FW: Medication Management     ** Submitted: **  Order:nortriptyline (nortriptyline 10 mg oral capsule)  See Instructions  3 cap(s) Oral qhs  Qty:  270 EA        Refills:  3          Substitutions Allowed     Route To Pharmacy - [facility]    Signed by Pollo Terry MD  10/21/2019 7:33:00 AM    ** Submitted: **  Complete:nortriptyline (nortriptyline 10 mg oral capsule)   Signed by Pollo Terry MD  10/21/2019  7:33:00 AM    ** Not Approved:  **  nortriptyline (NORTRIPTYLINE  10MG  CAP)  TAKE 2 CAPSULES BY MOUTH AT BEDTIME  Qty:  180 cap(s)        Days Supply:  90        Refills:  0          Substitutions Allowed     Route To Pharmacy - [facility]

## 2022-02-15 NOTE — TELEPHONE ENCOUNTER
---------------------  From: Rosalia Lawton RN ([employer] ([id number]_[place] - [place]))   To: Pollo Terry MD;     Sent: 6/18/2019 1:41:23 PM CDT  Subject: FW: Medication Management   Due Date/Time: 6/16/2019 10:35:00 AM CDT     Medication Refill needing approval    PCP:   [facility]    Medication:   Bupropion 150mg/12hrs  Last Filled:  12-10-18    Quantity:  180  Refills:  0    Date of last office visit and reason:   5-5-19 Sinusitis w/GJM  Date of last labs pertaining to condition:  _    Note:  Medication was completed off of patient's active med list 5-5-19. No office note or phone message stating why patient is no longer taking. Ok to refill or does patient need office visit.    Return to Clinic order placed?  due for annual exam     Resource:   eRx pool  Phone:           ------------------------------------------  From: [facility]  To: Pollo Terry MD  Sent: Nicole 15, 2019 10:35:32 AM CDT  Subject: Medication Management  Due: June 16, 2019 10:35:32 AM CDT    ** On Hold Pending Signature **  Drug: buPROPion (Zyban Advantage Pack 150 mg/12 hours oral tablet, extended release)  TAKE 1 TABLET BY MOUTH  DAILY FOR 1 WEEK, THEN 1  TABLET TWO TIMES A DAY  Quantity: 180 tab(s)    Days Supply: 90        Refills: 0  Substitutions Allowed  Notes from Pharmacy: - Ref: [id number]    Dispensed Drug: buPROPion (buPROPion 150 mg/12 hours (SR) oral tablet, extended release)  TAKE 1 TABLET BY MOUTH  DAILY FOR 1 WEEK, THEN 1  TABLET TWO TIMES A DAY  Quantity: 180 tab(s)    Days Supply: 90        Refills: 0  Substitutions Allowed  Notes from Pharmacy:   ---------------------------------------------------------------  From: Pollo Terry MD   To: [facility]    Sent: 6/18/2019 1:42:43 PM CDT  Subject: FW: Medication Management     ** Approved with modifications: **  buPROPion (BUPROPION  150MG  TAB  SMK DET)  TAKE 1 TABLET BY MOUTH  DAILY FOR 1 WEEK, THEN 1  TABLET TWO TIMES A DAY  Qty:  180 tab(s)        Days  Supply:  90        Refills:  3          Substitutions Allowed     Route To Pharmacy - [facility]

## 2022-02-15 NOTE — TELEPHONE ENCOUNTER
---------------------  From: Marley SELBY Liz   To: [employer] [facility] ([id number]_[place] - [place]);     Sent: 12/29/2020 10:16:48 AM CST  Subject: Insomnia/PTSD     PCP:   [facility]      Time of Call:  0943       Person Calling:  Patient  Phone number:  [phone] vm ok    Note:   Patient called asking for a sleep aid medication as she has been experiencing insomnia for a long time. Also, she is requesting a referral order for counseling due to PTSD. Advise    Last office visit and reason:  11/2/20 Meds w/ JDL---------------------  From: Dinora Campuzano ([employer] [facility] ([id number]_[place]))   To: Pollo Terry MD;     Sent: 12/29/2020 10:31:20 AM CST  Subject: FW: Insomnia/PTSD---------------------  From: Pollo Terry MD   To: [facility] Message Pool ([id number]_[place] - [place]);     Sent: 12/29/2020 10:36:23 AM CST  Subject: RE: Insomnia/PTSD   Actions: Notify the patient for appointment      Needs a visit. Video or Televisit OK.Time: 10:37 am  Note: Patient was notified to schedule virtual visit.Transferred to scheduling.

## 2022-02-15 NOTE — TELEPHONE ENCOUNTER
Order, notes, demographics, insurance faxed to [facility] @ [phone] by [email or web address]. They will contact patient to schedule.

## 2022-02-15 NOTE — PROGRESS NOTES
Chief Complaint    c/o lesions on head/neck, itchiness  History of Present Illness      Patient with a history of lifelong eczematous dermatitis with relatively chronic hand symptoms complains of pruritic lesions on the head and neck as well as arms for about 2 months.  She is used clobetasol in the past.  Review of Systems      No fever, chills, weight loss, sore throat, headache, cough, diarrhea, urinary complaints.  Physical Exam   Vitals & Measurements    T: 97.6   F (Tympanic)  HR: 75(Peripheral)  BP: 124/80     HT: 60.5 in  WT: 117.08 lb  BMI: 22.49       Examination reveals a healthy-appearing woman in no distress.  Some reddish raised papules macules head and neck a little bit of excoriation.  Chronic appearing dermatitis of the hands with lichenification.  Assessment/Plan       Eczema (L30.9)         Topical steroid call or return if not better.         Ordered:          clobetasol topical, 1 shantel, Topical, bid, x 14 day(s), # 45 gm, 0 Refill(s), Type: Acute, Pharmacy: [id number], 1 shantel Topical bid,x14 day(s), (Ordered)          80168 office outpatient visit 15 minutes (Charge), Quantity: 1, Eczema                Orders:         buPROPion, See Instructions, Instructions: TAKE 1 TABLET BY MOUTH DAILY FOR 1 WEEK, THEN 1 TABLET TWO TIMES A DAY, # 180 tab(s), 3 Refill(s), Type: Soft Stop, Pharmacy: [facility], TAKE 1 TABLET BY MOUTH DAILY FOR 1 WEEK, THEN 1 TABLET TWO TIMES A DAY, (Completed)  Patient Information     Name:GUNNAR DOLL      Address:      [address] [place] [address] [place] [address]     Sex:Female     YOB: 1950     [phone]     Emergency Contact:[facility], CONTACT     MRN:[id number]     FIN:[id number]     Location:[facility]     Date of Service:10/01/2019      Primary Care Physician:       Pollo Terry MD, [phone]      Attending Physician:       Pollo Terry MD, [phone]  Problem List/Past  Medical History    Ongoing     Asthma     Chronic insomnia     Eczema     Family history of colon cancer     Genital HSV     GERD (gastroesophageal reflux disease)     MVP (mitral valve prolapse)     Osteoporosis     Smoking 1/2 pack a day or less    Historical     Pregnancy     Pregnancy  Procedure/Surgical History     Extracapsular cataract extraction and insertion of intraocular lens (11/02/2017)      Comments: Right..     Extracapsular cataract extraction and insertion of intraocular lens (10/19/2017)      Comments: Left..     Colonoscopy (2016)      Comments: Every 5 years for family history.     Caesarean section      Comments: x 2.     Extraction of wisdom tooth     H/O: blood transfusion     H/O: tubal ligation     History of tonsillectomy  Medications    acyclovir 400 mg oral tablet, See Instructions, 3 refills    aspirin 81 mg oral tablet, 81 mg= 1 tab(s), Oral, daily    clobetasol 0.05% topical cream, 1 shantel, Topical, bid    copper    hydrocortisone 1% topical cream, 1 shantel, Topical, tid    ibuprofen 400 mg oral tablet, 400 mg= 1 tab(s), Oral, q6 hrs    lovastatin 40 mg oral tablet, 40 mg= 1 tab(s), Oral, daily, 3 refills    nortriptyline 10 mg oral capsule, 30 mg= 3 cap(s), Oral, hs    Reclast 5 mg/100 mL intravenous solution, 5 mg, IV, qyear    Selenium  mcg oral tablet    Zantac 150, 150 mg, Oral, bid  Allergies    Augmentin (Vomiting)    Latex    Macrobid (rash)    codeine  Social History    Smoking Status - 10/01/2019     Current every day smoker     Alcohol      Current, Wine (5 oz), 1 glass with dinner, 10/18/2017     Employment/School      Employed, 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 Exercise type: Curves., 12/10/2018     Home/Environment      Marital status:  (Living together). Spouse/Partner name: Bebo., 10/18/2017     Substance Abuse - Denies Substance Abuse,  12/10/2018     Tobacco      5-9 cigarettes (between 1/4 to 1/2 pack)/day in last 30 days, 10/16/2017      10 or more cigarettes (1/2 pack or more)/day in last 30 days, Cigarettes, 10/16/2017  Family History    Arthritis: Aunt (M).    Breast Cancer: Mother.    Cancer of colon: Sister (Dx at 55 years).    High blood pressure: Sister.    Kidney disease: Sister and Grandmother (P).  Immunizations      Vaccine Date Status Comments      influenza 10/22/2018 Recorded [10/24/2018] Received at [facility]/pharmacy, [place], [place]      influenza virus vaccine, inactivated 10/12/2017 Recorded      ZOS, shingles 03/16/2010 Recorded      ZOS, shingles 03/04/2010 Recorded      tetanus/diphth/pertuss (Tdap) adult/adol 01/07/2009 Recorded      ZOS, shingles 03/16/2004 Recorded

## 2022-02-15 NOTE — TELEPHONE ENCOUNTER
---------------------  From: Che Maldonado LPN (Phone Messages Pool ([facility]))   To: [facility] Message Pool ([id number]_[place]);     Sent: 4/8/2020 10:48:17 AM CDT  Subject: [employer] call     Phone Message    PCP:   [facility]      Time of Call:  10:21am       Person Calling:  [facility]  Phone number:  no number left    Note:   Janina LM stating pt had her appendix out on 3/23/20 and was hospitalized for 9 days. Pt discharged on 4/1/20. Per Janina pt has been to ED a few times and again this morning for atypical right sided chest pain.    Janina says pt called them this morning saying her recovery overall from surgery is going well. Per Janina pt states she had some symptoms gradually come on yesterday- gagging, no appetite and slept all day. Janina says pt denies fever or cough.    Janina says pt believes these symptoms are from the Augmentin that she has been taking for a week (pt instructed to take it for 2 weeks). Janina says per the [place] PA that is familiar with pt- the PA wants her to continue the Augmentin for now.    Janina says pt was instructed by the ED this morning to follow up with PCP and PA is also recommending follow up with PCP.    Janina says she will be contacting pt with this same information. Janina also says she is letting pt know that [facility] will be contacting her to set up a follow up appt.    Pt seen [facility] 4/6/20 for ED follow up and per note chest pain was noted. Please advise if you would like to see pt again for further follow up?    Last office visit and reason:  4/6/20 ED follow up and suture removal---------------------  From: Nell Galloway ([facility] Message Pool ([id number]_[place]))   To: Pollo Terry MD;     Sent: 4/9/2020 8:18:20 AM CDT  Subject: FW: [employer] call---------------------  From: Pollo Terry MD   To: [facility] Message Pool ([id number]_[place] - [place]);     Sent: 4/9/2020 9:55:03 AM CDT  Subject: RE: [place] call     Needs visit if  she was in ED since last visit.LVMTCB.Patient notified. Patient stated that she is doing much better now but was seen in the ED after the visit with JDL. Patient transferred to scheduling.

## 2022-02-15 NOTE — PROGRESS NOTES
"Chief Complaint    Verbal consent given for video visit. Medication refill- buspirone  History of Present Illness       Visit Information       Visit type: Video Visit via [email or web address] or [employer].       Participants in room during visit: 1       Location of patient: Home       Location of provider: Provider office    (Clinic office or Home office)       Video Start Time: 1650       Video End Time:   1700       Today's visit was conducted via video conference due to the COVID-19 pandemic. The patient's consent to proceed with a video visit has been obtained and documented.       Patient resumed buspirone after family members noted that she appeared anxious and \"meltdown\" problem. She cut it down to 7-1/2 mg twice a day due to drowsiness and feels the medication is working quite well. She is under little more stress due to her 's cognitive problems and memory problems. Otherwise is feeling well. Denies depressed mood or anhedonia. Sleeping well.  Review of Systems       No fever, chills, headache, myalgia.  Physical Exam   Vitals & Measurements    HT: 60.75 in        Patient appears comfortable and in no distress. Alert and oriented. No facial asymmetry. No increased work of breathing.  Assessment/Plan       MILTON (generalized anxiety disorder) (F41.1)         Ordered:          70824 office o/p est low 20-29 min (Charge), Quantity: 1, MILTON (generalized anxiety disorder)                Orders:         busPIRone, = 1 tab(s) ( 7.5 mg ), Oral, bid, # 180 tab(s), 3 Refill(s), Type: Maintenance, Pharmacy: [id number], 1 tab(s) Oral bid,x90 day(s), 60.75, in, 10/12/21 16:29:00 CDT, Height Measured, 120.3, lb, 06/01/21 10:37:00 CDT, Weight Measured, (Ordered)  Patient Information     Name:GUNNAR DOLL      Address:      [address] [place] [address] [address] [place], [place] [id number]     Sex:Female     YOB: 1950     [phone]     Emergency Contact:YUKI DOLL     MRN:[id number]     " FIN:[id number]     Location:[facility]     Date of Service:10/12/2021      Primary Care Physician:       Pollo Terry MD, [phone]      Attending Physician:       Pollo Terry MD, [phone]  Problem List/Past Medical History    Ongoing     Asthma     Chronic insomnia     Dyslipidemia     Eczema     Family history of colon cancer     MILTON (generalized anxiety disorder)     Genital HSV     GERD (gastroesophageal reflux disease)     MVP (mitral valve prolapse)     Osteoporosis     Thickened endometrium     Thrombocytosis    Historical     Inpatient stay       Comments: [facility], [place] - Acute appendicitis, transferred to [place], [place].     Pregnancy     Pregnancy     Smoking 1/2 pack a day or less     Stress-induced cardiomyopathy  Procedure/Surgical History     Appendectomy (03/23/2020)     Extracapsular cataract extraction and insertion of intraocular lens (11/02/2017)      Comments: Right..     Extracapsular cataract extraction and insertion of intraocular lens (10/19/2017)      Comments: Left..     Colonoscopy (06/10/2016)      Comments: Every 5 years for family history.     Colonoscopy (04/11/2001)     Caesarean section      Comments: x 2.     Extraction of wisdom tooth     H/O: blood transfusion     H/O: tubal ligation     History of tonsillectomy  Medications    acyclovir 400 mg oral tablet, See Instructions, 3 refills    albuterol 90 mcg/inh inhalation aerosol, 2 puff(s), Inhale, qid, PRN, 1 refills    busPIRone 7.5 mg oral tablet, 7.5 mg= 1 tab(s), Oral, bid, 3 refills    Caltrate Gummy Bites 250 mg-400 intl units oral tablet, chewable, See Instructions    Clobetasol (Eqv-Temovate) 0.05% topical cream, 1 shantle, Topical, bid    copper    lovastatin 40 mg oral tablet, 40 mg= 1 tab(s), Oral, daily, 3 refills    melatonin 10 mg oral tablet, 10 mg= 1 tab(s), Oral, hs    Spacer, See Instructions  Allergies    Augmentin (Vomiting)    Latex    Macrobid (rash)    codeine  Social  History    Smoking Status     Former smoker     Alcohol - Current      Wine (5 oz), Daily, Ready to change: No.     Electronic Cigarette/Vaping      Electronic Cigarette Use: Never.     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     Exercise - Regular exercise      Exercise frequency: 3-4 times/week. Exercise type: Curves.     Home/Environment      Marital status:  (Living together). Spouse/Partner name: Bebo. Living situation: Home/Independent. Injuries/Abuse/Neglect in household: No. Feels unsafe at home: No. Family/Friends available for support: Yes.     Nutrition/Health      Type of diet: Regular. Wants to lose weight: No. Sleeping concerns: No. Feels highly stressed: No.     Sexual      Sexually active: Yes. Identifies as female, Sexual orientation: Straight or heterosexual. History of STD: Yes. Contraceptive Use Details: None. History of sexual abuse: No.     Substance Abuse - Denies Substance Abuse      Never     Tobacco - Current      Former smoker, quit more than 30 days ago  Family History    Alcoholism: Sister.    Allergic rhinitis: Sister.    Arthritis: Mother, Sister and Aunt (M).    Bleeding disorder: Brother.    Breast Cancer: Mother (Dx at 72).    Cancer of colon: Sister (Dx at 55 years).    Dementia: Mother.    Depression: Mother and Sister.    High blood pressure: Sister.    Hypercholesterolemia: Sister.    Hypertension: Sister.    Kidney disease: Sister and Grandmother (P).    Obesity: Sister.    Seizure: Brother.  Immunizations          Scheduled Immunizations          Dose Date(s)          influenza virus vaccine, inactivated          10/03/2019, 09/09/2020          SARS-CoV-2 (COVID-19) Moderna-1273          02/17/2021          tetanus/diphth/pertuss (Tdap) adult/adol          01/07/2009          ZOS, shingles          03/04/2010, 03/16/2010          Other Immunizations          influenza           10/22/2018          influenza virus vaccine, inactivated          10/12/2017          pneumococcal (PPSV23)          12/23/2019          harjit COSME          03/16/2004

## 2022-02-15 NOTE — TELEPHONE ENCOUNTER
"---------------------  From: Holly Rosa LPN   To: [employer] Message Pool ([id number]_[place] - [place]);     Sent: 5/5/2020 3:59:14 PM CDT  Subject: update     PCP:   Dagoberto SANDS     Time of Call:  _ 1531       Person Calling:  _ pt, \"taras\"  Phone number:  _ [phone]      Note:   _ Pt LM stating that she was started on an antianxiety medication after having a big surgery and she has been doing great on that, although she has been having some urinary symptoms that have returned. Urgency and dysuria. Pt wondering if she can drop off a sample for UA.     okay for UA/UC?    or phone visit---------------------  From: Chico/Nell ENCISO ([employer] Message Pool ([id number] - [place]))   To: Pollo Terry MD;     Sent: 5/5/2020 4:02:05 PM CDT  Subject: FW: update---------------------  From: Pollo Terry MD   To: [employer] Pool ([id number]_[place] - [place]);     Sent: 5/5/2020 4:05:50 PM CDT  Subject: RE: update     yesPatient notified and transferred to scheduling. RTC placed.  "

## 2022-02-15 NOTE — NURSING NOTE
Generalized Anxiety Disorder Screening Entered On:  11/2/2020 11:59 AM CST    Performed On:  11/2/2020 11:59 AM CST by Nell Galloway               Generalized Anxiety Disorder Screening   MILTON Nervous, Anxious On Edge :   Several days   MILTON Control Worrying B :   Several days   MILTON Worrying Too Much :   Several days   MILTON Trouble Relaxing :   Not at all   MILTON Restless :   Not at all   MILTON Easily Annoyed/Irritable :   Not at all   MILTON Afraid :   Several days   MILTON Total Screening Score :   4    MILTON Difficulty with Work, Home, Others :   Not difficult at all   Nell Galloway - 11/2/2020 11:59 AM CST

## 2022-02-15 NOTE — TELEPHONE ENCOUNTER
"---------------------  From: Rosalia Lawton RN   To: [employer] Message Pool ([id number]_[place] - [place]);     Sent: 4/22/2021 9:06:42 AM CDT  Subject: Phone Message     Phone Message    PCP:   WICHO      Time of Call:  0830       Person Calling:  Pt  Phone number:  [phone]  VM OK    Returned call at: 0900    Note:   Pt called and left message about \"sensation\" in abdomen. Returned call - she states that she feels a \"rolling\"  around her rib cage/diaphragm. Can especially feel it when she does certain movements with her upper body. She notes that she had ruptured appendix last year and had extensive surgery - wonders if sensation is d/t the surgery/ incision. She also notes that a hiatal hernia was seen in CT scan a while ago and wonders if this could be it. No pain/SOB noted but wanting to know if RAYMUNDO recommends monitoring or office visit.  Please advise.     Last office visit and reason:  3/15/21 Telemedicine cough w/KWL---------------------  From: Chico/Nell ENCISO ([employer] Message Pool ([id number]_[place] - [place]))   To: Pollo Terry MD;     Sent: 4/22/2021 9:51:29 AM CDT  Subject: FW: Phone Message---------------------  From: oPllo Terry MD   To: [employer] Message Pool ([id number]_[place] - [place]);     Sent: 4/22/2021 10:12:17 AM CDT  Subject: RE: Phone Message     VisitPatient notified and transferred to scheduling.  "

## 2022-02-15 NOTE — PROGRESS NOTES
Patient:   GUNNAR DOLL            MRN: [id number]            FIN: [id number]               Age:   68 years     Sex:  Female     :  1950   Associated Diagnoses:   Asthma; Chronic insomnia; Eczema; Family history of colon cancer; Genital HSV; GERD (gastroesophageal reflux disease); MVP (mitral valve prolapse); Osteopenia; Smoking 1/2 pack a day or less; Wellness examination   Author:   Pollo Terry MD      Visit Information   Visit type:  Annual exam.    Accompanied by:  No one.    Source of history:  Self.    History limitation:  None.       Chief Complaint   12/10/2018 11:26 AM CST  AWV      History of Present Illness   The patient is here for a wellness visit.  She is generally very satisfied with her health.  She has a history of possible asthma but hasn t needed to use anything in many years.  She has chronic insomnia though.  She was just traveling and didn t have her medication and didn t require it.  We discussed cutting down and trying to get off of that medication.  Her dermatitis is doing well.  She uses a little bit of hydrocortisone.  She has had a colonoscopy every five years because her sister had colon cancer at age 55.  The patient has never had a polyp.  Her last colonoscopy was .  She is on suppressive therapy for genital herpes.  She takes p.r.n. over-the-counter medications for occasional heartburn from acid reflux.  She has a history of mitral valve prolapse and osteopenia but not osteoporosis.  She was treated with Boniva for five years.  She smokes less than half a pack of cigarettes daily.  She is interested in quitting.  She didn t tolerate Chantix.  We discussed trying Zyban and nicotine replacement.  She would like to try Zyban.  She has never had an abnormal Pap smear.  GDS short form score is 0.    No significant findings on the health risk assessment form.    On complete review of systems she has had chronic bilateral tinnitus without hearing loss.  On a recent trip, in a  hotel lobby, she had fallen due to uneven vicente.  She has had a blood transfusion.  She gets urinary frequency and urgency but no incontinence or nocturia.  Reviewed the lung cancer screening criteria and she doesn t fit the criteria.         Review of Systems          See HPI       Health Status   Allergies:    Allergic Reactions (Selected)  Severity Not Documented  Codeine (No reactions were documented)  Latex (No reactions were documented)  Macrobid (Rash)   Medications:  (Selected)   Prescriptions  Prescribed  Zyban 150 mg/12 hours oral tablet, extended release: See Instructions, Instructions: 1 tab(s) PO daily for one week then BID, # 180 EA, 0 Refill(s), Type: Maintenance, Pharmacy: [facility], 1 tab(s) PO daily for one week then BID  acyclovir 400 mg oral tablet: See Instructions, Instructions: TAKE 1 TABLET BY MOUTH TWO  TIMES DAILY, # 180 tab(s), 3 Refill(s), Type: Soft Stop, Pharmacy: [facility], TAKE 1 TABLET BY MOUTH TWO  TIMES DAILY  lovastatin 40 mg oral tablet: = 1 tab(s) ( 40 mg ), PO, Daily, # 90 tab(s), 3 Refill(s), Type: Maintenance, Pharmacy: [facility], 1 tab(s) Oral daily  nortriptyline 10 mg oral capsule: = 2 cap(s) ( 20 mg ), po, hs, Instructions: 20 mg daily, # 180 cap(s), 3 Refill(s), Type: Maintenance, Pharmacy: [facility], Needs appt for further refills, 2 cap(s) Oral hs,Instr:20 mg daily  Documented Medications  Documented  Selenium  mcg oral tablet: 0 Refill(s), Type: Maintenance  Zantac 150: ( 150 mg ), po, bid, 0 Refill(s), Type: Maintenance  copper: 0 Refill(s), Type: Maintenance  hydrocortisone 1% topical cream: 1 shantel, top, tid, Instructions: PRN for eczema, 0 Refill(s), Type: Maintenance  ibuprofen 400 mg oral tablet: 1 tab(s) ( 400 mg ), po, q6 hrs, 0 Refill(s), Type: Maintenance   Problem list:    All Problems  Asthma / SNOMED CT 200200520 / Confirmed  Chronic insomnia / SNOMED CT 615338459 / Confirmed  Eczema / SNOMED CT 99495315  / Confirmed  Family history of colon cancer / SNOMED CT 453414305 / Confirmed  Genital HSV / SNOMED CT 37814769 / Confirmed  GERD (gastroesophageal reflux disease) / SNOMED CT 176007843 / Confirmed  MVP (mitral valve prolapse) / SNOMED CT 7762058697 / Confirmed      Histories   Past Medical History:    Active  GERD (gastroesophageal reflux disease) (418212157)  Chronic insomnia (888479131)  Asthma (706529874)  MVP (mitral valve prolapse) (4116635822)  Eczema (15370380)  Genital HSV (23150243)   Family History:    Cancer of colon  Sister (Ghislaine): onset at 55 years.  Breast Cancer  Mother     Procedure history:    Extracapsular cataract extraction and insertion of intraocular lens (166576885) on 11/2/2017 at 67 Years.  Comments:  12/14/2017 10:40 AM [facility] - Gayatri Mckinnon  Right.  Extracapsular cataract extraction and insertion of intraocular lens (413201742) on 10/19/2017 at 67 Years.  Comments:  11/21/2017 12:20 PM Gayatri Stephenson  Left.  Colonoscopy (544575972) in 2016 at 66 Years.  Comments:  10/16/2017 2:21 PM CDT - Pollo Terry MD  Every 5 years for family history  H/O: tubal ligation (301458662).  Caesarean section (38490937).  Comments:  10/16/2017 2:09 PM CDT - Dayanara SEBLY, Travon  x 2  Extraction of wisdom tooth (678291894).  History of tonsillectomy (9745096946).  H/O: blood transfusion (762536555).   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      Home and Environment Assessment            Marital status:  (Living together).  Spouse/Partner name: Bebo.      Physical Examination   Vital Signs   12/10/2018 11:26 AM CST Peripheral Pulse Rate 93 bpm     Systolic Blood Pressure 134 mmHg  HI    Diastolic Blood Pressure 83 mmHg  HI    Mean Arterial Pressure 100 mmHg    BP Site Right arm      Measurements from flowsheet : Measurements   12/10/2018 11:26 AM CST Height Measured - Standard 60.5 in    Weight Measured - Standard 113 lb    BSA 1.48 m2    Body Mass Index 21.7 kg/m2      General:  Alert and oriented, No acute distress.    Eye:  Normal conjunctiva.    HENT:  Normocephalic, Normal hearing, Oral mucosa is moist, No pharyngeal erythema.    Neck:  Supple, Non-tender, No carotid bruit, No lymphadenopathy, No thyromegaly.    Respiratory:  Lungs are clear to auscultation, Respirations are non-labored.    Cardiovascular:  Normal rate, Regular rhythm, No murmur, No gallop, Normal peripheral perfusion, No edema.    Breast:  No mass, No tenderness, No discharge.    Gastrointestinal:  Soft, Non-tender.    Lymphatics:  No lymphadenopathy neck, axilla, groin.    Musculoskeletal:  No deformity, Normal gait.    Integumentary:  No pallor.    Feet:  Normal pulses, Sensation intact, By monofilament exam, By vibration, Bunion right great toe.    Neurologic:  No focal deficits.    Cognition and Speech:  Speech clear and coherent, Functional cognition intact.    Psychiatric:  Cooperative, Appropriate mood & affect.       Review / Management   ECG interpretation:  Time  10/16/2017 2:56:00 PM, Rate  1  beats per minute, Within normal limits.       Impression and Plan   Diagnosis     Asthma (WEK24-RY J45.909).     Chronic insomnia (FKC67-RR F51.04).     Eczema (CVD69-HS L30.9).     Family history of colon cancer (LDU76-LO Z80.0).     Genital HSV (SSG40-FL A60.00).     GERD (gastroesophageal reflux disease) (LTD45-VI K21.9).     MVP (mitral valve prolapse) (MPN45-QA I34.1).     Osteopenia (DQJ49-OO M85.80).     Smoking 1/2 pack a day or less (AYL35-UF F17.210).     Wellness examination (KXD51-XD Z00.00).     Course:  Unchanged.    Patient Instructions:       Counseled: Patient,  Regarding medications, Diet, Activity, Smoking cessation, Verbalized understanding.    Orders     Orders (Selected)   Outpatient Orders  Ordered  DEXA Scan (Request): Osteopenia  Return to Clinic (Request): RFV: Wellness exam, Return in 1 year  Return to Clinic (Request): RFV: alt and lipids, Return in 6-8 weeks  Prescriptions  Prescribed  Zyban 150 mg/12 hours oral tablet, extended release: See Instructions, Instructions: 1 tab(s) PO daily for one week then BID, # 180 EA, 0 Refill(s), Type: Maintenance, Pharmacy: [facility], 1 tab(s) PO daily for one week then BID  acyclovir 400 mg oral tablet: See Instructions, Instructions: TAKE 1 TABLET BY MOUTH TWO  TIMES DAILY, # 180 tab(s), 3 Refill(s), Type: Soft Stop, Pharmacy: [facility], TAKE 1 TABLET BY MOUTH TWO  TIMES DAILY  aspirin 81 mg oral tablet: = 1 tab(s) ( 81 mg ), Oral, daily, # 30 tab(s), 0 Refill(s), Type: Maintenance, OTC (Rx)  lovastatin 40 mg oral tablet: = 1 tab(s) ( 40 mg ), PO, Daily, # 90 tab(s), 3 Refill(s), Type: Maintenance, Pharmacy: [facility], 1 tab(s) Oral daily  nortriptyline 10 mg oral capsule: = 2 cap(s) ( 20 mg ), po, hs, Instructions: 20 mg daily, # 180 cap(s), 3 Refill(s), Type: Maintenance, Pharmacy: [facility], Needs appt for further refills, 2 cap(s) Oral hs,Instr:20 mg daily  Documented Medications  Documented  Selenium  mcg oral tablet: 0 Refill(s), Type: Maintenance  Zantac 150: ( 150 mg ), po, bid, 0 Refill(s), Type: Maintenance  copper: 0 Refill(s), Type: Maintenance  hydrocortisone 1% topical cream: 1 shantel, top, tid, Instructions: PRN for eczema, 0 Refill(s), Type: Maintenance  ibuprofen 400 mg oral tablet: 1 tab(s) ( 400 mg ), po, q6 hrs, 0 Refill(s), Type: Maintenance.     Healthcare records from prior provider.

## 2022-02-15 NOTE — LETTER
(Inserted Image. Unable to display)         December 24, 2020        GUNNAR DOLL  [address] [place], [place] [id number]        Dear GUNNAR,    Thank you for selecting [facility] for your healthcare needs.    Our records indicate you are due for the following services:     Annual Wellness Visit    (FYI   Regarding office visits: In some instances, a video visit or telephone visit may be offered as an option.)    To schedule an appointment or if you have further questions, please contact your clinic at [phone].    Powered by [employer] and [employer]    Sincerely,    Pollo Terry MD, FACP

## 2022-02-15 NOTE — NURSING NOTE
"Comprehensive Intake Entered On:  2020 1:35 PM CST    Performed On:  2020 1:28 PM CST by Dinora Campuzano               Summary   Chief Complaint :   Verbal consent for video visit. Goes my \"Aileen\". Insomnia medication, serious currently, but been a problem since March. PTSD referral.   Liane Campuzanobeth - 2020 1:51 PM CST     Health Status   Allergies Verified? :   Yes   Medication History Verified? :   Yes   Medical History Verified? :   Yes   Pre-Visit Planning Status :   Completed   Tobacco Use? :   Former smoker   Justen Dinora - 2020 1:28 PM CST   Demographics   Last Name :   Arya   Address :   [address] [place] [address] [employer]   First Name :   Shannan Mota Initial :   M   Responsible Party Date of Birth () :   1950 CDT   City :   [place]   State :   [place]   Zip Code :   [id number]   Contact Relationship to Patient (other) :   Patient   Liane Campuzanobeth - 2020 1:28 PM CST   Providers Grid   Provider Name :    Dr. ARELY Amaya           Provider Specialty :    dentist   eye             Dinora Campuzano - 2020 1:28 PM CST  CampuzanoDinora - 2020 1:28 PM CST        Ancillary Services Grid   Name :    [facility]              Type of Service :    Pharmacy                Justen Dinora - 2020 1:28 PM CST         Consents   Consent for Immunization Exchange :   Consent Granted   Consent for Immunizations to Providers :   Consent Granted   Magda Campuzanozabeth - 2020 1:28 PM CST   Meds / Allergies   (As Of: 2020 1:35:17 PM CST)   Allergies (Active)   Augmentin  Estimated Onset Date:   Unspecified ; Reactions:   Vomiting ; Created By:   Barbara Michel; Reaction Status:   Active ; Category:   Drug ; Substance:   Augmentin ; Type:   Sensitivity ; Severity:   Mild ; Updated By:   Barbara Michel; Reviewed Date:   2020 1:30 PM CST      codeine  Estimated Onset Date:   Unspecified ; Created By:   Travon Nichole CMA; Reaction Status:   Active ; " Category:   Drug ; Substance:   codeine ; Type:   Allergy ; Updated By:   Travon Nichole CMA; Reviewed Date:   12/29/2020 1:30 PM CST      Latex  Estimated Onset Date:   Unspecified ; Created By:   Travon Nichole CMA; Reaction Status:   Active ; Category:   Drug ; Substance:   Latex ; Type:   Allergy ; Updated By:   Travon Nichole CMA; Reviewed Date:   12/29/2020 1:30 PM CST      Macrobid  Estimated Onset Date:   Unspecified ; Reactions:   rash ; Created By:   Travon Nichole CMA; Reaction Status:   Active ; Category:   Drug ; Substance:   Macrobid ; Type:   Allergy ; Updated By:   Travon Nichole CMA; Reviewed Date:   12/29/2020 1:30 PM CST        Medication List   (As Of: 12/29/2020 1:35:17 PM CST)   Prescription/Discharge Order    acyclovir  :   acyclovir ; Status:   Prescribed ; Ordered As Mnemonic:   acyclovir 400 mg oral tablet ; Simple Display Line:   See Instructions, TAKE 1 TABLET BY MOUTH TWO  TIMES DAILY, 180 tab(s), 3 Refill(s) ; Ordering Provider:   Pollo Terry MD; Catalog Code:   acyclovir ; Order Dt/Tm:   11/2/2020 10:23:21 AM CST          albuterol  :   albuterol ; Status:   Prescribed ; Ordered As Mnemonic:   albuterol 90 mcg/inh inhalation aerosol ; Simple Display Line:   2 puff(s), Inhale, qid, PRN: for shortness of breath or wheezing, 1 EA, 1 Refill(s) ; Ordering Provider:   Sanket Gill MD; Catalog Code:   albuterol ; Order Dt/Tm:   1/22/2020 3:17:33 PM CST          busPIRone  :   busPIRone ; Status:   Prescribed ; Ordered As Mnemonic:   busPIRone 15 mg oral tablet ; Simple Display Line:   15 mg, 1 tab(s), Oral, bid, for 90 day(s), 180 tab(s), 3 Refill(s) ; Ordering Provider:   Pollo Terry MD; Catalog Code:   busPIRone ; Order Dt/Tm:   11/2/2020 10:21:27 AM CST          clobetasol topical  :   clobetasol topical ; Status:   Prescribed ; Ordered As Mnemonic:   clobetasol 0.05% topical cream ; Simple Display Line:   1 shantel, Topical, bid, 45 gm, 0 Refill(s) ; Ordering Provider:   Mara  Pollo BECKMAN; Catalog Code:   clobetasol topical ; Order Dt/Tm:   8/3/2020 9:22:10 AM CDT          lovastatin  :   lovastatin ; Status:   Prescribed ; Ordered As Mnemonic:   lovastatin 40 mg oral tablet ; Simple Display Line:   40 mg, 1 tab(s), PO, Daily, 90 tab(s), 3 Refill(s) ; Ordering Provider:   Pollo Terry MD; Catalog Code:   lovastatin ; Order Dt/Tm:   11/2/2020 10:22:50 AM CST          Miscellaneous Rx Supply  :   Miscellaneous Rx Supply ; Status:   Prescribed ; Ordered As Mnemonic:   Spacer ; Simple Display Line:   See Instructions, use as directed with inhaler., 1 EA, 0 Refill(s) ; Ordering Provider:   Sanket Gill MD; Catalog Code:   Miscellaneous Rx Supply ; Order Dt/Tm:   1/22/2020 3:17:33 PM CST            Home Meds    copper  :   copper ; Status:   Documented ; Ordered As Mnemonic:   copper ; Simple Display Line:   0 Refill(s) ; Catalog Code:   copper ; Order Dt/Tm:   10/16/2017 2:04:52 PM CDT          diphenhydrAMINE  :   diphenhydrAMINE ; Status:   Documented ; Ordered As Mnemonic:   Benadryl ; Simple Display Line:   0 Refill(s) ; Catalog Code:   diphenhydrAMINE ; Order Dt/Tm:   12/23/2019 10:10:57 AM CST          fluconazole  :   fluconazole ; Status:   Processing ; Ordered As Mnemonic:   fluconazole 150 mg oral tablet ; Action Display:   Complete ; Catalog Code:   fluconazole ; Order Dt/Tm:   12/29/2020 1:32:25 PM CST          hydrocortisone topical  :   hydrocortisone topical ; Status:   Documented ; Ordered As Mnemonic:   hydrocortisone 1% topical cream ; Simple Display Line:   1 shantel, top, tid, PRN for eczema, 0 Refill(s) ; Catalog Code:   hydrocortisone topical ; Order Dt/Tm:   10/16/2017 2:05:31 PM CDT            ID Risk Screen   Recent Travel History :   Last travel within 21 days   Family Member Travel History :   Last travel within 21 days   Other Exposure to Infectious Disease :   Unknown   COVID-19 Testing Status :   Positive COVID-19 test in the last 30 days   Dinora Campuzano  12/29/2020 1:28 PM CST   Social History   Social History   (As Of: 12/29/2020 1:35:17 PM CST)   Alcohol:  Current      Wine (5 oz), Daily, Ready to change: No.   (Last Updated: 12/23/2019 1:42:16 PM CST by Gayatri Mckinnon)          Tobacco:  Current      Former smoker, quit more than 30 days ago, Cigarettes   Comments:  12/23/2019 10:11 AM - Travon Nichole CMA: Pt quit 11/23/19   (Last Updated: 12/23/2019 10:11:47 AM CST by Travon Nichole CMA)   Cigarettes, 2 per day.  Ready to change: Yes.   (Last Updated: 12/23/2019 1:40:42 PM CST by Gayatri Mckinnon)   Former smoker, quit more than 30 days ago   (Last Updated: 12/29/2020 1:34:58 PM CST by Dinora Campuzano)          Electronic Cigarette/Vaping:        Electronic Cigarette Use: Never.   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   (Last Updated: 12/23/2019 1:43:17 PM CST by Gayatri Mckinnon)          Home/Environment:        Marital status:  (Living together).  Spouse/Partner name: Bebo.  Living situation: Home/Independent.  Injuries/Abuse/Neglect in household: No.  Feels unsafe at home: No.  Family/Friends available for support: Yes.   (Last Updated: 12/23/2019 1:41:16 PM CST by Gayatri Mckinnon)          Nutrition/Health:        Type of diet: Regular.  Wants to lose weight: No.  Sleeping concerns: No.  Feels highly stressed: No.   (Last Updated: 12/23/2019 1:42:36 PM CST by Gayatri Mckinnon)          Exercise:  Regular exercise      Exercise frequency: 3-4 times/week.  Exercise type: Curves.   (Last Updated: 12/10/2018 2:56:57 PM CST by Cherelle Bateman)          Sexual:        Sexually active: Yes.  Identifies as female, Sexual orientation: Straight or heterosexual.  History of STD: Yes.   Contraceptive Use Details: None.  History of sexual abuse: No.   (Last Updated: 12/23/2019 1:41:34 PM CST by Gayatri Mckinnon)

## 2022-02-15 NOTE — NURSING NOTE
Comprehensive Intake Entered On:  11/2/2020 9:58 AM CST    Performed On:  11/2/2020 9:52 AM CST by Nell Galloway               Summary   Chief Complaint :   f/u anxiety. Verbal consent given for video visit.    Height Measured :   60.75 in(Converted to: 5 ft 1 in, 154.30 cm)    Nell Galloway - 11/2/2020 9:52 AM CST   Health Status   Allergies Verified? :   Yes   Medication History Verified? :   Yes   Medical History Verified? :   Yes   Pre-Visit Planning Status :   Completed   Tobacco Use? :   Former smoker   Nell Galloway - 11/2/2020 9:52 AM CST   Consents   Consent for Immunization Exchange :   Consent Granted   Consent for Immunizations to Providers :   Consent Granted   Nell Galloway - 11/2/2020 9:52 AM CST   Meds / Allergies   (As Of: 11/2/2020 9:58:27 AM CST)   Allergies (Active)   Augmentin  Estimated Onset Date:   Unspecified ; Reactions:   Vomiting ; Created By:   Barbara Michel; Reaction Status:   Active ; Category:   Drug ; Substance:   Augmentin ; Type:   Sensitivity ; Severity:   Mild ; Updated By:   Barbara Michel; Reviewed Date:   11/2/2020 9:53 AM CST      codeine  Estimated Onset Date:   Unspecified ; Created By:   Travon Nichole CMA; Reaction Status:   Active ; Category:   Drug ; Substance:   codeine ; Type:   Allergy ; Updated By:   Travon Nichole CMA; Reviewed Date:   11/2/2020 9:53 AM CST      Latex  Estimated Onset Date:   Unspecified ; Created By:   Travon Nichole CMA; Reaction Status:   Active ; Category:   Drug ; Substance:   Latex ; Type:   Allergy ; Updated By:   Travon Nichole CMA; Reviewed Date:   11/2/2020 9:53 AM CST      Macrobid  Estimated Onset Date:   Unspecified ; Reactions:   rash ; Created By:   Travon Nichole CMA; Reaction Status:   Active ; Category:   Drug ; Substance:   Macrobid ; Type:   Allergy ; Updated By:   Travon Nichole CMA; Reviewed Date:   11/2/2020 9:53 AM CST        Medication List   (As Of: 11/2/2020 9:58:27 AM CST)    Prescription/Discharge Order    acyclovir  :   acyclovir ; Status:   Prescribed ; Ordered As Mnemonic:   acyclovir 400 mg oral tablet ; Simple Display Line:   See Instructions, TAKE 1 TABLET BY MOUTH TWO  TIMES DAILY, 180 tab(s), 3 Refill(s) ; Ordering Provider:   Pollo Terry MD; Catalog Code:   acyclovir ; Order Dt/Tm:   12/23/2019 10:26:09 AM CST          albuterol  :   albuterol ; Status:   Prescribed ; Ordered As Mnemonic:   albuterol 90 mcg/inh inhalation aerosol ; Simple Display Line:   2 puff(s), Inhale, qid, PRN: for shortness of breath or wheezing, 1 EA, 1 Refill(s) ; Ordering Provider:   Sanket Gill MD; Catalog Code:   albuterol ; Order Dt/Tm:   1/22/2020 3:17:33 PM CST          busPIRone  :   busPIRone ; Status:   Prescribed ; Ordered As Mnemonic:   busPIRone 15 mg oral tablet ; Simple Display Line:   15 mg, 1 tab(s), Oral, bid, for 30 day(s), 60 tab(s), 0 Refill(s) ; Ordering Provider:   Pollo Terry MD; Catalog Code:   busPIRone ; Order Dt/Tm:   10/5/2020 1:10:33 PM CDT          clobetasol topical  :   clobetasol topical ; Status:   Prescribed ; Ordered As Mnemonic:   clobetasol 0.05% topical cream ; Simple Display Line:   1 shantel, Topical, bid, 45 gm, 0 Refill(s) ; Ordering Provider:   Pollo Terry MD; Catalog Code:   clobetasol topical ; Order Dt/Tm:   8/3/2020 9:22:10 AM CDT          lovastatin  :   lovastatin ; Status:   Prescribed ; Ordered As Mnemonic:   lovastatin 40 mg oral tablet ; Simple Display Line:   40 mg, 1 tab(s), PO, Daily, 90 tab(s), 3 Refill(s) ; Ordering Provider:   Pollo Terry MD; Catalog Code:   lovastatin ; Order Dt/Tm:   12/23/2019 10:26:41 AM CST          Miscellaneous Rx Supply  :   Miscellaneous Rx Supply ; Status:   Prescribed ; Ordered As Mnemonic:   Spacer ; Simple Display Line:   See Instructions, use as directed with inhaler., 1 EA, 0 Refill(s) ; Ordering Provider:   Sanket Gill MD; Catalog Code:   Miscellaneous Rx Supply ; Order Dt/Tm:    1/22/2020 3:17:33 PM CST            Home Meds    copper  :   copper ; Status:   Documented ; Ordered As Mnemonic:   copper ; Simple Display Line:   0 Refill(s) ; Catalog Code:   copper ; Order Dt/Tm:   10/16/2017 2:04:52 PM CDT          diphenhydrAMINE  :   diphenhydrAMINE ; Status:   Documented ; Ordered As Mnemonic:   Benadryl ; Simple Display Line:   0 Refill(s) ; Catalog Code:   diphenhydrAMINE ; Order Dt/Tm:   12/23/2019 10:10:57 AM CST          fluconazole  :   fluconazole ; Status:   Documented ; Ordered As Mnemonic:   fluconazole 150 mg oral tablet ; Simple Display Line:   150 mg, 1 tab(s), Oral, daily, for 14 day(s), 14 tab(s), 0 Refill(s) ; Catalog Code:   fluconazole ; Order Dt/Tm:   4/2/2020 11:16:20 AM CDT          hydrocortisone topical  :   hydrocortisone topical ; Status:   Documented ; Ordered As Mnemonic:   hydrocortisone 1% topical cream ; Simple Display Line:   1 shantel, top, tid, PRN for eczema, 0 Refill(s) ; Catalog Code:   hydrocortisone topical ; Order Dt/Tm:   10/16/2017 2:05:31 PM CDT            ID Risk Screen   Recent Travel History :   No recent travel   Family Member Travel History :   No recent travel   Other Exposure to Infectious Disease :   Unknown   Nell Galloway - 11/2/2020 9:52 AM CST

## 2022-02-17 ENCOUNTER — COMMUNICATION - RIVER FALLS (OUTPATIENT)
Dept: FAMILY MEDICINE | Facility: CLINIC | Age: 72
End: 2022-02-17

## 2022-02-17 ENCOUNTER — OFFICE VISIT - RIVER FALLS (OUTPATIENT)
Dept: FAMILY MEDICINE | Facility: CLINIC | Age: 72
End: 2022-02-17

## 2022-03-02 VITALS — BODY MASS INDEX: 21.88 KG/M2 | HEIGHT: 61 IN | BODY MASS INDEX: 21.88 KG/M2 | HEIGHT: 61 IN

## 2022-03-02 NOTE — TELEPHONE ENCOUNTER
---------------------  From: Pollo Terry MD   To: Appointment Pool ([id number]);     Cc: [facility] Message Pool ([id number]);      Sent: 1/3/2022 12:03:42 PM CST  Subject: General Message   Actions: Notify the patient for appointment      Drive-up COVID PCR today please.Patient  transferred.Pt scheduled for 3pm.

## 2022-03-02 NOTE — NURSING NOTE
Comprehensive Intake Entered On:  1/3/2022 11:44 AM CST    Performed On:  1/3/2022 11:41 AM CST by Nell Galloway               Summary   Chief Complaint :   Verbal consent given for telephone visit. c/o abdominal pain, HA, muscle aches, fatigue, runny nose, diarrhea since 12/30/21. Wanting covid test.    Advance Directive :   No   Height Measured :   60.5 in(Converted to: 5 ft 0 in, 153.67 cm)    Nell Galloway - 1/3/2022 11:41 AM CST   Health Status   Tobacco Use? :   Former smoker   Nell Galloway - 1/3/2022 11:41 AM CST   Consents   Consent for Immunization Exchange :   Consent Granted   Consent for Immunizations to Providers :   Consent Granted   Nell Galloway - 1/3/2022 11:41 AM CST   Meds / Allergies   (As Of: 1/3/2022 11:44:49 AM CST)   Allergies (Active)   Augmentin  Estimated Onset Date:   Unspecified ; Reactions:   Vomiting ; Created By:   Barbara Michel; Reaction Status:   Active ; Category:   Drug ; Substance:   Augmentin ; Type:   Sensitivity ; Severity:   Mild ; Updated By:   Barbara Michel; Reviewed Date:   1/3/2022 11:44 AM CST      codeine  Estimated Onset Date:   Unspecified ; Created By:   Travon Nichole CMA; Reaction Status:   Active ; Category:   Drug ; Substance:   codeine ; Type:   Allergy ; Updated By:   Travon Nichole CMA; Reviewed Date:   1/3/2022 11:44 AM CST      Latex  Estimated Onset Date:   Unspecified ; Created By:   Travon Nichole CMA; Reaction Status:   Active ; Category:   Drug ; Substance:   Latex ; Type:   Allergy ; Updated By:   Travon Nichole CMA; Reviewed Date:   1/3/2022 11:44 AM CST      Macrobid  Estimated Onset Date:   Unspecified ; Reactions:   rash ; Created By:   Travon Nichole CMA; Reaction Status:   Active ; Category:   Drug ; Substance:   Macrobid ; Type:   Allergy ; Updated By:   Travon Nichole CMA; Reviewed Date:   1/3/2022 11:44 AM CST        Medication List   (As Of: 1/3/2022 11:44:49 AM CST)   Prescription/Discharge Order    Miscellaneous  Rx Supply  :   Miscellaneous Rx Supply ; Status:   Prescribed ; Ordered As Mnemonic:   Spacer ; Simple Display Line:   See Instructions, use as directed with inhaler., 1 EA, 0 Refill(s) ; Ordering Provider:   Sanket Gill MD; Catalog Code:   Miscellaneous Rx Supply ; Order Dt/Tm:   1/22/2020 3:17:33 PM CST          mirabegron  :   mirabegron ; Status:   Prescribed ; Ordered As Mnemonic:   Myrbetriq 25 mg oral tablet, extended release ; Simple Display Line:   25 mg, 1 tab(s), Oral, daily, 30 tab(s), 5 Refill(s) ; Ordering Provider:   Pollo Terry MD; Catalog Code:   mirabegron ; Order Dt/Tm:   12/3/2021 10:47:55 AM CST          lovastatin  :   lovastatin ; Status:   Prescribed ; Ordered As Mnemonic:   lovastatin 40 mg oral tablet ; Simple Display Line:   1 tab(s), Oral, daily, 90 tab(s), 0 Refill(s) ; Ordering Provider:   Pollo Terry MD; Catalog Code:   lovastatin ; Order Dt/Tm:   11/2/2021 4:06:24 PM CDT          clobetasol topical  :   clobetasol topical ; Status:   Prescribed ; Ordered As Mnemonic:   Clobetasol (Eqv-Temovate) 0.05% topical cream ; Simple Display Line:   1 shantel, Topical, bid, 45 gm, 0 Refill(s) ; Ordering Provider:   Pollo Terry MD; Catalog Code:   clobetasol topical ; Order Dt/Tm:   10/7/2021 8:03:32 AM CDT          busPIRone  :   busPIRone ; Status:   Prescribed ; Ordered As Mnemonic:   busPIRone 7.5 mg oral tablet ; Simple Display Line:   7.5 mg, 1 tab(s), Oral, bid, for 90 day(s), 180 tab(s), 3 Refill(s) ; Ordering Provider:   Pollo Terry MD; Catalog Code:   busPIRone ; Order Dt/Tm:   10/12/2021 4:51:15 PM CDT          acyclovir  :   acyclovir ; Status:   Prescribed ; Ordered As Mnemonic:   acyclovir 400 mg oral tablet ; Simple Display Line:   1 tab(s), Oral, bid, 180 tab(s), 3 Refill(s) ; Ordering Provider:   Pollo Terry MD; Catalog Code:   acyclovir ; Order Dt/Tm:   12/2/2021 7:53:14 AM CST          acyclovir  :   acyclovir ; Status:   Prescribed ; Ordered As Mnemonic:    acyclovir 400 mg oral tablet ; Simple Display Line:   1 tab(s), Oral, bid, 180 tab(s), 3 Refill(s) ; Ordering Provider:   Pollo Terry MD; Catalog Code:   acyclovir ; Order Dt/Tm:   12/2/2021 7:53:14 AM CST            Home Meds    zoledronic acid  :   zoledronic acid ; Status:   Documented ; Ordered As Mnemonic:   Reclast 5 mg/100 mL intravenous solution ; Simple Display Line:   5 mg, IV, once, Annual in January, 0 Refill(s) ; Catalog Code:   zoledronic acid ; Order Dt/Tm:   12/3/2021 10:56:32 AM CST          melatonin  :   melatonin ; Status:   Documented ; Ordered As Mnemonic:   melatonin 10 mg oral tablet ; Simple Display Line:   10 mg, 1 tab(s), Oral, hs, 0 Refill(s) ; Catalog Code:   melatonin ; Order Dt/Tm:   4/23/2021 10:37:09 AM CDT          copper  :   copper ; Status:   Documented ; Ordered As Mnemonic:   copper ; Simple Display Line:   0 Refill(s) ; Catalog Code:   copper ; Order Dt/Tm:   10/16/2017 2:04:52 PM CDT          calcium-vitamin D  :   calcium-vitamin D ; Status:   Documented ; Ordered As Mnemonic:   Caltrate Gummy Bites 250 mg-400 intl units oral tablet, chewable ; Simple Display Line:   See Instructions, 5 gummies Chewed, 0 Refill(s) ; Catalog Code:   calcium-vitamin D ; Order Dt/Tm:   12/29/2020 1:50:17 PM CST            Social History   Social History   (As Of: 1/3/2022 11:44:49 AM CST)   Alcohol:  Current      Wine (5 oz), Daily, Ready to change: No.   (Last Updated: 12/23/2019 1:42:16 PM CST by Gayatri Mckinnon)          Tobacco:  Current      Former smoker, quit more than 30 days ago, Cigarettes   Comments:  12/23/2019 10:11 AM - Travon Nichole CMA: Pt quit 11/23/19   (Last Updated: 1/3/2022 11:43:01 AM CST by [place]/Nell ENCISO)   Cigarettes, 2 per day.  Ready to change: Yes.   (Last Updated: 12/23/2019 1:40:42 PM CST by Gayatri Mckinnon)   Former smoker, quit more than 30 days ago   (Last Updated: 12/29/2020 1:34:58 PM CST by Dinora Campuzano)          Electronic Cigarette/Vaping:         Electronic Cigarette Use: Never.   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   (Last Updated: 12/23/2019 1:43:17 PM CST by Gayatri Mckinnon)          Home/Environment:        Marital status:  (Living together).  Spouse/Partner name: Bebo.  Living situation: Home/Independent.  Injuries/Abuse/Neglect in household: No.  Feels unsafe at home: No.  Family/Friends available for support: Yes.   (Last Updated: 12/23/2019 1:41:16 PM CST by Gayatri Mckinnon)          Nutrition/Health:        Type of diet: Regular.  Wants to lose weight: No.  Sleeping concerns: No.  Feels highly stressed: No.   (Last Updated: 12/23/2019 1:42:36 PM CST by Gayatri Mckinnon)          Exercise:  Regular exercise      Exercise frequency: 3-4 times/week.  Exercise type: Curves.   (Last Updated: 12/10/2018 2:56:57 PM CST by Cherelle Bateman)          Sexual:        Sexually active: Yes.  Identifies as female, Sexual orientation: Straight or heterosexual.  History of STD: Yes.  Contraceptive Use Details: None.  History of sexual abuse: No.   (Last Updated: 12/23/2019 1:41:34 PM CST by Gayatri Mckinnon)

## 2022-03-02 NOTE — NURSING NOTE
Comprehensive Intake Entered On:  2/17/2022 4:07 PM CST    Performed On:  2/17/2022 4:03 PM CST by Nell Galloway               Summary   Chief Complaint :   Verbal consent given for telephone visit. c/o cough/cold for the last week. Tested positive for covid 1 mo ago.   Advance Directive :   No   Height Measured :   60.5 in(Converted to: 5 ft 0 in, 153.67 cm)    Nell Galloway - 2/17/2022 4:03 PM CST   Health Status   Allergies Verified? :   Yes   Medication History Verified? :   Yes   Medical History Verified? :   Yes   Pre-Visit Planning Status :   Completed   Tobacco Use? :   Former smoker   Nell Galloway - 2/17/2022 4:03 PM CST   Consents   Consent for Immunization Exchange :   Consent Granted   Consent for Immunizations to Providers :   Consent Granted   Nell Galloway - 2/17/2022 4:03 PM CST   Meds / Allergies   (As Of: 2/17/2022 4:07:57 PM CST)   Allergies (Active)   Augmentin  Estimated Onset Date:   Unspecified ; Reactions:   Vomiting ; Created By:   Barbara Michel; Reaction Status:   Active ; Category:   Drug ; Substance:   Augmentin ; Type:   Sensitivity ; Severity:   Mild ; Updated By:   Barbara Michel; Reviewed Date:   2/17/2022 4:06 PM CST      codeine  Estimated Onset Date:   Unspecified ; Created By:   Travon Nichole CMA; Reaction Status:   Active ; Category:   Drug ; Substance:   codeine ; Type:   Allergy ; Updated By:   Travon Nichole CMA; Reviewed Date:   2/17/2022 4:06 PM CST      Latex  Estimated Onset Date:   Unspecified ; Created By:   Travon Nichole CMA; Reaction Status:   Active ; Category:   Drug ; Substance:   Latex ; Type:   Allergy ; Updated By:   Travon Nichole CMA; Reviewed Date:   2/17/2022 4:06 PM CST      Macrobid  Estimated Onset Date:   Unspecified ; Reactions:   rash ; Created By:   Travon Nichole CMA; Reaction Status:   Active ; Category:   Drug ; Substance:   Macrobid ; Type:   Allergy ; Updated By:   Travon Nichole CMA; Reviewed Date:   2/17/2022 4:06  PM CST        Medication List   (As Of: 2/17/2022 4:07:57 PM CST)   Prescription/Discharge Order    busPIRone  :   busPIRone ; Status:   Prescribed ; Ordered As Mnemonic:   busPIRone 7.5 mg oral tablet ; Simple Display Line:   7.5 mg, 1 tab(s), Oral, bid, for 90 day(s), 180 tab(s), 3 Refill(s) ; Ordering Provider:   Pollo Terry MD; Catalog Code:   busPIRone ; Order Dt/Tm:   1/3/2022 12:01:10 PM CST          lovastatin  :   lovastatin ; Status:   Prescribed ; Ordered As Mnemonic:   lovastatin 40 mg oral tablet ; Simple Display Line:   1 tab(s), Oral, daily, 90 tab(s), 3 Refill(s) ; Ordering Provider:   Pollo Terry MD; Catalog Code:   lovastatin ; Order Dt/Tm:   1/3/2022 12:01:42 PM CST          mirabegron  :   mirabegron ; Status:   Processing ; Ordered As Mnemonic:   Myrbetriq 25 mg oral tablet, extended release ; Ordering Provider:   Pollo Terry MD; Action Display:   Complete ; Catalog Code:   mirabegron ; Order Dt/Tm:   2/17/2022 4:06:17 PM CST          acyclovir  :   acyclovir ; Status:   Prescribed ; Ordered As Mnemonic:   acyclovir 400 mg oral tablet ; Simple Display Line:   1 tab(s), Oral, bid, 180 tab(s), 3 Refill(s) ; Ordering Provider:   Pollo Terry MD; Catalog Code:   acyclovir ; Order Dt/Tm:   12/2/2021 7:53:14 AM CST          clobetasol topical  :   clobetasol topical ; Status:   Prescribed ; Ordered As Mnemonic:   Clobetasol (Eqv-Temovate) 0.05% topical cream ; Simple Display Line:   1 shantel, Topical, bid, 45 gm, 0 Refill(s) ; Ordering Provider:   Pollo Terry MD; Catalog Code:   clobetasol topical ; Order Dt/Tm:   10/7/2021 8:03:32 AM CDT          Miscellaneous Rx Supply  :   Miscellaneous Rx Supply ; Status:   Prescribed ; Ordered As Mnemonic:   Spacer ; Simple Display Line:   See Instructions, use as directed with inhaler., 1 EA, 0 Refill(s) ; Ordering Provider:   Sanket Gill MD; Catalog Code:   Miscellaneous Rx Supply ; Order Dt/Tm:   1/22/2020 3:17:33 PM CST            Home  Meds    zoledronic acid  :   zoledronic acid ; Status:   Documented ; Ordered As Mnemonic:   Reclast 5 mg/100 mL intravenous solution ; Simple Display Line:   5 mg, IV, once, Annual in January, 0 Refill(s) ; Catalog Code:   zoledronic acid ; Order Dt/Tm:   12/3/2021 10:56:32 AM CST          melatonin  :   melatonin ; Status:   Documented ; Ordered As Mnemonic:   melatonin 10 mg oral tablet ; Simple Display Line:   10 mg, 1 tab(s), Oral, hs, 0 Refill(s) ; Catalog Code:   melatonin ; Order Dt/Tm:   4/23/2021 10:37:09 AM CDT          calcium-vitamin D  :   calcium-vitamin D ; Status:   Documented ; Ordered As Mnemonic:   Caltrate Gummy Bites 250 mg-400 intl units oral tablet, chewable ; Simple Display Line:   See Instructions, 5 gummies Chewed, 0 Refill(s) ; Catalog Code:   calcium-vitamin D ; Order Dt/Tm:   12/29/2020 1:50:17 PM CST          copper  :   copper ; Status:   Documented ; Ordered As Mnemonic:   copper ; Simple Display Line:   0 Refill(s) ; Catalog Code:   copper ; Order Dt/Tm:   10/16/2017 2:04:52 PM CDT

## 2022-03-02 NOTE — TELEPHONE ENCOUNTER
---------------------  From: America Emery CMA   To: Phone Messages Pool ([id number]_[place] - [place]);     Sent: 1/6/2022 11:59:18 AM CST  Subject: General Message-Neg Covid results     LM for a return call at 1158- patients COVID-19 test result is NEGATIVE    asked for a return call, has/had infusion coming up, ? symptomspt calls back at 1204 - advised of results.  States she feels great since Sunday, rescheduled her infusion for next wk  no further questions/concerns

## 2022-03-02 NOTE — NURSING NOTE
Seen for COVID testing at [facility] per  [facility]    O2 Sat = none taken  (Children under 12 do not require O2 sat)    Specimen sent to:   labs for testing    PUI form faxed to [facility] if positive

## 2022-03-02 NOTE — TELEPHONE ENCOUNTER
---------------------  From: Dinora Vallejo RN (Phone Messages Pool ([id number]_[facility]))   Sent: 2/17/2022 9:35:16 AM CST  Subject: cough     Time of Call:  0901  Return call at:09 25    Person Calling:  patient  Phone number:      Note:   She has a relentless cough. She did have Covid in Jan. She had a colonoscopy yesterday and was advised by the doctor to be seen for the persistent cough. She is transferred to schedule.    Last office visit and reason:  Cough 1/3/22

## 2022-03-02 NOTE — TELEPHONE ENCOUNTER
---------------------  From: Gerardo SELBYBlanca   Sent: 1/3/2022 3:46:29 PM CST  Subject: [facility] Testing     Pt was seen at [facility] for covid testing per Dr. Terry. 02 Sat=97%. Pt resides in [facility]-will notify Public Health if positive.

## 2022-03-02 NOTE — PROGRESS NOTES
Chief Complaint    Verbal consent given for telephone visit. c/o cough/cold for the last week. Tested positive for covid 1 mo ago.  History of Present Illness       Today's visit was conducted via telephone due to the COVID-19 pandemic.  Patient's consent to telephone visit was obtained and documented.       Call Start Time:  1615       Call End Time:   1625       Patient complains of 3 days of rhinitis, nasal congestion, and nonproductive cough.  No fever, chills, headache, myalgia, chest pain, dyspnea, anosmia.  She had Covid a month ago with an uneventful recovery.  Review of Systems       Had a colonoscopy earlier this week.  No abdominal pain, diarrhea, nausea or vomiting.  Physical Exam   Vitals & Measurements    HT: 60.5 in   Assessment/Plan       Acute URI (J06.9)          Symptomatic measures.  Doubt recurrent Covid as has been less than a month and she has uneventful recovery.  She will follow up if not improving with the time and symptomatic measures or if she has fever or dyspnea.         Ordered:          [id number] physician telephone evaluation 5-10 min (Charge), Quantity: 1, Acute URI                Orders:         mirabegron, = 1 tab(s) ( 25 mg ), Oral, daily, # 90 tab(s), 3 Refill(s), Type: Maintenance, Pharmacy: [facility], 1 tab(s) Oral daily, 60.5, in, 01/03/22 11:41:00 CST, Height Measured, 113.9, lb, 12/03/21 10:16:00 CST, Weight Measured, (Completed)  Patient Information     Name:GUNNAR DOLL      Address:      [address] [place] [address] [address] [place], [place] [id number]     Sex:Female     YOB: 1950     [phone]     Emergency Contact:YUKI DOLL     MRN:[id number]     FIN:[id number]     Location:[facility]     Date of Service:02/17/2022      Primary Care Physician:       Pollo Terry MD, [phone]      Attending Physician:       Pollo Terry MD, [phone]  Problem List/Past Medical History    Ongoing     Asthma     Chronic insomnia      Dyslipidemia     Eczema     Family history of colon cancer     MILTON (generalized anxiety disorder)     Genital HSV     GERD (gastroesophageal reflux disease)     MVP (mitral valve prolapse)     Osteoporosis     Thickened endometrium     Thrombocytosis     Urge incontinence    Historical     Inpatient stay       Comments: [facility], [place] - Acute appendicitis, transferred to [place], [place].     Pregnancy     Pregnancy     Smoking 1/2 pack a day or less     Stress-induced cardiomyopathy  Procedure/Surgical History     Appendectomy (03/23/2020)     Extracapsular cataract extraction and insertion of intraocular lens (11/02/2017)      Comments: Right..     Extracapsular cataract extraction and insertion of intraocular lens (10/19/2017)      Comments: Left..     Colonoscopy (06/10/2016)      Comments: Every 5 years for family history.     Colonoscopy (04/11/2001)     Caesarean section      Comments: x 2.     Extraction of wisdom tooth     H/O: blood transfusion     H/O: tubal ligation     History of tonsillectomy  Medications    acyclovir 400 mg oral tablet, 1 tab(s), Oral, bid    busPIRone 7.5 mg oral tablet, 7.5 mg= 1 tab(s), Oral, bid, 3 refills    Caltrate Gummy Bites 250 mg-400 intl units oral tablet, chewable, See Instructions    Clobetasol (Eqv-Temovate) 0.05% topical cream, 1 shantel, Topical, bid    copper    lovastatin 40 mg oral tablet, 1 tab(s), Oral, daily, 3 refills    melatonin 10 mg oral tablet, 10 mg= 1 tab(s), Oral, hs    Reclast 5 mg/100 mL intravenous solution, 5 mg, IV, once    Spacer, See Instructions  Allergies    Augmentin (Vomiting)    Latex    Macrobid (rash)    codeine  Social History    Smoking Status     Former smoker     Alcohol - Current      Wine (5 oz), Daily, Ready to change: No.     Electronic Cigarette/Vaping      Electronic Cigarette Use: Never.     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     Exercise - Regular exercise      Exercise frequency: 3-4 times/week. Exercise type: Curves.     Home/Environment      Marital status:  (Living together). Spouse/Partner name: Bebo. Living situation: Home/Independent. Injuries/Abuse/Neglect in household: No. Feels unsafe at home: No. Family/Friends available for support: Yes.     Nutrition/Health      Type of diet: Regular. Wants to lose weight: No. Sleeping concerns: No. Feels highly stressed: No.     Sexual      Sexually active: Yes. Identifies as female, Sexual orientation: Straight or heterosexual. History of STD: Yes. Contraceptive Use Details: None. History of sexual abuse: No.     Substance Abuse - Denies Substance Abuse      Never     Tobacco - Current      Former smoker, quit more than 30 days ago, Cigarettes  Family History    Alcoholism: Sister.    Allergic rhinitis: Sister.    Arthritis: Mother, Sister and Aunt (M).    Bleeding disorder: Brother.    Breast Cancer: Mother (Dx at 72).    Cancer of colon: Sister (Dx at 55 years) and Sister (Dx at 82).    Dementia: Mother.    Depression: Mother and Sister.    High blood pressure: Sister.    Hypercholesterolemia: Sister.    Hypertension: Sister.    Kidney disease: Sister and Grandmother (P).    Obesity: Sister.    Seizure: Brother.  Lab Results          Lab Results (Last 4 results within 90 days)           Coronavirus SARS-CoV-2 (COVID-19) TR: Positive (01/18/22 12:03:00)          Coronavirus SARS-CoV-2 (COVID-19) TR: Negative (01/03/22 15:00:00)  Immunizations          Scheduled Immunizations          Dose Date(s)          influenza virus vaccine, inactivated          10/03/2019, 09/09/2020, 10/01/2021          SARS-CoV-2 (COVID-19) Moderna-1273          02/17/2021, 03/26/2021          tetanus/diphth/pertuss (Tdap) adult/adol          01/07/2009          ZOS, shingles          03/04/2010, 03/16/2010          Other Immunizations          influenza          10/22/2018          influenza  virus vaccine, inactivated          10/12/2017          pneumococcal (PPSV23)          12/23/2019          harjit COSME          03/16/2004          SARS-CoV-2 (COVID-19) Moderna-9776          11/09/2021

## 2022-03-02 NOTE — TELEPHONE ENCOUNTER
---------------------  From: Ailin Ovalle RN (Phone Messages Pool ([id number]_[facility]))   To: Sanket Gill MD;     Sent: 1/19/2022 4:23:12 PM CST  Subject: COVID+     Contacted pt to inform her of Positive COVID results.  Pt states that she had rough days on Monday and Tuesday but felt just the slightest bit better today.  Informed pt that she would need to be in quarantine at home through Saturday and then for an additional 5 days (as long as she is showing improvement and is fever free for at least 24 hours without the use of fever reducing medication) she will need to be wearing a mask when she is around any other people. Pt states understanding.  Informed pt that public health should be reaching out to her as well.

## 2022-03-02 NOTE — PROGRESS NOTES
Chief Complaint    Verbal consent given for telephone visit. c/o abdominal pain, HA, muscle aches, fatigue, runny nose, diarrhea since 12/30/21. Wanting covid test.  History of Present Illness       Today's visit was conducted via telephone due to the COVID-19 pandemic.  Patient's consent to telephone visit was obtained and documented.       Call Start Time:  1200       Call End Time:   1210       Shannan osteoporosis, dyslipidemia, irritable bladder, and anxiety disorder who complains of an acute illness.  Symptom onset was December 30, 2021.  She is now over the illness.  She needs a Covid test because she has a Reclast infusion in 2 days.  She had abdominal pain, headache, myalgia, fatigue, rhinitis, and diarrhea all of which have resolved.  No cough, fever, anosmia.  Review of Systems       She needs a couple of her medications refilled.  Her anxiety is well controlled.  Mirabegron has been extremely helpful for her urinary complaints.  Physical Exam   Vitals & Measurements    HT: 60.5 in   Assessment/Plan       Acute URI (J06.9)         Acute illness has resolved.  Covid seems unlikely.  Covid PCR         Ordered:          [id number] physician telephone evaluation 5-10 min (Charge), Quantity: 1, Acute URI  Osteoporosis  Urge incontinence  MILTON (generalized anxiety disorder)  Dyslipidemia                Dyslipidemia (E78.5)          On statin         Ordered:          58034 physician telephone evaluation 5-10 min (Charge), Quantity: 1, Acute URI  Osteoporosis  Urge incontinence  MILTON (generalized anxiety disorder)  Dyslipidemia                MILTON (generalized anxiety disorder) (F41.1)          Controlled with low-dose BuSpar         Ordered:          [id number] physician telephone evaluation 5-10 min (Charge), Quantity: 1, Acute URI  Osteoporosis  Urge incontinence  MILTON (generalized anxiety disorder)  Dyslipidemia                Osteoporosis (M81.0)          Reclast infusion this week.         Ordered:           [id number] physician telephone evaluation 5-10 min (Charge), Quantity: 1, Acute URI  Osteoporosis  Urge incontinence  MILTON (generalized anxiety disorder)  Dyslipidemia                Urge incontinence (N39.41)          Good response to treatment with mirabegron.         Ordered:          [id number] physician telephone evaluation 5-10 min (Charge), Quantity: 1, Acute URI  Osteoporosis  Urge incontinence  MILTON (generalized anxiety disorder)  Dyslipidemia                Orders:         busPIRone, = 1 tab(s) ( 7.5 mg ), Oral, bid, # 180 tab(s), 3 Refill(s), Type: Maintenance, Pharmacy: [facility], 1 tab(s) Oral bid,x90 day(s), 60.5, in, 01/03/22 11:41:00 CST, Height Measured, 113.9, lb, 12/03/21 10:16:00 CST, Weight Measured, (Ordered)         lovastatin, = 1 tab(s), Oral, daily, # 90 tab(s), 3 Refill(s), Type: Maintenance, Pharmacy: [facility], 1 tab(s) Oral daily, 60.5, in, 01/03/22 11:41:00 CST, Height Measured, 113.9, lb, 12/03/21 10:16:00 CST, Weight Measured, (Ordered)         mirabegron, = 1 tab(s) ( 25 mg ), Oral, daily, # 90 tab(s), 3 Refill(s), Type: Maintenance, Pharmacy: [facility], 1 tab(s) Oral daily, 60.5, in, 01/03/22 11:41:00 CST, Height Measured, 113.9, lb, 12/03/21 10:16:00 CST, Weight Measured, (Ordered)  Patient Information     Name:GUNNAR DOLL      Address:      [address] [place] [address] [address] [place], [place] [id number]     Sex:Female     YOB: 1950     [phone]     Emergency Contact:YUKI DOLL     MRN:[id number]     FIN:[id number]     Location:[facility]     Date of Service:01/03/2022      Primary Care Physician:       Pollo Terry MD, [phone]      Attending Physician:       Pollo Terry MD, [phone]  Problem List/Past Medical History    Ongoing     Asthma     Chronic insomnia     Dyslipidemia     Eczema     Family history of colon cancer     MILTON (generalized anxiety disorder)     Genital HSV     GERD (gastroesophageal  reflux disease)     MVP (mitral valve prolapse)     Osteoporosis     Thickened endometrium     Thrombocytosis     Urge incontinence    Historical     Inpatient stay       Comments: [facility], [place] - Acute appendicitis, transferred to [place], [place].     Pregnancy     Pregnancy     Smoking 1/2 pack a day or less     Stress-induced cardiomyopathy  Procedure/Surgical History     Appendectomy (03/23/2020)     Extracapsular cataract extraction and insertion of intraocular lens (11/02/2017)      Comments: Right..     Extracapsular cataract extraction and insertion of intraocular lens (10/19/2017)      Comments: Left..     Colonoscopy (06/10/2016)      Comments: Every 5 years for family history.     Colonoscopy (04/11/2001)     Caesarean section      Comments: x 2.     Extraction of wisdom tooth     H/O: blood transfusion     H/O: tubal ligation     History of tonsillectomy  Medications    acyclovir 400 mg oral tablet, 1 tab(s), Oral, bid    busPIRone 7.5 mg oral tablet, 7.5 mg= 1 tab(s), Oral, bid, 3 refills    Caltrate Gummy Bites 250 mg-400 intl units oral tablet, chewable, See Instructions    Clobetasol (Eqv-Temovate) 0.05% topical cream, 1 shantel, Topical, bid    copper    lovastatin 40 mg oral tablet, 1 tab(s), Oral, daily, 3 refills    melatonin 10 mg oral tablet, 10 mg= 1 tab(s), Oral, hs    Myrbetriq 25 mg oral tablet, extended release, 25 mg= 1 tab(s), Oral, daily, 3 refills    Reclast 5 mg/100 mL intravenous solution, 5 mg, IV, once    Spacer, See Instructions  Allergies    Augmentin (Vomiting)    Latex    Macrobid (rash)    codeine  Social History    Smoking Status     Former smoker     Alcohol - Current      Wine (5 oz), Daily, Ready to change: No.     Electronic Cigarette/Vaping      Electronic Cigarette Use: Never.     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     Exercise - Regular exercise      Exercise  frequency: 3-4 times/week. Exercise type: Curves.     Home/Environment      Marital status:  (Living together). Spouse/Partner name: Bebo. Living situation: Home/Independent. Injuries/Abuse/Neglect in household: No. Feels unsafe at home: No. Family/Friends available for support: Yes.     Nutrition/Health      Type of diet: Regular. Wants to lose weight: No. Sleeping concerns: No. Feels highly stressed: No.     Sexual      Sexually active: Yes. Identifies as female, Sexual orientation: Straight or heterosexual. History of STD: Yes. Contraceptive Use Details: None. History of sexual abuse: No.     Substance Abuse - Denies Substance Abuse      Never     Tobacco - Current      Former smoker, quit more than 30 days ago, Cigarettes  Family History    Alcoholism: Sister.    Allergic rhinitis: Sister.    Arthritis: Mother, Sister and Aunt (M).    Bleeding disorder: Brother.    Breast Cancer: Mother (Dx at 72).    Cancer of colon: Sister (Dx at 55 years) and Sister (Dx at 82).    Dementia: Mother.    Depression: Mother and Sister.    High blood pressure: Sister.    Hypercholesterolemia: Sister.    Hypertension: Sister.    Kidney disease: Sister and Grandmother (P).    Obesity: Sister.    Seizure: Brother.  Immunizations          Scheduled Immunizations          Dose Date(s)          influenza virus vaccine, inactivated          10/03/2019, 09/09/2020, 10/01/2021          SARS-CoV-2 (COVID-19) Moderna-1273          02/17/2021, 03/26/2021          tetanus/diphth/pertuss (Tdap) adult/adol          01/07/2009          ZOS, shingles          03/04/2010, 03/16/2010          Other Immunizations          influenza          10/22/2018          influenza virus vaccine, inactivated          10/12/2017          pneumococcal (PPSV23)          12/23/2019          ZOS, shingles          03/16/2004          SARS-CoV-2 (COVID-19) Moderna-1273          11/09/2021

## 2022-03-02 NOTE — PROGRESS NOTES
Chief Complaint    verbal consent given for telemed visit---c/o COVID exposure last week, sx 01/17/2022--fever, cough, ST, chills, headache, fatigue, diarrhea   Visit type:  Telephone visit   Participants during visit:  patient   Location of patient:  home   Location of physician:  office   Call start time:  1108   Call end time:  1113   Today's visit was conducted by telephone conference due to the COVID-19 pandemic.  The patient's consent to proceed with the telephone conference was obtained and documented.  History of Present Illness      c/o COVID exposure on Friday last week, sx 01/17/2022--fever, cough, ST, chills, headache, fatigue, diarrhea, myalgia      vaccinated with booster  Review of Systems          ROS reviewed and negative except for symptoms noted in HPI.  Assessment/Plan       1. Close exposure to COVID-19 virus (Z20.822)       2. Fatigue (R53.83)         Analgesics and antipyretics as needed, symptomatic care, follow up if not improving       3. Myalgia (M79.10)          Patient is referred for [facility] COVID-19 testing and is instructed of the following:          Patient should remain isolated until results of test return and given that tests are not 100% accurate, would be safest to assume that they are contagious with COVID-19 until their symptoms have fully resolved. Isolation is recommended for at least 7 days from the onset of symptoms and for 3 days after resolution of fevers and productive cough. This means patient should not go to work or any public areas. In addition, it is recommended at home that they separate themselves from other people and from animals as much as possible, including using a separate bathroom. If they do need to be around others, a face mask is recommended. Frequent hand hygiene and cleaning of high touch surfaces is also recommended.           Symptoms can last for several weeks. For patients with COVID-19, they can sometimes start to improve and then get worse  again. If symptoms worsen at any time, including significant shortness of breath, low oxygen levels, high fevers that cannot be controlled, or concerns for dehydration, they should seek medical care. If going to the ER, calling 911, or seeking care at the clinic, they are reminded to notify staff that they have been tested for COVID-19.          Patient also is informed that testing will be done in their car at a scheduled time. Test will be sent to an outside commercial lab and billed by that lab. [facility] cannot confirm for patient how billing will be handled by their insurance company.            Patient is also informed that testing for COVID-19 must be reported to the public health department along with contact information for the patient.           Patient information is given to scheduling staff to get patient scheduled for testing. Patient will receive further instructions from scheduling staff.          Patient is encouraged to call back at any time with questions or concerns.    Patient Information     Name:GUNNAR DOLL      Address:      [address] [place] [id number]     Sex:Female     YOB: 1950     [phone]     Emergency Contact:YUKI DOLL     MRN:[id number]     FIN:[id number]     Location:[facility]     Date of Service:01/18/2022      Primary Care Physician:       Pollo Terry MD, [phone]      Attending Physician:       Sanket Gill MD, [phone]  Problem List/Past Medical History    Ongoing     Asthma     Chronic insomnia     Dyslipidemia     Eczema     Family history of colon cancer     MILTON (generalized anxiety disorder)     Genital HSV     GERD (gastroesophageal reflux disease)     MVP (mitral valve prolapse)     Osteoporosis     Thickened endometrium     Thrombocytosis     Urge incontinence    Historical     Inpatient stay       Comments: [facility], [place] - Acute appendicitis, transferred to [place], [place].     Pregnancy     Pregnancy     Smoking 1/2 pack a day or less     Stress-induced cardiomyopathy  Procedure/Surgical History     Appendectomy (03/23/2020)     Extracapsular cataract extraction and insertion of intraocular lens (11/02/2017)      Comments: Right..     Extracapsular cataract extraction and insertion of intraocular lens (10/19/2017)      Comments: Left..     Colonoscopy (06/10/2016)      Comments: Every 5 years for family history.     Colonoscopy (04/11/2001)     Caesarean section      Comments: x 2.     Extraction of wisdom tooth     H/O: blood transfusion     H/O: tubal ligation     History of tonsillectomy  Medications    acyclovir 400 mg oral tablet, 1 tab(s), Oral, bid    busPIRone 7.5 mg oral tablet, 7.5 mg= 1 tab(s), Oral, bid, 3 refills    Caltrate Gummy Bites 250 mg-400 intl units oral tablet, chewable, See Instructions    Clobetasol (Eqv-Temovate) 0.05% topical cream, 1 shantel, Topical, bid    copper    lovastatin 40 mg oral tablet, 1 tab(s), Oral, daily, 3 refills    melatonin 10 mg oral tablet, 10 mg= 1 tab(s), Oral, hs    Myrbetriq 25 mg oral tablet, extended release, 25 mg= 1 tab(s), Oral, daily, 3 refills    Reclast 5 mg/100 mL intravenous solution, 5 mg, IV, once    Spacer, See Instructions  Allergies    Augmentin (Vomiting)    Latex    Macrobid (rash)    codeine  Social History    Smoking Status     Former smoker     Alcohol - Current      Wine (5 oz), Daily, Ready to change: No.     Electronic Cigarette/Vaping      Electronic Cigarette Use: Never.     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     Exercise - Regular exercise      Exercise frequency: 3-4 times/week. Exercise type: Curves.     Home/Environment      Marital status:  (Living together). Spouse/Partner name: Bebo. Living situation: Home/Independent. Injuries/Abuse/Neglect in household: No. Feels unsafe at home: No.  Family/Friends available for support: Yes.     Nutrition/Health      Type of diet: Regular. Wants to lose weight: No. Sleeping concerns: No. Feels highly stressed: No.     Sexual      Sexually active: Yes. Identifies as female, Sexual orientation: Straight or heterosexual. History of STD: Yes. Contraceptive Use Details: None. History of sexual abuse: No.     Substance Abuse - Denies Substance Abuse      Never     Tobacco - Current      Former smoker, quit more than 30 days ago, Cigarettes  Family History    Alcoholism: Sister.    Allergic rhinitis: Sister.    Arthritis: Mother, Sister and Aunt (M).    Bleeding disorder: Brother.    Breast Cancer: Mother (Dx at 72).    Cancer of colon: Sister (Dx at 55 years) and Sister (Dx at 82).    Dementia: Mother.    Depression: Mother and Sister.    High blood pressure: Sister.    Hypercholesterolemia: Sister.    Hypertension: Sister.    Kidney disease: Sister and Grandmother (P).    Obesity: Sister.    Seizure: Brother.  Lab Results       Lab Results (Last 4 results within 90 days)        Coronavirus SARS-CoV-2 (COVID-19) TR: Negative (01/03/22 15:00:00)  Immunizations       Scheduled Immunizations       Dose Date(s)       influenza virus vaccine, inactivated       10/03/2019, 09/09/2020, 10/01/2021       SARS-CoV-2 (COVID-19) Moderna-1273       02/17/2021, 03/26/2021       tetanus/diphth/pertuss (Tdap) adult/adol       01/07/2009       ZOS, shingles       03/04/2010, 03/16/2010       Other Immunizations               influenza       10/22/2018       influenza virus vaccine, inactivated       10/12/2017       pneumococcal (PPSV23)       12/23/2019       ZOS, shingles       03/16/2004       SARS-CoV-2 (COVID-19) Moderna-1273       11/09/2021

## 2022-03-02 NOTE — TELEPHONE ENCOUNTER
---------------------  From: Erika Beltran RN (Phone Messages Pool ([id number]))   To: [facility] Message Pool ([address] [place] [address]);     Sent: 1/20/2022 1:56:08 PM CST  Subject: General Message: covid test       PCP:  WICHO o/c      Time of Call:  1:48pm       Person Calling:  pt  Phone number:  [phone]    Note:   Pt called in, stating she was seen on 1/18/22 for covid sxs with [facility]. Is +. Stated she spoke with public health and was told her  should be tested on Saturday due to exposure. Curbside hours given, no curbside testing w/e. Pt is asking for an order to check pt curbside on Monday.    Please advise, ok to check  Bebo Koenig, 12/17/1944, on Monday?    Last office visit and reason:  1/18/22 covid sxs GTG---------------------  From: Madelyn Weber MA ([facility] Message Pool ([id number]))   To: Sanket Gill MD;     Sent: 1/20/2022 2:00:45 PM CST  Subject: FW: General Message: covid test---------------------  From: Sanket Gill MD   To: [facility] Message Pool ([id number]);     Sent: 1/20/2022 2:03:47 PM CST  Subject: RE: General Message: covid test     OK for testing on Monday---------------------  From: Madelyn Weber MA ([facility] Message Pool ([id number]))   To: Appointment Pool ([id number]_[place]);     Sent: 1/20/2022 2:19:18 PM CST  Subject: FW: General Message: covid test     Please call pt and inform her that her  can be tested for COVID on Monday per [facility]TG.  Priscilla scheduled 1.24.22 for curbside at 2:10pm - [employer]

## 2022-03-02 NOTE — TELEPHONE ENCOUNTER
---------------------  From: Che Maldonado LPN (Phone Messages Pool ([id number]_[facility]))   Sent: 1/7/2022 12:13:42 PM CST  Subject: Myrbetriq     Phone Message    PCP:   WICHO      Time of Call:  10:11am       Person Calling:  pt  Phone number:  [phone]    Returned call at: 10:40am    Note:   Pt LM stating she talked with WICHO and thought he was going to send in a new Rx for Myrbetriq. Pt says [facility] did not get Rx and she is wondering if it was sent to [facility] or not sent in.   Returned call and informed pt Rx sent to [facility]. She will contact them for refill    Last office visit and reason:  1/3/22 SHIRLEY

## 2022-03-02 NOTE — NURSING NOTE
Comprehensive Intake Entered On:  1/18/2022 11:00 AM CST    Performed On:  1/18/2022 10:57 AM CST by Tia ENCISO, Madelyn               Summary   Chief Complaint :   verbal consent given for telemed visit---c/o COVID exposure last week, sx 01/17/2022--fever, cough, ST, chills, headache, fatigue, diarrhea   Advance Directive :   No   Madelyn Weber MA - 1/18/2022 10:57 AM CST   Health Status   Allergies Verified? :   Yes   Medication History Verified? :   Yes   Medical History Verified? :   Yes   Pre-Visit Planning Status :   Completed   Tobacco Use? :   Former smoker   Tia ENCISO, Madelyn - 1/18/2022 10:57 AM CST   Consents   Consent for Immunization Exchange :   Consent Granted   Consent for Immunizations to Providers :   Consent Granted   Madelyn Weber MA - 1/18/2022 10:57 AM CST   Meds / Allergies   (As Of: 1/18/2022 11:00:13 AM CST)   Allergies (Active)   Augmentin  Estimated Onset Date:   Unspecified ; Reactions:   Vomiting ; Created By:   Barbara Michel; Reaction Status:   Active ; Category:   Drug ; Substance:   Augmentin ; Type:   Sensitivity ; Severity:   Mild ; Updated By:   Barbara Michel; Reviewed Date:   1/3/2022 11:44 AM CST      codeine  Estimated Onset Date:   Unspecified ; Created By:   Travon Nichole CMA; Reaction Status:   Active ; Category:   Drug ; Substance:   codeine ; Type:   Allergy ; Updated By:   Travon Nichole CMA; Reviewed Date:   1/3/2022 11:44 AM CST      Latex  Estimated Onset Date:   Unspecified ; Created By:   Travon Nichole CMA; Reaction Status:   Active ; Category:   Drug ; Substance:   Latex ; Type:   Allergy ; Updated By:   Travon Nichole CMA; Reviewed Date:   1/3/2022 11:44 AM CST      Macrobid  Estimated Onset Date:   Unspecified ; Reactions:   rash ; Created By:   Travon Nichole CMA; Reaction Status:   Active ; Category:   Drug ; Substance:   Macrobid ; Type:   Allergy ; Updated By:   Travon Nichole CMA; Reviewed Date:   1/3/2022 11:44 AM CST        Medication List    (As Of: 1/18/2022 11:00:13 AM CST)   Prescription/Discharge Order    acyclovir  :   acyclovir ; Status:   Prescribed ; Ordered As Mnemonic:   acyclovir 400 mg oral tablet ; Simple Display Line:   1 tab(s), Oral, bid, 180 tab(s), 3 Refill(s) ; Ordering Provider:   Pollo Terry MD; Catalog Code:   acyclovir ; Order Dt/Tm:   12/2/2021 7:53:14 AM CST          busPIRone  :   busPIRone ; Status:   Prescribed ; Ordered As Mnemonic:   busPIRone 7.5 mg oral tablet ; Simple Display Line:   7.5 mg, 1 tab(s), Oral, bid, for 90 day(s), 180 tab(s), 3 Refill(s) ; Ordering Provider:   Pollo Terry MD; Catalog Code:   busPIRone ; Order Dt/Tm:   1/3/2022 12:01:10 PM CST          clobetasol topical  :   clobetasol topical ; Status:   Prescribed ; Ordered As Mnemonic:   Clobetasol (Eqv-Temovate) 0.05% topical cream ; Simple Display Line:   1 shantel, Topical, bid, 45 gm, 0 Refill(s) ; Ordering Provider:   Pollo Terry MD; Catalog Code:   clobetasol topical ; Order Dt/Tm:   10/7/2021 8:03:32 AM CDT          lovastatin  :   lovastatin ; Status:   Prescribed ; Ordered As Mnemonic:   lovastatin 40 mg oral tablet ; Simple Display Line:   1 tab(s), Oral, daily, 90 tab(s), 3 Refill(s) ; Ordering Provider:   Pollo Terry MD; Catalog Code:   lovastatin ; Order Dt/Tm:   1/3/2022 12:01:42 PM CST          mirabegron  :   mirabegron ; Status:   Prescribed ; Ordered As Mnemonic:   Myrbetriq 25 mg oral tablet, extended release ; Simple Display Line:   25 mg, 1 tab(s), Oral, daily, 90 tab(s), 3 Refill(s) ; Ordering Provider:   Pollo Terry MD; Catalog Code:   mirabegron ; Order Dt/Tm:   1/3/2022 12:00:06 PM CST          Miscellaneous Rx Supply  :   Miscellaneous Rx Supply ; Status:   Prescribed ; Ordered As Mnemonic:   Spacer ; Simple Display Line:   See Instructions, use as directed with inhaler., 1 EA, 0 Refill(s) ; Ordering Provider:   Sanket Gill MD; Catalog Code:   Miscellaneous Rx Supply ; Order Dt/Tm:   1/22/2020 3:17:33 PM  CST            Home Meds    calcium-vitamin D  :   calcium-vitamin D ; Status:   Documented ; Ordered As Mnemonic:   Caltrate Gummy Bites 250 mg-400 intl units oral tablet, chewable ; Simple Display Line:   See Instructions, 5 gummies Chewed, 0 Refill(s) ; Catalog Code:   calcium-vitamin D ; Order Dt/Tm:   12/29/2020 1:50:17 PM CST          copper  :   copper ; Status:   Documented ; Ordered As Mnemonic:   copper ; Simple Display Line:   0 Refill(s) ; Catalog Code:   copper ; Order Dt/Tm:   10/16/2017 2:04:52 PM CDT          melatonin  :   melatonin ; Status:   Documented ; Ordered As Mnemonic:   melatonin 10 mg oral tablet ; Simple Display Line:   10 mg, 1 tab(s), Oral, hs, 0 Refill(s) ; Catalog Code:   melatonin ; Order Dt/Tm:   4/23/2021 10:37:09 AM CDT          zoledronic acid  :   zoledronic acid ; Status:   Documented ; Ordered As Mnemonic:   Reclast 5 mg/100 mL intravenous solution ; Simple Display Line:   5 mg, IV, once, Annual in January, 0 Refill(s) ; Catalog Code:   zoledronic acid ; Order Dt/Tm:   12/3/2021 10:56:32 AM CST            Social History   Social History   (As Of: 1/18/2022 11:00:13 AM CST)   Alcohol:  Current      Wine (5 oz), Daily, Ready to change: No.   (Last Updated: 12/23/2019 1:42:16 PM CST by Gayatri Mckinnon)          Tobacco:  Current      Former smoker, quit more than 30 days ago, Cigarettes   Comments:  12/23/2019 10:11 AM - Travon Nichole CMA: Pt quit 11/23/19   (Last Updated: 1/3/2022 11:43:01 AM CST by Chico/Nell ENCISO)   Cigarettes, 2 per day.  Ready to change: Yes.   (Last Updated: 12/23/2019 1:40:42 PM CST by Gayatri Mckinnon)   Former smoker, quit more than 30 days ago   (Last Updated: 12/29/2020 1:34:58 PM CST by Dinora Campuzano)          Electronic Cigarette/Vaping:        Electronic Cigarette Use: Never.   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   (Last Updated: 12/23/2019 1:43:17 PM CST by Gayatri Mckinnon)          Home/Environment:        Marital status:  (Living together).  Spouse/Partner name: Bebo.  Living situation: Home/Independent.  Injuries/Abuse/Neglect in household: No.  Feels unsafe at home: No.  Family/Friends available for support: Yes.   (Last Updated: 12/23/2019 1:41:16 PM CST by Gayatri Mckinnon)          Nutrition/Health:        Type of diet: Regular.  Wants to lose weight: No.  Sleeping concerns: No.  Feels highly stressed: No.   (Last Updated: 12/23/2019 1:42:36 PM CST by Gayatri Mckinnon)          Exercise:  Regular exercise      Exercise frequency: 3-4 times/week.  Exercise type: Curves.   (Last Updated: 12/10/2018 2:56:57 PM CST by Cherelle Bateman)          Sexual:        Sexually active: Yes.  Identifies as female, Sexual orientation: Straight or heterosexual.  History of STD: Yes.  Contraceptive Use Details: None.  History of sexual abuse: No.   (Last Updated: 12/23/2019 1:41:34 PM CST by Gayatri Mckinnon)

## 2022-04-20 ENCOUNTER — ALLIED HEALTH/NURSE VISIT (OUTPATIENT)
Dept: FAMILY MEDICINE | Facility: CLINIC | Age: 72
End: 2022-04-20
Payer: MEDICARE

## 2022-04-20 DIAGNOSIS — Z23 NEED FOR VACCINATION: Primary | ICD-10-CM

## 2022-04-20 PROCEDURE — 91306 COVID-19,PF,MODERNA (18+ YRS BOOSTER .25ML): CPT | Performed by: INTERNAL MEDICINE

## 2022-04-20 PROCEDURE — 0064A COVID-19,PF,MODERNA (18+ YRS BOOSTER .25ML): CPT | Performed by: INTERNAL MEDICINE

## 2022-05-20 ENCOUNTER — TELEPHONE (OUTPATIENT)
Dept: FAMILY MEDICINE | Facility: CLINIC | Age: 72
End: 2022-05-20
Payer: MEDICARE

## 2022-05-20 DIAGNOSIS — F41.1 GENERALIZED ANXIETY DISORDER: Primary | ICD-10-CM

## 2022-05-20 PROBLEM — I34.1 MVP (MITRAL VALVE PROLAPSE): Status: ACTIVE | Noted: 2017-10-16

## 2022-05-20 PROBLEM — M81.0 AGE-RELATED OSTEOPOROSIS WITHOUT CURRENT PATHOLOGICAL FRACTURE: Status: ACTIVE | Noted: 2020-01-16

## 2022-05-20 PROBLEM — L30.9 ECZEMA: Status: ACTIVE | Noted: 2017-10-16

## 2022-05-20 PROBLEM — J45.909 ASTHMA: Status: ACTIVE | Noted: 2018-12-10

## 2022-05-20 PROBLEM — F51.04 CHRONIC INSOMNIA: Status: ACTIVE | Noted: 2017-10-16

## 2022-05-20 PROBLEM — R93.89 THICKENED ENDOMETRIUM: Status: ACTIVE | Noted: 2022-05-20

## 2022-05-20 PROBLEM — N39.41 URGE INCONTINENCE OF URINE: Status: ACTIVE | Noted: 2022-05-20

## 2022-05-20 PROBLEM — K21.9 GERD (GASTROESOPHAGEAL REFLUX DISEASE): Status: ACTIVE | Noted: 2017-10-16

## 2022-05-20 PROBLEM — A60.00 GENITAL HSV: Status: ACTIVE | Noted: 2017-10-16

## 2022-05-20 RX ORDER — BUSPIRONE HYDROCHLORIDE 7.5 MG/1
7.5 TABLET ORAL 2 TIMES DAILY
COMMUNITY
Start: 2021-10-12 | End: 2022-05-20

## 2022-05-20 RX ORDER — MELOXICAM 15 MG/1
15 TABLET ORAL DAILY
COMMUNITY
Start: 2021-06-03 | End: 2022-05-29

## 2022-05-20 RX ORDER — MIRABEGRON 25 MG/1
25 TABLET, FILM COATED, EXTENDED RELEASE ORAL
COMMUNITY
Start: 2021-12-03 | End: 2022-05-29

## 2022-05-20 RX ORDER — ZOLEDRONIC ACID 5 MG/100ML
5 INJECTION, SOLUTION INTRAVENOUS
Status: ON HOLD | COMMUNITY
Start: 2021-12-03 | End: 2023-03-27

## 2022-05-20 RX ORDER — CLOBETASOL PROPIONATE 0.5 MG/G
CREAM TOPICAL
COMMUNITY
Start: 2021-10-07 | End: 2023-04-05

## 2022-05-20 RX ORDER — ALBUTEROL SULFATE 90 UG/1
2 AEROSOL, METERED RESPIRATORY (INHALATION)
COMMUNITY
End: 2022-06-02

## 2022-05-20 RX ORDER — LOVASTATIN 40 MG
TABLET ORAL AT BEDTIME
COMMUNITY
Start: 2022-05-02 | End: 2023-04-05

## 2022-05-20 RX ORDER — ACYCLOVIR 400 MG/1
TABLET ORAL
COMMUNITY
Start: 2021-12-02 | End: 2023-04-05

## 2022-05-20 RX ORDER — BUSPIRONE HYDROCHLORIDE 15 MG/1
15 TABLET ORAL 2 TIMES DAILY
Qty: 180 TABLET | Refills: 3 | Status: SHIPPED | OUTPATIENT
Start: 2022-05-20 | End: 2022-09-15

## 2022-05-20 RX ORDER — PHENOL 1.4 %
10 AEROSOL, SPRAY (ML) MUCOUS MEMBRANE
COMMUNITY
Start: 2021-04-23 | End: 2022-05-29

## 2022-05-20 NOTE — TELEPHONE ENCOUNTER
Reason for Call:  Other Increase medication dosage    Detailed comments: Patient would like to increase dosage of  Buspirone.    Phone Number Patient can be reached at: Home number on file [phone] (home)    Best Time: anytime    Can we leave a detailed message on this number? YES    Call taken on 5/20/2022 at 8:44 AM by KWAKU INFANTE

## 2022-05-20 NOTE — TELEPHONE ENCOUNTER
TC with pt, who stated her anxiety is increasing. She had a panic attack about a week ago. She is not sleeping due to anxiety. Pt is taking Buspirone 7.5mg BID. Pt is asking to increase her Buspirone to 15mg BID.    Please advise if ok to increase dose or visit indicated.  Pt stated she does not need more Buspirone for now.

## 2022-05-29 ENCOUNTER — OFFICE VISIT (OUTPATIENT)
Dept: URGENT CARE | Facility: URGENT CARE | Age: 72
End: 2022-05-29
Payer: MEDICARE

## 2022-05-29 VITALS
SYSTOLIC BLOOD PRESSURE: 120 MMHG | WEIGHT: 114 LBS | DIASTOLIC BLOOD PRESSURE: 74 MMHG | BODY MASS INDEX: 21.9 KG/M2 | OXYGEN SATURATION: 98 % | HEART RATE: 94 BPM

## 2022-05-29 DIAGNOSIS — F41.9 ANXIETY: Primary | ICD-10-CM

## 2022-05-29 DIAGNOSIS — R06.02 SHORTNESS OF BREATH: ICD-10-CM

## 2022-05-29 PROCEDURE — 99213 OFFICE O/P EST LOW 20 MIN: CPT | Performed by: FAMILY MEDICINE

## 2022-05-29 RX ORDER — LORAZEPAM 0.5 MG/1
0.5 TABLET ORAL EVERY 8 HOURS PRN
Qty: 3 TABLET | Refills: 0 | Status: SHIPPED | OUTPATIENT
Start: 2022-05-29 | End: 2022-06-02

## 2022-05-29 NOTE — PROGRESS NOTES
Clinical Decision Making:    At the end of the encounter, I discussed results, diagnosis, medications. Discussed red flags for immediate return to clinic/ER, as well as indications for follow up if no improvement. Patient understood and agreed to plan. Patient was stable for discharge.      ICD-10-CM    1. Anxiety  F41.9 LORazepam (ATIVAN) 0.5 MG tablet   2. Shortness of breath  R06.02 LORazepam (ATIVAN) 0.5 MG tablet     We will do a trial of lorazepam 0.5 mg.  I wrote a prescription for 3 tablets only.  She can take 1 every 8 hours as needed.  Check for drug interactions with BuSpar and there are none.    Discussed with patient and  that if she has any chest pain, if the lorazepam does not work, or if they have any concerns about blood clots, heart attack, etc. that they should proceed to the emergency room for more of a work-up as we are not equipped to work that up here.  Patient and  verbalized understanding.      There are no Patient Instructions on file for this visit.   Return in about 1 week (around 6/5/2022) for with primary provider.      chief complaint    HPI:  Shannan Koenig (Martha) is a 71 year old female who presents today complaining of shortness of breath.  She notes that she has had shortness of breath on and off for a week.  She is feeling shaky.  She does have a little bit of a productive cough.  She denies fevers, chills, myalgias, fatigue, nasal congestion, sore throat, headache.  No nausea, abdominal pain, diarrhea.  No chest pain  No leg pain or swelling  She has had some decreased sleep recently  She has had increased anxiety and she previously had been on BuSpar 7.5 mg twice daily and on May 20, 9 days ago, they increased to 15 mg twice daily.    She did go into work yesterday and notes that when she was around people and dizzy all day that she did not feel short of breath.  Today they are supposed to travel to [place] to a family gathering and she does note anxiety with  any kind of travel.  She feels significant shortness of breath today.    She has been positive for COVID November 2020 where she had mostly GI symptoms.  Then January 2022 she was positive for COVID again and had a lot of nasal congestion.    History obtained from spouse, chart review and the patient.    Problem List:  2022-05: Generalized anxiety disorder  2022-05: Thickened endometrium  2022-05: Urge incontinence of urine  2020-01: Age-related osteoporosis without current pathological   fracture  2018-12: Asthma  2017-10: Eczema  2017-10: Genital HSV  2017-10: GERD (gastroesophageal reflux disease)  2017-10: MVP (mitral valve prolapse)  2017-10: Chronic insomnia  2013-03: Dyslipidemia      History reviewed. No pertinent past medical history.    Social History     Tobacco Use     Smoking status: Current Every Day Smoker     Smokeless tobacco: Never Used   Substance Use Topics     Alcohol use: Not on file       Review of systems  negative except listed in HPI    Vitals:    05/29/22 1118   BP: 120/74   Pulse: 94   SpO2: 98%   Weight: 51.7 kg (114 lb)       Physical Exam  Vitals noted and within normal limits  In general when I first walked in the room she was leaning forward and appeared very short of breath.  As we talked about her history and symptoms she was able to sit back and looked more comfortable breathing.  Eyes: Conjunctive not injected.  Ears: Canals patent, TMs intact, no erythema and no bulging.  Mouth: Mucous membranes pink and moist.  Pharynx is not erythematous.  Neck supple with no cervical lymphadenopathy and no thyromegaly.  Heart has a regular rate and rhythm with no murmurs.  Lungs are clear to auscultation bilaterally with good air entry.  No wheezes, rales, rhonchi.  Bilateral lower extremities with no tenderness to palpation and no pedal edema

## 2022-06-02 ENCOUNTER — VIRTUAL VISIT (OUTPATIENT)
Dept: FAMILY MEDICINE | Facility: CLINIC | Age: 72
End: 2022-06-02
Payer: MEDICARE

## 2022-06-02 DIAGNOSIS — F41.1 GENERALIZED ANXIETY DISORDER: Primary | ICD-10-CM

## 2022-06-02 DIAGNOSIS — F51.04 CHRONIC INSOMNIA: ICD-10-CM

## 2022-06-02 PROCEDURE — 99441 PR PHYSICIAN TELEPHONE EVALUATION 5-10 MIN: CPT | Mod: 95 | Performed by: INTERNAL MEDICINE

## 2022-06-02 RX ORDER — ZOLPIDEM TARTRATE 5 MG/1
5 TABLET ORAL
Qty: 14 TABLET | Refills: 0 | Status: SHIPPED | OUTPATIENT
Start: 2022-06-02 | End: 2022-06-16

## 2022-06-02 ASSESSMENT — ANXIETY QUESTIONNAIRES
GAD7 TOTAL SCORE: 17
6. BECOMING EASILY ANNOYED OR IRRITABLE: NEARLY EVERY DAY
IF YOU CHECKED OFF ANY PROBLEMS ON THIS QUESTIONNAIRE, HOW DIFFICULT HAVE THESE PROBLEMS MADE IT FOR YOU TO DO YOUR WORK, TAKE CARE OF THINGS AT HOME, OR GET ALONG WITH OTHER PEOPLE: SOMEWHAT DIFFICULT
2. NOT BEING ABLE TO STOP OR CONTROL WORRYING: NEARLY EVERY DAY
7. FEELING AFRAID AS IF SOMETHING AWFUL MIGHT HAPPEN: NOT AT ALL
5. BEING SO RESTLESS THAT IT IS HARD TO SIT STILL: NEARLY EVERY DAY
GAD7 TOTAL SCORE: 17
1. FEELING NERVOUS, ANXIOUS, OR ON EDGE: MORE THAN HALF THE DAYS
3. WORRYING TOO MUCH ABOUT DIFFERENT THINGS: NEARLY EVERY DAY

## 2022-06-02 ASSESSMENT — ENCOUNTER SYMPTOMS
DECREASED CONCENTRATION: 1
ACTIVITY CHANGE: 0
SLEEP DISTURBANCE: 1
HALLUCINATIONS: 0
CONFUSION: 0
APPETITE CHANGE: 0
NERVOUS/ANXIOUS: 1
UNEXPECTED WEIGHT CHANGE: 0
ABDOMINAL PAIN: 0
COUGH: 0
AGITATION: 1

## 2022-06-02 ASSESSMENT — PATIENT HEALTH QUESTIONNAIRE - PHQ9: 5. POOR APPETITE OR OVEREATING: NEARLY EVERY DAY

## 2022-06-02 NOTE — PROGRESS NOTES
Aileen is a 71 year old who is being evaluated via a billable telephone visit.      What phone number would you like to be contacted at? [phone]  How would you like to obtain your AVS? Mail a copy    Assessment & Plan     Generalized anxiety disorder  Multiple stressors.  Increased anxiety for approximately a month.  Dose increase of BuSpar which is worked well for her May 20.  Recent urgent care and ED visit with negative work-up.  Prescribed lorazepam which helped a bit.  Also prescribed Seroquel for her insomnia which was unhelpful.  Follow-up 2 weeks.  Chart referred to mental health.  - zolpidem (AMBIEN) 5 MG tablet; Take 1 tablet (5 mg) by mouth nightly as needed for sleep    Chronic insomnia  Discussed the fact that with anxiety disorders treating the insomnia was critical and can require short-term use of medications as well as longer term treatment such as CBT-I.  - zolpidem (AMBIEN) 5 MG tablet; Take 1 tablet (5 mg) by mouth nightly as needed for sleep             Tobacco Cessation:   reports that she has been smoking. She has never used smokeless tobacco.      Chart referred to psychology.    Return in about 2 weeks (around 6/16/2022).    Pollo Terry MD  [facility]   Aileen is a 71 year old who presents for the following health issues  accompanied by her self.    HPI     CC- Anxiety, panic attacks    MILTON-7 SCORE 2/4/2021 6/2/2022   Total Score 6 (mild anxiety) -   Total Score 6 17     Patient with history of anxiety and insomnia has had worsening symptoms over the past month.  Problems started when she was away visiting with some girlfriends and did not sleep and has had persisting problems with insomnia for the past month as well as increased anxiety.  She has financial concerns as well as her 's health.  BuSpar was increased to 5/20/2022 and she has noticed no improvement.  Recent urgent care visit with prescription of lorazepam which helped a  bit.  Subsequent ED visit with an extensive negative work-up with a prescription of Seroquel for sleep which did not help.        Review of Systems   Constitutional: Negative for activity change, appetite change and unexpected weight change.   Respiratory: Negative for cough.    Cardiovascular: Negative for chest pain and peripheral edema.   Gastrointestinal: Negative for abdominal pain.   Psychiatric/Behavioral: Positive for agitation, decreased concentration, mood changes and sleep disturbance. Negative for confusion, hallucinations and suicidal ideas. The patient is nervous/anxious.             Objective           Vitals:  No vitals were obtained today due to virtual visit.    Physical Exam   healthy, alert and no distress  PSYCH: Alert and oriented times 3; coherent speech, normal   rate and volume, able to articulate logical thoughts, able   to abstract reason, no tangential thoughts, no hallucinations   or delusions  Her affect is normal  RESP: No cough, no audible wheezing, able to talk in full sentences  Remainder of exam unable to be completed due to telephone visits    In the ED patient had a CBC, chemistry, troponin, D-dimer, chest x-ray, EKG, all unremarkable.            Phone call duration: 10 minutes

## 2022-06-03 ENCOUNTER — TELEPHONE (OUTPATIENT)
Dept: BEHAVIORAL HEALTH | Facility: CLINIC | Age: 72
End: 2022-06-03
Payer: MEDICARE

## 2022-06-07 ENCOUNTER — OFFICE VISIT (OUTPATIENT)
Dept: FAMILY MEDICINE | Facility: CLINIC | Age: 72
End: 2022-06-07
Payer: MEDICARE

## 2022-06-07 VITALS
OXYGEN SATURATION: 97 % | SYSTOLIC BLOOD PRESSURE: 133 MMHG | HEART RATE: 101 BPM | WEIGHT: 113 LBS | DIASTOLIC BLOOD PRESSURE: 91 MMHG | TEMPERATURE: 98.8 F | BODY MASS INDEX: 21.71 KG/M2

## 2022-06-07 DIAGNOSIS — F41.1 GENERALIZED ANXIETY DISORDER: Primary | ICD-10-CM

## 2022-06-07 PROCEDURE — 99214 OFFICE O/P EST MOD 30 MIN: CPT | Performed by: FAMILY MEDICINE

## 2022-06-07 RX ORDER — HYDROXYZINE PAMOATE 25 MG/1
25 CAPSULE ORAL 4 TIMES DAILY PRN
Qty: 40 CAPSULE | Refills: 3 | Status: SHIPPED | OUTPATIENT
Start: 2022-06-07 | End: 2022-08-18

## 2022-06-07 RX ORDER — ESCITALOPRAM OXALATE 10 MG/1
10 TABLET ORAL DAILY
Qty: 30 TABLET | Refills: 5 | Status: SHIPPED | OUTPATIENT
Start: 2022-06-07 | End: 2022-08-22

## 2022-06-07 RX ORDER — HYDROXYZINE PAMOATE 25 MG/1
25 CAPSULE ORAL EVERY 6 HOURS PRN
COMMUNITY
Start: 2022-06-04 | End: 2022-06-07

## 2022-06-07 ASSESSMENT — ANXIETY QUESTIONNAIRES
8. IF YOU CHECKED OFF ANY PROBLEMS, HOW DIFFICULT HAVE THESE MADE IT FOR YOU TO DO YOUR WORK, TAKE CARE OF THINGS AT HOME, OR GET ALONG WITH OTHER PEOPLE?: VERY DIFFICULT
GAD7 TOTAL SCORE: 17
2. NOT BEING ABLE TO STOP OR CONTROL WORRYING: NEARLY EVERY DAY
IF YOU CHECKED OFF ANY PROBLEMS ON THIS QUESTIONNAIRE, HOW DIFFICULT HAVE THESE PROBLEMS MADE IT FOR YOU TO DO YOUR WORK, TAKE CARE OF THINGS AT HOME, OR GET ALONG WITH OTHER PEOPLE: NOT DIFFICULT AT ALL
6. BECOMING EASILY ANNOYED OR IRRITABLE: MORE THAN HALF THE DAYS
3. WORRYING TOO MUCH ABOUT DIFFERENT THINGS: NEARLY EVERY DAY
4. TROUBLE RELAXING: NEARLY EVERY DAY
GAD7 TOTAL SCORE: 21
1. FEELING NERVOUS, ANXIOUS, OR ON EDGE: NEARLY EVERY DAY
GAD7 TOTAL SCORE: 21
3. WORRYING TOO MUCH ABOUT DIFFERENT THINGS: NEARLY EVERY DAY
5. BEING SO RESTLESS THAT IT IS HARD TO SIT STILL: NEARLY EVERY DAY
7. FEELING AFRAID AS IF SOMETHING AWFUL MIGHT HAPPEN: NEARLY EVERY DAY
7. FEELING AFRAID AS IF SOMETHING AWFUL MIGHT HAPPEN: NEARLY EVERY DAY
5. BEING SO RESTLESS THAT IT IS HARD TO SIT STILL: NEARLY EVERY DAY
GAD7 TOTAL SCORE: 21
2. NOT BEING ABLE TO STOP OR CONTROL WORRYING: NEARLY EVERY DAY
1. FEELING NERVOUS, ANXIOUS, OR ON EDGE: NEARLY EVERY DAY
7. FEELING AFRAID AS IF SOMETHING AWFUL MIGHT HAPPEN: NOT AT ALL
6. BECOMING EASILY ANNOYED OR IRRITABLE: NEARLY EVERY DAY

## 2022-06-07 ASSESSMENT — PATIENT HEALTH QUESTIONNAIRE - PHQ9
10. IF YOU CHECKED OFF ANY PROBLEMS, HOW DIFFICULT HAVE THESE PROBLEMS MADE IT FOR YOU TO DO YOUR WORK, TAKE CARE OF THINGS AT HOME, OR GET ALONG WITH OTHER PEOPLE: EXTREMELY DIFFICULT
SUM OF ALL RESPONSES TO PHQ QUESTIONS 1-9: 15
SUM OF ALL RESPONSES TO PHQ QUESTIONS 1-9: 15
5. POOR APPETITE OR OVEREATING: NEARLY EVERY DAY

## 2022-06-07 NOTE — PROGRESS NOTES
Assessment & Plan     Generalized anxiety disorder  Patient is anxiety is worsening.  She has some physical symptoms as well.  We have discussed various treatment options.  We will have her follow-up with behavioral health.  I have added escitalopram and she will follow-up in 1 month.  Side effects medication discussed.  Continue with use of hydroxyzine.  We briefly discussed addition of propranolol to help with her tachycardic symptoms. This will be held for now as we are adding escitalopram.  She will follow-up sooner as needed  - hydrOXYzine (VISTARIL) 25 MG capsule; Take 1 capsule (25 mg) by mouth 4 times daily as needed for anxiety  - escitalopram (LEXAPRO) 10 MG tablet; Take 1 tablet (10 mg) by mouth daily Take 1/2 tab for 6 days and increase to 1 tab on day 7    Recent ED notes reviewed           Tobacco Cessation:   reports that she has been smoking cigarettes. She has never used smokeless tobacco.      Depression Screening Follow Up    PHQ 6/7/2022   PHQ-9 Total Score 15   Q9: Thoughts of better off dead/self-harm past 2 weeks More than half the days   F/U: Thoughts of suicide or self-harm No   F/U: Safety concerns No       Follow Up    Follow Up Actions Taken  Crisis resource information provided in the After Visit Summary    Discussed the following ways the patient can remain in a safe environment:        Return in about 3 weeks (around 6/28/2022) for Follow up.    Sanket Gill MD  [facility]    Guru Gallagher is a 71 year old who presents for the following health issues     History of Present Illness       Mental Health Follow-up:  Patient presents to follow-up on Depression & Anxiety.Patient's depression since last visit has been:  Bad  The patient is having other symptoms associated with depression.  Patient's anxiety since last visit has been:  Bad  The patient is having other symptoms associated with anxiety.  Any significant life events: other  Patient is  feeling anxious or having panic attacks.  Patient has no concerns about alcohol or drug use.    She eats 2-3 servings of fruits and vegetables daily.She consumes 0 sweetened beverage(s) daily.She exercises with enough effort to increase her heart rate 20 to 29 minutes per day.  She exercises with enough effort to increase her heart rate 3 or less days per week.   She is taking medications regularly.    Today's PHQ-9         PHQ-9 Total Score: 15    PHQ-9 Q9 Thoughts of better off dead/self-harm past 2 weeks :   More than half the days  Thoughts of suicide or self harm: (P) No  Self-harm Plan:     Self-harm Action:       Safety concerns for self or others: (P) No    How difficult have these problems made it for you to do your work, take care of things at home, or get along with other people: Extremely difficult  Today's MILTON-7 Score: 21       Follow up on anxiety - 2 ER visits in the last 2wks and Urgent care - taking hydroxyzine which has helped the most - 2 doses today, last dose ~1130a.  Difficulty taking a deep breath - panic attacks  Patient is general anxiety disorder has been worsening with frequent panic attacks.  She has been in emergency department a couple times in the last week.  Hydroxyzine was prescribed to help with her anxiety.  Buspirone has recently been increased to 15 mg twice daily    MILTON-7 SCORE 6/2/2022 6/7/2022 6/7/2022   Total Score - - 21 (severe anxiety)   Total Score 17 21 17     PHQ 2/4/2021 6/7/2022   PHQ-9 Total Score 3 15   Q9: Thoughts of better off dead/self-harm past 2 weeks Not at all More than half the days   F/U: Thoughts of suicide or self-harm - No   F/U: Safety concerns - No         Review of Systems   Constitutional, HEENT, cardiovascular, pulmonary, gi and gu systems are negative, except as otherwise noted.      Objective    BP (!) 133/91   Pulse 101   Temp 98.8  F (37.1  C) (Tympanic)   Wt 51.3 kg (113 lb)   SpO2 97%   BMI 21.71 kg/m    Body mass index is 21.71  kg/m .  Physical Exam   Alert, oriented, mild distress  Heart is regular rate and rhythm  Lungs clear  Cooperative, anxious affect                Patient answers are not available for this visit.

## 2022-06-09 NOTE — TELEPHONE ENCOUNTER
Phone Encounter   [facility] spoke to patient, by PCP request. Patient reported that she has been having a difficult time with an increase in symptoms. Patient did reconnect to a previous therapist she had been working with and is likely going to see her even though it is out-of-network. Patient is interested in accelerated resolution therapy. [facility] will send a mychart message of some people in the area who provide that specific type of therapy. Patient does not want to schedule at this time, but will let [facility] know if she does need something.     Annetta Stockton, [facility], [facility]

## 2022-06-20 ENCOUNTER — VIRTUAL VISIT (OUTPATIENT)
Dept: FAMILY MEDICINE | Facility: CLINIC | Age: 72
End: 2022-06-20
Payer: MEDICARE

## 2022-06-20 ENCOUNTER — APPOINTMENT (OUTPATIENT)
Dept: URGENT CARE | Facility: URGENT CARE | Age: 72
End: 2022-06-20
Payer: MEDICARE

## 2022-06-20 DIAGNOSIS — Z20.822 ENCOUNTER BY TELEHEALTH FOR SUSPECTED COVID-19: Primary | ICD-10-CM

## 2022-06-20 PROCEDURE — 99213 OFFICE O/P EST LOW 20 MIN: CPT | Mod: 95 | Performed by: PHYSICIAN ASSISTANT

## 2022-06-20 PROCEDURE — U0003 INFECTIOUS AGENT DETECTION BY NUCLEIC ACID (DNA OR RNA); SEVERE ACUTE RESPIRATORY SYNDROME CORONAVIRUS 2 (SARS-COV-2) (CORONAVIRUS DISEASE [COVID-19]), AMPLIFIED PROBE TECHNIQUE, MAKING USE OF HIGH THROUGHPUT TECHNOLOGIES AS DESCRIBED BY CMS-2020-01-R: HCPCS | Performed by: PHYSICIAN ASSISTANT

## 2022-06-20 PROCEDURE — U0005 INFEC AGEN DETEC AMPLI PROBE: HCPCS | Performed by: PHYSICIAN ASSISTANT

## 2022-06-20 ASSESSMENT — ENCOUNTER SYMPTOMS
FATIGUE: 1
COUGH: 1
SORE THROAT: 1

## 2022-06-20 NOTE — PROGRESS NOTES
"Aileen is a 71 year old who is being evaluated via a billable telephone visit.      What phone number would you like to be contacted at? [phone]  How would you like to obtain your AVS? MyChart    1. Encounter by telehealth for suspected COVID-19  Will get her tested for Covid today, follow up if not improving      Subjective   Aileen is a 71 year old accompanied by her spouse., presenting for the following health issues:  Covid Concern (Pt has had covid exposure on 6/17.  Pt is c/o sore throat,cough,fatigue and states \"everything tastes terrible\" since 6/16)      HPI         Covid exposure on 6/17, since then has had sore throat, cough, fatigue, altered taste    She had completed the Covid vaccination series and has had 2 boosters  She has had Covid infection twice (Nov 2020, Jan 2022)    Review of Systems   Constitutional: Positive for fatigue.   HENT: Positive for sore throat.    Respiratory: Positive for cough.             Objective           Vitals:  No vitals were obtained today due to virtual visit.    Physical Exam   healthy, alert and no distress  PSYCH: Alert and oriented times 3; coherent speech, normal   rate and volume, able to articulate logical thoughts, able   to abstract reason, no tangential thoughts, no hallucinations   or delusions  Her affect is normal  RESP: No cough, no audible wheezing, able to talk in full sentences  Remainder of exam unable to be completed due to telephone visits                Phone call duration: 6 minutes    .  ..  "

## 2022-06-21 LAB — SARS-COV-2 RNA RESP QL NAA+PROBE: NEGATIVE

## 2022-06-22 ENCOUNTER — TELEPHONE (OUTPATIENT)
Dept: FAMILY MEDICINE | Facility: CLINIC | Age: 72
End: 2022-06-22

## 2022-06-22 DIAGNOSIS — J32.9 SINUSITIS: Primary | ICD-10-CM

## 2022-06-22 RX ORDER — DOXYCYCLINE HYCLATE 100 MG
100 TABLET ORAL 2 TIMES DAILY
Qty: 20 TABLET | Refills: 0 | Status: SHIPPED | OUTPATIENT
Start: 2022-06-22 | End: 2022-07-02

## 2022-06-22 NOTE — TELEPHONE ENCOUNTER
Patient reports that she continues to have sinus issues. Had negative COVID.    Patient was seen 6/20/22 (visit).    Patient tolerates Doxycycline.    Requesting Rx for antibiotic based on visit.    (Patient Primary: [facility] and Acute visit completed by DORON FARRELL).    Please advise.

## 2022-06-22 NOTE — TELEPHONE ENCOUNTER
Reason for Call:  Not feeling well     Detailed comments: Had neg covid test on 06/21 patient thinks she now has a sinus infection     Phone Number Patient can be reached at: Home number on file [phone] (home)    Best Time: any     Can we leave a detailed message on this number? YES    Call taken on 6/22/2022 at 9:18 AM by Clair Amador

## 2022-06-22 NOTE — TELEPHONE ENCOUNTER
Pt notified prescription has been sent and should follow up if not improving or worsens. Pt verbalized understanding and apprecation.

## 2022-07-19 ENCOUNTER — OFFICE VISIT (OUTPATIENT)
Dept: FAMILY MEDICINE | Facility: CLINIC | Age: 72
End: 2022-07-19

## 2022-07-19 ENCOUNTER — ANCILLARY PROCEDURE (OUTPATIENT)
Dept: GENERAL RADIOLOGY | Facility: CLINIC | Age: 72
End: 2022-07-19
Attending: PHYSICIAN ASSISTANT
Payer: MEDICARE

## 2022-07-19 VITALS
WEIGHT: 113 LBS | HEART RATE: 96 BPM | BODY MASS INDEX: 21.71 KG/M2 | SYSTOLIC BLOOD PRESSURE: 130 MMHG | DIASTOLIC BLOOD PRESSURE: 85 MMHG

## 2022-07-19 DIAGNOSIS — M25.532 LEFT WRIST PAIN: ICD-10-CM

## 2022-07-19 DIAGNOSIS — M25.532 LEFT WRIST PAIN: Primary | ICD-10-CM

## 2022-07-19 PROCEDURE — 73110 X-RAY EXAM OF WRIST: CPT | Mod: TC | Performed by: RADIOLOGY

## 2022-07-19 PROCEDURE — 99213 OFFICE O/P EST LOW 20 MIN: CPT | Performed by: PHYSICIAN ASSISTANT

## 2022-07-19 ASSESSMENT — PATIENT HEALTH QUESTIONNAIRE - PHQ9
SUM OF ALL RESPONSES TO PHQ QUESTIONS 1-9: 5
10. IF YOU CHECKED OFF ANY PROBLEMS, HOW DIFFICULT HAVE THESE PROBLEMS MADE IT FOR YOU TO DO YOUR WORK, TAKE CARE OF THINGS AT HOME, OR GET ALONG WITH OTHER PEOPLE: NOT DIFFICULT AT ALL
SUM OF ALL RESPONSES TO PHQ QUESTIONS 1-9: 5

## 2022-07-19 ASSESSMENT — ENCOUNTER SYMPTOMS
NUMBNESS: 0
ARTHRALGIAS: 1
ACTIVITY CHANGE: 1
JOINT SWELLING: 1
PARESTHESIAS: 0

## 2022-07-19 NOTE — PROGRESS NOTES
Assessment & Plan     Left wrist pain  OA, probable ganglion. refer  - XR Wrist Left G/E 3 Views  - Orthopedic  Referral               Tobacco Cessation:   reports that she has been smoking cigarettes. She has never used smokeless tobacco.          No follow-ups on file.    RANDALL Pacheco  [facility]    Guru Gallagher is a 71 year old, presenting for the following health issues:  Wrist Problem      71-year-old female presents to the clinic with complaint of a prominence on the left wrist radial side volar aspect she has noticed that over the last couple of weeks distantly.  It is painful especially with it extreme volar or dorsiflexion.  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  She exercises with enough effort to increase her heart rate 4 days per week.   She is taking medications regularly.    Today's PHQ-9         PHQ-9 Total Score: 5    PHQ-9 Q9 Thoughts of better off dead/self-harm past 2 weeks :   Not at all    How difficult have these problems made it for you to do your work, take care of things at home, or get along with other people: Not difficult at all    Onset: 1 week ago  Description: Cyst like structure in left wrist.   Painful to touch, limited ROM, loss of dexterit, some swelling in the area but not today.   Intensity: moderate  Progression of Symptoms:  same  Accompanying Signs & Symptoms: none  Previous history of  similar problem: Similar issue on right hand/wrist May 2021  Therapies tried and outcome: Ice, somewhat helpful       Review of Systems   Constitutional: Positive for activity change.   Musculoskeletal: Positive for arthralgias and joint swelling.   Neurological: Negative for numbness and paresthesias.            Objective    There were no vitals taken for this visit.  There is no height or weight on file to calculate BMI.  Physical Exam there appears to be a ganglion cyst about 1 cm in size volar aspect radial side of the left wrist she has tenderness with extremes of volar and dorsiflexion no other tenderness about the wrist or hand she has intact radial pulse intact sensation to light touch                Results for orders placed or performed in visit on 07/19/22   XR Wrist Left G/E 3 Views     Status: None    Narrative    EXAM: XR WRIST LEFT G/E 3 VIEWS  LOCATION: [facility]  DATE/TIME: 7/19/2022 9:28 AM    INDICATION:  Left wrist pain.  COMPARISON: None.      Impression    IMPRESSION: Degenerative change at the wrist joint involving the STT joint and first CMC joint. Degenerative change at the radioscaphoid and lunate articulation. Degenerative change at the DRUJ. No evidence for fracture. Normal carpal alignment.                 visit      .  ..

## 2022-08-18 ENCOUNTER — TELEPHONE (OUTPATIENT)
Dept: FAMILY MEDICINE | Facility: CLINIC | Age: 72
End: 2022-08-18

## 2022-08-18 DIAGNOSIS — F41.1 GENERALIZED ANXIETY DISORDER: ICD-10-CM

## 2022-08-18 RX ORDER — HYDROXYZINE PAMOATE 25 MG/1
25 CAPSULE ORAL 4 TIMES DAILY PRN
Qty: 40 CAPSULE | Refills: 3 | Status: SHIPPED | OUTPATIENT
Start: 2022-08-18 | End: 2022-09-06

## 2022-08-18 NOTE — TELEPHONE ENCOUNTER
Reason for Call:  Medication or medication refill:    Do you use a [facility]?  Name of the pharmacy and phone number for the current request:  [facility]     Name of the medication requested: Hydroxyzine 25mg     Other request: none     Can we leave a detailed message on this number? YES    Phone number patient can be reached at: Home number on file [phone] (home)    Best Time: any     Call taken on 8/18/2022 at 8:15 AM by Clair Amador

## 2022-08-18 NOTE — TELEPHONE ENCOUNTER
Prescription approved per [facility] Refill Protocol.    Last Written Prescription Date:  6/7/22  Last Fill Quantity: 40,  # refills: 3   Last office visit: 7/19/2022 with prescribing provider

## 2022-08-20 ENCOUNTER — OFFICE VISIT (OUTPATIENT)
Dept: URGENT CARE | Facility: URGENT CARE | Age: 72
End: 2022-08-20
Payer: MEDICARE

## 2022-08-20 VITALS
SYSTOLIC BLOOD PRESSURE: 146 MMHG | OXYGEN SATURATION: 96 % | WEIGHT: 112 LBS | RESPIRATION RATE: 20 BRPM | TEMPERATURE: 98.9 F | BODY MASS INDEX: 21.51 KG/M2 | DIASTOLIC BLOOD PRESSURE: 82 MMHG | HEART RATE: 91 BPM

## 2022-08-20 DIAGNOSIS — R06.02 SOB (SHORTNESS OF BREATH): ICD-10-CM

## 2022-08-20 DIAGNOSIS — F41.9 ANXIETY: ICD-10-CM

## 2022-08-20 DIAGNOSIS — F41.9 ANXIETY: Primary | ICD-10-CM

## 2022-08-20 LAB
T4 FREE SERPL-MCNC: 1.29 NG/DL (ref 0.9–1.7)
TSH SERPL DL<=0.005 MIU/L-ACNC: 5 UIU/ML (ref 0.3–4.2)

## 2022-08-20 PROCEDURE — 36415 COLL VENOUS BLD VENIPUNCTURE: CPT

## 2022-08-20 PROCEDURE — 84439 ASSAY OF FREE THYROXINE: CPT

## 2022-08-20 PROCEDURE — 99215 OFFICE O/P EST HI 40 MIN: CPT

## 2022-08-20 PROCEDURE — 84443 ASSAY THYROID STIM HORMONE: CPT

## 2022-08-20 RX ORDER — ALBUTEROL SULFATE 0.83 MG/ML
2.5 SOLUTION RESPIRATORY (INHALATION) ONCE
Status: COMPLETED | OUTPATIENT
Start: 2022-08-20 | End: 2022-08-20

## 2022-08-20 RX ORDER — PROPRANOLOL HYDROCHLORIDE 10 MG/1
10 TABLET ORAL 2 TIMES DAILY
Qty: 60 TABLET | Refills: 0 | Status: SHIPPED | OUTPATIENT
Start: 2022-08-20 | End: 2022-08-22

## 2022-08-20 RX ORDER — MELOXICAM 15 MG/1
TABLET ORAL
Status: ON HOLD | COMMUNITY
Start: 2022-07-26 | End: 2023-03-27

## 2022-08-20 RX ORDER — LEVALBUTEROL TARTRATE 45 UG/1
2 AEROSOL, METERED ORAL EVERY 4 HOURS PRN
Qty: 1 G | Refills: 1 | Status: ON HOLD | OUTPATIENT
Start: 2022-08-20 | End: 2023-03-27

## 2022-08-20 RX ADMIN — ALBUTEROL SULFATE 2.5 MG: 0.83 SOLUTION RESPIRATORY (INHALATION) at 16:24

## 2022-08-20 NOTE — PROGRESS NOTES
Assessment & Plan     Anxiety  Increase escitalopram to 15 mg (up 5 mg).  Max dose for PTSD is 40 mg.  Discussed this would take time to notice effect and there is more room for slow taper up as needed.    Pt is interested in starting a beta blocker. She has discussed this with Dr. Gill in the past and had recommendation from a close medical provider friend and interested in trial. Will start with low dose. Monitor for side effect of fatigue, bradycardia.     Should try additional psychotherapy and referral placed.  Initial provider is not a good fit but pt is willing to try another provider.  I discussed trial of calm shantel, continued breathing techniques.     Will check TSH. She has had troponin and D dimer, BMP, bmp, cbc, bnp at time of one of her ED visits for same sx.      Pt does have a hx of tobacco use and remote asthma. She has not tolerated albuterol in the past due to shaking/jittery and heart racing. We did give her a neb in the clinic to see if that made a difference and did feel jittery/heart racy in clinic.  Will try xopenx at home to decrease the heart racing sensation which could contribute to increased anxiety as well.      Discussed follow-up with pcp. appt arranged for Monday. Pt has had mulitple visits and ED   Consider holter for heart racing sensation during these episodes to ensure not related to other cardiac causes such as SVT or other tachyarrhythmia.      - propranolol (INDERAL) 10 MG tablet  Dispense: 60 tablet; Refill: 0  - TSH with free T4 reflex  - Adult Mental Health  Referral  - TSH with free T4 reflex  - T4 free    SOB (shortness of breath)  \  - levalbuterol (XOPENEX HFA) 45 MCG/ACT inhaler  Dispense: 1 g; Refill: 1  - TSH with free T4 reflex  - [facility]  - TSH with free T4 reflex  - albuterol (PROVENTIL) neb solution 2.5 mg  - T4 free     40 minutes spent with pt reviewing records, coordination of care and documenting.     Eh  Urgent Care  [facility] [place] ISI Gallagher is a 71 year old female who presents to clinic today for the following health issues:  Chief Complaint   Patient presents with     Shortness of Breath     Dizzy x3 days. Feels this is related to anxiety/panic attacks.     HPI    Pt presents to urgent care with concerns re: sob.  She has a hx of similar associated with profound anxiety in the past.  Has had several ED visits for same.   Reports she has been significantly struggling with anxiety. Episodes of racing heart and difficulty taking a deep breath, feels like she is not getting adequate breath.  Relates to PTSD secondary to emergent surgery for ruptured appendix requiring hospitalization during early Covid 19 pandemic followed by isolation related to covid as well.  She has been treated with hydroxyzine, buspar, escitalopram over the last 2 months.  Has had problem with rebound anxiety with benzodiazepine.  Has tried conservative measures and psychotherapy (though not continued due to not the right fit) has done some additional tx for trauma that was not particularly helpful.  Pt reports these sx are same as previous.    She has a hx of asthma remotely and tobacco use and wonders if that contributes. Has an albuterol inhaler at home but causes heart racing sensation.    Has had previous work up in the ED for d dimer, bmp, bnp, cmp all normal.    She does have mitral valve prolapse, no chest pain currently.  She also endorces that when her mind is busy and occupied with tasks, work, surrounded by friend or family she has no sx of sob. She only notes these problems when mind allowed to be quite or not active.    Sleeping : falls asleep with hydroxyzine. Wakes after 4 hours.            Review of Systems  Constitutional, HEENT, cardiovascular, pulmonary, gi and gu systems are negative, except as otherwise noted.      Objective    BP (!) 146/82 (BP Location: Right arm, Patient  Position: Sitting)   Pulse 91   Temp 98.9  F (37.2  C) (Tympanic)   Resp 20   Wt 50.8 kg (112 lb)   SpO2 96%   Breastfeeding No   BMI 21.51 kg/m    Physical Exam   nad appears well  But anxious. Mild jittering especially after albuterol.     Takes frequent deep breaths.    Able to talk in full sentences.    Lungs CTA  Heart RRR  Though processes are clear and well organized.

## 2022-08-20 NOTE — PATIENT INSTRUCTIONS
Start metoprolol as ordered.    Increase escitalopram to 15 mg (1.5 tablets) until recheck with Dr. Terry.    Continue other medications as ordered.    Recheck with Dr. Terry    Try behavioral health again, reach out to your roni community for assistance as well.

## 2022-08-22 ENCOUNTER — OFFICE VISIT (OUTPATIENT)
Dept: FAMILY MEDICINE | Facility: CLINIC | Age: 72
End: 2022-08-22
Payer: MEDICARE

## 2022-08-22 VITALS
SYSTOLIC BLOOD PRESSURE: 151 MMHG | OXYGEN SATURATION: 97 % | HEART RATE: 66 BPM | WEIGHT: 113.1 LBS | DIASTOLIC BLOOD PRESSURE: 91 MMHG | BODY MASS INDEX: 21.72 KG/M2

## 2022-08-22 DIAGNOSIS — F41.1 GENERALIZED ANXIETY DISORDER: Primary | ICD-10-CM

## 2022-08-22 DIAGNOSIS — E03.8 SUBCLINICAL HYPOTHYROIDISM: ICD-10-CM

## 2022-08-22 PROCEDURE — 99214 OFFICE O/P EST MOD 30 MIN: CPT | Performed by: INTERNAL MEDICINE

## 2022-08-22 RX ORDER — PROPRANOLOL HYDROCHLORIDE 10 MG/1
10 TABLET ORAL 2 TIMES DAILY
Qty: 60 TABLET | Refills: 3 | Status: SHIPPED | OUTPATIENT
Start: 2022-08-22 | End: 2022-11-11

## 2022-08-22 RX ORDER — PROPRANOLOL HYDROCHLORIDE 10 MG/1
TABLET ORAL
Qty: 180 TABLET | OUTPATIENT
Start: 2022-08-22

## 2022-08-22 RX ORDER — ESCITALOPRAM OXALATE 10 MG/1
20 TABLET ORAL DAILY
Qty: 180 TABLET | Refills: 3 | Status: SHIPPED | OUTPATIENT
Start: 2022-08-22 | End: 2022-09-02

## 2022-08-22 ASSESSMENT — ANXIETY QUESTIONNAIRES
GAD7 TOTAL SCORE: 16
2. NOT BEING ABLE TO STOP OR CONTROL WORRYING: NEARLY EVERY DAY
GAD7 TOTAL SCORE: 16
3. WORRYING TOO MUCH ABOUT DIFFERENT THINGS: NEARLY EVERY DAY
4. TROUBLE RELAXING: MORE THAN HALF THE DAYS
6. BECOMING EASILY ANNOYED OR IRRITABLE: NOT AT ALL
7. FEELING AFRAID AS IF SOMETHING AWFUL MIGHT HAPPEN: MORE THAN HALF THE DAYS
7. FEELING AFRAID AS IF SOMETHING AWFUL MIGHT HAPPEN: MORE THAN HALF THE DAYS
8. IF YOU CHECKED OFF ANY PROBLEMS, HOW DIFFICULT HAVE THESE MADE IT FOR YOU TO DO YOUR WORK, TAKE CARE OF THINGS AT HOME, OR GET ALONG WITH OTHER PEOPLE?: NOT DIFFICULT AT ALL
IF YOU CHECKED OFF ANY PROBLEMS ON THIS QUESTIONNAIRE, HOW DIFFICULT HAVE THESE PROBLEMS MADE IT FOR YOU TO DO YOUR WORK, TAKE CARE OF THINGS AT HOME, OR GET ALONG WITH OTHER PEOPLE: NOT DIFFICULT AT ALL
5. BEING SO RESTLESS THAT IT IS HARD TO SIT STILL: NEARLY EVERY DAY
1. FEELING NERVOUS, ANXIOUS, OR ON EDGE: NEARLY EVERY DAY
GAD7 TOTAL SCORE: 16

## 2022-08-22 ASSESSMENT — ASTHMA QUESTIONNAIRES
ACT_TOTALSCORE: 16
QUESTION_2 LAST FOUR WEEKS HOW OFTEN HAVE YOU HAD SHORTNESS OF BREATH: MORE THAN ONCE A DAY
QUESTION_3 LAST FOUR WEEKS HOW OFTEN DID YOUR ASTHMA SYMPTOMS (WHEEZING, COUGHING, SHORTNESS OF BREATH, CHEST TIGHTNESS OR PAIN) WAKE YOU UP AT NIGHT OR EARLIER THAN USUAL IN THE MORNING: FOUR OR MORE NIGHTS A WEEK
QUESTION_5 LAST FOUR WEEKS HOW WOULD YOU RATE YOUR ASTHMA CONTROL: COMPLETELY CONTROLLED
QUESTION_4 LAST FOUR WEEKS HOW OFTEN HAVE YOU USED YOUR RESCUE INHALER OR NEBULIZER MEDICATION (SUCH AS ALBUTEROL): ONCE A WEEK OR LESS
QUESTION_1 LAST FOUR WEEKS HOW MUCH OF THE TIME DID YOUR ASTHMA KEEP YOU FROM GETTING AS MUCH DONE AT WORK, SCHOOL OR AT HOME: NONE OF THE TIME
ACT_TOTALSCORE: 16

## 2022-08-22 ASSESSMENT — ENCOUNTER SYMPTOMS
UNEXPECTED WEIGHT CHANGE: 0
SHORTNESS OF BREATH: 1
PALPITATIONS: 1
NERVOUS/ANXIOUS: 1
MYALGIAS: 0
SLEEP DISTURBANCE: 1
COUGH: 0
HEADACHES: 0
FATIGUE: 0

## 2022-08-22 NOTE — PROGRESS NOTES
Assessment & Plan     Generalized anxiety disorder  Patient struggling with anxiety which has become more generalized.  Initially BuSpar was quite helpful, escitalopram has been added without a lot of improvement.  - escitalopram (LEXAPRO) 10 MG tablet; Take 2 tablets (20 mg) by mouth daily for 90 days Take 1/2 tab for 6 days and increase to 1 tab on day 7  - Adult Mental Health  Referral; Future    Subclinical hypothyroidism  TSH of 5 with normal free T4.  No symptoms of hypothyroidism               Nicotine/Tobacco Cessation:  She reports that she has been smoking cigarettes. She has been smoking about 0.50 packs per day. She has never used smokeless tobacco.  Nicotine/Tobacco Cessation Plan:   Not at appropriate time to address this          Return in about 2 weeks (around 9/5/2022) for Follow up.    Pollo Terry MD  [facility]    Guru Gallagher is a 71 year old accompanied by her self, presenting for the following health issues:  Anxiety (Was in  on Saturday )      History of Present Illness       Mental Health Follow-up:  Patient presents to follow-up on Anxiety.    Patient's anxiety since last visit has been:  Medium  The patient is having other symptoms associated with anxiety.  Any significant life events: health concerns  Patient is feeling anxious or having panic attacks.  Patient has no concerns about alcohol or drug use.    She eats 2-3 servings of fruits and vegetables daily.She consumes 0 sweetened beverage(s) daily.She exercises with enough effort to increase her heart rate 20 to 29 minutes per day.  She exercises with enough effort to increase her heart rate 3 or less days per week.   She is taking medications regularly.  Today's MILTON-7 Score: 16     Patient is struggling with anxiety which has become rather pervasive.  She is CAD chronic insomnia.  History of panic attacks.  Has had a couple of ER visits with thorough evaluations of her symptoms.   What happened she feels her heart race and assurance of shortness of breath as well as anxiety      Review of Systems   Constitutional: Negative for fatigue and unexpected weight change.   Respiratory: Positive for shortness of breath. Negative for cough.    Cardiovascular: Positive for palpitations. Negative for chest pain and peripheral edema.   Musculoskeletal: Negative for myalgias.   Neurological: Negative for headaches.   Psychiatric/Behavioral: Positive for sleep disturbance. The patient is nervous/anxious.             Objective    BP (!) 151/91 (BP Location: Right arm)   Pulse 66   Wt 51.3 kg (113 lb 1.6 oz)   SpO2 97%   BMI 21.72 kg/m    Body mass index is 21.72 kg/m .  Physical Exam   Alert, oriented, mood is anxious.  Patient is fidgeting.  Chest clear.  Card exam is regular no tachycardia or murmur.  No edema.  Abdomen nontender.                    .  ..

## 2022-08-22 NOTE — TELEPHONE ENCOUNTER
This refill request is a duplicate request, previously received or sent.   Sent denial notification to pharmacy.  BARAK Upton  [facility]

## 2022-09-02 RX ORDER — ESCITALOPRAM OXALATE 10 MG/1
20 TABLET ORAL DAILY
Qty: 180 TABLET | Refills: 3 | Status: SHIPPED | OUTPATIENT
Start: 2022-09-02 | End: 2022-09-06

## 2022-09-02 RX ORDER — ESCITALOPRAM OXALATE 10 MG/1
20 TABLET ORAL DAILY
Qty: 180 TABLET | Refills: 3 | Status: CANCELLED | OUTPATIENT
Start: 2022-09-02

## 2022-09-06 ENCOUNTER — OFFICE VISIT (OUTPATIENT)
Dept: FAMILY MEDICINE | Facility: CLINIC | Age: 72
End: 2022-09-06
Payer: MEDICARE

## 2022-09-06 ENCOUNTER — TELEPHONE (OUTPATIENT)
Dept: FAMILY MEDICINE | Facility: CLINIC | Age: 72
End: 2022-09-06

## 2022-09-06 VITALS
TEMPERATURE: 99.2 F | OXYGEN SATURATION: 95 % | BODY MASS INDEX: 21.71 KG/M2 | WEIGHT: 113 LBS | HEART RATE: 74 BPM | DIASTOLIC BLOOD PRESSURE: 86 MMHG | SYSTOLIC BLOOD PRESSURE: 140 MMHG

## 2022-09-06 DIAGNOSIS — F41.1 GENERALIZED ANXIETY DISORDER: Primary | ICD-10-CM

## 2022-09-06 DIAGNOSIS — R06.09 OTHER FORM OF DYSPNEA: ICD-10-CM

## 2022-09-06 DIAGNOSIS — F17.210 SMOKING 1/2 PACK A DAY OR LESS: ICD-10-CM

## 2022-09-06 PROBLEM — J45.909 ASTHMA: Status: RESOLVED | Noted: 2018-12-10 | Resolved: 2022-09-06

## 2022-09-06 PROCEDURE — 99213 OFFICE O/P EST LOW 20 MIN: CPT | Performed by: INTERNAL MEDICINE

## 2022-09-06 RX ORDER — HYDROXYZINE PAMOATE 25 MG/1
50 CAPSULE ORAL 4 TIMES DAILY PRN
Qty: 180 CAPSULE | Refills: 0 | Status: SHIPPED | OUTPATIENT
Start: 2022-09-06 | End: 2022-09-07

## 2022-09-06 RX ORDER — ESCITALOPRAM OXALATE 10 MG/1
20 TABLET ORAL DAILY
Qty: 180 TABLET | Refills: 3 | Status: ON HOLD | OUTPATIENT
Start: 2022-09-06 | End: 2023-03-27

## 2022-09-06 ASSESSMENT — ENCOUNTER SYMPTOMS
FATIGUE: 0
NERVOUS/ANXIOUS: 1
ABDOMINAL PAIN: 0
HEADACHES: 0
SLEEP DISTURBANCE: 1
FEVER: 0
COUGH: 0
UNEXPECTED WEIGHT CHANGE: 0

## 2022-09-06 ASSESSMENT — ANXIETY QUESTIONNAIRES
2. NOT BEING ABLE TO STOP OR CONTROL WORRYING: MORE THAN HALF THE DAYS
GAD7 TOTAL SCORE: 12
5. BEING SO RESTLESS THAT IT IS HARD TO SIT STILL: NEARLY EVERY DAY
GAD7 TOTAL SCORE: 12
IF YOU CHECKED OFF ANY PROBLEMS ON THIS QUESTIONNAIRE, HOW DIFFICULT HAVE THESE PROBLEMS MADE IT FOR YOU TO DO YOUR WORK, TAKE CARE OF THINGS AT HOME, OR GET ALONG WITH OTHER PEOPLE: SOMEWHAT DIFFICULT
4. TROUBLE RELAXING: MORE THAN HALF THE DAYS
8. IF YOU CHECKED OFF ANY PROBLEMS, HOW DIFFICULT HAVE THESE MADE IT FOR YOU TO DO YOUR WORK, TAKE CARE OF THINGS AT HOME, OR GET ALONG WITH OTHER PEOPLE?: SOMEWHAT DIFFICULT
3. WORRYING TOO MUCH ABOUT DIFFERENT THINGS: SEVERAL DAYS
GAD7 TOTAL SCORE: 12
7. FEELING AFRAID AS IF SOMETHING AWFUL MIGHT HAPPEN: SEVERAL DAYS
6. BECOMING EASILY ANNOYED OR IRRITABLE: NOT AT ALL
1. FEELING NERVOUS, ANXIOUS, OR ON EDGE: NEARLY EVERY DAY
7. FEELING AFRAID AS IF SOMETHING AWFUL MIGHT HAPPEN: SEVERAL DAYS

## 2022-09-06 ASSESSMENT — ASTHMA QUESTIONNAIRES
QUESTION_1 LAST FOUR WEEKS HOW MUCH OF THE TIME DID YOUR ASTHMA KEEP YOU FROM GETTING AS MUCH DONE AT WORK, SCHOOL OR AT HOME: NONE OF THE TIME
QUESTION_2 LAST FOUR WEEKS HOW OFTEN HAVE YOU HAD SHORTNESS OF BREATH: MORE THAN ONCE A DAY
QUESTION_3 LAST FOUR WEEKS HOW OFTEN DID YOUR ASTHMA SYMPTOMS (WHEEZING, COUGHING, SHORTNESS OF BREATH, CHEST TIGHTNESS OR PAIN) WAKE YOU UP AT NIGHT OR EARLIER THAN USUAL IN THE MORNING: NOT AT ALL
ACT_TOTALSCORE: 21
QUESTION_4 LAST FOUR WEEKS HOW OFTEN HAVE YOU USED YOUR RESCUE INHALER OR NEBULIZER MEDICATION (SUCH AS ALBUTEROL): NOT AT ALL
QUESTION_5 LAST FOUR WEEKS HOW WOULD YOU RATE YOUR ASTHMA CONTROL: COMPLETELY CONTROLLED
ACT_TOTALSCORE: 21

## 2022-09-06 NOTE — TELEPHONE ENCOUNTER
Fax received from [facility] regarding Rx sent in for lexapro 10 mg tablets. Note from pharmacy state maximum recommended daily dose of elderly patients is 10 mg. Also notes that this patient has a history of filling 10 mg tablets prescribed by Dr. Pruett on 8/2/22.    Pharmacy asks for call with clarification   [phone]

## 2022-09-06 NOTE — PROGRESS NOTES
"  Assessment & Plan     Generalized anxiety disorder  Improved with MILTON-7 decreased from 16-12.  Follow-up in a month.  Think we might consider tapering off BuSpar at that time.  - escitalopram (LEXAPRO) 10 MG tablet; Take 2 tablets (20 mg) by mouth daily for 90 days  - hydrOXYzine (VISTARIL) 25 MG capsule; Take 2 capsules (50 mg) by mouth 4 times daily as needed for anxiety    Other form of dyspnea  ED visit this spring with a chest x-ray showing some peripheral scarring and an unremarkable work-up including D-dimer troponin.  Patient's symptoms are episodic and seem like they are related to anxiety.  - Adult Pulmonary Medicine Referral; Future    Smoking 1/2 pack a day or less  Quit for 15 months after her surgery but has resumed again.                   Return in about 4 weeks (around 10/4/2022) for Follow up.    Pollo Terry MD  [facility]    Guru Gallagher is a 71 year old accompanied by her self, presenting for the following health issues:  Anxiety      History of Present Illness       Mental Health Follow-up:  Patient presents to follow-up on Anxiety.    Patient's anxiety since last visit has been:  Medium  The patient is having other symptoms associated with anxiety.  Any significant life events: relationship concerns  Patient is feeling anxious or having panic attacks.  Patient has no concerns about alcohol or drug use.    She eats 2-3 servings of fruits and vegetables daily.She consumes 0 sweetened beverage(s) daily.She exercises with enough effort to increase her heart rate 30 to 60 minutes per day.    She is taking medications regularly.  Today's MILTON-7 Score: 12     Patient's anxiety has improved with the addition and titration of escitalopram.  MILTON-7 improved.  She gets episodes where she feels a sensation of a \"catch\" in her breathing.  She quit smoking for 15 months but resumed it.  Stressful situation due to her 's progressive dementia.  They are " contemplating moving to the [facility] to be closer to family.      Review of Systems   Constitutional: Negative for fatigue, fever and unexpected weight change.   Respiratory: Negative for cough.    Cardiovascular: Negative for chest pain and peripheral edema.   Gastrointestinal: Negative for abdominal pain.   Neurological: Negative for headaches.   Psychiatric/Behavioral: Positive for sleep disturbance. The patient is nervous/anxious.             Objective    BP (!) 140/86 (BP Location: Right arm)   Pulse 74   Temp 99.2  F (37.3  C)   Wt 51.3 kg (113 lb)   SpO2 95%   BMI 21.71 kg/m    Body mass index is 21.71 kg/m .  Physical Exam   Patient appears comfortable.  Alert and oriented.  No distress.  Chest clear card exam regular.  No edema.                    [unfilled]  @[facility]@

## 2022-09-07 ENCOUNTER — TELEPHONE (OUTPATIENT)
Dept: FAMILY MEDICINE | Facility: CLINIC | Age: 72
End: 2022-09-07

## 2022-09-07 DIAGNOSIS — F41.1 GENERALIZED ANXIETY DISORDER: ICD-10-CM

## 2022-09-07 RX ORDER — HYDROXYZINE PAMOATE 25 MG/1
50 CAPSULE ORAL 4 TIMES DAILY PRN
Qty: 180 CAPSULE | Refills: 0 | Status: SHIPPED | OUTPATIENT
Start: 2022-09-07 | End: 2022-09-15

## 2022-09-07 RX ORDER — HYDROXYZINE HYDROCHLORIDE 25 MG/1
50 TABLET, FILM COATED ORAL 3 TIMES DAILY PRN
Qty: 60 TABLET | Refills: 1 | Status: SHIPPED | OUTPATIENT
Start: 2022-09-07 | End: 2022-09-15

## 2022-09-07 NOTE — TELEPHONE ENCOUNTER
TC received from pt, requesting new prescription for increased dose of hydroxyzine to be sent to [facility].   Chart reviewed; was sent to mail order pharmacy. Pt requested to have that prescription cancelled and sent to [facility] going forward.     Last Written Prescription Date: 9/6/22  Last Fill Quantity: 180,  # refills: 0   Last office visit: 9/6/2022 with prescribing provider   TC with [facility] pharmacist, requesting to cancel hydroxyzine from mail order.

## 2022-09-09 DIAGNOSIS — R06.09 OTHER FORM OF DYSPNEA: Primary | ICD-10-CM

## 2022-09-12 ENCOUNTER — ANCILLARY PROCEDURE (OUTPATIENT)
Dept: GENERAL RADIOLOGY | Facility: CLINIC | Age: 72
End: 2022-09-12
Attending: PHYSICIAN ASSISTANT
Payer: MEDICARE

## 2022-09-12 ENCOUNTER — OFFICE VISIT (OUTPATIENT)
Dept: URGENT CARE | Facility: URGENT CARE | Age: 72
End: 2022-09-12
Payer: MEDICARE

## 2022-09-12 VITALS
HEART RATE: 87 BPM | TEMPERATURE: 98.8 F | WEIGHT: 113 LBS | DIASTOLIC BLOOD PRESSURE: 95 MMHG | BODY MASS INDEX: 21.71 KG/M2 | OXYGEN SATURATION: 98 % | RESPIRATION RATE: 20 BRPM | SYSTOLIC BLOOD PRESSURE: 158 MMHG

## 2022-09-12 DIAGNOSIS — F17.210 SMOKING 1/2 PACK A DAY OR LESS: Primary | ICD-10-CM

## 2022-09-12 DIAGNOSIS — R09.A2 GLOBUS HYSTERICUS: ICD-10-CM

## 2022-09-12 DIAGNOSIS — K21.9 GASTROESOPHAGEAL REFLUX DISEASE WITHOUT ESOPHAGITIS: ICD-10-CM

## 2022-09-12 LAB
BASOPHILS # BLD AUTO: 0 10E3/UL (ref 0–0.2)
BASOPHILS NFR BLD AUTO: 0 %
EOSINOPHIL # BLD AUTO: 0.1 10E3/UL (ref 0–0.7)
EOSINOPHIL NFR BLD AUTO: 1 %
ERYTHROCYTE [DISTWIDTH] IN BLOOD BY AUTOMATED COUNT: 13 % (ref 10–15)
ERYTHROCYTE [SEDIMENTATION RATE] IN BLOOD BY WESTERGREN METHOD: 4 MM/HR (ref 0–30)
HCT VFR BLD AUTO: 43.5 % (ref 35–47)
HGB BLD-MCNC: 14.4 G/DL (ref 11.7–15.7)
LYMPHOCYTES # BLD AUTO: 2 10E3/UL (ref 0.8–5.3)
LYMPHOCYTES NFR BLD AUTO: 20 %
MCH RBC QN AUTO: 31.2 PG (ref 26.5–33)
MCHC RBC AUTO-ENTMCNC: 33.1 G/DL (ref 31.5–36.5)
MCV RBC AUTO: 94 FL (ref 78–100)
MONOCYTES # BLD AUTO: 0.9 10E3/UL (ref 0–1.3)
MONOCYTES NFR BLD AUTO: 9 %
NEUTROPHILS # BLD AUTO: 7.2 10E3/UL (ref 1.6–8.3)
NEUTROPHILS NFR BLD AUTO: 70 %
PLATELET # BLD AUTO: 244 10E3/UL (ref 150–450)
RBC # BLD AUTO: 4.61 10E6/UL (ref 3.8–5.2)
TROPONIN I SERPL HS-MCNC: 5 NG/L
WBC # BLD AUTO: 10.2 10E3/UL (ref 4–11)

## 2022-09-12 PROCEDURE — 85025 COMPLETE CBC W/AUTO DIFF WBC: CPT | Performed by: PHYSICIAN ASSISTANT

## 2022-09-12 PROCEDURE — 70360 X-RAY EXAM OF NECK: CPT | Mod: TC | Performed by: RADIOLOGY

## 2022-09-12 PROCEDURE — 85652 RBC SED RATE AUTOMATED: CPT | Performed by: PHYSICIAN ASSISTANT

## 2022-09-12 PROCEDURE — 84484 ASSAY OF TROPONIN QUANT: CPT | Performed by: PHYSICIAN ASSISTANT

## 2022-09-12 PROCEDURE — 36415 COLL VENOUS BLD VENIPUNCTURE: CPT | Performed by: PHYSICIAN ASSISTANT

## 2022-09-12 PROCEDURE — 99214 OFFICE O/P EST MOD 30 MIN: CPT | Performed by: PHYSICIAN ASSISTANT

## 2022-09-12 RX ORDER — OMEPRAZOLE 40 MG/1
40 CAPSULE, DELAYED RELEASE ORAL DAILY
Qty: 30 CAPSULE | Refills: 3 | Status: ON HOLD | OUTPATIENT
Start: 2022-09-12 | End: 2023-03-27

## 2022-09-12 NOTE — PROGRESS NOTES
Assessment & Plan     Smoking 1/2 pack a day or less    Discussed the increased risk of cancer    Globus hystericus    xr neck Negative for acute findings, read by Efren REYES at time of visit.  CBC neg for anemia  Troponin neg for cardiac event    Referral to GI for discussion of endoscopy    - XR Neck Soft Tissue; Future  - Troponin I; Future  - ESR: Erythrocyte sedimentation rate; Future  - CBC with platelets and differential; Future  - Troponin I  - ESR: Erythrocyte sedimentation rate  - CBC with platelets and differential  - Adult GI  Referral - Consult Only; Future    Gastroesophageal reflux disease without esophagitis    The esophagus is a tube that carries food from the mouth to the stomach. A valve (the LES, lower esophageal sphincter) at the lower end of the esophagus prevents stomach acid from flowing upward. When this valve doesn't work properly, stomach contents may repeatedly flow back up (reflux) into the esophagus. This is called gastroesophageal reflux disease (GERD). GERD can irritate the esophagus. It can cause problems with pain, swallowing or breathing. In severe cases, GERD can cause recurrent pneumonia (from aspiration or breathing in particles) or other serious problems.  Symptoms of reflux include burning, pressure or sharp pain in the upper abdomen or mid to lower chest. The pain can spread to the neck, back, or shoulder. There may be belching, an acid taste in the back of the throat, chronic cough, or sore throat, or hoarseness. GERD symptoms often occur during the day after a big meal. They can also occur at night when lying down.   - omeprazole (PRILOSEC) 40 MG DR capsule; Take 1 capsule (40 mg) by mouth daily    Review of external notes as documented elsewhere in note       CONSULTATION/REFERRAL to GI    No follow-ups on file.    Efren Vyas, ERIC, PAJM  [facility] LAURA Gallagher is a 71 year old, presenting for the following  "health issues:  Gastrointestinal Problem (GERD sx's since yesterday, \"lump in throat\". Patient under great amount of stress at this time)      HPI   Review of Systems   Constitutional, HEENT, cardiovascular, pulmonary, GI, , musculoskeletal, neuro, skin, endocrine and psych systems are negative, except as otherwise noted.      Objective    BP (!) 158/95   Pulse 87   Temp 98.8  F (37.1  C)   Resp 20   Wt 51.3 kg (113 lb)   SpO2 98%   BMI 21.71 kg/m    Body mass index is 21.71 kg/m .  Physical Exam   GENERAL: healthy, alert and no distress  EYES: Eyes grossly normal to inspection, PERRL and conjunctivae and sclerae normal  HENT: ear canals and TM's normal, nose and mouth without ulcers or lesions  NECK: no adenopathy, no asymmetry, masses, or scars and thyroid normal to palpation  RESP: lungs clear to auscultation - no rales, rhonchi or wheezes  CV: regular rate and rhythm, normal S1 S2, no S3 or S4, no murmur, click or rub, no peripheral edema and peripheral pulses strong  ABDOMEN: soft, nontender, no hepatosplenomegaly, no masses and bowel sounds normal  MS: no gross musculoskeletal defects noted, no edema  SKIN: no suspicious lesions or rashes  NEURO: Normal strength and tone, mentation intact and speech normal  PSYCH: mentation appears normal, affect normal/bright        Results for orders placed or performed in visit on 09/12/22   XR Neck Soft Tissue     Status: None (Preliminary result)    Narrative    XR NECK SOFT TISSUE   9/12/2022 11:25 AM     HISTORY: Globus hystericus    COMPARISON: None.      Impression    IMPRESSION: No convincing acute soft tissue abnormality by x-ray.  Epiglottis within normal limits. No prevertebral soft tissue swelling.  Severe cervical spine degenerative changes.   Results for orders placed or performed in visit on 09/12/22   Troponin I     Status: Normal   Result Value Ref Range    Troponin I High Sensitivity 5 <54 ng/L   ESR: Erythrocyte sedimentation rate     Status: " Normal   Result Value Ref Range    Erythrocyte Sedimentation Rate 4 0 - 30 mm/hr   CBC with platelets and differential     Status: None   Result Value Ref Range    WBC Count 10.2 4.0 - 11.0 10e3/uL    RBC Count 4.61 3.80 - 5.20 10e6/uL    Hemoglobin 14.4 11.7 - 15.7 g/dL    Hematocrit 43.5 35.0 - 47.0 %    MCV 94 78 - 100 fL    MCH 31.2 26.5 - 33.0 pg    MCHC 33.1 31.5 - 36.5 g/dL    RDW 13.0 10.0 - 15.0 %    Platelet Count 244 150 - 450 10e3/uL    % Neutrophils 70 %    % Lymphocytes 20 %    % Monocytes 9 %    % Eosinophils 1 %    % Basophils 0 %    Absolute Neutrophils 7.2 1.6 - 8.3 10e3/uL    Absolute Lymphocytes 2.0 0.8 - 5.3 10e3/uL    Absolute Monocytes 0.9 0.0 - 1.3 10e3/uL    Absolute Eosinophils 0.1 0.0 - 0.7 10e3/uL    Absolute Basophils 0.0 0.0 - 0.2 10e3/uL   CBC with platelets and differential     Status: None    Narrative    The following orders were created for panel order CBC with platelets and differential.  Procedure                               Abnormality         Status                     ---------                               -----------         ------                     CBC with platelets and d...[[id number]]                      Final result                 Please view results for these tests on the individual orders.

## 2022-09-13 ENCOUNTER — HOSPITAL ENCOUNTER (EMERGENCY)
Facility: CLINIC | Age: 72
Discharge: HOME | End: 2022-09-14
Attending: EMERGENCY MEDICINE | Admitting: EMERGENCY MEDICINE
Payer: MEDICARE

## 2022-09-13 ENCOUNTER — APPOINTMENT (OUTPATIENT)
Dept: GENERAL RADIOLOGY | Facility: CLINIC | Age: 72
End: 2022-09-13
Attending: EMERGENCY MEDICINE
Payer: MEDICARE

## 2022-09-13 DIAGNOSIS — F41.9 ANXIETY: ICD-10-CM

## 2022-09-13 DIAGNOSIS — R06.02 SHORTNESS OF BREATH: ICD-10-CM

## 2022-09-13 LAB
ANION GAP SERPL CALCULATED.3IONS-SCNC: 11 MMOL/L (ref 3–14)
ATRIAL RATE - MUSE: 71 BPM
BASOPHILS # BLD AUTO: 0.1 10E3/UL (ref 0–0.2)
BASOPHILS NFR BLD AUTO: 1 %
BUN SERPL-MCNC: 8 MG/DL (ref 7–30)
CALCIUM SERPL-MCNC: 8.9 MG/DL (ref 8.5–10.1)
CHLORIDE BLD-SCNC: 104 MMOL/L (ref 94–109)
CO2 SERPL-SCNC: 22 MMOL/L (ref 20–32)
CREAT SERPL-MCNC: 0.69 MG/DL (ref 0.52–1.04)
D DIMER PPP FEU-MCNC: <0.27 UG/ML FEU (ref 0–0.5)
DIASTOLIC BLOOD PRESSURE - MUSE: NORMAL MMHG
EOSINOPHIL # BLD AUTO: 0.3 10E3/UL (ref 0–0.7)
EOSINOPHIL NFR BLD AUTO: 3 %
ERYTHROCYTE [DISTWIDTH] IN BLOOD BY AUTOMATED COUNT: 12.8 % (ref 10–15)
GFR SERPL CREATININE-BSD FRML MDRD: >90 ML/MIN/1.73M2
GLUCOSE BLD-MCNC: 96 MG/DL (ref 70–99)
HCT VFR BLD AUTO: 40.2 % (ref 35–47)
HGB BLD-MCNC: 13.2 G/DL (ref 11.7–15.7)
IMM GRANULOCYTES # BLD: 0.1 10E3/UL
IMM GRANULOCYTES NFR BLD: 1 %
INTERPRETATION ECG - MUSE: NORMAL
LYMPHOCYTES # BLD AUTO: 2.1 10E3/UL (ref 0.8–5.3)
LYMPHOCYTES NFR BLD AUTO: 19 %
MCH RBC QN AUTO: 30.9 PG (ref 26.5–33)
MCHC RBC AUTO-ENTMCNC: 32.8 G/DL (ref 31.5–36.5)
MCV RBC AUTO: 94 FL (ref 78–100)
MONOCYTES # BLD AUTO: 1.1 10E3/UL (ref 0–1.3)
MONOCYTES NFR BLD AUTO: 10 %
NEUTROPHILS # BLD AUTO: 7.8 10E3/UL (ref 1.6–8.3)
NEUTROPHILS NFR BLD AUTO: 66 %
NRBC # BLD AUTO: 0 10E3/UL
NRBC BLD AUTO-RTO: 0 /100
P AXIS - MUSE: 52 DEGREES
PLATELET # BLD AUTO: 230 10E3/UL (ref 150–450)
POTASSIUM BLD-SCNC: 3.8 MMOL/L (ref 3.4–5.3)
PR INTERVAL - MUSE: 156 MS
QRS DURATION - MUSE: 78 MS
QT - MUSE: 424 MS
QTC - MUSE: 460 MS
R AXIS - MUSE: 56 DEGREES
RBC # BLD AUTO: 4.27 10E6/UL (ref 3.8–5.2)
SODIUM SERPL-SCNC: 137 MMOL/L (ref 133–144)
SYSTOLIC BLOOD PRESSURE - MUSE: NORMAL MMHG
T AXIS - MUSE: 35 DEGREES
TROPONIN I SERPL HS-MCNC: 4 NG/L
VENTRICULAR RATE- MUSE: 71 BPM
WBC # BLD AUTO: 11.5 10E3/UL (ref 4–11)

## 2022-09-13 PROCEDURE — 36415 COLL VENOUS BLD VENIPUNCTURE: CPT | Performed by: EMERGENCY MEDICINE

## 2022-09-13 PROCEDURE — 71046 X-RAY EXAM CHEST 2 VIEWS: CPT

## 2022-09-13 PROCEDURE — 82310 ASSAY OF CALCIUM: CPT | Performed by: EMERGENCY MEDICINE

## 2022-09-13 PROCEDURE — 84484 ASSAY OF TROPONIN QUANT: CPT | Performed by: EMERGENCY MEDICINE

## 2022-09-13 PROCEDURE — 85025 COMPLETE CBC W/AUTO DIFF WBC: CPT | Performed by: EMERGENCY MEDICINE

## 2022-09-13 PROCEDURE — C9803 HOPD COVID-19 SPEC COLLECT: HCPCS

## 2022-09-13 PROCEDURE — 85379 FIBRIN DEGRADATION QUANT: CPT | Performed by: EMERGENCY MEDICINE

## 2022-09-13 PROCEDURE — 99285 EMERGENCY DEPT VISIT HI MDM: CPT | Mod: 25

## 2022-09-13 PROCEDURE — U0005 INFEC AGEN DETEC AMPLI PROBE: HCPCS | Performed by: EMERGENCY MEDICINE

## 2022-09-13 PROCEDURE — 93005 ELECTROCARDIOGRAM TRACING: CPT

## 2022-09-13 RX ORDER — HYDROXYZINE HYDROCHLORIDE 25 MG/1
50 TABLET, FILM COATED ORAL ONCE
Status: COMPLETED | OUTPATIENT
Start: 2022-09-13 | End: 2022-09-14

## 2022-09-13 ASSESSMENT — ACTIVITIES OF DAILY LIVING (ADL): ADLS_ACUITY_SCORE: 35

## 2022-09-13 ASSESSMENT — ENCOUNTER SYMPTOMS
NERVOUS/ANXIOUS: 1
WHEEZING: 1
CHEST TIGHTNESS: 1
SHORTNESS OF BREATH: 1

## 2022-09-14 ENCOUNTER — TELEPHONE (OUTPATIENT)
Dept: BEHAVIORAL HEALTH | Facility: CLINIC | Age: 72
End: 2022-09-14

## 2022-09-14 VITALS
SYSTOLIC BLOOD PRESSURE: 128 MMHG | HEART RATE: 90 BPM | TEMPERATURE: 98.5 F | BODY MASS INDEX: 22.09 KG/M2 | WEIGHT: 115 LBS | RESPIRATION RATE: 20 BRPM | DIASTOLIC BLOOD PRESSURE: 70 MMHG | OXYGEN SATURATION: 97 %

## 2022-09-14 LAB — SARS-COV-2 RNA RESP QL NAA+PROBE: NEGATIVE

## 2022-09-14 PROCEDURE — 250N000013 HC RX MED GY IP 250 OP 250 PS 637: Performed by: EMERGENCY MEDICINE

## 2022-09-14 PROCEDURE — 90791 PSYCH DIAGNOSTIC EVALUATION: CPT

## 2022-09-14 RX ADMIN — HYDROXYZINE HYDROCHLORIDE 50 MG: 25 TABLET, FILM COATED ORAL at 00:06

## 2022-09-14 ASSESSMENT — ACTIVITIES OF DAILY LIVING (ADL): ADLS_ACUITY_SCORE: 35

## 2022-09-14 NOTE — TELEPHONE ENCOUNTER
Writer spoke with patient and scheduled initial psychiatry appointment for 09/15/22 @10:30 am with Nola Fierro. Tracker completed.    Maricarmen Coppolara  09/14/22  [id number]    ----- Message from David Solis RN sent at 9/14/2022 11:05 AM CDT -----  Regarding: FW: Referral   Mental Health &Addiction (MH&A)Transition Clinic (TC):     Provides Patient Support While Waiting to Access Programmatic and Outpatient MH&A Care and Provides Select Crisis Assessment Services     NURSING Referral Review  _________________________________________    This RN has reviewed this Medication Management referral to the Transition Clinic and deemed the referral    Appropriate  X   Inappropriate   Consulting     Based on the following criteria:    Pt has a psychiatric provider (or pending plan) in place for future prescribing: Yes:   Next Level of Care Patient Will Be Transitioned To:   Provider(s) October 19th   Medication management   Francisco De La Rosa   Location [facility]   Date/Time 1:30 pm- 2:30 pm      Timeframe until pt's scheduled psychiatry appointment is less than 6 months: Yes: ~1 month.       Pt takes psychiatric medications: Yes: hydrOXYzine (ATARAX) tablet 50 mg, escitalopram (LEXAPRO) 10 MG tablet, busPIRone (BUSPAR) 15 MG tablet,     Pt's goals seem to align with this temporary service: Yes: Transition Clinic to bridge psychiatric care and psychiatric medication management until next Level of Care.         Any additional pertinent information regarding this referral: Recent ED visit 9/13/22-9/14/22.  History of MILTON and Mitral Valve Prolapse.  Had difficulty breathing.   has dimentia and he drinks a lot.  She is his full time caretaker. Hx of Anxiety Osteoporosis  Dyslipidemia Genital HSV GERD MVP Chronic insomnia Thickened endometrium Hypothyroidism Tobacco abuse Hypercholesteremia Osteopenia Acute appendicitis  Hyperglycemia Sepsis Leukocytosis Pneumonia Bilateral cataracts  "Hypokalemia. Pt reported feeling alone. Reported anxiety symptoms began in May of 2022, including difficulty sleeping, lack of appetite, isolation, fear, SOB, rapid heart rate.         Initial contact w/ patient/parent: TC Coordinator to contact this patient/patients guardian to schedule a New Person Visit with TC Provider Nola Fierro.        The Transition Clinic phone # is [phone].    David Solis RN on September 14, 2022 at 10:49 AM          Additional Scheduling Instructions for Transition Clinic Coordinator:   TC Coordinators:  This is a Therapy and Medication Management  Referral.        Please schedule this patient with TC Provider Nola Fierro in the next 1-3 weeks or as indicated by the patient.      TC Coordinator, please educate this (patient/ parent/guardian/facility staff member ) as to the purpose and benefit of the TC.      \"The Transition Clinic is a Temporary Service that helps to bridge the time to your next appointment.  It is not intended to be a long-term service and you are expecxted to attend your scheduled appointment with your next provider.      Patient/Parent/ Facility Staff Member verbalized understanding     If you need support between appointments, please call [phone] and let them know you're seen by Transition Clinic Psychiatry.  You may also send a [employer] message to reach us.         RN Signature  David Solis RN on 9/14/2022 at 11:04 AM  ----- Message -----  From: Maricarmen Orellana  Sent: 9/14/2022   9:56 AM CDT  To: [facility]  Subject: FW: Referral                                     Hello,    Med referral-    Referral from ED     Next Level of Care Patient Will Be Transitioned To:   Provider(s) October 19th   Medication management   Francisco [facility]   Location [facility]   Date/Time 1:30 pm- 2:30 pm     Thank you!    ------------------------------------            Transition Clinic Referral "   [place] (Limited)         Please Check Type of Referral Requested:       _xxxxxx___THERAPY: The Transition clinic is able to schedule patients without current medical insurance; these patient will be referred to our Social Work Care Coordinator for Medical Insurance              Assistance. We are open for referral for psychotherapy. Patient is referred from:  Brian     xxxxxx   ____MEDICATION:  Referrals for Medication are ONLY accepted from the following areas (select): Emergency Department/Urgent Care                                       Suboxone and Opioid Management Referrals are automatically denied. TC Psychiatry cannot see patient without active medical insurance.         Referring Provider Contact Name: Maricarmen Orellana; Phone Number: [phone]     Reason for Transition Clinic Referral: Patient called TC que as patient as in ED lastnight and staff told them to call this number for short-term therapy     Next Level of Care Patient Will Be Transitioned To:   Provider(s) October 19th   Medication management   Francisco De La Rosa   Location [facility]   Date/Time 1:30 pm- 2:30 pm     What Would Be Helpful from the Transition Clinic: bridge therapy      Needs: no     Does Patient Have Access to Technology: yes     Patient E-mail Address: [email or web address]     Current Patient Phone Number: [phone]     Clinician Gender Preference (if applicable): NO     Maricarmen Orellana       ----- Message -----  From: Jeff Tubbs  Sent: 9/14/2022   2:23 AM CDT  To: Transition Clinic  Subject: Referral                                         Transition Clinic Referral      Type of Referral:     _____Therapy   __X___Therapy & Medication     Referring Provider Name: Jenn Fierro / YOGI      Referring Provider Contact Phone Number: [phone]     Reason for Transition Clinic Referral: Therapy / Med Management several days out     Next Level of Care  Patient Will Be Transitioned To: OP therapy / Med Management     Start Date for Next Level of Care: Med Management: 10/17 / Therapy: 1-23-23     Type of Clinical Assessment Completed (Crisis, DA, JUAN, etc.): Crisis     Date Clinical Assessment was Completed: 9-14-22     Diagnosis: Adjustment Disorders  309.24 (F43.22) With anxiety     What Would Be Helpful from the Transition Clinic: N/a      Needs: N/a     Does Patient Have Access to Technology: Yes     Patient E-mail Address: email: [email or web address]     Current Patient Phone Number: # [phone]     Clinician Gender Preference (if applicable): Woman

## 2022-09-14 NOTE — TELEPHONE ENCOUNTER
Mental Health &Addiction (MH&A)Transition Clinic (TC):     Provides Patient Support While Waiting to Access Programmatic and Outpatient MH&A Care and Provides Select Crisis Assessment Services     NURSING Referral Review  _________________________________________    This RN has reviewed this Medication Management referral to the Transition Clinic and deemed the referral   [x] Appropriate  [] Inappropriate   []Consulting     Based on the following criteria:    Pt has a psychiatric provider (or pending plan) in place for future prescribing: Yes:   Next Level of Care Patient Will Be Transitioned To:   Provider(s) October 19th   Medication management   Francisco De La Rosa   Location [facility]   Date/Time 1:30 pm- 2:30 pm      Timeframe until pt's scheduled psychiatry appointment is less than 6 months: Yes: ~1 month.       Pt takes psychiatric medications: Yes: hydrOXYzine (ATARAX) tablet 50 mg, escitalopram (LEXAPRO) 10 MG tablet, busPIRone (BUSPAR) 15 MG tablet,     Pt's goals seem to align with this temporary service: Yes: Transition Clinic to bridge psychiatric care and psychiatric medication management until next Level of Care.         Any additional pertinent information regarding this referral: Recent ED visit 9/13/22-9/14/22.  History of MILTON and Mitral Valve Prolapse.  Had difficulty breathing.   has dimentia and he drinks a lot.  She is his full time caretaker. Hx of Anxiety Osteoporosis  Dyslipidemia Genital HSV GERD MVP Chronic insomnia Thickened endometrium Hypothyroidism Tobacco abuse Hypercholesteremia Osteopenia Acute appendicitis  Hyperglycemia Sepsis Leukocytosis Pneumonia Bilateral cataracts Hypokalemia. Pt reported feeling alone. Reported anxiety symptoms began in May of 2022, including difficulty sleeping, lack of appetite, isolation, fear, SOB, rapid heart rate.         Initial contact w/ patient/parent: TC Coordinator to contact this patient/patients guardian to  "schedule a New Person Visit with TC Provider Nola Feirro.        The Transition Clinic phone # is [phone].    David Solis RN on September 14, 2022 at 10:49 AM          Additional Scheduling Instructions for Transition Clinic Coordinator:   TC Coordinators:  This is a Therapy and Medication Management  Referral.        Please schedule this patient with TC Provider Nola Fierro in the next 1-3 weeks or as indicated by the patient.      TC Coordinator, please educate this (patient/ parent/guardian/facility staff member ) as to the purpose and benefit of the TC.      \"The Transition Clinic is a Temporary Service that helps to bridge the time to your next appointment.  It is not intended to be a long-term service and you are expecxted to attend your scheduled appointment with your next provider.      Patient/Parent/ Facility Staff Member verbalized understanding     If you need support between appointments, please call [phone] and let them know you're seen by Transition [facility] Psychiatry.  You may also send a [employer] message to reach us.         RN Signature  David Solis RN on 9/14/2022 at 11:04 AM          Maricarmen Orellana [facility] referral-     Referral from ED     Next Level of Care Patient Will Be Transitioned To:   Provider(s) October 19th   Medication management   Francisco De La Rosa   Location [facility]   Date/Time 1:30 pm- 2:30 pm     Thank you!     ------------------------------------             Transition Clinic Referral   [place] (Limited)         Please Check Type of Referral Requested:       _xxxxxx___THERAPY: The Transition clinic is able to schedule patients without current medical insurance; these patient will be referred to our Social Work Care Coordinator for Medical Insurance              Assistance. We are open for referral for psychotherapy. Patient is referred from:  Brian     xxxxxx "   ____MEDICATION:  Referrals for Medication are ONLY accepted from the following areas (select): Emergency Department/Urgent Care                                       Suboxone and Opioid Management Referrals are automatically denied. TC Psychiatry cannot see patient without active medical insurance.         Referring Provider Contact Name: Maricarmen Orellana; Phone Number: [phone]     Reason for Transition Clinic Referral: Patient called TC que as patient as in ED lastnight and staff told them to call this number for short-term therapy     Next Level of Care Patient Will Be Transitioned To:   Provider(s) October 19th   Medication management   Francisco De La Rosa   Location [facility]   Date/Time 1:30 pm- 2:30 pm     What Would Be Helpful from the Transition Clinic: bridge therapy      Needs: no     Does Patient Have Access to Technology: yes     Patient E-mail Address: [email or web address]     Current Patient Phone Number: [phone]     Clinician Gender Preference (if applicable): NO     Maricarmen Orellana             Previous Messages       ----- Message -----   From: Jeff Tubbs   Sent: 9/14/2022   2:23 AM CDT   To: Transition Clinic   Subject: Referral                                         Transition Clinic Referral       Type of Referral:       _____Therapy   __X___Therapy & Medication       Referring Provider Name: Jenn Fierro / YOGI        Referring Provider Contact Phone Number: [phone]       Reason for Transition Clinic Referral: Therapy / Med Management several days out       Next Level of Care Patient Will Be Transitioned To: OP therapy / Med Management       Start Date for Next Level of Care: Med Management: 10/17 / Therapy: 1-23-23       Type of Clinical Assessment Completed (Crisis, DA, JUAN, etc.): Crisis       Date Clinical Assessment was Completed: 9-14-22       Diagnosis: Adjustment Disorders  309.24 (F43.22) With anxiety       What  Would Be Helpful from the Transition Clinic: N/a        Needs: N/a       Does Patient Have Access to Technology: Yes       Patient E-mail Address: email: [email or web address]       Current Patient Phone Number: # [phone]       Clinician Gender Preference (if applicable): Woman

## 2022-09-14 NOTE — ED PROVIDER NOTES
DEC has seen and evaluated pt.  Plan with family and pt made to help with stress and anxiety symptoms.  DEC has set up therapy referrals.       Alex Avila MD  09/14/22 0229
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
## 2022-09-14 NOTE — ED PROVIDER NOTES
History   Chief Complaint:  Anxiety       The history is provided by the patient and a relative.      Shannan Koenig is a 71 year old female with history of anxiety and MVP who presents with anxiety. She states that she needs help breathing and can't get enough air into her lungs. She has shortness of breath all day with intermittent wheezing. Notes chest tightness. She breathes normally when sleeping. She is staying with her daughter right now due to stressors at home but is planning to go home to [place], [place] in 2 days, which is increasing the anxiety. 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 She was at Urgent Care yesterday for trouble swallowing. She is on many medications for anxiety and is curious about drug interactions. She smokes a bit but denies a cough. Denies chest pain, leg swelling, or coughing up blood. She is open to speaking with mental health here at the ED.    Review of Systems   Respiratory: Positive for chest tightness, shortness of breath and wheezing.    Cardiovascular: Negative for chest pain and leg swelling.   Psychiatric/Behavioral: The patient is nervous/anxious.    All other systems reviewed and are negative.      Allergies:  Codeine  Morphine  Augmentin  Latex  Nitrofurantoin    Medications:  Buspar  Lexapro  Atarax  Inderal    Past Medical History:     Anxiety  Osteoporosis  Dyslipidemia  Genital HSV  GERD  MVP  Chronic insomnia  Thickened endometrium  Hypothyroidism  Tobacco abuse  Hypercholesteremia  Osteopenia  Acute appendicitis  Hyperglycemia  Sepsis  Leukocytosis  Pneumonia  Bilateral cataracts  Hypokalemia    Past Surgical History:    Appendectomy   section  Colonoscopy x3  Extracapsular cataract extraction IOL  Fetal blood transfusion  Tonsillectomy  Tubal ligation  [place] tooth extraction  Laparoscopy abdomen  Ulnar collateral ligament repair, right  thumb    Family History:    Mother: arthritis, breast cancer, depression, dementia  Father: alcoholism, allergic rhinitis, arthritis, colon cancer, depression, hypertension, hyperlipidemia, kidney disease, obesity  Sister: colon cancer, hypertension, alcohol addiction  Brother: bleeding disorder, seizure disorder    Social History:  The patient presents to the ED with daughter  Living Situation: Lives in [place], [place] with her  who has dementia and alcoholism. Currently staying with her daughters in town.   Tobacco Use: Positive, smokes cigarettes   PCP: Pollo Terry     Physical Exam     Patient Vitals for the past 24 hrs:   BP Temp Temp src Pulse Resp SpO2 Weight   09/13/22 2230 108/84 -- -- 76 25 94 % --   09/13/22 2225 115/72 -- -- 75 26 96 % --   09/13/22 2100 119/70 98.5  F (36.9  C) Oral 76 22 97 % 52.2 kg (115 lb)       Physical Exam  Eyes:  The pupils are equal and round    Conjunctivae and sclerae are normal  ENT:    The nose is normal    Pinnae are normal  Neck:  Normal range of motion    There is no rigidity noted  CV:  Regular rate and rhythm     No edema  Resp:  Lungs are clear    Non-labored    No rales    No wheezing   GI:  Abdomen is soft, there is no rigidity    No distension    No rebound tenderness   MS:  Normal muscular tone    No asymmetric leg swelling  Skin:  No rash or acute skin lesions noted  Neuro:   Awake, alert.      Speech is normal and fluent.    Face is symmetric.     Moves all extremities  Psych:  Appears anxious. Denies SI/HI    Emergency Department Course   ECG  ECG taken at 2219, ECG read at 2222  Normal sinus rhythm. Nonspecific ST abnormality.    Rate 71 bpm. NJ interval 156 ms. QRS duration 78 ms. QT/QTc 424/460 ms. P-R-T axes 52 56 35.     Laboratory:  Labs Ordered and Resulted from Time of ED Arrival to Time of ED Departure   CBC WITH PLATELETS AND DIFFERENTIAL - Abnormal       Result Value    WBC Count 11.5 (*)     RBC Count 4.27      Hemoglobin 13.2       Hematocrit 40.2      MCV 94      MCH 30.9      MCHC 32.8      RDW 12.8      Platelet Count 230      % Neutrophils 66      % Lymphocytes 19      % Monocytes 10      % Eosinophils 3      % Basophils 1      % Immature Granulocytes 1      NRBCs per 100 WBC 0      Absolute Neutrophils 7.8      Absolute Lymphocytes 2.1      Absolute Monocytes 1.1      Absolute Eosinophils 0.3      Absolute Basophils 0.1      Absolute Immature Granulocytes 0.1      Absolute NRBCs 0.0     D DIMER QUANTITATIVE - Normal    D-Dimer Quantitative <0.27     BASIC METABOLIC PANEL - Normal    Sodium 137      Potassium 3.8      Chloride 104      Carbon Dioxide (CO2) 22      Anion Gap 11      Urea Nitrogen 8      Creatinine 0.69      Calcium 8.9      Glucose 96      GFR Estimate >90     TROPONIN I        Emergency Department Course:         Reviewed:  I reviewed nursing notes, vitals, past medical history and Care Everywhere    Assessments:  2150 I obtained history and examined the patient as noted above.     Interventions:  Hydroxyzine 50 mg    Disposition:  Patient was signed out to my partner awaiting empath/DEC evaluation.    Impression & Plan     Medical Decision Making:  Shannan Koenig is a 71 year old female who presents to the emergency department with anxiety.  She mostly complains of feelings of shortness of breath and chest tightness.  Is been ongoing throughout the day today.  She been using her hydroxyzine without relief.  Oxygenation here is normal.  Chest x-ray is normal.  EKG does not show any acute ischemic changes and troponin is negative.  Given duration of her symptoms I doubt ACS.  D-dimer obtained and was also undetectable.  No leg pain or swelling.  No pleuritic pain.  Overall work-up here is reassuring from a medical standpoint.  She is cleared for evaluation by empath and and/or DEC pending COVID testing results.  Patient comfortable with plan and will stay here for assistance with her anxiety.  She was signed out to my  partner awaiting mental health evaluation.      Diagnosis:    ICD-10-CM    1. Anxiety  F41.9    2. Shortness of breath  R06.02        Discharge Medications:  New Prescriptions    No medications on file       Scribe Disclosure:  I, Jolanta Katelynnjohn, am serving as a scribe at 10:05 PM on 9/13/2022 to document services personally performed by Lopez Worley MD based on my observations and the provider's statements to me.        Lopez Worley MD  09/13/22 [id number]

## 2022-09-14 NOTE — ED NOTES
"Pt arrives with daughter. Pt states anxiety worsening recently. Also with some SOB and chest \"pressure\" that is alleviated somewhat by sitting up. SR on cardiac monitor.,  "

## 2022-09-14 NOTE — TELEPHONE ENCOUNTER
Writer spoke with pt and pts daughter and scheduled initial TC therapy appointment on 09/16/22 @ 11:00 am. Writer sent intake documents via [employer]. Writer will reply to referral source.Tracker completed. Medication referral forward to tc rn wandy.    Maricarmen Orellana  09/14/22  1003      ----- Message from Jeff Tubbs sent at 9/14/2022  2:14 AM CDT -----  Regarding: Referral  Transition Clinic Referral      Type of Referral:     _____Therapy   __X___Therapy & Medication     Referring Provider Name: Jenn CALIX      Referring Provider Contact Phone Number: [phone]     Reason for Transition Clinic Referral: Therapy / Med Management several days out     Next Level of Care Patient Will Be Transitioned To: OP therapy / Med Management     Start Date for Next Level of Care: Med Management: 10/17 / Therapy: 1-23-23     Type of Clinical Assessment Completed (Crisis, DA, JUAN, etc.): Crisis     Date Clinical Assessment was Completed: 9-14-22     Diagnosis: Adjustment Disorders  309.24 (F43.22) With anxiety     What Would Be Helpful from the Transition Clinic: N/a      Needs: N/a     Does Patient Have Access to Technology: Yes     Patient E-mail Address: email: [email or web address]     Current Patient Phone Number: # [phone]     Clinician Gender Preference (if applicable): Woman

## 2022-09-14 NOTE — ED TRIAGE NOTES
Pt presents c/o increased anxiety that got worse this evening. States that when she becomes anxious she experiences SOB, does not have any respiratory hx. Pt states she is taking medications for anxiety as prescribed. Denies HI and SI.

## 2022-09-15 ENCOUNTER — VIRTUAL VISIT (OUTPATIENT)
Dept: BEHAVIORAL HEALTH | Facility: CLINIC | Age: 72
End: 2022-09-15
Payer: MEDICARE

## 2022-09-15 VITALS — BODY MASS INDEX: 21.32 KG/M2 | WEIGHT: 111 LBS

## 2022-09-15 DIAGNOSIS — F43.22 ADJUSTMENT DISORDER WITH ANXIOUS MOOD: Primary | ICD-10-CM

## 2022-09-15 DIAGNOSIS — G47.9 SLEEP DISTURBANCES: ICD-10-CM

## 2022-09-15 PROCEDURE — 99205 OFFICE O/P NEW HI 60 MIN: CPT | Mod: 95 | Performed by: NURSE PRACTITIONER

## 2022-09-15 RX ORDER — MIRTAZAPINE 15 MG/1
15 TABLET, FILM COATED ORAL AT BEDTIME
Qty: 30 TABLET | Refills: 0 | Status: SHIPPED | OUTPATIENT
Start: 2022-09-15 | End: 2022-10-12

## 2022-09-15 RX ORDER — HYDROXYZINE HYDROCHLORIDE 25 MG/1
50 TABLET, FILM COATED ORAL 4 TIMES DAILY PRN
Qty: 90 TABLET | Refills: 0 | Status: SHIPPED | OUTPATIENT
Start: 2022-09-15 | End: 2022-09-29

## 2022-09-15 RX ORDER — MIRTAZAPINE 7.5 MG/1
7.5 TABLET, FILM COATED ORAL AT BEDTIME
Qty: 7 TABLET | Refills: 0 | Status: SHIPPED | OUTPATIENT
Start: 2022-09-15 | End: 2022-10-13 | Stop reason: DRUGHIGH

## 2022-09-15 ASSESSMENT — PATIENT HEALTH QUESTIONNAIRE - PHQ9
SUM OF ALL RESPONSES TO PHQ QUESTIONS 1-9: 7
SUM OF ALL RESPONSES TO PHQ QUESTIONS 1-9: 7
10. IF YOU CHECKED OFF ANY PROBLEMS, HOW DIFFICULT HAVE THESE PROBLEMS MADE IT FOR YOU TO DO YOUR WORK, TAKE CARE OF THINGS AT HOME, OR GET ALONG WITH OTHER PEOPLE: SOMEWHAT DIFFICULT
5. POOR APPETITE OR OVEREATING: NEARLY EVERY DAY

## 2022-09-15 ASSESSMENT — ANXIETY QUESTIONNAIRES
6. BECOMING EASILY ANNOYED OR IRRITABLE: SEVERAL DAYS
3. WORRYING TOO MUCH ABOUT DIFFERENT THINGS: MORE THAN HALF THE DAYS
7. FEELING AFRAID AS IF SOMETHING AWFUL MIGHT HAPPEN: NEARLY EVERY DAY
GAD7 TOTAL SCORE: 15
IF YOU CHECKED OFF ANY PROBLEMS ON THIS QUESTIONNAIRE, HOW DIFFICULT HAVE THESE PROBLEMS MADE IT FOR YOU TO DO YOUR WORK, TAKE CARE OF THINGS AT HOME, OR GET ALONG WITH OTHER PEOPLE: NOT DIFFICULT AT ALL
5. BEING SO RESTLESS THAT IT IS HARD TO SIT STILL: SEVERAL DAYS
2. NOT BEING ABLE TO STOP OR CONTROL WORRYING: MORE THAN HALF THE DAYS
1. FEELING NERVOUS, ANXIOUS, OR ON EDGE: NEARLY EVERY DAY
GAD7 TOTAL SCORE: 15

## 2022-09-15 NOTE — PROGRESS NOTES
"[facility]      Mental Health & Addiction Service Line    Transition Clinic: Psychiatry Note  Medication Management              Charts/documentation read prior to the appointment:  -2022          VISIT INFORMATION    Date:  September 15, 2022     Number:  -Initial     Referral source:  -ED      Patient Identifying Information:  Legal name: Shannan Koenig  Preferred name: Aileen  : 1950  Preferred pronouns: She/her      Participants:   -Patient  -Provider          Telehealth visit details:  Type of service:  Video  Patient location:  At home  Provider Location:  [facility]  Platform utilized:  [email or web address]    Start time: 10:35 am  End time:  11:37 am      HPI    Hx:  -Anorexia at 12 y/o and underwent treatment at this time      Copied/Pasted from DEC 2022 assessment:    Pt reported she has not experienced any mental health concerns in her life. Reported that at the start of the pandemic, she had a \"near death experience\" when her appendix ruptured and she was in the hospital for 4 days. Identified this as trauma and reported she has felt scared since. Reported roughly 2 years ago, her  was diagnosed with dementia and has been progressively harder to live with due to alcohol abuse in the home. Pt reported feeling alone. Reported anxiety symptoms began in May of 2022, including difficulty sleeping, lack of appetite, isolation, fear, SOB, rapid heart rate. Pt reported she feels this is directly related to these stressors. Reported she was prescribed, by her PCP, in May of 2022 Buspar and then in July Lexapro, Atarax, and Inderal. Reported usually this works but today when she thought about returning home, she experience SOB significant enough to bring her to the ED.     -----------------    Per patient:  -Was feeling pretty good after appendix was removed (then put on Buspar) until May/2022  -Since May/2022 things have been steadily " "worsening (reiterates above info)        PSYCHIATRIC ROS    Sleep:   -Difficulty with sleeping through the night  -Waking up in the middle of the night  -Getting 5 to 6 hours but it's broken up + 1 to 2 hour nap during the daytime        Appetite/Weight Changes:   -Denies unintentional loss or gain > 10 lbs in the past 4 weeks    -------------------------    -It's decreased   -Just not super interested in food      Energy Levels:   -8 or 9/10        Trauma hx and or PTSD:   -See above ... reiterates this info        Depression/Anxiety:   -See PHQ-9 score     -See MILTON-7 score    -Rates anxiety > depression  -Anticipatory anxiety feelings of panic, SOB, will occur again  -Taking the Hydroxyzine prn fairly regularly        Eating Disorder:   -See above    -Within the past 12 months denies: calorie restriction, bingeing/purging, intentional use of laxatives or exercising in excess      Pat/Hypomania:   -No hx of 7+ consecutive days of symptoms during periods of sobriety        Psychotic Symptoms:   -No hx of:  AH/VH, delusions, paranoia, thought insertion or broadcasting during periods of sobriety      Suicidal ideations:   -Denies at this time      SIB:  -Denies hx or current engagement of self harm       Side effects:  -None reported               MENTAL HEALTH HISTORY      Individual therapy or IOP:   -Individual therapy previously      Suicide attempts:  -None reported       Inpatient psychiatric hospitalizations:  -None reported   -No hx of commitments       ECT:  -None reported         Medication Trials:    Other anxiolytics:  -Buspar 15 mg twice daily;  plateau    Benzodiazepines:  -Lorazepam prn administered in the ED caused \"rebound anxiety\"      SUBSTANCE USE    Prior use:  -Denies any problematic history of alcohol or recreational substances resulting in legal issues, c/d programming, or withdrawal symptoms        Current use:    Alcohol:   -1 to 2 glasses of wine per day      Recreational Drugs:   -None " reported       Medical Marijuana:  -None reported       Cigarettes per day:   -Quit for 15 months after surgery but ended up restarting  -8 to 10       Chewing tobacco:   -None reported       Vaping:    -None reported       Caffeine intake:    -2 or 3 cups coffee per day             SOCIAL HISTORY  -Was reviewed within the EMR per: 9/14/2022 [facility]                MEDICAL HISTORY    Current:  -The problem list was reviewed prior to the appointment  -The patient denies any concerning physical and or medical symptoms during the interviewing process      Developmental:   -Mother had normal pregnancy: Yes  -Met age appropriate milestones: Yes  -Participated in special education classes and or had an IEP: No  -Hx of autism spectrum disorder, learning disability, and or other cognitive disorder: No      Neurological:  -Denies any hx of: seizures, concussions, or TBI        MEDICATIONS      Current Outpatient Medications:      acyclovir (ZOVIRAX) 400 MG tablet, , Disp: , Rfl:      Ca Phosphate-Cholecalciferol (CALTRATE GUMMY BITES) 250-400 MG-UNIT CHEW,  See Instructions, Instructions: 5 gummies Chewed, 0 Refill(s), Type: Maintenance, Disp: , Rfl:      clobetasol (TEMOVATE) 0.05 % external cream, , Disp: , Rfl:      escitalopram (LEXAPRO) 10 MG tablet, Take 2 tablets (20 mg) by mouth daily for 90 days, Disp: 180 tablet, Rfl: 3     hydrOXYzine (ATARAX) 25 MG tablet, Take 2 tablets (50 mg) by mouth 3 times daily as needed for itching or anxiety, Disp: 60 tablet, Rfl: 1     hydrOXYzine (VISTARIL) 25 MG capsule, Take 2 capsules (50 mg) by mouth 4 times daily as needed for anxiety, Disp: 180 capsule, Rfl: 0     levalbuterol (XOPENEX HFA) 45 MCG/ACT inhaler, Inhale 2 puffs into the lungs every 4 hours as needed for shortness of breath / dyspnea or wheezing, Disp: 1 g, Rfl: 1     lovastatin (MEVACOR) 40 MG tablet, Take by mouth At Bedtime, Disp: , Rfl:      meloxicam (MOBIC) 15 MG tablet, TAKE 1 TABLET BY MOUTH DAILY WITH  FOOD, Disp: , Rfl:      omeprazole (PRILOSEC) 40 MG DR capsule, Take 1 capsule (40 mg) by mouth daily, Disp: 30 capsule, Rfl: 3     propranolol (INDERAL) 10 MG tablet, Take 1 tablet (10 mg) by mouth 2 times daily for 90 days, Disp: 60 tablet, Rfl: 3     zoledronic Acid (RECLAST) 5 MG/100ML SOLN infusion, Inject 5 mg into the vein Yearly/Annual, Disp: , Rfl:      busPIRone (BUSPAR) 15 MG tablet, Take 1 tablet (15 mg) by mouth 2 times daily, Disp: 180 tablet, Rfl: 3          If a controlled substance has been prescribed during the appointment:    -The [place] Prescription Monitoring Program has been reviewed and there are no current concerns with: diversionary activity, early refill requests, and or obtaining the medication from multiple providers.          VITALS    BP Readings from Last 3 Encounters:   09/14/22 128/70   09/12/22 (!) 158/95   09/06/22 (!) 140/86       Pulse Readings from Last 3 Encounters:   09/14/22 90   09/12/22 87   09/06/22 74       Wt Readings from Last 3 Encounters:   09/15/22 50.3 kg (111 lb)   09/13/22 52.2 kg (115 lb)   09/12/22 51.3 kg (113 lb)               LABS    The following have been reviewed prior to or during the appointment:  -9/12/2022          SCALES      PHQ 7/19/2022 9/15/2022 9/15/2022   PHQ-9 Total Score 5 7 7   Q9: Thoughts of better off dead/self-harm past 2 weeks Not at all Not at all Not at all   F/U: Thoughts of suicide or self-harm - - -   F/U: Safety concerns - - -        MILTON-7 SCORE 8/22/2022 9/6/2022 9/15/2022   Total Score 16 (severe anxiety) 12 (moderate anxiety) -   Total Score 16 12 15          Answers for HPI/ROS submitted by the patient on 9/15/2022  If you checked off any problems, how difficult have these problems made it for you to do your work, take care of things at home, or get along with other people?: Somewhat difficult  PHQ9 TOTAL SCORE: 7          MENTAL STATUS EXAMINATION    Appearance: Well Groomed, Attire Appropriate for the Season  General  Behavior:  Cooperative, Direct Eye Contact  Speech: Fluent, Normal rate and volume  Musculoskeletal:    -Gait not observed during t.h. visit  -No facial tics/tremors observed   -Motor coordination is grossly intact   Mood:  Anxious  Affect: Appropriate to Content of Speech and Circumstances  Attention: Intact  Orientation:  Person, Place, Time, Situation  Thought Associations:  Intact  Thought Content: Reality based   Thought Processes: Organized, Normal rate  Memory: No overt impairment; no screenings or formal testing performed  Language: Intact  Judgement: Good  Insight: Fair to Good        ASSESSMENT/CLINICAL IMPRESSIONS    Summary:      Cynthia Koenig is a 71 y/o female with a history of: Anorexia (in early adolescence but not adulthood).    Is attending today's appointment for management of psychotropics/bridging until able to establish long term outpatient psychiatry services.    Recently underwent an ED visit on 9/13/2022 due to: SOB + worsening anxiety symptoms in the context of multiple stressors, and states: I'm not depressed and don't want to hurt myself or die but the anxiety isn't well controlled at all.    Since the spring/2022 PCP has been prescribing anxiolytics/medication regimen, but it continues to be difficult to de-escalate self.  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   Worries about having increased heart rate, SOB, and other panic symptoms and wants to avoid them from occurring again.    Continues to have difficulty sleeping through the night and poor appetite, therefore will discontinue Buspar (as a means to avoid 3 serotonergic agents/potential for serotonin syndrome) and replace with Mirtazapine.    Moving forward, can also consider an increase in Propranolol.    Is future oriented about starting individual therapy as another  means to manage mental health symptoms.        DSM-V and or working diagnosis:    1. Adjustment disorder with anxious mood    2. Sleep disturbances      Rule outs:    3. Panic Disorder    4. MILTON    5. MDD, single episode with anxious distress        SAFETY EVALUATION:  Suicidal ideations:  -denies  Homicidal ideations:  -denies  Risk factors:  -age  -worsening of anxiety symptoms  -psychosocial stressors  Protective and mitigating factors:  -daughters, family  -willingness to engage in outpatient mental health services  -no prior attempts  Risk assessment:  -low to medium            TREATMENT PLAN      Medications:  Start:  Buspirone/Buspar for 7 day increments:  -15 mg   -7.5 mg = 1/2 tab  -Then discontinue    Mirtazapine/Remeron 7.5 mg for 7 nights then increase to 15 mg at bedtime      Continue:  Escitalopram/Lexapro 20 mg daily    Hydroxyzine/Atrax 25 to 50 mg (1 to 2 tabs) up to 4x per day as needed for anxiety    Propranolol/Inderal 10 mg twice daily; TDD = 20 mg      Labs:  -None Obtained        Therapy:  -Will be starting individual therapy 9/16/2022 [facility]          Non-pharmacological modalities:  -Did not discuss          Return to Clinic or Referrals:  -Please follow up at the [facility] for medication management in:  10 to 14 days          Total time:  76 minutes per:    -Review of EMR   -Appointment time  -Documentation           Nola SPEARS  [facility]          --------------------------------------------------------------------------------------------------------------------------        TREATMENT RISK STATEMENT    The risks, benefits, alternatives, and potential adverse effects have been explained and are understood by the patient.  The patient agrees to the treatment plan with their ability to do so.      The patient knows to call the clinic: [phone]  for any problems or concerns until the next psychiatry visit, regardless if it is within or outside of the  [employer] system.     If unable to reach clinic staff (via phone call or medical messaging) during the normal business hours: 8:00 am to 4:30 pm then it is recommended accessing the nearest: emergency department, urgent care facility, or utilizing local (varies based on county of residence) and national crisis #'s or text messaging services for immediate assistance.          --------------------------------------------------------------------------------------------------------------------------        If applicable the following has been discussed with the patient, parent/guardian, and or attending family member during the appointment:      1. Risks of polypharmacy and possible drug interactions with current medication list + common OTC products, herbs, and supplements.    Moving forward, it is suggested to intermittently check-in with a clinic or retail pharmacist whenever new medications or OTC/h/s are consumed.    2. Recommendation to adhere to CDC guidelines as it relates alcohol consumption.  If taking benzodiazepines, you should abstain from alcohol intake due to increased risks of CNS and respiratory depression, as well as psychomotor impairment.    3. If possible, it is recommended to avoid concurrent use of prescribed:  opioids  +  benzodiazepines due to increased risks of CNS and respiratory depression, as well as the increased risk of overdose.     4. Recommendation to minimize and or abstain from THC use (unless the pt. is prescribed medical marijuana).    5. Recommendation to abstain from illicit substances including but not limited to the following: heroin, street fentanyl, cocaine, methamphetamines, and bath salts.    6. Do not take opioids, stimulants, and or other prescription medications unless they are specifically prescribed for you.    7. Recommendation to abstain from: alcohol,  tobacco/smoking, vaping, THC, and all illicit substances if trying to become or are pregnant.    8. Black Box  Warnings associated with the prescribed psychotropic(s).    9. Potential adverse effects of antipsychotics including but not limited to the following: weight gain, metabolic syndrome, EPS/Tardive Dyskinesias.    10. Potential CV and neurological adverse effects of stimulants including but not limited to the following:  sudden death, MI, stroke, HTN, cardiomyopathy (long term use) as well as seizures.

## 2022-09-15 NOTE — PROGRESS NOTES
" This video/telephone visit will be conducted virtually between you and your provider. We have found that certain health care needs can be provided without the need for an in-person physical exam. This service lets us provide the care you need with a video /telephone conversation. If a prescription is necessary we can send it directly to your pharmacy. If lab work is needed we can place an order for that and you can then stop by our lab to have the test done at a later time.\"   Just as we bill insurance for in-person visits, we also bill insurance for video/telephone visits. If you have questions about your insurance coverage, we recommend that you speak with your insurance company.      Patient/Parent has given verbal consent for video/Telephone visit? yes    Patient would like the video visit invitation sent by: Text to cell phone: ImpressPages    Patient verified allergies, medications and pharmacy via phone. Patient states they are ready for visit.          Mental Health &Addiction ([facility]&A)[facility] (TC):     Provides Patient Support While Waiting to Access Programmatic and Outpatient MH&A Care and Provides [facility] Crisis Assessment Services     INTAKE  ____________________________________________________    \"The [facility] is a temporary psychiatry service that helps to bridge the time to your next appointment. It is not intended to be a long-term service and you are expected to attend your scheduled appointment with your next provider.\"  [x] Patient/Parent verbalized understanding    If you need support between appointments, please call [phone] and let them know you're seen by [facility] Psychiat[facility]. You may also send a [employer] message to reach us.    General-     Most pressing MH needs at this time: Recent ED visit 9/16/22. Has PTSD so is wondering if that is contributing to her symptoms.        Any physical health conditions or diagnoses we should be aware of or that are impacting you: " History of MILTON and Mitral Valve Prolapse.  Had difficulty breathing.   has dimentia and he drinks a lot.  She is his full time caretaker. Hx of Anxiety Osteoporosis  Dyslipidemia Genital HSV GERD MVP Chronic insomnia Thickened endometrium Hypothyroidism Tobacco abuse Hypercholesteremia Osteopenia Acute appendicitis  Hyperglycemia Sepsis Leukocytosis Pneumonia Bilateral cataracts Hypokalemia. Pt reported feeling alone. Reported anxiety symptoms began in May of 2022, including difficulty sleeping, lack of appetite, isolation, fear, SOB, rapid heart rate.           Medications-     Injectable medications currently prescribed: no  If yes, do you need an appointment for the next injection: n/a    Any Controlled Substances that you are prescribed: none       Primary care provider: Pollo Terry MD        MILTON-7 scores:    MILTON-7 SCORE 9/6/2022 9/15/2022   Total Score 12 (moderate anxiety) -   Total Score 12 15       PHQ-9 scores:   PHQ-9 SCORE 9/15/2022 9/15/2022   PHQ-9 Total Score MyChart 7 (Mild depression) 7 (Mild depression)   PHQ-9 Total Score 7 7           Anything the provider should be aware of for today's appointment: no    New (awaiting) Mental health provider or next programming: pt states she has a psychotherapy appt tomorrow with Sofiya Morel    Date of scheduled apt: 9/16/22        Enrique Donnelly MA on September 15, 2022 at 10:19 AM

## 2022-09-15 NOTE — PATIENT INSTRUCTIONS
Start:  Buspirone/Buspar for 7 day increments:  -15 mg   -7.5 mg = 1/2 tab  -Then discontinue    Mirtazapine/Remeron 7.5 mg for 7 nights then increase to 15 mg at bedtime      Continue:  Escitalopram/Lexapro 20 mg daily    Hydroxyzine/Atrax 25 to 50 mg (1 to 2 tabs) up to 4x per day as needed for anxiety    Propranolol/Inderal 10 mg twice daily; TDD = 20 mg

## 2022-09-16 ENCOUNTER — TELEPHONE (OUTPATIENT)
Dept: BEHAVIORAL HEALTH | Facility: CLINIC | Age: 72
End: 2022-09-16

## 2022-09-16 ENCOUNTER — OFFICE VISIT (OUTPATIENT)
Dept: BEHAVIORAL HEALTH | Facility: CLINIC | Age: 72
End: 2022-09-16

## 2022-09-16 DIAGNOSIS — F41.1 GAD (GENERALIZED ANXIETY DISORDER): Primary | ICD-10-CM

## 2022-09-16 RX ORDER — PROPRANOLOL HYDROCHLORIDE 10 MG/1
TABLET ORAL
Qty: 180 TABLET | OUTPATIENT
Start: 2022-09-16

## 2022-09-16 NOTE — PROGRESS NOTES
"[facility]     Transition Clinic    Provider Name:  MOIRA Styles LICSW    PATIENT'S NAME: Shannan Koenig  PREFERRED NAME: Aileen  PRONOUNS: Her, Her's, She  MRN: [id number]  : 1950  ADDRESS: [address] [place] [address] [address]  [place] [address]  ACCT. NUMBER:  [id number]  DATE OF SERVICE: 22  START TIME:   END TIME: 12:28  PREFERRED PHONE: [phone]  May we leave a program related message: Yes  SERVICE MODALITY:  In-person    UNIVERSAL ADULT Mental Health DIAGNOSTIC ASSESSMENT    Identifying Information:  Patient is a 72 year old,   individual.    Patient was referred for an assessment by referring provider.  Patient attended the session with Daughter Glo, Health Agent.    Chief Complaint:   The reason for seeking services at this time is: \"Anxiety and depression\".  The problem(s) began 5/10/2022.    Patient has attempted to resolve these concerns in the past through Talking with family, PCP medication..    Social/Family History:  Patient reported they grew up in [facility].  They were raised by biological parents; biological mother.  Parents .  Patient reported that their childhood was sad at times. Lost her father at age 4.  Patient described their current relationships with family of origin as ok.      The patient describes their cultural background as .  Cultural influences and impact on patient's life structure, values, norms, and healthcare: My childhood was mostly affected by my fathers death. Mother was forced into a survival mode for her family.  Contextual influences on patient's health include: Individual Factors; Mental health, Family Factors; Fathers death at an early age still impacted family which has carried over and Health- Seeking Factors; [facility] health care/hospital experience which [facility] and [facility].    These factors will be addressed in the " Preliminary Treatment plan. Patient identified their preferred language to be English. Patient reported she does not need the assistance of an  or other support involved in therapy.     Patient reported had no significant delays in developmental tasks.   Patient's highest education level was college graduate  .  Patient identified the following learning problems: none reported.  Modifications will not be used to assist communication in therapy. Patient reports she is  able to understand written materials.    Patient reported the following relationship history .  Patient's current relationship status is  for 17 years.   Patient identified their sexual orientation as heterosexual.  Patient reported having 2 child(jimenez). Patient identified siblings; adult child; friends as part of their support system.  Patient identified the quality of these relationships as stable and meaningful.      Patient's current living/housing situation involves staying with someone.  The immediate members of family and household include Marta, 46,Daughter, Spouse Charbel, Age 78 and they report that housing is stable.    Patient is currently employed part time.  Patient reports their finances are obtained through spouse. Patient does not identify finances as a current stressor.      Patient reported that they have not been involved with the legal system.  Patient does not report being under probation/ parole/ jurisdiction. They are not under any current court jurisdiction. .    Patient's Strengths and Limitations:  Patient identified the following strengths or resources that will help them succeed in treatment: commitment to health and well being, friends / good social support, family support, insight, motivation, strong social skills and work ethic. Things that may interfere with the patient's success in treatment include: Health, Spouse's health.     Assessments:  The following assessments were completed by patient for this  visit:  PHQ9:   PHQ-9 SCORE 2/4/2021 6/7/2022 7/19/2022 9/15/2022 9/15/2022   PHQ-9 Total Score MyChart 3 (Minimal depression) 15 (Moderately severe depression) 5 (Mild depression) 7 (Mild depression) 7 (Mild depression)   PHQ-9 Total Score 3 15 5 7 7     GAD7:   MILTON-7 SCORE 2/4/2021 6/2/2022 6/7/2022 6/7/2022 8/22/2022 9/6/2022 9/15/2022   Total Score 6 (mild anxiety) - - 21 (severe anxiety) 16 (severe anxiety) 12 (moderate anxiety) -   Total Score 6 17 21 17 16 12 15     CAGE-AID:   CAGE-AID Total Score 9/15/2022   Total Score 0   Total Score MyChart 0 (A total score of 2 or greater is considered clinically significant)     PROMIS 10-Global Health (only subscores and total score):   PROMIS-10 Scores Only 9/15/2022   Global Mental Health Score 10   Global Physical Health Score 18   PROMIS TOTAL - SUBSCORES 28     [place] Suicide Severity Rating Scale (Lifetime/Recent)  [place]cher Suicide Severity Rating (Lifetime/Recent) 2/4/2021 9/15/2022   Wish to be Dead (Lifetime) No -   Non-Specific Active Suicidal Thoughts (Lifetime) No -   RETIRED: 1. Wish to be Dead (Recent) No -   RETIRED: 2. Non-Specific Active Suicidal Thoughts (Recent) No -   3. Active Suicidal Ideation with any Methods (Not Plan) Without Intent to Act (Lifetime) No -   RETIRED: 3. Active Suicidal Ideation with any Methods (Not Plan) Without Intent to Act (Recent) No -   RETIRE: 4. Active Suicidal Ideation with Some Intent to Act, Without Specific Plan (Lifetime) No -   4. Active Suicidal Ideation with Some Intent to Act, Without Specific Plan (Recent) No -   RETIRE: 5. Active Suicidal Ideation with Specific Plan and Intent (Lifetime) No -   RETIRED: 5. Active Suicidal Ideation with Specific Plan and Intent (Recent) No -   Actual Attempt (Lifetime) No -   Actual Attempt (Past 3 Months) No -   Has subject engaged in non-suicidal self-injurious behavior? (Lifetime) No -   Has subject engaged in non-suicidal self-injurious behavior? (Past 3 Months) No -    Interrupted Attempts (Lifetime) No -   Interrupted Attempts (Past 3 Months) No -   Aborted or Self-Interrupted Attempt (Lifetime) No -   Aborted or Self-Interrupted Attempt (Past 3 Months) No -   Preparatory Acts or Behavior (Lifetime) No -   Preparatory Acts or Behavior (Past 3 Months) No -   1. Wish to be Dead (Past 1 Month) - 0   2. Non-Specific Active Suicidal Thoughts (Past 1 Month) - 0   Calculated C-SSRS Risk Score (Lifetime/Recent) - No Risk Indicated     [place] Suicide Severity Rating Scale (Short Version)  [place] Suicide Severity Rating (Short Version) 9/13/2022   Over the past 2 weeks have you felt down, depressed, or hopeless? no   Over the past 2 weeks have you had thoughts of killing yourself? no   Have you ever attempted to kill yourself? no       Personal and Family Medical History:  Patient does not report a family history of mental health concerns.  Patient reports family history includes Alcoholism in her father; Allergic rhinitis in her father; Arthritis in her father and mother; Bleeding Disorder in her brother; Breast Cancer in her mother; Colon Cancer in her father and sister; Dementia in her mother; Depression in her father and mother; Hyperlipidemia in her father; Hypertension in her father and sister; Kidney Disease in her father; Obesity in her father; Seizure Disorder in her brother..     Patient does not report Mental Health Diagnosis or Treatment.      Patient has had a physical exam to rule out medical causes for current symptoms.  Date of last physical exam was within the past year. Client was encouraged to follow up with PCP if symptoms were to develop. The patient has a [facility] Provider, who is named Pollo Terry..  Patient reports the following current medical concerns: GERD (gastroesophageal reflux disease); Endocrine Dyslipidemia  Subclinical hypothyroidism, Circulatory, MVP (mitral valve prolapse), Musculoskeletal and Integumentary  Age-related  osteoporosis without current pathological fracture, Eczema.     and no current dental concerns.  Patient denies any issues with pain..   There are not significant appetite / nutritional concerns / weight changes.   Patient does not report a history of head injury / trauma / cognitive impairment.      Patient reports current meds as:   No outpatient medications have been marked as taking for the 9/16/22 encounter (Office Visit) with Sofiya Morel LICSW.     Medication Adherence:  Patient reports taking.  taking prescribed medications as prescribed.    Patient Allergies:    Allergies   Allergen Reactions     Codeine Nausea and Vomiting     Morphine      Augmentin Nausea and Vomiting     Latex Dermatitis, Rash and Swelling     Puffy weepy red eyes, nasal congestion and sneezing       Nitrofurantoin Rash     Medical History:    Past Medical History:   Diagnosis Date     Anxiety      Infection due to 2019 novel coronavirus 11/2020     Infection due to 2019 novel coronavirus 01/2022       Current Mental Status Exam:   Appearance:  Appropriate    Eye Contact:  Good   Psychomotor:  Normal       Gait / station:  no problem  Attitude / Demeanor: Cooperative  Friendly  Speech      Rate / Production: Normal/ Responsive      Volume:  Normal  volume      Language:  intact  Mood:   Anxious  Depressed   Affect:   Appropriate    Thought Content: Clear   Thought Process: Coherent  Goal Directed       Associations: No loosening of associations  Insight:   Good   Judgment:  Intact   Orientation:  Person Place Time Situation  Attention/concentration: Good      Substance Use:  Patient did report a family history of substance use concerns; see medical history section for details. Spouse is a chronic alcoholic that has led to liver and kidney disease and alcohol induced dementia.  Patient has not received chemical dependency treatment in the past.  Patient has not ever been to detox.      Patient is not currently receiving any  chemical dependency treatment.           Substance History of use Age of first use Date of last use     Pattern and duration of use (include amounts and frequency)   Alcohol currently use   20 9/14/2022 REPORTS SUBSTANCE USE: reports using substance 2-4 times per week and has 3 glasses at a time.  Patient reports heaviest use is current use.   Cannabis   used in the past 25 7/14/2022 Not currently using     Amphetamines   never used   Never used   Cocaine/crack    never used   Never used   Hallucinogens never used     Never used   Inhalants never used     Never used   Heroin never used     Never used   Other Opiates used in the past 54 4/4/2020 Not currently using   Benzodiazepine   used in the past 71 9/15/2022 Not currently using   Barbiturates never used     Not currently using   Over the counter meds used in the past 29 9/13/2022 Not currently using   Caffeine currently use 21   Not currently using   Nicotine  currently use 17 9/15/2022    Other substances not listed above:  Identify:  never used     Not currently using     Patient reported the following problems as a result of their substance use: no problems, not applicable.    Substance Use: No information provided    Based on the negative CAGE score and clinical interview there  are not indications of drug or alcohol abuse. Discussed amunt used daily. Patient did not report concern about her drinking nor did her daughter who was in this intake sesson. She used in the evening de-stress.  No other info provided.      Significant Losses / Trauma / Abuse / Neglect Issues:   Patient did not serve in the .  There are indications or report of significant loss, trauma, abuse or neglect issues related to: are indications or report of significant loss, trauma, abuse or neglect issues related to.  Concerns for possible neglect are not present.     Safety Assessment:   Patient denies current homicidal ideation and behaviors.  Patient denies current  self-injurious ideation and behaviors.    Patient denied risk behaviors associated with substance use.  Patient denies any high risk behaviors associated with mental health symptoms.  Patient reports the following current concerns for their personal safety: Spouse has dementia and has been paranoid.  He is worried about her adult children. Patient does worry about his behavior, possible violence due to dementia. .  Patient reports there are not firearms in the house.         History of Safety Concerns:  Patient denied a history of homicidal ideation.     Patient denied a history of personal safety concerns.    Patient denied a history of assaultive behaviors.    Patient denied a history of sexual assault behaviors.     Patient denied a history of risk behaviors associated with substance use.  Patient denies any history of high risk behaviors associated with mental health symptoms.  Patient reports the following protective factors: forward or future oriented thinking    Risk Plan:  See Recommendations for Safety and Risk Management Plan    Review of Symptoms per patient report:  Depression: No symptoms, Change in sleep, Excessive or inappropriate guilt, Change in energy level, Difficulties concentrating, Change in appetite and Feeling sad, down, or depressed  Pat:  No Symptoms  Psychosis: No Symptoms  Anxiety: Excessive worry, Nervousness, Physical complaints, such as headaches, stomachaches, muscle tension, Sleep disturbance and Poor concentration  Panic:  No symptoms  Post Traumatic Stress Disorder:  Experienced traumatic event past medical issues and care.  Increased anxiety, fear of medical care., Reexperiencing of trauma, Nightmares and fear of hospitals or procedures.   Eating Disorder: No Symptoms  ADD / ADHD:  No symptoms  Conduct Disorder: No symptoms  Autism Spectrum Disorder: No symptoms  Obsessive Compulsive Disorder: No Symptoms    Patient reports the following compulsive behaviors and treatment  history: None.      Diagnostic Criteria:   Adjustment Disorder  A. The development of emotional or behavioral symptoms in response to an identifiable stressor(s) occurring within 3 months of the onset of the stressor(s)  B. These symptoms or behaviors are clinically significant, as evidenced by one or both of the following:       - Significant impairment in social, occupational, or other important areas of functioning  C. The stress-related disturbance does not meet criteria for another disorder & is not not an exacerbation of another mental disorder  D. The symptoms do not represent normal bereavement  E. Once the stressor or its consequences have terminated, the symptoms do not persist for more than an additional 6 months       * Adjustment Disorder with Mixed Anxiety and Depressed Mood: The predominant manifestation is a combination of depression and anxiety      Functional Status:  Patient reports the following functional impairments:  health maintenance, home life with spouse, management of the household and or completion of tasks, money management and social interactions.     Nonprogrammatic care:  Patient is requesting basic services to address current mental health concerns.    Clinical Summary:  1. Reason for assessment: Depression and anxiety with increased negative behaviors from spouse who has alcohol induced dementia.  2. Psychosocial, Cultural and Contextual Factors: Retired nurse; second marriage. Stepson who is neglectful.  Uses intimidation to try to get what he wants.  Patient experienced a medical emergency and inadequate medical care leading to severe medical issues that eventually required extensive care. Poor care by medical staff with intimidation this all led to legal issues and adverse reports to [employer] and State.  3. Principal DSM5 Diagnoses  (Sustained by DSM5 Criteria Listed Above):   Adjustment Disorders  309.28 (F43.23) With mixed anxiety and depressed mood.  4. Other Diagnoses that is  relevant to services:  Rule out chemical dependency  5. Provisional Diagnosis:  Rule out chemical dependency as evidenced by patient report of frequent use and amount of alcohol used.      6. Prognosis: Expect Improvement and Relieve Acute Symptoms.  7. Likely consequences of symptoms if not treated: patient may continue to use alcohol.  This may lead to increased symptoms of depression and anxiety with further increased alcohol use.  8. Client strengths include:  caring, educated, empathetic, insightful, intelligent and support of family, friends and providers .     Recommendations:     1. Plan for Safety and Risk Management:   Safety and Risk: Recommended that patient call 911 or go to the local ED should there be a change in any of these risk factors..          Report to child / adult protection services was No issues or concerns reported or noted. .     2. Patient's identified Health and medical care; working with and trusting providers.     3. Initial Treatment will focus on:    Adjustment Difficulties related to: family concerns.     4. Resources/Service Plan:    services are not indicated.   Modifications to assist communication are not indicated.   Additional disability accommodations are not indicated.      5. Collaboration:   Collaboration / coordination of treatment will be initiated with the following  support professionals: primary care physician and outpatient therapist.        6.  Referrals:   The following referral(s) will be initiated: none. Next Scheduled Appointment: 2 weeks.     A Release of Information has been obtained for the following: No DAVID needs identified at intake.     Emergency Contact is Daughter ; phone number was obtained. [phone]    7. JUAN:    JUAN:  Discussed the general effects of drugs and alcohol on health and well-being and Attempt harm reduction by decresing use by limiting alcohol, mixing alcohol with water or soda.  Provider did not give patient printed  information about the effects of chemical use on their health and well being. Printed information was not available.    Recommendations:  Consider session with a [facility] provider.     8. Records:   These were reviewed at time of assessment.   Information in this assessment was obtained from the medical record and provided by patient and family who is a good historian.       Patient will have open access to their mental health medical record.      Contextual influences on patient's health include: Individual Factors; Mental health, Family Factors; Fathers death at an early age still impacted family which has carried over and Health- Seeking Factors; [facility] care/hospital experience which involved [facility] and [facility].     Provider Name/ Credentials:  MOIRA Styles [facility]   September 16, 2022

## 2022-09-16 NOTE — TELEPHONE ENCOUNTER
Writer spoke with patient on que and scheduled return visit for 09/27/2022 @ 11:00 am with Adiran Orellana  09/16/22  [id number]

## 2022-09-16 NOTE — TELEPHONE ENCOUNTER
Pending Prescriptions:                       Disp   Refills    propranolol (INDERAL) 10 MG tablet [Pharm*180 ta*             Sig: TAKE 1 TABLET(10 MG) BY MOUTH TWICE DAILY    8/22/22 #60 R-3    Medication request denied. Patient has refills on file.

## 2022-09-21 ENCOUNTER — OFFICE VISIT (OUTPATIENT)
Dept: FAMILY MEDICINE | Facility: CLINIC | Age: 72
End: 2022-09-21
Payer: MEDICARE

## 2022-09-21 VITALS
DIASTOLIC BLOOD PRESSURE: 90 MMHG | SYSTOLIC BLOOD PRESSURE: 139 MMHG | OXYGEN SATURATION: 99 % | HEART RATE: 106 BPM | WEIGHT: 115 LBS | BODY MASS INDEX: 22.09 KG/M2

## 2022-09-21 DIAGNOSIS — N30.00 ACUTE CYSTITIS WITHOUT HEMATURIA: ICD-10-CM

## 2022-09-21 DIAGNOSIS — R30.9 PAINFUL URINATION: Primary | ICD-10-CM

## 2022-09-21 LAB
ALBUMIN UR-MCNC: NEGATIVE MG/DL
APPEARANCE UR: CLEAR
BACTERIA #/AREA URNS HPF: ABNORMAL /HPF
BILIRUB UR QL STRIP: NEGATIVE
COLOR UR AUTO: YELLOW
GLUCOSE UR STRIP-MCNC: NEGATIVE MG/DL
HGB UR QL STRIP: ABNORMAL
KETONES UR STRIP-MCNC: NEGATIVE MG/DL
LEUKOCYTE ESTERASE UR QL STRIP: ABNORMAL
NITRATE UR QL: NEGATIVE
PH UR STRIP: 6.5 [PH] (ref 5–7)
RBC #/AREA URNS AUTO: ABNORMAL /HPF
SP GR UR STRIP: 1.01 (ref 1–1.03)
UROBILINOGEN UR STRIP-ACNC: 0.2 E.U./DL
WBC #/AREA URNS AUTO: ABNORMAL /HPF

## 2022-09-21 PROCEDURE — 99213 OFFICE O/P EST LOW 20 MIN: CPT

## 2022-09-21 PROCEDURE — 81001 URINALYSIS AUTO W/SCOPE: CPT

## 2022-09-21 RX ORDER — CEFUROXIME AXETIL 250 MG/1
250 TABLET ORAL 2 TIMES DAILY
Qty: 10 TABLET | Refills: 0 | Status: SHIPPED | OUTPATIENT
Start: 2022-09-21 | End: 2022-09-26

## 2022-09-21 NOTE — PROGRESS NOTES
Assessment & Plan     Painful urination    - UA reflex to Microscopic and Culture  - Urine Microscopic    Acute cystitis without hematuria    - cefuroxime (CEFTIN) 250 MG tablet; Take 1 tablet (250 mg) by mouth 2 times daily for 5 day  Was treated based on length of symptoms. If cx negative will have her stop abx. She understands this.     15 minutes spent on the date of the encounter doing review of test results, patient visit and documentation        See Patient Instructions    Return in about 5 days (around 9/26/2022), or if symptoms worsen or fail to improve.    [facility]  [facility]    Guru Gallagher is a 72 year old accompanied by her daughter, presenting for the following health issues:  UTI (Frequency, 1 week)      HPI     Genitourinary - Female  Onset/Duration: 1 week  Description:   Painful urination (Dysuria): No           Frequency: YES and urgency  Blood in urine (Hematuria): No  Delay in urine (Hesitency): YES  Intensity: moderate  Progression of Symptoms:  same  Accompanying Signs & Symptoms:  Fever/chills: No  Flank pain: No  Nausea and vomiting: No  Vaginal symptoms: none  Abdominal/Pelvic Pain: No  History:   History of frequent UTI s: No  History of kidney stones: YES    Precipitating or alleviating factors: None  Therapies tried and outcome:  none         Review of Systems   Constitutional, HEENT, cardiovascular, pulmonary, gi and gu systems are negative, except as otherwise noted.      Objective    BP (!) 139/90 (BP Location: Right arm, Patient Position: Sitting, Cuff Size: Adult Regular)   Pulse 106   Wt 52.2 kg (115 lb)   SpO2 99%   BMI 22.09 kg/m    Body mass index is 22.09 kg/m .  Physical Exam   GENERAL: healthy, alert and no distress  RESP: lungs clear to auscultation - no rales, rhonchi or wheezes  CV: regular rate and rhythm, normal S1 S2, no S3 or S4, no murmur, click or rub  ABDOMEN: soft, nontender,  no hepatosplenomegaly, no masses and bowel sounds normal. No CVA tenderness          Results for orders placed or performed in visit on 09/21/22   UA reflex to Microscopic and Culture     Status: Abnormal    Specimen: Urine, Midstream   Result Value Ref Range    Color Urine Yellow Colorless, Straw, Light Yellow, Yellow    Appearance Urine Clear Clear    Glucose Urine Negative Negative mg/dL    Bilirubin Urine Negative Negative    Ketones Urine Negative Negative mg/dL    Specific Gravity Urine 1.010 1.003 - 1.035    Blood Urine Trace (A) Negative    pH Urine 6.5 5.0 - 7.0    Protein Albumin Urine Negative Negative mg/dL    Urobilinogen Urine 0.2 0.2, 1.0 E.U./dL    Nitrite Urine Negative Negative    Leukocyte Esterase Urine Trace (A) Negative

## 2022-09-22 ENCOUNTER — VIRTUAL VISIT (OUTPATIENT)
Dept: BEHAVIORAL HEALTH | Facility: CLINIC | Age: 72
End: 2022-09-22

## 2022-09-22 DIAGNOSIS — F43.23 ADJUSTMENT DISORDER WITH MIXED ANXIETY AND DEPRESSED MOOD: Primary | ICD-10-CM

## 2022-09-25 ENCOUNTER — HEALTH MAINTENANCE LETTER (OUTPATIENT)
Age: 72
End: 2022-09-25

## 2022-09-27 ENCOUNTER — TELEPHONE (OUTPATIENT)
Dept: BEHAVIORAL HEALTH | Facility: CLINIC | Age: 72
End: 2022-09-27

## 2022-09-27 NOTE — TELEPHONE ENCOUNTER
Made a check in phone call, pt was at another appt and said she was told that her appt should be starting @ 11:30 ( not as it was scheduled @ 11) . Notified provider and NP was OK with moving he to 11:30 . Made another check in call but pt wasn't  done with her previous appt and told this Wrt she will call later and reschedule.

## 2022-09-27 NOTE — PROGRESS NOTES
"[facility]      Mental Health & Addiction Service Line    Transition Clinic: Psychiatry Progress Note  Medication Management                Charts/documentation read prior to the appointment:  -2022          VISIT INFORMATION      Date:  2022       Number:  -2nd      Referral source:  -ED      Patient Identifying Information:  Legal name: Shannan Koenig  Preferred name: Aileen  : 1950  Preferred pronouns: She/her       Participants:   -Patient  -Provider        Telehealth visit details:  Type of service:   Video  Patient location:   Daughter's home, Off site  Provider Location: [facility]  Platform utilized: [employer]    Start time: 1:36 pm  End time:  2:01 pm          INTERIM HX    -Work as a substitute nurse in the float pool  -Have gotten requests to fill in but have turned down   -Hoping to be return in Nov or Dec/2022     -Co-workers + supervisor are being supportive of my situation    -Get along well with daughters and they are often helpful to me        PSYCHIATRIC ROS      Sleep:  -Able to sleep through the night or get back to sleep easily if waking up to go to the bathroom  -Generally feeling well rested when getting up in the AM  -Averaging 7 to 8 hours      Mood/Anxiety:  -See PHQ-9 score    -See MILTON-7 score      Suicidal ideations:  -Denies passive or active thoughts at this time        SIB:  -Denies engaging in self harm         Side effects:  -None reported         PSYCHIATRIC HISTORY      Individual therapy or IOP:   -Individual therapy at [facility]        Suicide attempts:  -None reported         Inpatient psychiatric hospitalizations:  -None reported   -No hx of commitments         ECT:  -None reported            Medication Trials:     Other anxiolytics:  -Buspar 15 mg twice daily;  plateau     Benzodiazepines:  -Lorazepam prn administered in the ED caused \"rebound anxiety\"            CURRENT SUBSTANCE USE    Alcohol: "   -1 to 2 glasses of wine per day        Recreational Drugs:   -None reported         Medical Marijuana:  -None reported         Cigarettes per day:   -8 to 10         Chewing tobacco:   -None reported         Vaping:    -None reported         Caffeine intake:    -2 or 3 cups coffee per day                MEDICAL HISTORY    -The problem list was reviewed via the EMR prior to the appointment    -The patient denies any concerning physical and or medical symptoms during the interviewing process          MEDICATIONS      Current Outpatient Medications:      acyclovir (ZOVIRAX) 400 MG tablet, , Disp: , Rfl:      Ca Phosphate-Cholecalciferol (CALTRATE GUMMY BITES) 250-400 MG-UNIT CHEW,  See Instructions, Instructions: 5 gummies Chewed, 0 Refill(s), Type: Maintenance, Disp: , Rfl:      cefuroxime (CEFTIN) 250 MG tablet, Take 1 tablet (250 mg) by mouth 2 times daily for 5 days, Disp: 10 tablet, Rfl: 0     clobetasol (TEMOVATE) 0.05 % external cream, , Disp: , Rfl:      escitalopram (LEXAPRO) 10 MG tablet, Take 2 tablets (20 mg) by mouth daily for 90 days, Disp: 180 tablet, Rfl: 3     hydrOXYzine (ATARAX) 25 MG tablet, Take 2 tablets (50 mg) by mouth 4 times daily as needed for anxiety, Disp: 90 tablet, Rfl: 0     levalbuterol (XOPENEX HFA) 45 MCG/ACT inhaler, Inhale 2 puffs into the lungs every 4 hours as needed for shortness of breath / dyspnea or wheezing, Disp: 1 g, Rfl: 1     lovastatin (MEVACOR) 40 MG tablet, Take by mouth At Bedtime, Disp: , Rfl:      meloxicam (MOBIC) 15 MG tablet, TAKE 1 TABLET BY MOUTH DAILY WITH FOOD, Disp: , Rfl:      mirtazapine (REMERON) 15 MG tablet, Take 1 tablet (15 mg) by mouth At Bedtime, Disp: 30 tablet, Rfl: 0     mirtazapine (REMERON) 7.5 MG tablet, Take 1 tablet (7.5 mg) by mouth At Bedtime For 7 nights then increase 15 mg nightly thereafter, Disp: 7 tablet, Rfl: 0     omeprazole (PRILOSEC) 40 MG DR capsule, Take 1 capsule (40 mg) by mouth daily, Disp: 30 capsule, Rfl: 3      propranolol (INDERAL) 10 MG tablet, Take 1 tablet (10 mg) by mouth 2 times daily for 90 days, Disp: 60 tablet, Rfl: 3     zoledronic Acid (RECLAST) 5 MG/100ML SOLN infusion, Inject 5 mg into the vein Yearly/Annual, Disp: , Rfl:       If a controlled substance is prescribed during today's appointment:    -The [place] Prescription Monitoring Program has been reviewed and there are no current concerns with: diversionary activity, early refill requests, and or obtaining the medication from multiple providers.        VITALS    BP Readings from Last 3 Encounters:   09/21/22 (!) 139/90   09/14/22 128/70   09/12/22 (!) 158/95       Pulse Readings from Last 3 Encounters:   09/21/22 106   09/14/22 90   09/12/22 87       Wt Readings from Last 3 Encounters:   09/21/22 52.2 kg (115 lb)   09/15/22 50.3 kg (111 lb)   09/13/22 52.2 kg (115 lb)         LABS    The following labs were reviewed prior to or during the appointment:  -9/21/2022        SCALES    PHQ 7/19/2022 9/15/2022 9/15/2022   PHQ-9 Total Score 5 7 7   Q9: Thoughts of better off dead/self-harm past 2 weeks Not at all Not at all Not at all   F/U: Thoughts of suicide or self-harm - - -   F/U: Safety concerns - - -        MILTON-7 SCORE 8/22/2022 9/6/2022 9/15/2022   Total Score 16 (severe anxiety) 12 (moderate anxiety) -   Total Score 16 12 15               MENTAL STATUS EXAMINATION    Appearance: Well Groomed, Attire Appropriate for the Season  General Behavior:  Cooperative, Direct Eye Contact  Speech: Fluent, Normal rate and volume  Musculoskeletal:    -Gait not observed during t.h. visit  -No facial tics/tremors observed   -Motor coordination is grossly intact   Mood: Doing pretty good   Affect: Appropriate to Content of Speech and Circumstances  Attention: Intact  Orientation:  Person, Place, Time, Situation  Thought Associations:  Intact  Thought Content: Reality based   Thought Processes: Organized, Normal rate  Memory: No overt impairment; no screenings or formal  testing performed  Language: Intact  Judgement: Good  Insight: Fair to Good          ASSESSMENT/CLINICAL IMPRESSIONS    Summary:    Cynthia Koenig is a 71 y/o female with a history of: Anorexia (in early adolescence but not adulthood).     Recently underwent an ED visit on 9/13/2022 due to: SOB + worsening anxiety symptoms in the context of multiple stressors.    Established care at the Transition Clinic on 9/15/2022 psychotropics/bridging until able to   establish long term outpatient psychiatry services.      ------------------    Overall Aileen outlines anxiety symptoms have reduced + sleep patterns have stabilized since engaging in individual therapy and discontinuing Buspar 30 mg/day and replacing with Mirtazapine 15 mg/day.    Isn't having to utilize Hydroxyzine prn as frequently and no longer feels on the verge of constant panic attacks.    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    Currently Aileen denies experiencing suicidal ideations/thoughts of harm towards others/or engaging in SIB.          DSM-V and or working diagnosis:    1. Adjustment disorder with anxious mood     2. Sleep disturbances        Rule outs:     3. Panic Disorder     4. MILTON     5. MDD, single episode with anxious distress           SAFETY EVALUATION:  Suicidal ideations:  -denies  Homicidal ideations:  -denies  Risk factors:  -age  -recent hx of escalating anxiety symptoms  -psychosocial stressors  Protective and mitigating factors:  -daughters, family  -willingness to engage in outpatient mental health services  -no prior attempts  Risk assessment:  -low to medium          TREATMENT PLAN      Medications:    Continue:  Mirtazapine/Remeron 15 mg at bedtime    Per PCP:  Escitalopram/Lexapro 20 mg daily     Hydroxyzine/Atrax 25 to 50 mg (1 to 2 tabs) up to 4x per day as needed for  anxiety    Propranolol/Inderal 10 mg twice daily; TDD = 20 mg          Labs:  -None obtained          Therapy:  -Continue individual therapy        Non-pharmacological modalities:  -Move around/be active for at least 30 minutes daily (doesn't have to be consecutive)        Return to Clinic or Referrals:  -You do not need to return to the Transition Clinic for medication management  -Please make sure to keep the appointment on: 10/19/2022  for longitudinal outpatient psychiatry services                Total time:  29 minutes per:    -Review of EMR  -Appointment time   -Documentation          Nola ALTAMIRANO-CNP, [facility]    --------------------------------------------------------------------------------------------------------------------------        TREATMENT RISK STATEMENT    The risks, benefits, alternatives, and potential adverse effects have been explained and are understood by the patient.  The patient agrees to the treatment plan with their ability to do so.      The patient knows to call the clinic: [phone]  for any problems or concerns until the next psychiatry visit, regardless if it is within or outside of the [facility] system.     If unable to reach clinic staff (via phone call or medical messaging) during the normal business hours: 8:00 am to 4:30 pm then it is recommended accessing the nearest: emergency department, urgent care facility, or utilizing local (varies based on county of residence) and national crisis #'s or text messaging services for immediate assistance.          --------------------------------------------------------------------------------------------------------------------------        If applicable the following has been discussed with the patient, parent/guardian, and or attending family member during the appointment:      1. Risks of polypharmacy and possible drug interactions with current medication list + common OTC products, herbs, and supplements.    Moving  forward, it is suggested to intermittently check-in with a clinic or retail pharmacist whenever new medications or OTC/h/s are consumed.    2. Recommendation to adhere to CDC guidelines as it relates alcohol consumption.  If taking benzodiazepines, you should abstain from alcohol intake due to increased risks of CNS and respiratory depression, as well as psychomotor impairment.    3. If possible, it is recommended to avoid concurrent use of prescribed:  opioids  +  benzodiazepines due to increased risks of CNS and respiratory depression, as well as the increased risk of overdose.     4. Recommendation to minimize and or abstain from THC use (unless the pt. is prescribed medical marijuana).    5. Recommendation to abstain from illicit substances including but not limited to the following: heroin, street fentanyl, cocaine, methamphetamines, and bath salts.    6. Do not take opioids, stimulants, and or other prescription medications unless they are specifically prescribed for you.    7. Recommendation to abstain from tobacco/smoking, alcohol, THC, and all illicit substances if trying to become or are pregnant.    8. Black Box Warnings associated with the prescribed psychotropic(s).    9. Potential adverse effects of antipsychotics including but not limited to the following: weight gain, metabolic syndrome, EPS/Tardive Dyskinesias.    10. Potential CV and neurological adverse effects of stimulants including but not limited to the following:  sudden death, MI, stroke, HTN, cardiomyopathy (long term use) as well as seizures.

## 2022-09-29 DIAGNOSIS — F43.22 ADJUSTMENT DISORDER WITH ANXIOUS MOOD: ICD-10-CM

## 2022-09-29 RX ORDER — HYDROXYZINE HYDROCHLORIDE 25 MG/1
50 TABLET, FILM COATED ORAL 4 TIMES DAILY PRN
Qty: 90 TABLET | Refills: 0 | Status: SHIPPED | OUTPATIENT
Start: 2022-09-29 | End: 2022-11-04

## 2022-10-04 ENCOUNTER — VIRTUAL VISIT (OUTPATIENT)
Dept: BEHAVIORAL HEALTH | Facility: CLINIC | Age: 72
End: 2022-10-04

## 2022-10-04 DIAGNOSIS — F43.23 ADJUSTMENT DISORDER WITH MIXED ANXIETY AND DEPRESSED MOOD: Primary | ICD-10-CM
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
## 2022-10-12 DIAGNOSIS — F43.22 ADJUSTMENT DISORDER WITH ANXIOUS MOOD: ICD-10-CM

## 2022-10-12 DIAGNOSIS — G47.9 SLEEP DISTURBANCES: ICD-10-CM

## 2022-10-12 RX ORDER — MIRTAZAPINE 15 MG/1
15 TABLET, FILM COATED ORAL AT BEDTIME
Qty: 30 TABLET | Refills: 0 | Status: SHIPPED | OUTPATIENT
Start: 2022-10-12 | End: 2022-10-13

## 2022-10-12 NOTE — TELEPHONE ENCOUNTER
Date of Last Office Visit: 9/15/22  [facility]   Date of Next Office Visit: 10/13/22 [facility]  No shows since last visit: none  Cancellations since last visit: none    Medication requested: Remeron 15 mg tablet.  Date last ordered: 9/15/22  Qty: 30  Refills: 0       Lapse in medication adherence greater than 5 days?: no  If yes, call patient and gather details: NA  Medication refill request verified as identical to current order?: yes  Result of Last DAM, VPA, Li+ Level, CBC, or Carbamazepine Level (at or since last visit): N/A    Last visit treatment plan: Instructions       Return in about 12 days (around 9/27/2022) for Follow up, with me, in person.  Start:  Buspirone/Buspar for 7 day increments:  -15 mg   -7.5 mg = 1/2 tab  -Then discontinue     Mirtazapine/Remeron 7.5 mg for 7 nights then increase to 15 mg at bedtime        Continue:  Escitalopram/Lexapro 20 mg daily     Hydroxyzine/Atrax 25 to 50 mg (1 to 2 tabs) up to 4x per day as needed for anxiety     Propranolol/Inderal 10 mg twice daily; TDD = 20 mg            []Medication refilled per  Medication Refill in Ambulatory Care  policy.  [x]Medication unable to be refilled by RN due to criteria not met as indicated below:    []Eligibility - not seen in the last year   []Supervision - no future appointment   []Compliance - no shows, cancellations or lapse in therapy   []Verification - order discrepancy   []Controlled medication   [x]Medication not included in policy   []90-day supply request   []Other

## 2022-10-13 ENCOUNTER — VIRTUAL VISIT (OUTPATIENT)
Dept: BEHAVIORAL HEALTH | Facility: CLINIC | Age: 72
End: 2022-10-13
Payer: MEDICARE

## 2022-10-13 DIAGNOSIS — F43.22 ADJUSTMENT DISORDER WITH ANXIOUS MOOD: ICD-10-CM

## 2022-10-13 DIAGNOSIS — G47.9 SLEEP DISTURBANCES: ICD-10-CM

## 2022-10-13 PROCEDURE — 99213 OFFICE O/P EST LOW 20 MIN: CPT | Mod: 95 | Performed by: NURSE PRACTITIONER

## 2022-10-13 RX ORDER — MIRTAZAPINE 15 MG/1
15 TABLET, FILM COATED ORAL AT BEDTIME
Qty: 90 TABLET | Refills: 0 | Status: SHIPPED | OUTPATIENT
Start: 2022-10-13 | End: 2022-12-22

## 2022-10-13 ASSESSMENT — PATIENT HEALTH QUESTIONNAIRE - PHQ9
SUM OF ALL RESPONSES TO PHQ QUESTIONS 1-9: 0
5. POOR APPETITE OR OVEREATING: NOT AT ALL

## 2022-10-13 ASSESSMENT — ANXIETY QUESTIONNAIRES
GAD7 TOTAL SCORE: 1
6. BECOMING EASILY ANNOYED OR IRRITABLE: NOT AT ALL
GAD7 TOTAL SCORE: 1
1. FEELING NERVOUS, ANXIOUS, OR ON EDGE: SEVERAL DAYS
IF YOU CHECKED OFF ANY PROBLEMS ON THIS QUESTIONNAIRE, HOW DIFFICULT HAVE THESE PROBLEMS MADE IT FOR YOU TO DO YOUR WORK, TAKE CARE OF THINGS AT HOME, OR GET ALONG WITH OTHER PEOPLE: NOT DIFFICULT AT ALL
5. BEING SO RESTLESS THAT IT IS HARD TO SIT STILL: NOT AT ALL
3. WORRYING TOO MUCH ABOUT DIFFERENT THINGS: NOT AT ALL
7. FEELING AFRAID AS IF SOMETHING AWFUL MIGHT HAPPEN: NOT AT ALL
2. NOT BEING ABLE TO STOP OR CONTROL WORRYING: NOT AT ALL

## 2022-10-13 NOTE — PROGRESS NOTES
" This video/telephone visit will be conducted virtually between you and your provider. We have found that certain health care needs can be provided without the need for an in-person physical exam. This service lets us provide the care you need with a video /telephone conversation. If a prescription is necessary we can send it directly to your pharmacy. If lab work is needed we can place an order for that and you can then stop by our lab to have the test done at a later time.\"   Just as we bill insurance for in-person visits, we also bill insurance for video/telephone visits. If you have questions about your insurance coverage, we recommend that you speak with your insurance company.      Patient/Parent has given verbal consent for video/Telephone visit? yes    Patient would like the video visit invitation sent by: Paybubblewendy    Patient verified allergies, medications and pharmacy via phone. Patient states they are ready for visit.       Mental Health &Addiction (MH&A)Transition Clinic (TC):     Provides Patient Support While Waiting to Access Programmatic and Outpatient MH&A Care and Provides [facility] Crisis Assessment Services     FOLLOW-UP VISIT  ____________________________________________      \"The Transition Clinic is a temporary psychiatry service that helps to bridge the time to your next appointment. It is not intended to be a long-term service and you are expected to attend your scheduled appointment with your next provider.\"  [x] Patient/Parent verbalized understanding    If you need support between appointments, please call [phone] and let them know you're seen by [facility]. You may also send a [employer] message to reach us.    General-     Most pressing needs at this time: None, pt states she is doing so much better, the medications are working great and the best she's felt in a while. Has been getting more sleep and \"that feels amazing\".      Mental Health currently: no, stable     Any " changes to your physical health: no          Medications-     Injectable medications currently prescribed: no     If yes, do you need an appointment for the next injection: n/a    Any Controlled Substances that you are prescribed: no     Any refills you are aware that you'll need soon: no        Primary care provider: Pollo Terry MD        MILTON-7 scores:    MILTON-7 SCORE 9/15/2022 10/13/2022   Total Score - -   Total Score 15 1       PHQ-9 scores:   PHQ-9 SCORE 9/15/2022 10/13/2022   PHQ-9 Total Score MyChart 7 (Mild depression) -   PHQ-9 Total Score 7 0         Anything the provider should be aware of for today's appointment:     New (awaiting) Mental health provider or next programming: Sofiya Romeo 10/19/22        Enrique Donnelly MA on October 13, 2022 at 11:53 AM

## 2022-10-13 NOTE — PATIENT INSTRUCTIONS
Continue:  Escitalopram/Lexapro 20 mg daily     Hydroxyzine/Atrax 25 to 50 mg (1 to 2 tabs) up to 4x per day as needed for anxiety    Mirtazapine/Remeron 15 mg at bedtime     Propranolol/Inderal 10 mg twice daily; TDD = 20 mg    -----------------------------------    -You do not need to return to the Transition Clinic for medication management  -Please make sure to keep the appointment (see below) for longitudinal outpatient psychiatry services     ----------------------------------    Date: Wednesday, 10/19/2022  Time: 1:30 pm- 2:30 pm    Provider: Francisco WEATHERS  CNP,RN   Location: [facility], [address] [place] [id number]   Phone: [phone]

## 2022-10-14 ENCOUNTER — MYC MEDICAL ADVICE (OUTPATIENT)
Dept: BEHAVIORAL HEALTH | Facility: CLINIC | Age: 72
End: 2022-10-14

## 2022-11-02 ENCOUNTER — VIRTUAL VISIT (OUTPATIENT)
Dept: BEHAVIORAL HEALTH | Facility: CLINIC | Age: 72
End: 2022-11-02
Payer: MEDICARE

## 2022-11-02 DIAGNOSIS — F43.22 ADJUSTMENT DISORDER WITH ANXIOUS MOOD: Primary | ICD-10-CM

## 2022-11-02 ASSESSMENT — COLUMBIA-SUICIDE SEVERITY RATING SCALE - C-SSRS
2. HAVE YOU ACTUALLY HAD ANY THOUGHTS OF KILLING YOURSELF?: NO
1. HAVE YOU WISHED YOU WERE DEAD OR WISHED YOU COULD GO TO SLEEP AND NOT WAKE UP?: NO
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
## 2022-11-04 DIAGNOSIS — F43.22 ADJUSTMENT DISORDER WITH ANXIOUS MOOD: ICD-10-CM

## 2022-11-04 RX ORDER — HYDROXYZINE HYDROCHLORIDE 25 MG/1
50 TABLET, FILM COATED ORAL 4 TIMES DAILY PRN
Qty: 90 TABLET | Refills: 0 | Status: ON HOLD | OUTPATIENT
Start: 2022-11-04 | End: 2023-03-27

## 2022-11-11 DIAGNOSIS — I34.1 MVP (MITRAL VALVE PROLAPSE): Primary | ICD-10-CM

## 2022-11-11 RX ORDER — PROPRANOLOL HYDROCHLORIDE 10 MG/1
TABLET ORAL
Qty: 180 TABLET | Refills: 3 | Status: ON HOLD | OUTPATIENT
Start: 2022-11-11 | End: 2023-03-27

## 2022-11-11 NOTE — TELEPHONE ENCOUNTER
Routing to provider as refill request for review/approval because:  BP Readings from Last 3 Encounters:   09/21/22 (!) 139/90   09/14/22 128/70   09/12/22 (!) 158/95   Last office visit with you: 9/6/22    Lainey South RN  [facility]

## 2022-11-16 ENCOUNTER — VIRTUAL VISIT (OUTPATIENT)
Dept: BEHAVIORAL HEALTH | Facility: CLINIC | Age: 72
End: 2022-11-16

## 2022-11-16 DIAGNOSIS — F43.22 ADJUSTMENT DISORDER WITH ANXIOUS MOOD: Primary | ICD-10-CM

## 2022-11-16 ASSESSMENT — COLUMBIA-SUICIDE SEVERITY RATING SCALE - C-SSRS
1. SINCE LAST CONTACT, HAVE YOU WISHED YOU WERE DEAD OR WISHED YOU COULD GO TO SLEEP AND NOT WAKE UP?: NO
2. HAVE YOU ACTUALLY HAD ANY THOUGHTS OF KILLING YOURSELF?: NO
ATTEMPT SINCE LAST CONTACT: NO
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
## 2022-11-22 DIAGNOSIS — G47.9 SLEEP DISTURBANCES: ICD-10-CM

## 2022-11-22 DIAGNOSIS — F41.1 GENERALIZED ANXIETY DISORDER: ICD-10-CM

## 2022-11-22 DIAGNOSIS — I34.1 MVP (MITRAL VALVE PROLAPSE): ICD-10-CM

## 2022-11-22 DIAGNOSIS — F43.22 ADJUSTMENT DISORDER WITH ANXIOUS MOOD: ICD-10-CM

## 2022-11-23 RX ORDER — MIRTAZAPINE 15 MG/1
15 TABLET, FILM COATED ORAL AT BEDTIME
Qty: 90 TABLET | Refills: 0 | OUTPATIENT
Start: 2022-11-23

## 2022-11-23 RX ORDER — ESCITALOPRAM OXALATE 10 MG/1
20 TABLET ORAL DAILY
Qty: 180 TABLET | Refills: 3 | OUTPATIENT
Start: 2022-11-23

## 2022-11-23 RX ORDER — PROPRANOLOL HYDROCHLORIDE 10 MG/1
10 TABLET ORAL 2 TIMES DAILY
Qty: 180 TABLET | Refills: 3 | OUTPATIENT
Start: 2022-11-23

## 2022-11-30 ENCOUNTER — VIRTUAL VISIT (OUTPATIENT)
Dept: BEHAVIORAL HEALTH | Facility: CLINIC | Age: 72
End: 2022-11-30
Payer: MEDICARE

## 2022-11-30 DIAGNOSIS — F43.22 ADJUSTMENT DISORDER WITH ANXIOUS MOOD: Primary | ICD-10-CM
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
## 2022-12-12 DIAGNOSIS — L30.9 ECZEMA, UNSPECIFIED TYPE: ICD-10-CM

## 2022-12-12 DIAGNOSIS — A60.00 GENITAL HERPES SIMPLEX, UNSPECIFIED SITE: Primary | ICD-10-CM

## 2022-12-12 DIAGNOSIS — E78.5 DYSLIPIDEMIA: ICD-10-CM

## 2022-12-13 RX ORDER — LOVASTATIN 40 MG
40 TABLET ORAL AT BEDTIME
Qty: 90 TABLET | Refills: 0 | Status: SHIPPED | OUTPATIENT
Start: 2022-12-13 | End: 2023-03-07

## 2022-12-13 RX ORDER — CLOBETASOL PROPIONATE 0.5 MG/G
CREAM TOPICAL 2 TIMES DAILY
Qty: 45 G | Refills: 0 | Status: ON HOLD | OUTPATIENT
Start: 2022-12-13 | End: 2023-04-07

## 2022-12-13 RX ORDER — ACYCLOVIR 400 MG/1
400 TABLET ORAL 2 TIMES DAILY
Qty: 180 TABLET | Refills: 2 | Status: ON HOLD | OUTPATIENT
Start: 2022-12-13 | End: 2023-04-07

## 2022-12-13 NOTE — TELEPHONE ENCOUNTER
Routing refill request to provider for review/approval because:  Drug not on the [facility] refill protocol      BARAK Upton  [facility]

## 2022-12-15 ENCOUNTER — VIRTUAL VISIT (OUTPATIENT)
Dept: BEHAVIORAL HEALTH | Facility: CLINIC | Age: 72
End: 2022-12-15
Payer: MEDICARE

## 2022-12-15 DIAGNOSIS — F43.23 ADJUSTMENT DISORDER WITH MIXED ANXIETY AND DEPRESSED MOOD: Primary | ICD-10-CM
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
## 2022-12-22 DIAGNOSIS — G47.9 SLEEP DISTURBANCES: ICD-10-CM

## 2022-12-22 DIAGNOSIS — F43.22 ADJUSTMENT DISORDER WITH ANXIOUS MOOD: ICD-10-CM

## 2022-12-22 RX ORDER — MIRTAZAPINE 15 MG/1
15 TABLET, FILM COATED ORAL AT BEDTIME
Qty: 90 TABLET | Refills: 0 | Status: SHIPPED | OUTPATIENT
Start: 2022-12-22 | End: 2022-12-29

## 2022-12-22 NOTE — TELEPHONE ENCOUNTER
"Please review if appropriate to refill:    Ordered on: 10/13/2022        Authorized by: SHELBY PEREZ       Dispense: 90 tablet       Note to Pharmacy: To replace all other Mirtazapine scripts          Last office visit: 9/6/2022     Future Appointments 12/22/2022 - 6/20/2023      Date Visit Type Length Department Provider     12/28/2022  9:30 AM ADULT PSYCHOTHERAPY RETURN 60 min [facility] Sofiya Morel LICSW              1/17/2023 11:00 AM ADULT PSYCHOTHERAPY NEW 60 min [facility] Crow Stinson LMFT    Location Instructions:     [address] [place] [address]                   Requested Prescriptions   Pending Prescriptions Disp Refills     mirtazapine (REMERON) 15 MG tablet 90 tablet 0     Sig: Take 1 tablet (15 mg) by mouth At Bedtime       Atypical Antidepressants Protocol Passed - 12/22/2022 10:02 AM        Passed - Recent (12 mo) or future (30 days) visit within the authorizing provider's specialty     Patient has had an office visit with the authorizing provider or a provider within the authorizing providers department within the previous 12 mos or has a future within next 30 days. See \"Patient Info\" tab in inbasket, or \"Choose Columns\" in Meds & Orders section of the refill encounter.              Passed - Medication active on med list        Passed - Patient is age 18 or older        Passed - No active pregnancy on record        Passed - No positive pregnancy test in past 12 mos                   "

## 2023-01-26 ENCOUNTER — DOCUMENTATION ONLY (OUTPATIENT)
Dept: BEHAVIORAL HEALTH | Facility: CLINIC | Age: 73
End: 2023-01-26
Payer: MEDICARE
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
## 2023-01-30 ENCOUNTER — HEALTH MAINTENANCE LETTER (OUTPATIENT)
Age: 73
End: 2023-01-30

## 2023-02-06 DIAGNOSIS — G47.9 SLEEP DISTURBANCES: ICD-10-CM

## 2023-02-06 DIAGNOSIS — F43.22 ADJUSTMENT DISORDER WITH ANXIOUS MOOD: ICD-10-CM

## 2023-02-07 RX ORDER — MIRTAZAPINE 15 MG/1
TABLET, FILM COATED ORAL
Qty: 90 TABLET | Refills: 1 | Status: SHIPPED | OUTPATIENT
Start: 2023-02-07

## 2023-02-07 NOTE — TELEPHONE ENCOUNTER
Routing refill request to provider for review/approval because:  Drug interaction warning: high, escitalopram and mirtazapine     Devi Szymanski RN

## 2023-03-06 DIAGNOSIS — E78.5 DYSLIPIDEMIA: ICD-10-CM

## 2023-03-07 RX ORDER — LOVASTATIN 40 MG
TABLET ORAL
Qty: 90 TABLET | Refills: 1 | Status: SHIPPED | OUTPATIENT
Start: 2023-03-07 | End: 2024-02-08

## 2023-03-07 NOTE — TELEPHONE ENCOUNTER
Routing refill request to provider for review/approval because:  Labs not current:    LDL Cholesterol Calculated   Date Value Ref Range Status   02/01/2021 112 (H)  Final     Comment:     Reference range: <100     Desirable range <100 mg/dL for primary prevention;    <70 mg/dL for patients with CHD or diabetic patients   with > or = 2 CHD risk factors.     LDL-C is now calculated using the Andi-Calvin   calculation, which is a validated novel method providing   better accuracy than the Friedewald equation in the   estimation of LDL-C.   Andi QUIJANO et al. LILY. 2013;310(19): 6620-4399   (http://education.[employer]/faq/NOX366)  Lab test performed by:  Lab Mnemonic:SRI  [facility] [place] [facility]  [address] [place] [address]  Kobe Ramon M.D.  Unit of Measure:   mg/dL (calc)  [facility] Specimen received date and time: 01-FEB-2021 09:05:00.00       Devi Szymanski RN

## 2023-03-25 ENCOUNTER — TELEPHONE (OUTPATIENT)
Dept: BEHAVIORAL HEALTH | Facility: CLINIC | Age: 73
End: 2023-03-25

## 2023-03-25 ENCOUNTER — HOSPITAL ENCOUNTER (INPATIENT)
Facility: CLINIC | Age: 73
LOS: 2 days | Discharge: HOME | DRG: 897 | End: 2023-03-27
Attending: PSYCHIATRY & NEUROLOGY | Admitting: PSYCHIATRY & NEUROLOGY
Payer: MEDICARE

## 2023-03-25 ENCOUNTER — HOSPITAL ENCOUNTER (EMERGENCY)
Facility: CLINIC | Age: 73
Discharge: OTHER INSTITUTION | DRG: 897 | End: 2023-03-25
Attending: EMERGENCY MEDICINE | Admitting: EMERGENCY MEDICINE
Payer: MEDICARE

## 2023-03-25 VITALS
WEIGHT: 113 LBS | RESPIRATION RATE: 22 BRPM | DIASTOLIC BLOOD PRESSURE: 89 MMHG | BODY MASS INDEX: 21.71 KG/M2 | SYSTOLIC BLOOD PRESSURE: 126 MMHG | TEMPERATURE: 97.9 F | HEART RATE: 94 BPM | OXYGEN SATURATION: 92 %

## 2023-03-25 DIAGNOSIS — F10.939 ALCOHOL WITHDRAWAL SYNDROME WITH COMPLICATION (H): ICD-10-CM

## 2023-03-25 DIAGNOSIS — F10.10 ALCOHOL ABUSE: ICD-10-CM

## 2023-03-25 DIAGNOSIS — F32.1 CURRENT MODERATE EPISODE OF MAJOR DEPRESSIVE DISORDER WITHOUT PRIOR EPISODE (H): ICD-10-CM

## 2023-03-25 DIAGNOSIS — F10.930 ALCOHOL WITHDRAWAL SYNDROME WITHOUT COMPLICATION (H): Primary | ICD-10-CM

## 2023-03-25 DIAGNOSIS — F32.A DEPRESSION, UNSPECIFIED DEPRESSION TYPE: ICD-10-CM

## 2023-03-25 DIAGNOSIS — K21.9 GASTROESOPHAGEAL REFLUX DISEASE WITHOUT ESOPHAGITIS: ICD-10-CM

## 2023-03-25 LAB
ALBUMIN SERPL BCG-MCNC: 4.4 G/DL (ref 3.5–5.2)
ALBUMIN UR-MCNC: NEGATIVE MG/DL
ALP SERPL-CCNC: 84 U/L (ref 35–104)
ALT SERPL W P-5'-P-CCNC: 38 U/L (ref 10–35)
AMPHETAMINES UR QL SCN: NORMAL
ANION GAP SERPL CALCULATED.3IONS-SCNC: 10 MMOL/L (ref 7–15)
ANION GAP SERPL CALCULATED.3IONS-SCNC: 13 MMOL/L (ref 7–15)
APPEARANCE UR: CLEAR
AST SERPL W P-5'-P-CCNC: 39 U/L (ref 10–35)
BARBITURATES UR QL SCN: NORMAL
BASOPHILS # BLD AUTO: 0.1 10E3/UL (ref 0–0.2)
BASOPHILS NFR BLD AUTO: 1 %
BENZODIAZ UR QL SCN: NORMAL
BILIRUB SERPL-MCNC: 0.4 MG/DL
BILIRUB UR QL STRIP: NEGATIVE
BUN SERPL-MCNC: 7.1 MG/DL (ref 8–23)
BUN SERPL-MCNC: 7.1 MG/DL (ref 8–23)
BZE UR QL SCN: NORMAL
CALCIUM SERPL-MCNC: 10 MG/DL (ref 8.8–10.2)
CALCIUM SERPL-MCNC: 10.1 MG/DL (ref 8.8–10.2)
CANNABINOIDS UR QL SCN: NORMAL
CHLORIDE SERPL-SCNC: 105 MMOL/L (ref 98–107)
CHLORIDE SERPL-SCNC: 106 MMOL/L (ref 98–107)
COLOR UR AUTO: ABNORMAL
CREAT SERPL-MCNC: 0.69 MG/DL (ref 0.51–0.95)
CREAT SERPL-MCNC: 0.71 MG/DL (ref 0.51–0.95)
DEPRECATED HCO3 PLAS-SCNC: 23 MMOL/L (ref 22–29)
DEPRECATED HCO3 PLAS-SCNC: 25 MMOL/L (ref 22–29)
EOSINOPHIL # BLD AUTO: 0.1 10E3/UL (ref 0–0.7)
EOSINOPHIL NFR BLD AUTO: 1 %
ERYTHROCYTE [DISTWIDTH] IN BLOOD BY AUTOMATED COUNT: 13.5 % (ref 10–15)
ETHANOL SERPL-MCNC: <0.01 G/DL
GFR SERPL CREATININE-BSD FRML MDRD: 89 ML/MIN/1.73M2
GFR SERPL CREATININE-BSD FRML MDRD: >90 ML/MIN/1.73M2
GLUCOSE SERPL-MCNC: 106 MG/DL (ref 70–99)
GLUCOSE SERPL-MCNC: 108 MG/DL (ref 70–99)
GLUCOSE UR STRIP-MCNC: NEGATIVE MG/DL
HCT VFR BLD AUTO: 47.5 % (ref 35–47)
HGB BLD-MCNC: 15.8 G/DL (ref 11.7–15.7)
HGB UR QL STRIP: ABNORMAL
HOLD SPECIMEN: NORMAL
HYALINE CASTS: 1 /LPF
IMM GRANULOCYTES # BLD: 0.1 10E3/UL
IMM GRANULOCYTES NFR BLD: 1 %
KETONES UR STRIP-MCNC: ABNORMAL MG/DL
LEUKOCYTE ESTERASE UR QL STRIP: ABNORMAL
LIPASE SERPL-CCNC: 27 U/L (ref 13–60)
LYMPHOCYTES # BLD AUTO: 1.5 10E3/UL (ref 0.8–5.3)
LYMPHOCYTES NFR BLD AUTO: 18 %
MCH RBC QN AUTO: 32 PG (ref 26.5–33)
MCHC RBC AUTO-ENTMCNC: 33.3 G/DL (ref 31.5–36.5)
MCV RBC AUTO: 96 FL (ref 78–100)
MONOCYTES # BLD AUTO: 0.7 10E3/UL (ref 0–1.3)
MONOCYTES NFR BLD AUTO: 8 %
MUCOUS THREADS #/AREA URNS LPF: PRESENT /LPF
NEUTROPHILS # BLD AUTO: 6.1 10E3/UL (ref 1.6–8.3)
NEUTROPHILS NFR BLD AUTO: 71 %
NITRATE UR QL: NEGATIVE
NRBC # BLD AUTO: 0 10E3/UL
NRBC BLD AUTO-RTO: 0 /100
OPIATES UR QL SCN: NORMAL
PCP QUAL URINE (ROCHE): NORMAL
PH UR STRIP: 5 [PH] (ref 5–7)
PLATELET # BLD AUTO: 274 10E3/UL (ref 150–450)
POTASSIUM SERPL-SCNC: 4 MMOL/L (ref 3.4–5.3)
POTASSIUM SERPL-SCNC: 4 MMOL/L (ref 3.4–5.3)
PROT SERPL-MCNC: 7.3 G/DL (ref 6.4–8.3)
RBC # BLD AUTO: 4.93 10E6/UL (ref 3.8–5.2)
RBC URINE: 2 /HPF
SARS-COV-2 RNA RESP QL NAA+PROBE: NEGATIVE
SODIUM SERPL-SCNC: 141 MMOL/L (ref 136–145)
SODIUM SERPL-SCNC: 141 MMOL/L (ref 136–145)
SP GR UR STRIP: 1.01 (ref 1–1.03)
SQUAMOUS EPITHELIAL: 1 /HPF
UROBILINOGEN UR STRIP-MCNC: NORMAL MG/DL
WBC # BLD AUTO: 8.5 10E3/UL (ref 4–11)
WBC URINE: 5 /HPF

## 2023-03-25 PROCEDURE — 80307 DRUG TEST PRSMV CHEM ANLYZR: CPT | Performed by: EMERGENCY MEDICINE

## 2023-03-25 PROCEDURE — 36415 COLL VENOUS BLD VENIPUNCTURE: CPT | Performed by: EMERGENCY MEDICINE

## 2023-03-25 PROCEDURE — C9803 HOPD COVID-19 SPEC COLLECT: HCPCS

## 2023-03-25 PROCEDURE — 83690 ASSAY OF LIPASE: CPT | Performed by: EMERGENCY MEDICINE

## 2023-03-25 PROCEDURE — 80053 COMPREHEN METABOLIC PANEL: CPT | Performed by: EMERGENCY MEDICINE

## 2023-03-25 PROCEDURE — 99285 EMERGENCY DEPT VISIT HI MDM: CPT | Mod: 25

## 2023-03-25 PROCEDURE — 128N000001 HC R&B CD/MH ADULT

## 2023-03-25 PROCEDURE — U0005 INFEC AGEN DETEC AMPLI PROBE: HCPCS | Performed by: EMERGENCY MEDICINE

## 2023-03-25 PROCEDURE — 81001 URINALYSIS AUTO W/SCOPE: CPT | Performed by: EMERGENCY MEDICINE

## 2023-03-25 PROCEDURE — 250N000013 HC RX MED GY IP 250 OP 250 PS 637: Performed by: PSYCHIATRY & NEUROLOGY

## 2023-03-25 PROCEDURE — 82077 ASSAY SPEC XCP UR&BREATH IA: CPT | Performed by: EMERGENCY MEDICINE

## 2023-03-25 PROCEDURE — 85018 HEMOGLOBIN: CPT | Performed by: EMERGENCY MEDICINE

## 2023-03-25 PROCEDURE — 250N000011 HC RX IP 250 OP 636: Performed by: EMERGENCY MEDICINE

## 2023-03-25 PROCEDURE — 96361 HYDRATE IV INFUSION ADD-ON: CPT

## 2023-03-25 PROCEDURE — 90791 PSYCH DIAGNOSTIC EVALUATION: CPT

## 2023-03-25 PROCEDURE — 258N000003 HC RX IP 258 OP 636: Performed by: EMERGENCY MEDICINE

## 2023-03-25 PROCEDURE — 96374 THER/PROPH/DIAG INJ IV PUSH: CPT

## 2023-03-25 PROCEDURE — 250N000013 HC RX MED GY IP 250 OP 250 PS 637: Performed by: EMERGENCY MEDICINE

## 2023-03-25 RX ORDER — DIAZEPAM 5 MG
5-20 TABLET ORAL EVERY 30 MIN PRN
Status: DISCONTINUED | OUTPATIENT
Start: 2023-03-25 | End: 2023-03-27 | Stop reason: HOSPADM

## 2023-03-25 RX ORDER — MIRTAZAPINE 15 MG/1
15 TABLET, FILM COATED ORAL AT BEDTIME
Status: DISCONTINUED | OUTPATIENT
Start: 2023-03-25 | End: 2023-03-27 | Stop reason: HOSPADM

## 2023-03-25 RX ORDER — PROPRANOLOL HYDROCHLORIDE 10 MG/1
10 TABLET ORAL 2 TIMES DAILY PRN
Status: DISCONTINUED | OUTPATIENT
Start: 2023-03-25 | End: 2023-03-27 | Stop reason: HOSPADM

## 2023-03-25 RX ORDER — MULTIPLE VITAMINS W/ MINERALS TAB 9MG-400MCG
1 TAB ORAL ONCE
Status: COMPLETED | OUTPATIENT
Start: 2023-03-25 | End: 2023-03-25

## 2023-03-25 RX ORDER — ONDANSETRON 4 MG/1
4 TABLET, ORALLY DISINTEGRATING ORAL EVERY 6 HOURS PRN
Status: DISCONTINUED | OUTPATIENT
Start: 2023-03-25 | End: 2023-03-27 | Stop reason: HOSPADM

## 2023-03-25 RX ORDER — ESCITALOPRAM OXALATE 20 MG/1
20 TABLET ORAL DAILY
Status: DISCONTINUED | OUTPATIENT
Start: 2023-03-26 | End: 2023-03-27

## 2023-03-25 RX ORDER — NICOTINE 21 MG/24HR
1 PATCH, TRANSDERMAL 24 HOURS TRANSDERMAL DAILY
Status: DISCONTINUED | OUTPATIENT
Start: 2023-03-26 | End: 2023-03-27 | Stop reason: HOSPADM

## 2023-03-25 RX ORDER — ACYCLOVIR 400 MG/1
400 TABLET ORAL 2 TIMES DAILY
Status: DISCONTINUED | OUTPATIENT
Start: 2023-03-26 | End: 2023-03-26

## 2023-03-25 RX ORDER — CLOBETASOL PROPIONATE 0.5 MG/G
CREAM TOPICAL 2 TIMES DAILY
Status: DISCONTINUED | OUTPATIENT
Start: 2023-03-26 | End: 2023-03-27 | Stop reason: HOSPADM

## 2023-03-25 RX ORDER — ATENOLOL 50 MG/1
50 TABLET ORAL DAILY PRN
Status: DISCONTINUED | OUTPATIENT
Start: 2023-03-25 | End: 2023-03-27 | Stop reason: HOSPADM

## 2023-03-25 RX ORDER — POLYETHYLENE GLYCOL 3350 17 G
2 POWDER IN PACKET (EA) ORAL
Status: DISCONTINUED | OUTPATIENT
Start: 2023-03-25 | End: 2023-03-27 | Stop reason: HOSPADM

## 2023-03-25 RX ORDER — GABAPENTIN 100 MG/1
100 CAPSULE ORAL EVERY 6 HOURS PRN
Status: DISCONTINUED | OUTPATIENT
Start: 2023-03-25 | End: 2023-03-27 | Stop reason: HOSPADM

## 2023-03-25 RX ORDER — AMOXICILLIN 250 MG
1 CAPSULE ORAL 2 TIMES DAILY PRN
Status: DISCONTINUED | OUTPATIENT
Start: 2023-03-25 | End: 2023-03-27 | Stop reason: HOSPADM

## 2023-03-25 RX ORDER — FOLIC ACID 1 MG/1
1 TABLET ORAL DAILY
Status: DISCONTINUED | OUTPATIENT
Start: 2023-03-26 | End: 2023-03-27 | Stop reason: HOSPADM

## 2023-03-25 RX ORDER — LOPERAMIDE HCL 2 MG
2 CAPSULE ORAL 4 TIMES DAILY PRN
Status: DISCONTINUED | OUTPATIENT
Start: 2023-03-25 | End: 2023-03-27 | Stop reason: HOSPADM

## 2023-03-25 RX ORDER — IBUPROFEN 600 MG/1
600 TABLET, FILM COATED ORAL EVERY 6 HOURS PRN
Status: DISCONTINUED | OUTPATIENT
Start: 2023-03-25 | End: 2023-03-26

## 2023-03-25 RX ORDER — LORAZEPAM 2 MG/ML
0.5 INJECTION INTRAMUSCULAR EVERY 10 MIN PRN
Status: DISCONTINUED | OUTPATIENT
Start: 2023-03-25 | End: 2023-03-25 | Stop reason: HOSPADM

## 2023-03-25 RX ORDER — ATORVASTATIN CALCIUM 10 MG/1
10 TABLET, FILM COATED ORAL EVERY EVENING
Status: DISCONTINUED | OUTPATIENT
Start: 2023-03-25 | End: 2023-03-27 | Stop reason: HOSPADM

## 2023-03-25 RX ORDER — SODIUM CHLORIDE 9 MG/ML
INJECTION, SOLUTION INTRAVENOUS CONTINUOUS
Status: DISCONTINUED | OUTPATIENT
Start: 2023-03-25 | End: 2023-03-25 | Stop reason: HOSPADM

## 2023-03-25 RX ORDER — MULTIPLE VITAMINS W/ MINERALS TAB 9MG-400MCG
1 TAB ORAL DAILY
Status: DISCONTINUED | OUTPATIENT
Start: 2023-03-26 | End: 2023-03-27 | Stop reason: HOSPADM

## 2023-03-25 RX ORDER — ATORVASTATIN CALCIUM 10 MG/1
10 TABLET, FILM COATED ORAL DAILY
Status: DISCONTINUED | OUTPATIENT
Start: 2023-03-26 | End: 2023-03-25

## 2023-03-25 RX ORDER — MAGNESIUM HYDROXIDE/ALUMINUM HYDROXICE/SIMETHICONE 120; 1200; 1200 MG/30ML; MG/30ML; MG/30ML
30 SUSPENSION ORAL EVERY 4 HOURS PRN
Status: DISCONTINUED | OUTPATIENT
Start: 2023-03-25 | End: 2023-03-27 | Stop reason: HOSPADM

## 2023-03-25 RX ADMIN — DIAZEPAM 10 MG: 5 TABLET ORAL at 20:37

## 2023-03-25 RX ADMIN — ATORVASTATIN CALCIUM 10 MG: 10 TABLET, FILM COATED ORAL at 21:10

## 2023-03-25 RX ADMIN — SODIUM CHLORIDE 1000 ML: 9 INJECTION, SOLUTION INTRAVENOUS at 09:12

## 2023-03-25 RX ADMIN — LORAZEPAM 0.5 MG: 2 INJECTION INTRAMUSCULAR; INTRAVENOUS at 10:41

## 2023-03-25 RX ADMIN — MULTIPLE VITAMINS W/ MINERALS TAB 1 TABLET: TAB at 10:42

## 2023-03-25 RX ADMIN — SODIUM CHLORIDE 1000 ML: 9 INJECTION, SOLUTION INTRAVENOUS at 10:42

## 2023-03-25 RX ADMIN — MIRTAZAPINE 15 MG: 15 TABLET, FILM COATED ORAL at 21:10

## 2023-03-25 ASSESSMENT — ACTIVITIES OF DAILY LIVING (ADL)
ADLS_ACUITY_SCORE: 35
DRESSING/BATHING_DIFFICULTY: NO
CONCENTRATING,_REMEMBERING_OR_MAKING_DECISIONS_DIFFICULTY: NO
ADLS_ACUITY_SCORE: 35
WEAR_GLASSES_OR_BLIND: NO
DIFFICULTY_EATING/SWALLOWING: NO
TOILETING_ISSUES: NO
WALKING_OR_CLIMBING_STAIRS_DIFFICULTY: NO
ADLS_ACUITY_SCORE: 28
ADLS_ACUITY_SCORE: 28
DOING_ERRANDS_INDEPENDENTLY_DIFFICULTY: NO
FALL_HISTORY_WITHIN_LAST_SIX_MONTHS: NO
ADLS_ACUITY_SCORE: 35
ADLS_ACUITY_SCORE: 35
CHANGE_IN_FUNCTIONAL_STATUS_SINCE_ONSET_OF_CURRENT_ILLNESS/INJURY: NO
ADLS_ACUITY_SCORE: 28
ADLS_ACUITY_SCORE: 33

## 2023-03-25 ASSESSMENT — COLUMBIA-SUICIDE SEVERITY RATING SCALE - C-SSRS
ATTEMPT LIFETIME: NO
TOTAL  NUMBER OF INTERRUPTED ATTEMPTS LIFETIME: NO
TOTAL  NUMBER OF ABORTED OR SELF INTERRUPTED ATTEMPTS LIFETIME: NO
2. HAVE YOU ACTUALLY HAD ANY THOUGHTS OF KILLING YOURSELF?: NO
1. HAVE YOU WISHED YOU WERE DEAD OR WISHED YOU COULD GO TO SLEEP AND NOT WAKE UP?: NO
6. HAVE YOU EVER DONE ANYTHING, STARTED TO DO ANYTHING, OR PREPARED TO DO ANYTHING TO END YOUR LIFE?: NO

## 2023-03-25 ASSESSMENT — ENCOUNTER SYMPTOMS
TREMORS: 1
DIARRHEA: 1
APPETITE CHANGE: 1
CONFUSION: 0
NAUSEA: 0

## 2023-03-25 NOTE — TELEPHONE ENCOUNTER
S: [facility] ED , Provider calling at 1:49 PM with clinical on a 72 year old/Female presenting for alcohol detox.     B: Pt presents for ETOH detox.   Currently reports drinking 5-6 glasses of wine daily for several weeks. Pt has to drink in the morning to feel okay, experiences panic and depression when she stops drinking.   Patient reports last use was this morning PTA.  Pt SHANON: 0.0  Pt  denies hx of DT  Pt  denies hx of seizures  Pt endorsing the following symptoms of withdrawal:shaky, nauseous, unable to function.  CIWA: 4     Pt denies acute medical concerns. Pt denies SI but endorses depression and related symptoms.   Pt denies other drug use    Does Pt have a detox care plan in [facility]? no  Does pt present with specific needs, assistive devices, or exclusionary criteria? None  Is the patient ambulating, eating and drinking in the ED? yes    A: Pt meets criteria to be presented for IP detox admission. Patient is voluntary  COVID: In process  Utox: Negative  CMP: Abnormalities: AST 39, ALT 38  CBC: WNL  HCG: N/A     R: Patient cleared and ready for behavioral bed placement: Yes    Presenting for admission to 3A/MICD     3:14 PM - Pt accepted for admission to [facility] 3A/[place]. Pt placed in queue for admission     3:20 PM - Indicia completed, Pt added to PPS Admit Board, passed off to [facility] staff to follow for disposition

## 2023-03-25 NOTE — ED TRIAGE NOTES
Pt reports daily ETOH usage. States she wants help and is worried because she is shaky and concerned about having a seizure. Pt states she drinks 5-6 glasses of wine each day along with hard liquor.
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
## 2023-03-25 NOTE — PROGRESS NOTES
03/25/23 1831   Patient Belongings   Did you bring any home meds/supplements to the hospital?  Yes   Disposition of meds  Other (see comment)   Patient Belongings other (see comments);locker   Patient Belongings Put in Hospital Secure Location (Security or Locker, etc.) other (see comments)   Belongings Search Yes   Clothing Search Yes   Second Staff Angelita & Alli   Comment see note     Storage Bin: jacket, blanket, gloves, scarf, boots, mini bag w/personal supplies, suitcase, purse  Medical Bin Room: wallet, cell phone, , lighter, [place], keys   Security Envelope #[id number]: 4x [facility] Medicare Rx Plans Cards, $37, [employer] Visa card, [employer] visa card, [facility] rewards card, [employer] MasterCard, 2x [employer] MasterCards, 's license of [place].  A             Admission:  I am responsible for any personal items that are not sent to the safe or pharmacy.  [facility] is not responsible for loss, theft or damage of any property in my possession.  Signature:  _________________________________ Date: _______  Time: _____                                              Staff Signature:  ____________________________ Date: ________  Time: _____      2nd Staff person, if patient is unable/unwilling to sign:    Signature: ________________________________ Date: ________  Time: _____   Discharge:  [facility] has returned all of my personal belongings:  Signature: _________________________________ Date: ________  Time: _____                                          Staff Signature:  ____________________________ Date: ________  Time: _____

## 2023-03-25 NOTE — PLAN OF CARE
Shannan Koenig  March 25, 2023  Plan of Care Hand-off Note     Patient Care Path: Inpatient Medical , pending detox unit    Plan for Care:     Pt reports that when she woke up this AM she began again experiencing withdrawal sx and drank some left over wine.  Pt became concerned about having a seizure so she came to the ED for assessment.     Pt reports that this is the highest quantity she has drank in her lifetime.  Pt reports at least 5 drinks of wine per night plus margaritas at times.  Pt believes that this is up to a box of wine per week.  Pt states that it is hard for her to quanitfy as she buys box over bottles.  Pt states that she has been drinking this degree for the last 6 months or so.  Pt denies any previous withdrawal concerns including seizures or DT's but approparitely states that this is the most she has been consuming so she doesn't know if she will have more sx of withdrawal.  Pt denies any drug concerns.  Pt notes that she drinks to help her sleep and then drinks right away in the AM due to withdrawal sx.     Pt notes a lifetime of anxiety sx that have increased over the last 6 months.  Pt reports that her generalized anxiety has now turned into panic attacks and phobias including fears of breaking a hip, fears of driving places, etc.  Pt reports that these aren't realistic for her.  Pt reports current sx of depression which aren't typical for her.  There are no known concerns for psychosis, shira, delusional thinking or paranoia.    Critical Safety Issues: ETOH withdrawl    Overview:  This patient is a child/adolescent: No    This patient has additional special visitor precautions: No    Legal Status: Voluntary    Contacts:   Marta , daughter    Psychiatry Consult:  Psychiatry Consult not requested because admit to 3A    Updated Attending Provider regarding plan of care.    Aziza Cruz, [facility]

## 2023-03-25 NOTE — TELEPHONE ENCOUNTER
3A called with disposition at 4:10PM    ED updated with pt [facility].    [facility]    [facility] [phone]  3A [phone]

## 2023-03-25 NOTE — CONSULTS
"Diagnostic Evaluation Consultation  Crisis Assessment    Patient was assessed: In Person  Patient location: ED  Was a release of information signed: No. Reason: n/a      Referral Data and Chief Complaint  \"Loreta" is a 72 year old, who uses she/her pronouns, and presents to the ED with family/friends. Patient is referred to the ED by self. Patient is presenting to the ED for the following concerns: withdrawal, depression and anxiety.      Informed Consent and Assessment Methods     Patient is her own guardian. Writer met with patient and explained the crisis assessment process, including applicable information disclosures and limits to confidentiality, assessed understanding of the process, and obtained consent to proceed with the assessment. Patient was observed to be able to participate in the assessment as evidenced by verbal understanding of the assessment process. Assessment methods included conducting a formal interview with patient, review of medical records, collaboration with medical staff, and obtaining relevant collateral information from family and community providers when available..     Over the course of this crisis assessment provided reassurance, offered validation, engaged patient in problem solving and disposition planning, provided psychoeducation and facilitated family communication. Patient's response to interventions was engaged     Summary of Patient Situation  Pt reports that when she woke up this AM she began again experiencing withdrawal sx and drank some left over wine.  Pt became concerned about having a seizure so she came to the ED for assessment.    Pt reports that this is the highest quantity she has drank in her lifetime.  Pt reports at least 5 drinks of wine per night plus margaritas at times.  Pt believes that this is up to a box of wine per week.  Pt states that it is hard for her to quanitfy as she buys box over bottles.  Pt states that she has been drinking this degree for the " "last 6 months or so.  Pt denies any previous withdrawal concerns including seizures or DT's but approparitely states that this is the most she has been consuming so she doesn't know if she will have more sx of withdrawal.  Pt denies any drug concerns.  Pt notes that she drinks to help her sleep and then drinks right away in the AM due to withdrawal sx.    Pt notes a lifetime of anxiety sx that have increased over the last 6 months.  Pt reports that her generalized anxiety has now turned into panic attacks and phobias including fears of breaking a hip, fears of driving places, etc.  Pt reports that these aren't realistic for her.  Pt reports current sx of depression which aren't typical for her.  There are no known concerns for psychosis, shira, delusional thinking or paranoia.    Pt denies any safety concerns including suicidal ideation, intent, or planning.  Pt denies any self harm or homicidal ideation.  Pt denies any previous attempts.  Pt notes that she does note that she just \"doesn't want to live like this anymore and wants all the help\".      Pt states that she is interested in cravings medications to support future sobriety.    Brief Psychosocial History  Pt currently lives in her own condo after she had to sell her home.  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  Pt states that her stressors have escalated the last 6 months since her  was placed in Memory Care due to Dementia.  She states that she has taken all of his affairs mgmt including trying to get his [employer] benefits squared away.  She notes that its equally stressful that he is out of the home as he calls everyday and is overwhelmed by his confusion and disorientation.  She notes that he recently threatened suicide to get out of the  facility and come home.  She states that she has 2 adult daughters whom are incredibly supportive and helpful.    Significant Clinical History  Pt reports previous anxiety dx.      Pt denies any previous " hospitalizations.    Pt has no current MH providers but notes a hx of such.  She has an intake for therapy on Wednesday at [facility] Information  The following information was received from Marta whose relationship to the patient is daughter. Information was obtained in person. Their phone number is [phone] and they last had contact with patient on today.    What happened today: She notes that she was brought in today for concerns of withdrawl    What is different about patient's functioning: She has been having concerns of increased ETOH use as well as depression and anxiety the last few months due to the stress.    Concern about alcohol/drug use: Yes ETOH    What do you think the patient needs: detox?    Has patient made comments about wanting to kill themselves/others:  No    If d/c is recommended, can they take part in safety/aftercare planning: Yes .    Other information:      Risk Assessment  Divide Suicide Severity Rating Scale Full Clinical Version:3/25/23  Suicidal Ideation  1. Wish to be Dead (Lifetime): No  2. Non-Specific Active Suicidal Thoughts (Lifetime): No     Suicidal Behavior  Actual Attempt (Lifetime): No  Has subject engaged in non-suicidal self-injurious behavior? (Lifetime): No  Interrupted Attempts (Lifetime): No  Aborted or Self-Interrupted Attempt (Lifetime): No  Preparatory Acts or Behavior (Lifetime): No  C-SSRS Risk (Lifetime/Recent)  Calculated C-SSRS Risk Score (Lifetime/Recent): No Risk Indicated      Validity of evaluation is not impacted by presenting factors during interview .   Comments regarding subjective versus objective responses to Divide tool: n/a  Environmental or Psychosocial Events: challenging interpersonal relationships, other life stressors and ongoing abuse of substances  Chronic Risk Factors: chronic and ongoing sleep difficulties   Warning Signs: increasing substance use or abuse and anxiety, agitation, unable to sleep, sleeping all the  time  Protective Factors: strong bond to family unit, community support, or employment, responsibilities and duties to others, including pets and children, lives in a responsibly safe and stable environment, sense of importance of health and wellness and help seeking  Interpretation of Risk Scoring, Risk Mitigation Interventions and Safety Plan:  Pt has no risk currently indicated.         Does the patient have thoughts of harming others? No     Is the patient engaging in sexually inappropriate behavior?  no        Current Substance Abuse     Is there recent substance abuse? ETOH     Was a urine drug screen or blood alcohol level obtained: Yes 0       Mental Status Exam     Affect: Flat   Appearance: Appropriate    Attention Span/Concentration: Attentive  Eye Contact: Engaged   Fund of Knowledge: Appropriate    Language /Speech Content: Fluent   Language /Speech Volume: Normal    Language /Speech Rate/Productions: Normal    Recent Memory: Intact   Remote Memory: Intact   Mood: Anxious    Orientation to Person: Yes    Orientation to Place: Yes   Orientation to Time of Day: Yes    Orientation to Date: Yes    Situation (Do they understand why they are here?): Yes    Psychomotor Behavior: Normal    Thought Content: Clear   Thought Form: Intact      History of commitment: No         Medication    Psychotropic medications: No  Medication changes made in the last two weeks: No       Current Care Team    Primary Care Provider: No  Psychiatrist: No  Therapist: No Medinaing Tanvi Peter Next week  : No     CTSS or ARMHS: No  ACT Team: No  Other: No      Diagnosis    300.00 (F41.9) Unspecified Anxiety Disorder       Clinical Summary and Substantiation of Recommendations     Writer at the time of assessment recommends that Pt be admitted for further support, evaluation and monitoring of alcohol withdrawal  Admission to Inpatient Level of Care is indicated due to:    1. Patient risk of severity of behavioral health  disorder is appropriate to proposed level of care as indicated by:    Imminent Risk of Harm: n/a  Behavioral health disorder is present and appropriate for inpatient care with both of the following:     Severe psychiatric, behavioral or other comorbid conditions are appropriate for management at inpatient mental health as indicated by at least one of the following:   o Comorbid substance use disorder and Escalating relapse behaviors    Severe dysfunction in daily living is present as indicated by at least one of the following:   o Complete withdrawal from all social interactions and Complete neglect of self care with associated impairment in physical status    2. Inpatient mental health services are necessary to meet patient needs and at least one of the following:  Specific condition related to admission diagnosis is present and judged likely to further improve at proposed level of care and Specific condition related to admission diagnosis is present and judged likely to deteriorate in absence of treatment at proposed level of care    3. Situation and expectations are appropriate for inpatient care, as indicated by one of the following:   Voluntary treatment at lower level of care is not feasible, Around-the-clock medical and nursing care to address symptoms and initiate intervention is required and Biopsychosocial stresses potentially contributing to clinical presentation (co morbidities) have been assessed and are absent or manageable at proposed level of care    Disposition    Recommended disposition: Detox       Reviewed case and recommendations with attending provider. Attending Name: Cyndieg       Attending concurs with disposition: Yes       Patient and/or validated legal guardian concurs with disposition: Yes       Final disposition: Detox.     Inpatient Details (if applicable):   Is patient admitted voluntarily:Yes      Patient aware of potential for transfer if there is not appropriate placement? Yes        Patient is willing to travel outside of the [facility] for placement? No      Behavioral Intake Notified? Yes: Date: 3/25 Time: 2pm.       Assessment Details    Patient interview started at: 1330 and completed at: 1430.     Total duration spent on the patient case in minutes: 1.0 hrs      CPT code(s) utilized: 69778 - Psychotherapy for Crisis - 60 (30-74*) min       Aziza Cruz [facility], , Psychotherapist  DEC - Triage & Transition Services  Callback: [phone]

## 2023-03-26 PROBLEM — F41.1 GENERALIZED ANXIETY DISORDER: Status: ACTIVE | Noted: 2022-05-20

## 2023-03-26 PROBLEM — F10.930 ALCOHOL WITHDRAWAL SYNDROME WITHOUT COMPLICATION (H): Status: ACTIVE | Noted: 2023-03-26

## 2023-03-26 LAB
ALBUMIN SERPL BCG-MCNC: 4.3 G/DL (ref 3.5–5.2)
ALP SERPL-CCNC: 72 U/L (ref 35–104)
ALT SERPL W P-5'-P-CCNC: 32 U/L (ref 10–35)
AST SERPL W P-5'-P-CCNC: 31 U/L (ref 10–35)
BILIRUB DIRECT SERPL-MCNC: <0.2 MG/DL (ref 0–0.3)
BILIRUB SERPL-MCNC: 0.5 MG/DL
FOLATE SERPL-MCNC: 12.1 NG/ML (ref 4.6–34.8)
GGT SERPL-CCNC: 35 U/L (ref 5–36)
PROT SERPL-MCNC: 6.5 G/DL (ref 6.4–8.3)
TSH SERPL DL<=0.005 MIU/L-ACNC: 2.64 UIU/ML (ref 0.3–4.2)
VIT B12 SERPL-MCNC: 291 PG/ML (ref 232–1245)

## 2023-03-26 PROCEDURE — 128N000001 HC R&B CD/MH ADULT

## 2023-03-26 PROCEDURE — 36415 COLL VENOUS BLD VENIPUNCTURE: CPT | Performed by: PSYCHIATRY & NEUROLOGY

## 2023-03-26 PROCEDURE — 80076 HEPATIC FUNCTION PANEL: CPT | Performed by: PSYCHIATRY & NEUROLOGY

## 2023-03-26 PROCEDURE — 82977 ASSAY OF GGT: CPT | Performed by: PSYCHIATRY & NEUROLOGY

## 2023-03-26 PROCEDURE — 82607 VITAMIN B-12: CPT | Performed by: PSYCHIATRY & NEUROLOGY

## 2023-03-26 PROCEDURE — G0177 OPPS/PHP; TRAIN & EDUC SERV: HCPCS

## 2023-03-26 PROCEDURE — 82746 ASSAY OF FOLIC ACID SERUM: CPT | Performed by: PSYCHIATRY & NEUROLOGY

## 2023-03-26 PROCEDURE — 250N000013 HC RX MED GY IP 250 OP 250 PS 637: Performed by: PSYCHIATRY & NEUROLOGY

## 2023-03-26 PROCEDURE — 84443 ASSAY THYROID STIM HORMONE: CPT | Performed by: PSYCHIATRY & NEUROLOGY

## 2023-03-26 PROCEDURE — H2032 ACTIVITY THERAPY, PER 15 MIN: HCPCS

## 2023-03-26 PROCEDURE — 99222 1ST HOSP IP/OBS MODERATE 55: CPT | Mod: AI | Performed by: PSYCHIATRY & NEUROLOGY

## 2023-03-26 PROCEDURE — 99222 1ST HOSP IP/OBS MODERATE 55: CPT

## 2023-03-26 PROCEDURE — HZ2ZZZZ DETOXIFICATION SERVICES FOR SUBSTANCE ABUSE TREATMENT: ICD-10-PCS | Performed by: PSYCHIATRY & NEUROLOGY

## 2023-03-26 RX ORDER — ACYCLOVIR 400 MG/1
400 TABLET ORAL DAILY
Status: DISCONTINUED | OUTPATIENT
Start: 2023-03-27 | End: 2023-03-27 | Stop reason: HOSPADM

## 2023-03-26 RX ORDER — ACETAMINOPHEN 325 MG/1
650 TABLET ORAL EVERY 4 HOURS PRN
Status: DISCONTINUED | OUTPATIENT
Start: 2023-03-26 | End: 2023-03-27 | Stop reason: HOSPADM

## 2023-03-26 RX ADMIN — THIAMINE HCL TAB 100 MG 100 MG: 100 TAB at 08:21

## 2023-03-26 RX ADMIN — ATORVASTATIN CALCIUM 10 MG: 10 TABLET, FILM COATED ORAL at 20:08

## 2023-03-26 RX ADMIN — DIAZEPAM 10 MG: 5 TABLET ORAL at 11:34

## 2023-03-26 RX ADMIN — NICOTINE 1 PATCH: 14 PATCH, EXTENDED RELEASE TRANSDERMAL at 08:22

## 2023-03-26 RX ADMIN — MULTIPLE VITAMINS W/ MINERALS TAB 1 TABLET: TAB at 08:21

## 2023-03-26 RX ADMIN — MIRTAZAPINE 15 MG: 15 TABLET, FILM COATED ORAL at 20:08

## 2023-03-26 RX ADMIN — CLOBETASOL PROPIONATE: 0.5 CREAM TOPICAL at 08:27

## 2023-03-26 RX ADMIN — OMEPRAZOLE 40 MG: 20 CAPSULE, DELAYED RELEASE ORAL at 08:21

## 2023-03-26 RX ADMIN — DIAZEPAM 5 MG: 5 TABLET ORAL at 04:38

## 2023-03-26 RX ADMIN — ACYCLOVIR 400 MG: 400 TABLET ORAL at 08:20

## 2023-03-26 RX ADMIN — FOLIC ACID 1 MG: 1 TABLET ORAL at 08:20

## 2023-03-26 RX ADMIN — ESCITALOPRAM OXALATE 20 MG: 20 TABLET, FILM COATED ORAL at 08:20

## 2023-03-26 ASSESSMENT — ACTIVITIES OF DAILY LIVING (ADL)
ADLS_ACUITY_SCORE: 28

## 2023-03-26 NOTE — PROGRESS NOTES
Case Management:     Writer checked-in and introduced role to pt's care and how to assist pt during their stay. Inquired with pt about what they are needing during their stay and what they are considering for their aftercare plans. Pt processed that she is working with [facility] to set up services. PT discussed that she is setting up Individual therapy and Medication Management. Aileen expressed confusion with the emails from [facility] and was able to show writer her emails with her Cellphone. Writer clarified with Aileen that [facility] has the paper work for Individual therapy and that they need her to complete the paperwork for Medication Management. Pt processed struggling with the paperwork. Writer coached pt on an e-mail to send about completing the paper work via the phone and requested for [facility] to coordiante with pt via phone tomorrow to complete that paper work. Pt signed a DAVID for [facility]. Lastly pt discussed wanting a list of AA meetings in [place] and processed that her two daughters are super supportive of pt. Writer provided pt a list of AA meetings in the [place] Area and faxed the DVAID to [facility]. CM to follow up with pt tomorrow to assist with solidifying appointments with [facility].     Nicky Remy, SHARAC, LPCC

## 2023-03-26 NOTE — CONSULTS
"  [facility]  Internal Medicine Consult     Shannan Koenig MRN# [id number]   Age: 72 year old YOB: 1950     Date of Admission: 3/25/2023  Date of Consult: 3/26/2023    Primary Care Provider: Pollo Terry    Requesting Service: Behavioral Health - Loyd Napier MD  Reason for Consult: General Medical Evaluation      SUBJECTIVE   CC:   \"my stools are loose\"   Assessment and Plan/Recommendations:     Shannan Koenig is a 72 year old woman with a history of CHF, mitral valve prolapse, dyslipidemia, subclinical hypothyroidism, GERD, osteoporosis, alcohol and substance abuse, generalized anxiety disorder. Pt was admitted on 3/26/2023 to  for medical management of withdrawal from alcohol.    Alcohol withdrawal, hx of alcohol use disorder   Patient reports she has been drinking boxed wine every day for several years and typically goes through 1 box/week.  She starts drinking around 8 AM every morning after having coffee.  She has been drinking tequila in addition to the line for the last 6 months.  Believes she is drinking more due to depression.  She is having increased depression and stress in the last 6 months because her  moved into memory care.  -MSSA 2 this shift. Denies hx of withdrawal seizures. Not currently on AEDs.    -continue MSSA   -folvite, multi-vites, thiamine supplementation   -further management per Psychiatry     Stress cardiomyopathy-history of  Mitral valve prolapse  Dyslipidemia  Patient developed sepsis from a ruptured appendix in March 2020.  Preop echo showed an EF of 25% and was determined to have a stress cardiomyopathy.  Postop echo EF 60% as her left ventricular ejection fraction recovered.  She was not continued on a beta-blocker, ACE inhibitor, or diuretics.  Last lipid panel on file 2/1/2021-cholesterol 212, , HDL 84.  -continue PTA atorvastatin 10 mg daily     Subclinical hypothyroidism  TSH 2.64.  Normal T4.  Not on thyroid " - Take all medications normally, unless told otherwise by your surgeon.    - I will look at your medical records.    Before Your Surgery      Call your surgeon if there is any change in your health. This includes signs of a cold or flu (such as a sore throat, runny nose, cough, rash or fever).    Do not smoke, drink alcohol or take over the counter medicine (unless your surgeon or primary care doctor tells you to) for the 24 hours before and after surgery.    If you take prescribed drugs: Follow your doctor s orders about which medicines to take and which to stop until after surgery.    Eating and drinking prior to surgery: follow the instructions from your surgeon    Take a shower or bath the night before surgery. Use the soap your surgeon gave you to gently clean your skin. If you do not have soap from your surgeon, use your regular soap. Do not shave or scrub the surgery site.  Wear clean pajamas and have clean sheets on your bed.     [facility]    If you have any questions regarding to your visit please contact your care team:     Team Pink:   Clinic Hours Telephone Number   Internal Medicine:  Dr. Patty Shaw NP       7am-7pm  Monday - Thursday   7am-5pm  Fridays  [phone]  (Appointment scheduling available 24/7)    Questions about your visit?  Team Line  [phone]   Urgent Care - [place] and [place] - 11am-9pm Monday-Friday Saturday-Sunday- 9am-5pm   [place] - 5pm-9pm Monday-Friday Saturday-Sunday- 9am-5pm  [phone] - [place]   [phone] [facility]       What options do I have for visits at the clinic other than the traditional office visit?  To expand how we care for you, many of our providers are utilizing electronic visits (e-visits) and telephone visits, when medically appropriate, for interactions with their patients rather than a visit in the clinic.   We also offer nurse visits for many medical concerns. Just  "replacement.  -Monitor at primary care as outpatient    HSV  -Continue acyclovir prophylactic dose at 400 mg daily     GERD  Diarrhea  Pt states she has diarrhea almost daily. It will be mixed with formed stool. She denies abdominal pain, fever, chills. Does not take stool softeners. She eats a lot of fiber and salads on a daily basis. Her daughter just started assisting her with meal delivery with low PO intake. States she was eating less because it is \"boring\" to cook for only one person.   -Imodium PRN QID loose stools  -Increase PTA omeprazole 40 mg BID from daily    Osteoporosis  Currently denies pain.  -APAP 650 mg every 4 hours as needed for pain    Tobacco abuse  -Nicotine patch 14 mg/24 HR daily    MILTON  Depression  Continue PTA Lexapro  -Management per psychiatry    Currently, medically stable and internal medicine will sign off. Please contact if future questions or concerns arise. Thank you for the opportunity to be a part of this patient's care.      EVELIA Escalona CNP  Internal Medicine [facility] Hospitalist  Page job code [phone] (), [phone] or [phone] ([address])  Text paging via [employer] is appreciated  March 26, 2023         HPI:   Shannan Koenig is a 72 year old woman with a history of CHF, mitral valve prolapse, dyslipidemia, subclinical hypothyroidism, GERD, osteoporosis, alcohol and substance abuse, generalized anxiety disorder. Pt was admitted on 3/26/2023 to  for medical management of withdrawal from alcohol.  Patient reports she has been drinking boxed wine every day for several years and typically goes through 1 box/week.  She starts drinking around 8 AM every morning after having coffee.  She has been drinking tequila in addition to the line for the last 6 months.  Believes she is drinking more due to depression.  She is having increased depression and stress in the last 6 months because her  moved into memory care.  Met with patient in common area, calm and cooperative for " like any other service, we will bill your insurance company for this type of visit based on time spent on the phone with your provider. Not all insurance companies cover these visits. Please check with your medical insurance if this type of visit is covered. You will be responsible for any charges that are not paid by your insurance.      E-visits via [employer]:  generally incur a $35.00 fee.  Telephone visits:  Time spent on the phone: *charged based on time that is spent on the phone in increments of 10 minutes. Estimated cost:   5-10 mins $30.00   11-20 mins. $59.00   21-30 mins. $85.00   Use [employer] (secure email communication and access to your chart) to send your primary care provider a message or make an appointment. Ask someone on your Team how to sign up for [employer].    For a Price Quote for your services, please call our Consumer Price Line at [phone].    As always, Thank you for trusting us with your health care needs!    Lavern Sesay, CMA     exam.  Patient feels that she is doing well with withdrawal symptoms.  Her main complaint is diarrhea.  We discussed diet choices.  She denies abdominal pain, nausea, vomiting, chest pain.  She does not take stool softeners.  She has not noticed any fever or chills.  Discussed the effect of prolonged alcohol abuse on the stomach lining.  Patient agrees to increasing omeprazole to twice daily, and she may go back to once a day once symptoms resolved.  She has had a poor appetite at home recently.  She thinks it is because she does not want to cook just for 1 person.  Her daughter has been helping her with meal delivery services.  No other questions or concerns for medicine at this time.       Past Medical History:     Past Medical History:   Diagnosis Date     Anxiety      Infection due to 2019 novel coronavirus 2020     Infection due to 2019 novel coronavirus 2022     Substance abuse (H)         Reviewed and updated in [facility].     Past Surgical History:      Past Surgical History:   Procedure Laterality Date     APPENDECTOMY  2020      SECTION      No date given     COLONOSCOPY  2022    mpression: Two 5 - 8 mm polyps in distal ascending colon.  Patent partial cecectomy anastomosis.  Exam otherwise normal.     COLONOSCOPY  06/10/2016    Every 5 years for family history     COLONOSCOPY  2001     EXTRACAPSULAR CATARACT EXTRATION WITH INTRAOCULAR LENS IMPLANT Right 2017     EXTRACAPSULAR CATARACT EXTRATION WITH INTRAOCULAR LENS IMPLANT Left 10/19/2017     FETAL BLOOD TRANSFUSION      No date given     TONSILLECTOMY      No date given     TUBAL LIGATION      No date given     WISDOM TOOTH EXTRACTION      No date given   -wisdom tooth         Social History:     Social History     Tobacco Use     Smoking status: Every Day     Packs/day: 0.50     Types: Cigarettes     Smokeless tobacco: Never     Tobacco comments:     smoking ~ 5 cig/day   Vaping Use     Vaping Use: Never used    Substance Use Topics     Alcohol use: Not Currently     Comment: 5-6 glasses of wine each day along with hard etoh     Drug use: Never        Family History:     Family History   Problem Relation Age of Onset     Arthritis Mother      Breast Cancer Mother      Depression Mother      Dementia Mother      Alcoholism Father      Allergic rhinitis Father      Arthritis Father      Colon Cancer Father      Depression Father      Hypertension Father      Hyperlipidemia Father      Kidney Disease Father      Obesity Father      Colon Cancer Sister      Hypertension Sister      Bleeding Disorder Brother      Seizure Disorder Brother          Allergies:     Allergies   Allergen Reactions     Codeine Nausea and Vomiting     Morphine      Augmentin Nausea and Vomiting     Latex Dermatitis, Rash and Swelling     Puffy weepy red eyes, nasal congestion and sneezing       Nitrofurantoin Rash         Medications:   Reviewed. Please see MAR     Review of Systems:   10 point ROS of systems including Constitutional, Eyes, Respiratory, Cardiovascular, Gastroenterology, Genitourinary, Integumentary, Muscularskeletal, Psychiatric were all negative except for pertinent positives noted in my HPI.    OBJECTIVE   Physical Exam:   Vitals were reviewed  Blood pressure 139/88, pulse 75, temperature 97.4  F (36.3  C), temperature source Temporal, resp. rate 16, SpO2 96 %, not currently breastfeeding.  General: Alert and oriented x3, well-appearing  HEENT: Anicteric sclera, MMM  Cardiovascular: RRR, S1S2. No murmur noted  Lungs: CTAB without wheezing or crackles   GI: Abdomen soft, non-tender with bowel sounds present. No guarding or rebound   Vascular: No peripheral edema, distal pulses palpable  Neurologic: No focal deficits, CN II-XII grossly intact  Skin: No jaundice, rashes, or lesions        Data:        Lab Results   Component Value Date     03/25/2023     03/25/2023    Lab Results   Component Value Date    CHLORIDE 106  03/25/2023    CHLORIDE 105 03/25/2023    CHLORIDE 104 09/13/2022    Lab Results   Component Value Date    BUN 7.1 03/25/2023    BUN 7.1 03/25/2023    BUN 8 09/13/2022      Lab Results   Component Value Date    POTASSIUM 4.0 03/25/2023    POTASSIUM 4.0 03/25/2023    POTASSIUM 3.8 09/13/2022    Lab Results   Component Value Date    CO2 25 03/25/2023    CO2 23 03/25/2023    CO2 22 09/13/2022    Lab Results   Component Value Date    CR 0.71 03/25/2023    CR 0.69 03/25/2023        Lab Results   Component Value Date    WBC 8.5 03/25/2023    HGB 15.8 (H) 03/25/2023    HCT 47.5 (H) 03/25/2023    MCV 96 03/25/2023     03/25/2023     Lab Results   Component Value Date    WBC 8.5 03/25/2023

## 2023-03-26 NOTE — PLAN OF CARE
ADMISSION      Shannan Koenig  72/female      S = Situation:     Voluntary admit from [facility] ED for alcohol use    Pt is MICD    B  = Background:     First detox admission    States recent life stressors have led to increased drinking ( in memory care, panic attacks began in 2020 at onset of COVID - was in hospital and could not have visitors)    Drinking wine 4-5 glasses daily for 6 months. Last drink 3/25 in the morning. Pt denied seizures, denied DTs    Denied other substance use    ED administered IV bolus, IV ativan    A  =  Assessment:     MSSA = 6 (120/79, pulse 100)  ALT 38, AST 39    Pt denied SI, endorsed depression and anxiety. Cooperative with intake    R =   [facility]    Psychiatry, internal medicine, case management to meet on 3/26

## 2023-03-26 NOTE — PLAN OF CARE
Problem: Alcohol Withdrawal  Goal: Alcohol Withdrawal Symptom Control  Outcome: Progressing   Goal Outcome Evaluation:         Pt is in alcohol detox with MSSA and valium.   Scored 4 and 8. Got 5mg valium prn.     Slept well.

## 2023-03-26 NOTE — H&P
"PSYCHIATRY   HISTORY AND PHYSICAL     DATE OF SERVICE   3/26/2023         CHIEF COMPLAINT   \" Alcohol withdrawal.\"       HISTORY OF PRESENT ILLNESS   This is a 72 year old female with history of Alcohol withdrawal syndrome without complication (H).  Current legal status is voluntary. 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    Patient was seen and evaluated in the consult room by herself, this was a face-to-face evaluation.  During my assessment the patient presented as calm, pleasant and cooperative.  Patient has a good hygiene and she looks younger than stated age.      Patient said that she has been drinking more alcohol since her  had to be moved to a memory care facility.  Patient stated that a few months ago the family moved her to [place] in order for her to be closer to them and to her .  Since then patient has been living alone and she receives assistance from her 2 daughters.  Patient stated that she feels very lonely especially during the day, her day lacks structure and what she does most of the time is pacing around her home.  Patient tells me that in the past she used to always have a glass or 2 of wine with her lunch but lately she has been also having a few glasses of wine in the morning before noon.  As per patient she has been drinking almost a box of wine that she has also been mixing with margaritas that she makes.  Patient said that she was thinking about cutting down on the use of alcohol but she was starting to feel shaky in the morning and feeling better after \"zip of wine\". She was worried about having a seizure, she talked to her daughter about the situation and was taken to [facility].    Patient tells me that before the changes in her life she " "never drink this way. Things deteriorated with the  been in memory care.  She has been feeling lonely and depressed.  used to drink hard liquor.  Here in the [facility] she has no friends and no structure to her day.  The transition has been extremely difficult.  On the other hand the patient said that she is looking to see if she can go back to work.    At the moment of the assessment the patient is denying having any thoughts of harming herself or others and she has been able to contract for safety.  Patient tells me that this is her first admission to psychiatry and to detox.  Patient tells me that she has never had experienced any hallucinations, seizures or delirium tremens.    As per ER notes:  Diagnostic Evaluation Consultation  Crisis Assessment     Patient was assessed: In Person  Patient location: ED  Was a release of information signed: No. Reason: n/a      Referral Data and Chief Complaint  \"Aileen\" is a 72 year old, who uses she/her pronouns, and presents to the ED with family/friends. Patient is referred to the ED by self. Patient is presenting to the ED for the following concerns: withdrawal, depression and anxiety.       Informed Consent and Assessment Methods     Patient is her own guardian. Writer met with patient and explained the crisis assessment process, including applicable information disclosures and limits to confidentiality, assessed understanding of the process, and obtained consent to proceed with the assessment. Patient was observed to be able to participate in the assessment as evidenced by verbal understanding of the assessment process. Assessment methods included conducting a formal interview with patient, review of medical records, collaboration with medical staff, and obtaining relevant collateral information from family and community providers when available..      Over the course of this crisis assessment provided reassurance, offered validation, engaged patient in " "problem solving and disposition planning, provided psychoeducation and facilitated family communication. Patient's response to interventions was engaged     Summary of Patient Situation  Pt reports that when she woke up this AM she began again experiencing withdrawal sx and drank some left over wine.  Pt became concerned about having a seizure so she came to the ED for assessment.     Pt reports that this is the highest quantity she has drank in her lifetime.  Pt reports at least 5 drinks of wine per night plus margaritas at times.  Pt believes that this is up to a box of wine per week.  Pt states that it is hard for her to quanitfy as she buys box over bottles.  Pt states that she has been drinking this degree for the last 6 months or so.  Pt denies any previous withdrawal concerns including seizures or DT's but approparitely states that this is the most she has been consuming so she doesn't know if she will have more sx of withdrawal.  Pt denies any drug concerns.  Pt notes that she drinks to help her sleep and then drinks right away in the AM due to withdrawal sx.     Pt notes a lifetime of anxiety sx that have increased over the last 6 months.  Pt reports that her generalized anxiety has now turned into panic attacks and phobias including fears of breaking a hip, fears of driving places, etc.  Pt reports that these aren't realistic for her.  Pt reports current sx of depression which aren't typical for her.  There are no known concerns for psychosis, shira, delusional thinking or paranoia.     Pt denies any safety concerns including suicidal ideation, intent, or planning.  Pt denies any self harm or homicidal ideation.  Pt denies any previous attempts.  Pt notes that she does note that she just \"doesn't want to live like this anymore and wants all the help\".       Pt states that she is interested in cravings medications to support future sobriety.     Brief Psychosocial History  Pt currently lives in her own " condcésar after she had to sell her home.  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  Pt states that her stressors have escalated the last 6 months since her  was placed in Memory Care due to Dementia.  She states that she has taken all of his affairs mgmt including trying to get his VA benefits squared away.  She notes that its equally stressful that he is out of the home as he calls everyday and is overwhelmed by his confusion and disorientation.  She notes that he recently threatened suicide to get out of the  facility and come home.  She states that she has 2 adult daughters whom are incredibly supportive and helpful.     CHEMICAL DEPENDENCY HISTORY   History   Drug Use Unknown       Social History    Substance and Sexual Activity      Alcohol use: Not Currently        Comment: 5-6 glasses of wine each day along with hard etoh      History   Smoking Status     Every Day     Packs/day: 0.50     Types: Cigarettes   Smokeless Tobacco     Never       Treatment: denies  Detox: denies   Legal: denies        PAST PSYCHIATRIC HISTORY   Psychiatrist: Has an appointment on 2023, at this time does not have any established provider.  Current medications is prescribed by the primary care doctor.  Therapist: Denies  Case Management: Denies   Hospitalizations: This will be her first hospitalization    History of Commitment: Denies  Past Medications: Remeron and Lexapro  ECT:  Yes  Suicide Attempts/Gun Access: Denies both   Community Supports: Patient tells me that she has a very vague supportive family.       PAST MEDICAL HISTORY   Past Medical History:   Diagnosis Date     Anxiety      Infection due to 2019 novel coronavirus 2020     Infection due to 2019 novel coronavirus 2022     Substance abuse (H)        Past Surgical History:   Procedure Laterality Date     APPENDECTOMY  2020      SECTION      No date given     COLONOSCOPY  2022    mpression: Two 5 - 8 mm polyps in distal ascending colon.   Patent partial cecectomy anastomosis.  Exam otherwise normal.     COLONOSCOPY  06/10/2016    Every 5 years for family history     COLONOSCOPY  04/11/2001     EXTRACAPSULAR CATARACT EXTRATION WITH INTRAOCULAR LENS IMPLANT Right 11/02/2017     EXTRACAPSULAR CATARACT EXTRATION WITH INTRAOCULAR LENS IMPLANT Left 10/19/2017     FETAL BLOOD TRANSFUSION      No date given     TONSILLECTOMY      No date given     TUBAL LIGATION      No date given     WISDOM TOOTH EXTRACTION      No date given   -wisdom tooth       Primary Care Provider: Pollo Terry  Medications:     [START ON 3/27/2023] acyclovir  400 mg Oral Daily     atorvastatin  10 mg Oral QPM     clobetasol   Topical BID     escitalopram  20 mg Oral Daily     folic acid  1 mg Oral Daily     mirtazapine  15 mg Oral At Bedtime     multivitamin w/minerals  1 tablet Oral Daily     nicotine  1 patch Transdermal Daily     nicotine   Transdermal Q8H     omeprazole  40 mg Oral BID AC     thiamine  100 mg Oral Daily     Medications as needed: acetaminophen, alum & mag hydroxide-simethicone, atenolol, diazepam, gabapentin, loperamide, nicotine, ondansetron, propranolol, senna-docusate    ALLERGIES: Codeine, Morphine, Augmentin, Latex, and Nitrofurantoin       MEDICATIONS   No current outpatient medications on file.       Medication adherence issues: MS Med Adherence Y/N: Yes, Hospitalization  Medication side effects: MEDICATION SIDE EFFECTS: no side effects reported  Benefit: Yes / No: Yes       ROS   A comprehensive review of systems was negative.       FAMILY HISTORY   Family History   Problem Relation Age of Onset     Arthritis Mother      Breast Cancer Mother      Depression Mother      Dementia Mother      Alcoholism Father      Allergic rhinitis Father      Arthritis Father      Colon Cancer Father      Depression Father      Hypertension Father      Hyperlipidemia Father      Kidney Disease Father      Obesity Father      Colon Cancer Sister      Hypertension Sister       Bleeding Disorder Brother      Seizure Disorder Brother         Psychiatric: Sister in therapy for years.  Chemical: Sister has alcohol use issues.   Suicide: denies        SOCIAL HISTORY   Social History     Socioeconomic History     Marital status:      Spouse name: Not on file     Number of children: Not on file     Years of education: Not on file     Highest education level: Not on file   Occupational History     Not on file   Tobacco Use     Smoking status: Every Day     Packs/day: 0.50     Types: Cigarettes     Smokeless tobacco: Never     Tobacco comments:     smoking ~ 5 cig/day   Vaping Use     Vaping Use: Never used   Substance and Sexual Activity     Alcohol use: Not Currently     Comment: 5-6 glasses of wine each day along with hard etoh     Drug use: Never     Sexual activity: Not on file   Other Topics Concern     Not on file   Social History Narrative     Not on file     Social Determinants of Health     Financial Resource Strain: Not on file   Food Insecurity: Not on file   Transportation Needs: Not on file   Physical Activity: Not on file   Stress: Not on file   Social Connections: Not on file   Intimate Partner Violence: Not on file   Housing Stability: Not on file       Born and Raised: [place]  Occupation: Retired nurse  Marital Status:   Children: 3 adult children  Legal: Voluntary  Living Situation: Living arrangements - the patient lives alone  ASSETS/STRENGTHS: Verbal       MENTAL STATUS EXAM   Appearance:  Neatly groomed and Appears younger than stated age  Mood:  {Mood: Sad   Affect: restricted  was congruent to speech  Suicidal Ideation: PRESENT / ABSENT: absent   Homicidal Ideation: PRESENT / ABSENT: absent   Thought process: unremarkable   Thought content: devoid of  suicidal and violent ideation.   Fund of Knowledge: Average  Attention/Concentration: Normal  Language ability:  Intact  Memory:  Immediate recall intact, Short-term memory intact and Long-term memory  intact  Insight:  good and adequate.  Judgement: good  Orientation: Yes, x4  Psychomotor Behavior: tremor    Muscle Strength and Tone: MuscleStrength: Normal  Gait and Station: Normal       PHYSICAL EXAM   Vitals: /83 (BP Location: Left arm, Patient Position: Sitting, Cuff Size: Adult Regular)   Pulse 72   Temp 97.7  F (36.5  C) (Temporal)   Resp 16   SpO2 96%     Physical exam as per Luda Cid. Dated 3/26/2023:    Physical Exam:   Vitals were reviewed  Blood pressure 139/88, pulse 75, temperature 97.4  F (36.3  C), temperature source Temporal, resp. rate 16, SpO2 96 %, not currently breastfeeding.  General: Alert and oriented x3, well-appearing  HEENT: Anicteric sclera, MMM  Cardiovascular: RRR, S1S2. No murmur noted  Lungs: CTAB without wheezing or crackles   GI: Abdomen soft, non-tender with bowel sounds present. No guarding or rebound   Vascular: No peripheral edema, distal pulses palpable  Neurologic: No focal deficits, CN II-XII grossly intact  Skin: No jaundice, rashes, or lesions         LABS   personally reviewed.   No visits with results within 2 Month(s) from this visit.   Latest known visit with results is:   Office Visit on 09/21/2022   Component Date Value     Color Urine 09/21/2022 Yellow      Appearance Urine 09/21/2022 Clear      Glucose Urine 09/21/2022 Negative      Bilirubin Urine 09/21/2022 Negative      Ketones Urine 09/21/2022 Negative      Specific Gravity Urine 09/21/2022 1.010      Blood Urine 09/21/2022 Trace (A)      pH Urine 09/21/2022 6.5      Protein Albumin Urine 09/21/2022 Negative      Urobilinogen Urine 09/21/2022 0.2      Nitrite Urine 09/21/2022 Negative      Leukocyte Esterase Urine 09/21/2022 Trace (A)      Bacteria Urine 09/21/2022 Few (A)      RBC Urine 09/21/2022 2-5 (A)      WBC Urine 09/21/2022 0-5      No results found for: PHENYTOIN, PHENOBARB, VALPROATE, CBMZ       ASSESSMENT   This is a 72 year old female with history of Alcohol withdrawal syndrome without  complication (H).  Current legal status is voluntary. 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       DIAGNOSIS   Principal Problem:    Alcohol withdrawal syndrome without complication (H)    Active Problem List:  Patient Active Problem List   Diagnosis     Age-related osteoporosis without current pathological fracture     Dyslipidemia     Eczema     Generalized anxiety disorder     Genital HSV     GERD (gastroesophageal reflux disease)     MVP (mitral valve prolapse)     Chronic insomnia     Thickened endometrium     Urge incontinence of urine     Subclinical hypothyroidism     Smoking 1/2 pack a day or less     Alcohol withdrawal syndrome without complication (H)          PLAN   1. Education given regarding diagnostic and treatment options with risks, benefits and alternatives and adequate verbalization of understanding.  2. Admitted.    3A.    Precautions placed .  3. Medications: 3/26/2023: PTA medications reviewed.    4. Medications:  Hospital    [facility] protocol     Remeron 15mg at bedtime     Lexapro 20 mg daily  5. Consultations:    Hospitalist to follow as needed.  6. Structure and Supervision    Unit 3A.    Barriers to Discharge: Symptom stabilization  7.   is following in regards to collecting and reviewing collateral information, referrals and disposition planning.    Legal:  voluntary    Referrals:  tbd    Care Coordination:  SW    Placement:  Return home when stable     Anticipated Discharge:  Within 5 days  . Further treatment programming to be determined throughout the hospital course.        [facility]K ASSESS[facility]: Patient able to contract for safety    This note was created with help of Dragon dictation system. Grammatical / typing errors are not  intentional.    Rhianna Mccoy MD       CERTIFICATION   Initial Certification I certify that the inpatient psychiatric facility admission was medically necessary for treatment which could   reasonably be expected to improve the patient s condition.                                       I estimate 5 days of hospitalization is necessary for proper treatment of the patient. My plans for post-hospital care for this patient are home with self-care.                                       Rhianna Mccoy MD     -     3/26/2023     -     10:13 AM

## 2023-03-26 NOTE — PROGRESS NOTES
03/26/23 1700   Group Therapy Session   Group Attendance attended group session   Time Session Began 1645   Time Session Ended 1735   Total Time (minutes) 50   Total # Attendees 8   Group Type recreation   Group Topic Covered coping skills/lifestyle management   Group Session Detail healthy coping skills   Patient Response/Contribution cooperative with task   Patient Participation Detail Pt participated in Therapeutic Recreation group with focus on leisure education and acquisition of knowledge and skills. Pt was fully engaged and cooperative in group recreational intervention; leisure inventory. Pt was focused on the written portion for the first part of group and then contributed to the group discussion following. Pt discussed several recreational interests and positive coping skills that are healthy outlets. Pt mood was calm and was appropriate with interactions.

## 2023-03-26 NOTE — PLAN OF CARE
Occupational Therapy Group Note:       03/26/23 1443   Group Therapy Session   Group Attendance attended group session   Time Session Began 1315   Time Session Ended 1400   Total Time (minutes) 45   Total # Attendees 6   Group Type recreation;psychoeducation   Group Topic Covered leisure exploration/use of leisure time;structured socialization   Group Session Detail OT Leisure group and discussion   Patient Response/Contribution confronted peers appropriately;cooperative with task;listened actively;offered helpful suggestions to peers;organized   Patient Participation Detail Occupational therapy group facilitated in order to educate patients about the skill and benefits of distraction. Distraction can be a useful tool to help combat and lessen intense emotions. The acronym A.C.C.E.P.T.S (activities, contributing, comparisons, emotions, pushing away, thoughts, and sensations) was discussed in group. A recreational group game was introduced to allow patients the opportunity to practice the art of distraction. Patient was an active discussion and game participant. Patient contributed ideas to the seven components of ways to tolerate distress. Patient spoke about hardships and overcoming of her 's diagnosis. Patient's responses were thoughtful and purposeful. Patient was encouraging and supportive of peers. Patient tolerated frustration well when she could not think of answers to game prompts. Patient was socially appropriate. Patient was a positive and pleasant participant.

## 2023-03-26 NOTE — PLAN OF CARE
Problem: Adult Behavioral Health Plan of Care  Goal: Develops/Participates in Therapeutic Vici to Support Successful Transition  Outcome: Progressing    Behavioral  Pt pleasant and cooperative upon approach;  eating 100 % of meals and hydrating adequately;  Pt denied SI, SIB, HI, and hallucinations;  Attending to ADL's appropriately- pt showered and performed self cares; no behavioral escalation or concerns noted this shift. Pt isolative to self. Pt present in the milieu; affect flat and blunted. Mood depressed. Pt endorses anxiety and depression; pt stated that she feels the anxiety is what leads to her drinking.      Medical  No complaints of physical pain/discomfort this shift. vital signs stable    Pt continues in alcohol withdrawal; MSSA 2 and 8; 10 mg of valium given this shift; pt has required 25 mg of valium since admission; pt last required valium 3/26 @ 1134.    Plan  Discharge plan is to go home with outpatient services after detox.

## 2023-03-26 NOTE — PROVIDER NOTIFICATION
Pt has yearly RECLAST injection, stated she was supposed to get injection in January but did not follow through with appointment. Pt informed to discuss with providers on 3/26

## 2023-03-27 VITALS
DIASTOLIC BLOOD PRESSURE: 84 MMHG | RESPIRATION RATE: 16 BRPM | HEART RATE: 76 BPM | TEMPERATURE: 97.3 F | OXYGEN SATURATION: 96 % | SYSTOLIC BLOOD PRESSURE: 150 MMHG

## 2023-03-27 PROCEDURE — 250N000013 HC RX MED GY IP 250 OP 250 PS 637

## 2023-03-27 PROCEDURE — 250N000013 HC RX MED GY IP 250 OP 250 PS 637: Performed by: PSYCHIATRY & NEUROLOGY

## 2023-03-27 PROCEDURE — 250N000013 HC RX MED GY IP 250 OP 250 PS 637: Performed by: INTERNAL MEDICINE

## 2023-03-27 RX ORDER — MULTIPLE VITAMINS W/ MINERALS TAB 9MG-400MCG
1 TAB ORAL DAILY
Qty: 90 TABLET | Refills: 3 | Status: SHIPPED | OUTPATIENT
Start: 2023-03-28 | End: 2023-04-17

## 2023-03-27 RX ORDER — FOLIC ACID 1 MG/1
1 TABLET ORAL DAILY
Qty: 90 TABLET | Refills: 1 | Status: SHIPPED | OUTPATIENT
Start: 2023-03-28 | End: 2023-04-17

## 2023-03-27 RX ORDER — OMEPRAZOLE 40 MG/1
40 CAPSULE, DELAYED RELEASE ORAL DAILY
Qty: 30 CAPSULE | Refills: 3 | Status: SHIPPED | OUTPATIENT
Start: 2023-03-27 | End: 2023-04-17

## 2023-03-27 RX ORDER — DULOXETIN HYDROCHLORIDE 30 MG/1
30 CAPSULE, DELAYED RELEASE ORAL DAILY
Status: DISCONTINUED | OUTPATIENT
Start: 2023-03-27 | End: 2023-03-27 | Stop reason: HOSPADM

## 2023-03-27 RX ORDER — IBUPROFEN 600 MG/1
600 TABLET, FILM COATED ORAL EVERY 6 HOURS PRN
Status: DISCONTINUED | OUTPATIENT
Start: 2023-03-27 | End: 2023-03-27 | Stop reason: HOSPADM

## 2023-03-27 RX ORDER — NALTREXONE HYDROCHLORIDE 50 MG/1
50 TABLET, FILM COATED ORAL DAILY
Qty: 30 TABLET | Refills: 3 | Status: SHIPPED | OUTPATIENT
Start: 2023-03-27 | End: 2023-04-17

## 2023-03-27 RX ORDER — DULOXETIN HYDROCHLORIDE 30 MG/1
30 CAPSULE, DELAYED RELEASE ORAL DAILY
Qty: 30 CAPSULE | Refills: 3 | Status: SHIPPED | OUTPATIENT
Start: 2023-03-27 | End: 2023-04-17

## 2023-03-27 RX ORDER — LANOLIN ALCOHOL/MO/W.PET/CERES
100 CREAM (GRAM) TOPICAL DAILY
Qty: 90 TABLET | Refills: 1 | Status: SHIPPED | OUTPATIENT
Start: 2023-03-28 | End: 2023-04-17

## 2023-03-27 RX ORDER — LANOLIN ALCOHOL/MO/W.PET/CERES
1000 CREAM (GRAM) TOPICAL DAILY
Status: DISCONTINUED | OUTPATIENT
Start: 2023-03-27 | End: 2023-03-27 | Stop reason: HOSPADM

## 2023-03-27 RX ADMIN — CYANOCOBALAMIN TAB 1000 MCG 1000 MCG: 1000 TAB at 12:57

## 2023-03-27 RX ADMIN — Medication 25 MG: at 12:57

## 2023-03-27 RX ADMIN — FOLIC ACID 1 MG: 1 TABLET ORAL at 09:05

## 2023-03-27 RX ADMIN — ESCITALOPRAM OXALATE 20 MG: 20 TABLET, FILM COATED ORAL at 09:05

## 2023-03-27 RX ADMIN — MULTIPLE VITAMINS W/ MINERALS TAB 1 TABLET: TAB at 09:05

## 2023-03-27 RX ADMIN — ACYCLOVIR 400 MG: 400 TABLET ORAL at 09:05

## 2023-03-27 RX ADMIN — NICOTINE 1 PATCH: 14 PATCH, EXTENDED RELEASE TRANSDERMAL at 09:05

## 2023-03-27 RX ADMIN — DULOXETINE HYDROCHLORIDE 30 MG: 30 CAPSULE, DELAYED RELEASE ORAL at 12:57

## 2023-03-27 RX ADMIN — IBUPROFEN 600 MG: 600 TABLET ORAL at 07:41

## 2023-03-27 RX ADMIN — THIAMINE HCL TAB 100 MG 100 MG: 100 TAB at 09:05

## 2023-03-27 RX ADMIN — OMEPRAZOLE 40 MG: 20 CAPSULE, DELAYED RELEASE ORAL at 07:41

## 2023-03-27 ASSESSMENT — ACTIVITIES OF DAILY LIVING (ADL)
ADLS_ACUITY_SCORE: 28

## 2023-03-27 NOTE — PLAN OF CARE
Behavioral Team Discussion: (3/27/2023)    Continued Stay Criteria/Rationale: Patient admitted for Alcohol Withdrawal and Use Disorder.  Plan: The following services will be provided to the patient; psychiatric assessment, medication management, therapeutic milieu, individual and group support, and skills groups.   Participants: 3A Provider: Dr. Alex Vásquez MD; 3A RN: Alex Reza RN; 3A CM's: Jamel Waterman.  Summary/Recommendation: Providers will assess today for treatment recommendations, discharge planning, and aftercare plans. CM will meet with pt for discharge planning.   Medical/Physical: None noted  Precautions:   Behavioral Orders   Procedures     Code 1 - Restrict to Unit     Routine Programming     As clinically indicated     Status 15     Every 15 minutes.     Withdrawal precautions     Rationale for change in precautions or plan: N/A  Progress: Initial.    ASAM Dimension Scale Ratings:  Dimension 1: 3 Client tolerates and feng with withdrawal discomfort poorly. Client has severe intoxication, such that the client endangers self or others, or intoxication has not abated with less intensive levels of services. Client displays severe signs and symptoms; or risk of severe, but manageable withdrawal; or withdrawal worsening despite detox at less intensive level.  Dimension 2: 0 Client displays full functioning with good ability to cope with physical discomfort.  Dimension 3: 2 Client has difficulty with impulse control and lacks coping skills. Client has thoughts of suicide or harm to others without means; however, the thoughts may interfere with participation in some treatment activities. Client has difficulty functioning in significant life areas. Client has moderate symptoms of emotional, behavioral, or cognitive problems. Client is able to participate in most treatment activities.  Dimension 4: 2 Client displays verbal compliance, but lacks consistent behaviors; has low motivation for change; and is  passively involved in treatment.  Dimension 5: 3 Client has poor recognition and understanding of relapse and recidivism issues and displays moderately high vulnerability for further substance use or mental health problems. Client has few coping skills and rarely applies coping skills.  Dimension 6: 3 Client is not engaged in structured, meaningful activity and the client's peers, family, significant other, and living environment are unsupportive, or there is significant criminal justice system involvement.

## 2023-03-27 NOTE — DISCHARGE SUMMARY
"Ms. Campbell \"Aileen\" Arya is a 72 year old woman admitted to [facility] treat depression and Anxiety in the context of Alcohol dependence and withdrawal.    From the H&P:  This is a 72 year old female with history of Alcohol withdrawal syndrome without complication (H).  Current legal status is voluntary. Patient is a 73 yo woman , she has 2 daughters and a stepson, domiciled in her own apartment, supported by an REVA and social security, the patient is a retired nurse; that was admitted to the psychiatric inpatient unit due to alcohol withdrawal symptoms.     Patient was seen and evaluated in the consult room by herself, this was a face-to-face evaluation.  During my assessment the patient presented as calm, pleasant and cooperative.  Patient has a good hygiene and she looks younger than stated age.       Patient said that she has been drinking more alcohol since her  had to be moved to a memory care facility.  Patient stated that a few months ago the family moved her to [place] in order for her to be closer to them and to her .  Since then patient has been living alone and she receives assistance from her 2 daughters.  Patient stated that she feels very lonely especially during the day, her day lacks structure and what she does most of the time is pacing around her home.  Patient tells me that in the past she used to always have a glass or 2 of wine with her lunch but lately she has been also having a few glasses of wine in the morning before noon.  As per patient she has been drinking almost a box of wine that she has also been mixing with margaritas that she makes.  Patient said that she was thinking about cutting down on the use of alcohol but she was starting to feel shaky in the morning and feeling better after \"zip of wine\". She was worried about having a seizure, she talked to her daughter about the situation and was taken to [facility].    Hospital course:  She " completed detox.  Lexapro was switched to Cymbalta.  Naltrexone was started.      3/27: Blood pressure (!) 150/84, pulse 76, temperature 97.3  F (36.3  C), temperature source Temporal, resp. rate 16, SpO2 96 %, not currently breastfeeding.    3/27: General appearance: good  Alert.   Affect: fair  Mood: fair    Speech:  normal.   Eye contact:  good.    Psychomotor behavior: normal  Gait: steady   Abnormal movements:  none  Delusions: none  Hallucinations:  none  Thoughts: logical  Associations: intact  Judgement: good  Insight: good  Cognitions: intact in conversation  Memory:  intact in conversation  Orientation: normal    Not suicidal.    Imp:  MDD F33.2  2.  MILTON F41.1  3.  Alcohol use disorder, severe, complicated by psych illness  And:   Patient Active Problem List   Diagnosis     Age-related osteoporosis without current pathological fracture     Dyslipidemia     Eczema     Generalized anxiety disorder     Genital HSV     GERD (gastroesophageal reflux disease)     MVP (mitral valve prolapse)     Chronic insomnia     Thickened endometrium     Urge incontinence of urine     Subclinical hypothyroidism     Smoking 1/2 pack a day or less     Alcohol withdrawal syndrome without complication (H)     Plan:  Dischagre  Recent Results (from the past 168 hour(s))   Extra Red Top Tube    Collection Time: 03/25/23  9:10 AM   Result Value Ref Range    Hold Specimen JIC    Extra Green Top (Lithium Heparin) Tube    Collection Time: 03/25/23  9:10 AM   Result Value Ref Range    Hold Specimen JIC    Extra Purple Top Tube    Collection Time: 03/25/23  9:10 AM   Result Value Ref Range    Hold Specimen JIC    Basic metabolic panel    Collection Time: 03/25/23  9:10 AM   Result Value Ref Range    Sodium 141 136 - 145 mmol/L    Potassium 4.0 3.4 - 5.3 mmol/L    Chloride 106 98 - 107 mmol/L    Carbon Dioxide (CO2) 25 22 - 29 mmol/L    Anion Gap 10 7 - 15 mmol/L    Urea Nitrogen 7.1 (L) 8.0 - 23.0 mg/dL    Creatinine 0.71 0.51 - 0.95  mg/dL    Calcium 10.0 8.8 - 10.2 mg/dL    Glucose 108 (H) 70 - 99 mg/dL    GFR Estimate 89 >60 mL/min/1.73m2   Alcohol level blood    Collection Time: 03/25/23  9:10 AM   Result Value Ref Range    Alcohol ethyl <0.01 <=0.01 g/dL   CBC with platelets and differential    Collection Time: 03/25/23  9:10 AM   Result Value Ref Range    WBC Count 8.5 4.0 - 11.0 10e3/uL    RBC Count 4.93 3.80 - 5.20 10e6/uL    Hemoglobin 15.8 (H) 11.7 - 15.7 g/dL    Hematocrit 47.5 (H) 35.0 - 47.0 %    MCV 96 78 - 100 fL    MCH 32.0 26.5 - 33.0 pg    MCHC 33.3 31.5 - 36.5 g/dL    RDW 13.5 10.0 - 15.0 %    Platelet Count 274 150 - 450 10e3/uL    % Neutrophils 71 %    % Lymphocytes 18 %    % Monocytes 8 %    % Eosinophils 1 %    % Basophils 1 %    % Immature Granulocytes 1 %    NRBCs per 100 WBC 0 <1 /100    Absolute Neutrophils 6.1 1.6 - 8.3 10e3/uL    Absolute Lymphocytes 1.5 0.8 - 5.3 10e3/uL    Absolute Monocytes 0.7 0.0 - 1.3 10e3/uL    Absolute Eosinophils 0.1 0.0 - 0.7 10e3/uL    Absolute Basophils 0.1 0.0 - 0.2 10e3/uL    Absolute Immature Granulocytes 0.1 <=0.4 10e3/uL    Absolute NRBCs 0.0 10e3/uL   Comprehensive metabolic panel    Collection Time: 03/25/23  9:10 AM   Result Value Ref Range    Sodium 141 136 - 145 mmol/L    Potassium 4.0 3.4 - 5.3 mmol/L    Chloride 105 98 - 107 mmol/L    Carbon Dioxide (CO2) 23 22 - 29 mmol/L    Anion Gap 13 7 - 15 mmol/L    Urea Nitrogen 7.1 (L) 8.0 - 23.0 mg/dL    Creatinine 0.69 0.51 - 0.95 mg/dL    Calcium 10.1 8.8 - 10.2 mg/dL    Glucose 106 (H) 70 - 99 mg/dL    Alkaline Phosphatase 84 35 - 104 U/L    AST 39 (H) 10 - 35 U/L    ALT 38 (H) 10 - 35 U/L    Protein Total 7.3 6.4 - 8.3 g/dL    Albumin 4.4 3.5 - 5.2 g/dL    Bilirubin Total 0.4 <=1.2 mg/dL    GFR Estimate >90 >60 mL/min/1.73m2   Lipase    Collection Time: 03/25/23  9:10 AM   Result Value Ref Range    Lipase 27 13 - 60 U/L   UA with Microscopic reflex to Culture    Collection Time: 03/25/23  9:44 AM    Specimen: Urine, Midstream    Result Value Ref Range    Color Urine Light Yellow Colorless, Straw, Light Yellow, Yellow    Appearance Urine Clear Clear    Glucose Urine Negative Negative mg/dL    Bilirubin Urine Negative Negative    Ketones Urine Trace (A) Negative mg/dL    Specific Gravity Urine 1.011 1.003 - 1.035    Blood Urine Trace (A) Negative    pH Urine 5.0 5.0 - 7.0    Protein Albumin Urine Negative Negative mg/dL    Urobilinogen Urine Normal Normal, 2.0 mg/dL    Nitrite Urine Negative Negative    Leukocyte Esterase Urine Small (A) Negative    Mucus Urine Present (A) None Seen /LPF    RBC Urine 2 <=2 /HPF    WBC Urine 5 <=5 /HPF    Squamous Epithelials Urine 1 <=1 /HPF    Hyaline Casts Urine 1 <=2 /LPF   Drug abuse screen 77 urine (FL, RH, SH)    Collection Time: 03/25/23  9:44 AM   Result Value Ref Range    Amphetamines Urine Screen Negative Screen Negative    Barbituates Urine Screen Negative Screen Negative    Benzodiazepine Urine Screen Negative Screen Negative    Cannabinoids Urine Screen Negative Screen Negative    Opiates Urine Screen Negative Screen Negative    PCP Urine Screen Negative Screen Negative    Cocaine Urine Screen Negative Screen Negative   Asymptomatic COVID-19 Virus (Coronavirus) by PCR Nasopharyngeal    Collection Time: 03/25/23  1:44 PM    Specimen: Nasopharyngeal; Swab   Result Value Ref Range    SARS CoV2 PCR Negative Negative   GGT    Collection Time: 03/26/23  7:19 AM   Result Value Ref Range    GGT 35 5 - 36 U/L   TSH with free T4 reflex and/or T3 as indicated    Collection Time: 03/26/23  7:19 AM   Result Value Ref Range    TSH 2.64 0.30 - 4.20 uIU/mL   Vitamin B12    Collection Time: 03/26/23  7:19 AM   Result Value Ref Range    Vitamin B12 291 232 - 1,245 pg/mL   Folate    Collection Time: 03/26/23  7:19 AM   Result Value Ref Range    Folic Acid 12.1 4.6 - 34.8 ng/mL   Hepatic panel    Collection Time: 03/26/23  7:19 AM   Result Value Ref Range    Protein Total 6.5 6.4 - 8.3 g/dL    Albumin 4.3 3.5 -  5.2 g/dL    Bilirubin Total 0.5 <=1.2 mg/dL    Alkaline Phosphatase 72 35 - 104 U/L    AST 31 10 - 35 U/L    ALT 32 10 - 35 U/L    Bilirubin Direct <0.20 0.00 - 0.30 mg/dL        Review of your medicines      START taking      Dose / Directions   DULoxetine 30 MG capsule  Commonly known as: CYMBALTA  Used for: Current moderate episode of major depressive disorder without prior episode (H)      Dose: 30 mg  Take 1 capsule (30 mg) by mouth daily  Quantity: 30 capsule  Refills: 3     folic acid 1 MG tablet  Commonly known as: FOLVITE      Dose: 1 mg  Start taking on: March 28, 2023  Take 1 tablet (1 mg) by mouth daily  Quantity: 90 tablet  Refills: 1     multivitamin w/minerals tablet      Dose: 1 tablet  Start taking on: March 28, 2023  Take 1 tablet by mouth daily  Quantity: 90 tablet  Refills: 3     naltrexone 50 MG tablet  Commonly known as: DEPADE/REVIA      Dose: 50 mg  Take 1 tablet (50 mg) by mouth daily  Quantity: 30 tablet  Refills: 3     thiamine 100 MG tablet  Commonly known as: B-1      Dose: 100 mg  Start taking on: March 28, 2023  Take 1 tablet (100 mg) by mouth daily  Quantity: 90 tablet  Refills: 1     vitamin B-12 1000 MCG tablet  Commonly known as: CYANOCOBALAMIN      Dose: 1,000 mcg  Take 1 tablet (1,000 mcg) by mouth daily  Quantity: 90 tablet  Refills: 1        CONTINUE these medicines which have NOT CHANGED      Dose / Directions   * acyclovir 400 MG tablet  Commonly known as: ZOVIRAX      Refills: 0     * acyclovir 400 MG tablet  Commonly known as: ZOVIRAX  Used for: Genital herpes simplex, unspecified site      Dose: 400 mg  Take 1 tablet (400 mg) by mouth 2 times daily  Quantity: 180 tablet  Refills: 2     * clobetasol 0.05 % external cream  Commonly known as: TEMOVATE      Refills: 0     * clobetasol 0.05 % external cream  Commonly known as: TEMOVATE  Used for: Eczema, unspecified type      Apply topically 2 times daily  Quantity: 45 g  Refills: 0     * lovastatin 40 MG tablet  Commonly known  as: MEVACOR      Take by mouth At Bedtime  Refills: 0     * lovastatin 40 MG tablet  Commonly known as: MEVACOR  Used for: Dyslipidemia      TAKE 1 TABLET BY MOUTH AT  BEDTIME  Quantity: 90 tablet  Refills: 1     mirtazapine 15 MG tablet  Commonly known as: REMERON  Used for: Adjustment disorder with anxious mood, Sleep disturbances      TAKE 1 TABLET BY MOUTH AT  BEDTIME  Quantity: 90 tablet  Refills: 1     omeprazole 40 MG DR capsule  Commonly known as: priLOSEC  Used for: Gastroesophageal reflux disease without esophagitis      Dose: 40 mg  Take 1 capsule (40 mg) by mouth daily  Quantity: 30 capsule  Refills: 3         * This list has 6 medication(s) that are the same as other medications prescribed for you. Read the directions carefully, and ask your doctor or other care provider to review them with you.            STOP taking    Caltrate Gummy Bites 250-10 MG-MCG Chew  Generic drug: Ca Phosphate-Cholecalciferol        escitalopram 10 MG tablet  Commonly known as: LEXAPRO        hydrOXYzine 25 MG tablet  Commonly known as: ATARAX        levalbuterol 45 MCG/ACT inhaler  Commonly known as: XOPENEX HFA        meloxicam 15 MG tablet  Commonly known as: MOBIC        propranolol 10 MG tablet  Commonly known as: INDERAL        zoledronic Acid 5 MG/100ML Soln infusion  Commonly known as: RECLAST              Where to get your medicines      These medications were sent to [facility] [place], [place] - [address]  [address] [address]    Phone: [phone]     DULoxetine 30 MG capsule    folic acid 1 MG tablet    multivitamin w/minerals tablet    naltrexone 50 MG tablet    omeprazole 40 MG DR capsule    thiamine 100 MG tablet    vitamin B-12 1000 MCG tablet

## 2023-03-27 NOTE — PLAN OF CARE
"  Problem: Alcohol Withdrawal  Goal: Alcohol Withdrawal Symptom Control  Outcome: Progressing   Goal Outcome Evaluation:         Pt continues in detox for alcohol. Last had valium 1123 yesterday.   Slept well, denied withdrawal symptoms except mild sweats. \" I don't think I need any medicine now\"         "

## 2023-03-27 NOTE — PROGRESS NOTES
Patient has not required any valium for alcohol detox since 3/27 at 1134. All MSSA scores since that time have been less than 8. Pt is now removed from alcohol detox monitoring status. Await disposition likely to home.

## 2023-03-27 NOTE — DISCHARGE INSTRUCTIONS
Behavioral Discharge Planning and Instructions  THANK YOU FOR CHOOSING THE [facility]  3A  [phone]    Summary: You were admitted to [facility] on 3/25/2023 for detoxification from alcohol.  A medical exam was performed that included lab work. You have met with a  and opted to pursue individual therapy and medication management through [facility].  Please take care and make your recovery a priority, Shannan!    Recommendation:  Remain abstinent from all mood-altering chemicals except those prescribed by a medical provider. Attend your upcoming appointment with your new therapist. If problems persist, consider obtaining a chemical dependency assessment (call [phone] to schedule with [facility]) and following all recommendations.      Main Diagnosis: Per Dr. Alex Vásquez MD  Imp:  MDD F33.2  2.  MILTON F41.1  3.  Alcohol use disorder, severe, complicated by psych illness    Major Treatments, Procedures and Findings:  You were detoxed from alcohol with the Modified Selective Severity Protocol using Valium. You have met with a  to develop a treatment plan for discharge.  You have had labs drawn and a copy of those labs will be sent home with you.  Please bring your lab results with to your follow up doctor appointment.    Symptoms to Report:  If you experience more anxiety, confusion, sleeplessness, deep sadness or thoughts of suicide, notify your treatment team or notify your primary care physician. IF ANY OF THE SYMPTOMS YOU ARE EXPERIENCING ARE A MEDICAL EMERGENCY CALL 911 IMMEDIATELY.     If you or someone you know is struggling or in crisis, help is available.  Call or text 014 or chat  at [email or web address]    Lifestyle Adjustment: Adjust your lifestyle to get enough sleep, relaxation, exercise and  good nutrition. Continue to develop healthy coping skills to decrease stress and promote a sober living environment. Do not use  "alcohol, illegal drugs or addictive medications other than what is currently prescribed. AA, NA, and  Sponsor are excellent resources for support.     Primary Provider:    Disposition: Home    Mental Health Follow-Up:  You report an upcoming intake appointment with a therapist Tanvi at [facility] on Thursday 3/30 at 10 AM. Please attend this appointment to establish care with this provider.   [facility] Phone: [phone]    Facts about COVID19 at www.cdc.gov/COVID19 and www.[place].gov/covid19    Keeping hands clean is one of the most important steps we can take to avoid getting sick and spreading germs to others.  Please wash your hands frequently and lather with soap for at least 20 seconds!    Follow-up Appointment:   Appointment Date/Time: 4/11/23 1:10    Primary Care Giver: Gene Funk  Address: [facility]. [address] [place] [address]    Phone Number: [phone]      Resources:     Resources for on line recovery meetings:  AA meetings can be found online; search for them at: http://aa-intergroup.org/directory.php  AA meetings via [place] for [place] area can be found online at: https://a[place].org/find-a-meeting/holiday-closings/  NA meetings via [place]OM for [place] area can be found online at: https://sites.[employer].com/view/[place]regionofnarcoticsanonymous/home?authuser=2    Www.Robotronica  has online resources for meeting and recovery care including Podcast \"Let's Talk:Addiction & Recovery Podcasts    Www.mnrecGreenlight Planet.org     DISCHARGE RESOURCES:  -SMART Recovery - self management for addiction recovery:  www.smartrecovery.org    -Pathways ~ A Health Crisis Resource & Support Center: 204.898.3384.  -[facility] [phone]   -[facility] Behavioral Intake 783-742-3157 or 849-940-7832.  -Suicide Awareness Voices of Education (SAVE) (www.save.org): 288-063-FPKU (6910)  -National Suicide Prevention Line " (www.mentalhealthmn.org): 151-664-CIPO (4358)  -National Mode on Mental Illness (www.[place].thea.org): 372.712.9930 or 448-118-8113.  -Ddng3uuna: text the word LIFE to 78872 for immediate support and crisis intervention  -Mental Health Consumer/Survivor Network of [place] (www.mhcsn.net): 649.235.9735 or 059-897-8721  -Mental Health Association of [place] (www.mentalhealth.org): 874.627.8510 or 042-117-6626     -Substance Abuse and Mental Health Services (www.samhsa.gov)  -Harm Reduction Coalition (www. Harmreduction.org)  -www.prescribetoprevent.org or http://prescribetoprevent.org/video  -Poison control 9-840-095-7292   **[place] Opioid Prevention Coalition: www.opioidcoalition.org    Sober Support Group Information:  AA/NA & Sponsor/Support  -Alcoholics Anonymous (www.alcoholics-anonymous.org): for local information 24 hours/day  -AA Intergroup service office in [place] (http://www.aastpaul.org/) [phone]  -AA Intergroup service office in [facility]: 587.327.8505. (http://www.aamin[place].org/)  -Narcotics Anonymous (www.nam[place].org) (617) 719-1177   **Sober Fun Activities: www.soberEntertainment Cruisesactivities.Bizimply.Zhenai/[facility]//[facility] Recovery Connection ([facility])  [facility] connects people seeking recovery to resources that help foster and sustain long-term recovery.  Whether you are seeking resources for treatment, transportation, housing, job training, education, health care or other pathways to recovery, [facility] is a great place to start.    Phone: (968) 365-4910.  www.min[employer]otaStreak.[employer] (Great listing of all types of recovery and non-recovery related resources)      General Medication Instructions:   See your medication sheet(s) for instructions.   Take all medicines as directed.  Make no changes unless your doctor suggests them.   Go to all your doctor visits.  Be sure to have all your required lab tests. This way, your medicines can be refilled on time.  Do not use any drugs not prescribed by your provider.   AA/NA and Sponsors are excellent resources for support  Avoid alcohol.    Any follow up concerns:  Nursing questions call the Unit [facility] [phone]  Medical Record call [phone]  Outpatient Behavioral Intake call [phone]  LP+ Wait List/Bed Availability call [phone]    The entire treatment team has appreciated the opportunity to work with you Shannan.  We wish you the best in the future and with your lifelong recovery goals. Please bring this discharge folder with you to all follow up appointments.  It contains your lab results, diagnosis, medication list and discharge recommendations.    THANK YOU FOR CHOOSING THE [facility]

## 2023-03-27 NOTE — PROGRESS NOTES
Pt MSSA is 6 and 5. She received PRN Advil for a headache pain 2/10. She reports low anxiety. When discussing plans for after she discharges pt said she plans to volunteer or work so her day has structure. She plans to receive counseling help and utilize coping skills and resources.   BP (!) 150/84   Pulse 76   Temp 97.3  F (36.3  C) (Temporal)   Resp 16   SpO2 96%

## 2023-03-27 NOTE — PROGRESS NOTES
Met with pt in AM to finalize discharge planning. Pt reports she has an appointment in place with a new therapist Tanvi at [facility] on Thursday 3/30. Pt reports she completed all necessary paperwork for this. Pt reports she was confused on setting up a medication provider through this organization, but reports this has become less of a concern since her attending here is ordering her a 30 day supply of her medications, which will give her time to establish with the therapist and navigate the onboarding process for medication management at [facility]. Pt declined further resources from writer at this time. AVS updated.     Ebony Velásquez, LPCC, LADC

## 2023-03-27 NOTE — PROGRESS NOTES
You were detoxed from alcohol with the Modified Selective Severity Protocol using  valium.  You have met with a  to develop a treatment plan for discharge.  You have had labs drawn and a copy of those labs will be sent home with you. Your alcohol withdrawal is complete and you are being discharged today.  Please bring your lab results with to your follow up doctor appointment.  Make your recovery a priority!

## 2023-03-27 NOTE — PLAN OF CARE
Problem: Alcohol Withdrawal  Goal: Alcohol Withdrawal Symptom Control  Outcome: Progressing     Pt monitored for alcohol withdrawal, MSSA of 2, 2. Pt did not receive medication for withdrawal symptoms this shift.     Pt's daughter visited this afternoon. Pt states the visit went well.     Endorsed anxiety. Denied SI

## 2023-04-05 ENCOUNTER — HOSPITAL ENCOUNTER (INPATIENT)
Facility: CLINIC | Age: 73
LOS: 2 days | Discharge: HOME | DRG: 379 | End: 2023-04-07
Attending: EMERGENCY MEDICINE | Admitting: STUDENT IN AN ORGANIZED HEALTH CARE EDUCATION/TRAINING PROGRAM
Payer: MEDICARE

## 2023-04-05 ENCOUNTER — APPOINTMENT (OUTPATIENT)
Dept: CT IMAGING | Facility: CLINIC | Age: 73
DRG: 379 | End: 2023-04-05
Attending: EMERGENCY MEDICINE
Payer: MEDICARE

## 2023-04-05 DIAGNOSIS — L30.9 ECZEMA, UNSPECIFIED TYPE: ICD-10-CM

## 2023-04-05 DIAGNOSIS — A60.00 GENITAL HERPES SIMPLEX, UNSPECIFIED SITE: ICD-10-CM

## 2023-04-05 DIAGNOSIS — K92.2 GASTROINTESTINAL HEMORRHAGE, UNSPECIFIED GASTROINTESTINAL HEMORRHAGE TYPE: ICD-10-CM

## 2023-04-05 DIAGNOSIS — K57.32 DIVERTICULITIS OF COLON: ICD-10-CM

## 2023-04-05 LAB
ABO/RH(D): NORMAL
ALBUMIN SERPL BCG-MCNC: 3.8 G/DL (ref 3.5–5.2)
ALP SERPL-CCNC: 73 U/L (ref 35–104)
ALT SERPL W P-5'-P-CCNC: 14 U/L (ref 10–35)
ANION GAP SERPL CALCULATED.3IONS-SCNC: 10 MMOL/L (ref 7–15)
ANTIBODY SCREEN: NEGATIVE
AST SERPL W P-5'-P-CCNC: 16 U/L (ref 10–35)
BASOPHILS # BLD AUTO: 0.1 10E3/UL (ref 0–0.2)
BASOPHILS NFR BLD AUTO: 0 %
BILIRUB SERPL-MCNC: 0.2 MG/DL
BUN SERPL-MCNC: 9.5 MG/DL (ref 8–23)
CALCIUM SERPL-MCNC: 9.1 MG/DL (ref 8.8–10.2)
CHLORIDE SERPL-SCNC: 110 MMOL/L (ref 98–107)
CREAT SERPL-MCNC: 0.79 MG/DL (ref 0.51–0.95)
DEPRECATED HCO3 PLAS-SCNC: 23 MMOL/L (ref 22–29)
EOSINOPHIL # BLD AUTO: 0.1 10E3/UL (ref 0–0.7)
EOSINOPHIL NFR BLD AUTO: 1 %
ERYTHROCYTE [DISTWIDTH] IN BLOOD BY AUTOMATED COUNT: 13.1 % (ref 10–15)
GFR SERPL CREATININE-BSD FRML MDRD: 79 ML/MIN/1.73M2
GLUCOSE SERPL-MCNC: 196 MG/DL (ref 70–99)
HCT VFR BLD AUTO: 36.7 % (ref 35–47)
HGB BLD-MCNC: 12 G/DL (ref 11.7–15.7)
HGB BLD-MCNC: 9.6 G/DL (ref 11.7–15.7)
HOLD SPECIMEN: NORMAL
IMM GRANULOCYTES # BLD: 0.1 10E3/UL
IMM GRANULOCYTES NFR BLD: 1 %
LYMPHOCYTES # BLD AUTO: 1 10E3/UL (ref 0.8–5.3)
LYMPHOCYTES NFR BLD AUTO: 5 %
MCH RBC QN AUTO: 31.8 PG (ref 26.5–33)
MCHC RBC AUTO-ENTMCNC: 32.7 G/DL (ref 31.5–36.5)
MCV RBC AUTO: 97 FL (ref 78–100)
MONOCYTES # BLD AUTO: 1.2 10E3/UL (ref 0–1.3)
MONOCYTES NFR BLD AUTO: 6 %
NEUTROPHILS # BLD AUTO: 17.3 10E3/UL (ref 1.6–8.3)
NEUTROPHILS NFR BLD AUTO: 87 %
NRBC # BLD AUTO: 0 10E3/UL
NRBC BLD AUTO-RTO: 0 /100
PLATELET # BLD AUTO: 315 10E3/UL (ref 150–450)
POTASSIUM SERPL-SCNC: 3.2 MMOL/L (ref 3.4–5.3)
PROT SERPL-MCNC: 6.2 G/DL (ref 6.4–8.3)
RBC # BLD AUTO: 3.77 10E6/UL (ref 3.8–5.2)
SODIUM SERPL-SCNC: 143 MMOL/L (ref 136–145)
SPECIMEN EXPIRATION DATE: NORMAL
WBC # BLD AUTO: 19.8 10E3/UL (ref 4–11)

## 2023-04-05 PROCEDURE — 250N000009 HC RX 250: Performed by: EMERGENCY MEDICINE

## 2023-04-05 PROCEDURE — 74177 CT ABD & PELVIS W/CONTRAST: CPT | Mod: MG

## 2023-04-05 PROCEDURE — 258N000003 HC RX IP 258 OP 636: Performed by: STUDENT IN AN ORGANIZED HEALTH CARE EDUCATION/TRAINING PROGRAM

## 2023-04-05 PROCEDURE — 258N000003 HC RX IP 258 OP 636: Performed by: EMERGENCY MEDICINE

## 2023-04-05 PROCEDURE — 99222 1ST HOSP IP/OBS MODERATE 55: CPT | Mod: AI | Performed by: STUDENT IN AN ORGANIZED HEALTH CARE EDUCATION/TRAINING PROGRAM

## 2023-04-05 PROCEDURE — 250N000011 HC RX IP 250 OP 636: Performed by: EMERGENCY MEDICINE

## 2023-04-05 PROCEDURE — 85025 COMPLETE CBC W/AUTO DIFF WBC: CPT | Performed by: EMERGENCY MEDICINE

## 2023-04-05 PROCEDURE — 250N000013 HC RX MED GY IP 250 OP 250 PS 637: Performed by: STUDENT IN AN ORGANIZED HEALTH CARE EDUCATION/TRAINING PROGRAM

## 2023-04-05 PROCEDURE — 99285 EMERGENCY DEPT VISIT HI MDM: CPT | Mod: 25

## 2023-04-05 PROCEDURE — 80053 COMPREHEN METABOLIC PANEL: CPT | Performed by: EMERGENCY MEDICINE

## 2023-04-05 PROCEDURE — 86850 RBC ANTIBODY SCREEN: CPT | Performed by: EMERGENCY MEDICINE

## 2023-04-05 PROCEDURE — 36415 COLL VENOUS BLD VENIPUNCTURE: CPT | Performed by: EMERGENCY MEDICINE

## 2023-04-05 PROCEDURE — G1010 CDSM STANSON: HCPCS

## 2023-04-05 PROCEDURE — 120N000001 HC R&B MED SURG/OB

## 2023-04-05 PROCEDURE — 85018 HEMOGLOBIN: CPT | Performed by: STUDENT IN AN ORGANIZED HEALTH CARE EDUCATION/TRAINING PROGRAM

## 2023-04-05 PROCEDURE — 86901 BLOOD TYPING SEROLOGIC RH(D): CPT | Performed by: EMERGENCY MEDICINE

## 2023-04-05 PROCEDURE — 96374 THER/PROPH/DIAG INJ IV PUSH: CPT

## 2023-04-05 PROCEDURE — 36415 COLL VENOUS BLD VENIPUNCTURE: CPT | Performed by: STUDENT IN AN ORGANIZED HEALTH CARE EDUCATION/TRAINING PROGRAM

## 2023-04-05 PROCEDURE — C9113 INJ PANTOPRAZOLE SODIUM, VIA: HCPCS | Performed by: EMERGENCY MEDICINE

## 2023-04-05 RX ORDER — METRONIDAZOLE 500 MG/100ML
500 INJECTION, SOLUTION INTRAVENOUS EVERY 12 HOURS
Status: DISCONTINUED | OUTPATIENT
Start: 2023-04-06 | End: 2023-04-07 | Stop reason: HOSPADM

## 2023-04-05 RX ORDER — CEFTRIAXONE 1 G/1
1 INJECTION, POWDER, FOR SOLUTION INTRAMUSCULAR; INTRAVENOUS EVERY 24 HOURS
Status: DISCONTINUED | OUTPATIENT
Start: 2023-04-06 | End: 2023-04-05

## 2023-04-05 RX ORDER — LIDOCAINE 40 MG/G
CREAM TOPICAL
Status: DISCONTINUED | OUTPATIENT
Start: 2023-04-05 | End: 2023-04-07 | Stop reason: HOSPADM

## 2023-04-05 RX ORDER — METRONIDAZOLE 500 MG/100ML
500 INJECTION, SOLUTION INTRAVENOUS ONCE
Status: COMPLETED | OUTPATIENT
Start: 2023-04-05 | End: 2023-04-05

## 2023-04-05 RX ORDER — DULOXETIN HYDROCHLORIDE 30 MG/1
30 CAPSULE, DELAYED RELEASE ORAL DAILY
Status: DISCONTINUED | OUTPATIENT
Start: 2023-04-06 | End: 2023-04-07 | Stop reason: HOSPADM

## 2023-04-05 RX ORDER — CEFTRIAXONE 2 G/1
2 INJECTION, POWDER, FOR SOLUTION INTRAMUSCULAR; INTRAVENOUS ONCE
Status: COMPLETED | OUTPATIENT
Start: 2023-04-05 | End: 2023-04-05

## 2023-04-05 RX ORDER — POTASSIUM CHLORIDE 1500 MG/1
40 TABLET, EXTENDED RELEASE ORAL ONCE
Status: COMPLETED | OUTPATIENT
Start: 2023-04-05 | End: 2023-04-05

## 2023-04-05 RX ORDER — FOLIC ACID 1 MG/1
1 TABLET ORAL DAILY
Status: DISCONTINUED | OUTPATIENT
Start: 2023-04-06 | End: 2023-04-07 | Stop reason: HOSPADM

## 2023-04-05 RX ORDER — CEFTRIAXONE 2 G/1
2 INJECTION, POWDER, FOR SOLUTION INTRAMUSCULAR; INTRAVENOUS EVERY 24 HOURS
Status: DISCONTINUED | OUTPATIENT
Start: 2023-04-06 | End: 2023-04-07 | Stop reason: HOSPADM

## 2023-04-05 RX ORDER — LANOLIN ALCOHOL/MO/W.PET/CERES
1000 CREAM (GRAM) TOPICAL DAILY
Status: DISCONTINUED | OUTPATIENT
Start: 2023-04-06 | End: 2023-04-07 | Stop reason: HOSPADM

## 2023-04-05 RX ORDER — ACETAMINOPHEN 650 MG/1
650 SUPPOSITORY RECTAL EVERY 6 HOURS PRN
Status: DISCONTINUED | OUTPATIENT
Start: 2023-04-05 | End: 2023-04-07 | Stop reason: HOSPADM

## 2023-04-05 RX ORDER — NALTREXONE HYDROCHLORIDE 50 MG/1
50 TABLET, FILM COATED ORAL DAILY
Status: DISCONTINUED | OUTPATIENT
Start: 2023-04-06 | End: 2023-04-07 | Stop reason: HOSPADM

## 2023-04-05 RX ORDER — SODIUM CHLORIDE 9 MG/ML
INJECTION, SOLUTION INTRAVENOUS CONTINUOUS
Status: DISCONTINUED | OUTPATIENT
Start: 2023-04-05 | End: 2023-04-06

## 2023-04-05 RX ORDER — IOPAMIDOL 755 MG/ML
57 INJECTION, SOLUTION INTRAVASCULAR ONCE
Status: COMPLETED | OUTPATIENT
Start: 2023-04-05 | End: 2023-04-05

## 2023-04-05 RX ORDER — ACETAMINOPHEN 325 MG/1
650 TABLET ORAL EVERY 6 HOURS PRN
Status: DISCONTINUED | OUTPATIENT
Start: 2023-04-05 | End: 2023-04-07 | Stop reason: HOSPADM

## 2023-04-05 RX ORDER — MIRTAZAPINE 15 MG/1
15 TABLET, FILM COATED ORAL AT BEDTIME
Status: DISCONTINUED | OUTPATIENT
Start: 2023-04-05 | End: 2023-04-07 | Stop reason: HOSPADM

## 2023-04-05 RX ADMIN — POTASSIUM CHLORIDE 40 MEQ: 1500 TABLET, EXTENDED RELEASE ORAL at 21:26

## 2023-04-05 RX ADMIN — SODIUM CHLORIDE: 9 INJECTION, SOLUTION INTRAVENOUS at 21:29

## 2023-04-05 RX ADMIN — MIRTAZAPINE 15 MG: 15 TABLET, FILM COATED ORAL at 22:18

## 2023-04-05 RX ADMIN — METRONIDAZOLE 500 MG: 500 INJECTION, SOLUTION INTRAVENOUS at 21:27

## 2023-04-05 RX ADMIN — IOPAMIDOL 57 ML: 755 INJECTION, SOLUTION INTRAVENOUS at 20:16

## 2023-04-05 RX ADMIN — SODIUM CHLORIDE 60 ML: 9 INJECTION, SOLUTION INTRAVENOUS at 20:16

## 2023-04-05 RX ADMIN — SODIUM CHLORIDE 1000 ML: 9 INJECTION, SOLUTION INTRAVENOUS at 20:07

## 2023-04-05 RX ADMIN — PANTOPRAZOLE SODIUM 40 MG: 40 INJECTION, POWDER, FOR SOLUTION INTRAVENOUS at 20:07

## 2023-04-05 RX ADMIN — CEFTRIAXONE SODIUM 2 G: 2 INJECTION, POWDER, FOR SOLUTION INTRAMUSCULAR; INTRAVENOUS at 21:26

## 2023-04-05 ASSESSMENT — ACTIVITIES OF DAILY LIVING (ADL)
WEAR_GLASSES_OR_BLIND: NO
DRESSING/BATHING_DIFFICULTY: NO
WALKING_OR_CLIMBING_STAIRS_DIFFICULTY: NO
DOING_ERRANDS_INDEPENDENTLY_DIFFICULTY: NO
DIFFICULTY_COMMUNICATING: NO
FALL_HISTORY_WITHIN_LAST_SIX_MONTHS: NO
HEARING_DIFFICULTY_OR_DEAF: NO
ADLS_ACUITY_SCORE: 35
ADLS_ACUITY_SCORE: 33
CONCENTRATING,_REMEMBERING_OR_MAKING_DECISIONS_DIFFICULTY: NO
TOILETING_ISSUES: NO
DIFFICULTY_EATING/SWALLOWING: NO
CHANGE_IN_FUNCTIONAL_STATUS_SINCE_ONSET_OF_CURRENT_ILLNESS/INJURY: NO
ADLS_ACUITY_SCORE: 35

## 2023-04-05 NOTE — ED TRIAGE NOTES
Patient BIBA from home after having bright red blood per rectum x 3 in toilet. Patient recently sober and through treatment and has been home and taking increased amount of advil. .

## 2023-04-06 ENCOUNTER — ANESTHESIA EVENT (OUTPATIENT)
Dept: GASTROENTEROLOGY | Facility: CLINIC | Age: 73
DRG: 379 | End: 2023-04-06
Payer: MEDICARE

## 2023-04-06 ENCOUNTER — ANESTHESIA (OUTPATIENT)
Dept: GASTROENTEROLOGY | Facility: CLINIC | Age: 73
DRG: 379 | End: 2023-04-06
Payer: MEDICARE

## 2023-04-06 LAB
ANION GAP SERPL CALCULATED.3IONS-SCNC: 8 MMOL/L (ref 7–15)
BASOPHILS # BLD AUTO: 0.1 10E3/UL (ref 0–0.2)
BASOPHILS NFR BLD AUTO: 1 %
BUN SERPL-MCNC: 6.3 MG/DL (ref 8–23)
CALCIUM SERPL-MCNC: 8.1 MG/DL (ref 8.8–10.2)
CHLORIDE SERPL-SCNC: 115 MMOL/L (ref 98–107)
CREAT SERPL-MCNC: 0.63 MG/DL (ref 0.51–0.95)
DEPRECATED HCO3 PLAS-SCNC: 18 MMOL/L (ref 22–29)
EOSINOPHIL # BLD AUTO: 0.2 10E3/UL (ref 0–0.7)
EOSINOPHIL NFR BLD AUTO: 2 %
ERYTHROCYTE [DISTWIDTH] IN BLOOD BY AUTOMATED COUNT: 12.9 % (ref 10–15)
GFR SERPL CREATININE-BSD FRML MDRD: >90 ML/MIN/1.73M2
GLUCOSE SERPL-MCNC: 81 MG/DL (ref 70–99)
HCT VFR BLD AUTO: 27.8 % (ref 35–47)
HGB BLD-MCNC: 8.4 G/DL (ref 11.7–15.7)
HGB BLD-MCNC: 9 G/DL (ref 11.7–15.7)
HGB BLD-MCNC: 9.2 G/DL (ref 11.7–15.7)
IMM GRANULOCYTES # BLD: 0.1 10E3/UL
IMM GRANULOCYTES NFR BLD: 1 %
LACTATE SERPL-SCNC: 0.6 MMOL/L (ref 0.7–2)
LYMPHOCYTES # BLD AUTO: 1.9 10E3/UL (ref 0.8–5.3)
LYMPHOCYTES NFR BLD AUTO: 17 %
MCH RBC QN AUTO: 31.6 PG (ref 26.5–33)
MCHC RBC AUTO-ENTMCNC: 32.4 G/DL (ref 31.5–36.5)
MCV RBC AUTO: 98 FL (ref 78–100)
MONOCYTES # BLD AUTO: 0.8 10E3/UL (ref 0–1.3)
MONOCYTES NFR BLD AUTO: 7 %
NEUTROPHILS # BLD AUTO: 8.1 10E3/UL (ref 1.6–8.3)
NEUTROPHILS NFR BLD AUTO: 72 %
NRBC # BLD AUTO: 0 10E3/UL
NRBC BLD AUTO-RTO: 0 /100
PLATELET # BLD AUTO: 271 10E3/UL (ref 150–450)
POTASSIUM SERPL-SCNC: 3.5 MMOL/L (ref 3.4–5.3)
POTASSIUM SERPL-SCNC: 3.6 MMOL/L (ref 3.4–5.3)
RBC # BLD AUTO: 2.85 10E6/UL (ref 3.8–5.2)
SODIUM SERPL-SCNC: 141 MMOL/L (ref 136–145)
UPPER GI ENDOSCOPY: NORMAL
WBC # BLD AUTO: 11.2 10E3/UL (ref 4–11)

## 2023-04-06 PROCEDURE — 120N000001 HC R&B MED SURG/OB

## 2023-04-06 PROCEDURE — 84132 ASSAY OF SERUM POTASSIUM: CPT | Performed by: STUDENT IN AN ORGANIZED HEALTH CARE EDUCATION/TRAINING PROGRAM

## 2023-04-06 PROCEDURE — 36415 COLL VENOUS BLD VENIPUNCTURE: CPT | Performed by: HOSPITALIST

## 2023-04-06 PROCEDURE — 80048 BASIC METABOLIC PNL TOTAL CA: CPT | Performed by: HOSPITALIST

## 2023-04-06 PROCEDURE — 250N000009 HC RX 250: Performed by: NURSE ANESTHETIST, CERTIFIED REGISTERED

## 2023-04-06 PROCEDURE — 85025 COMPLETE CBC W/AUTO DIFF WBC: CPT | Performed by: STUDENT IN AN ORGANIZED HEALTH CARE EDUCATION/TRAINING PROGRAM

## 2023-04-06 PROCEDURE — C9113 INJ PANTOPRAZOLE SODIUM, VIA: HCPCS | Performed by: STUDENT IN AN ORGANIZED HEALTH CARE EDUCATION/TRAINING PROGRAM

## 2023-04-06 PROCEDURE — 370N000017 HC ANESTHESIA TECHNICAL FEE, PER MIN: Performed by: INTERNAL MEDICINE

## 2023-04-06 PROCEDURE — 999N000010 HC STATISTIC ANES STAT CODE-CRNA PER MINUTE: Performed by: INTERNAL MEDICINE

## 2023-04-06 PROCEDURE — 0DJ08ZZ INSPECTION OF UPPER INTESTINAL TRACT, VIA NATURAL OR ARTIFICIAL OPENING ENDOSCOPIC: ICD-10-PCS | Performed by: INTERNAL MEDICINE

## 2023-04-06 PROCEDURE — 85018 HEMOGLOBIN: CPT | Performed by: STUDENT IN AN ORGANIZED HEALTH CARE EDUCATION/TRAINING PROGRAM

## 2023-04-06 PROCEDURE — 43235 EGD DIAGNOSTIC BRUSH WASH: CPT | Performed by: INTERNAL MEDICINE

## 2023-04-06 PROCEDURE — 83605 ASSAY OF LACTIC ACID: CPT | Performed by: HOSPITALIST

## 2023-04-06 PROCEDURE — 250N000013 HC RX MED GY IP 250 OP 250 PS 637: Performed by: STUDENT IN AN ORGANIZED HEALTH CARE EDUCATION/TRAINING PROGRAM

## 2023-04-06 PROCEDURE — 250N000011 HC RX IP 250 OP 636: Performed by: STUDENT IN AN ORGANIZED HEALTH CARE EDUCATION/TRAINING PROGRAM

## 2023-04-06 PROCEDURE — 36415 COLL VENOUS BLD VENIPUNCTURE: CPT | Performed by: STUDENT IN AN ORGANIZED HEALTH CARE EDUCATION/TRAINING PROGRAM

## 2023-04-06 PROCEDURE — 258N000003 HC RX IP 258 OP 636: Performed by: STUDENT IN AN ORGANIZED HEALTH CARE EDUCATION/TRAINING PROGRAM

## 2023-04-06 PROCEDURE — 99232 SBSQ HOSP IP/OBS MODERATE 35: CPT | Performed by: HOSPITALIST

## 2023-04-06 PROCEDURE — 250N000011 HC RX IP 250 OP 636: Performed by: NURSE ANESTHETIST, CERTIFIED REGISTERED

## 2023-04-06 RX ORDER — KETOROLAC TROMETHAMINE 30 MG/ML
15 INJECTION, SOLUTION INTRAMUSCULAR; INTRAVENOUS EVERY 6 HOURS PRN
Status: DISCONTINUED | OUTPATIENT
Start: 2023-04-06 | End: 2023-04-07 | Stop reason: HOSPADM

## 2023-04-06 RX ORDER — PROPOFOL 10 MG/ML
INJECTION, EMULSION INTRAVENOUS CONTINUOUS PRN
Status: DISCONTINUED | OUTPATIENT
Start: 2023-04-06 | End: 2023-04-06

## 2023-04-06 RX ORDER — ONDANSETRON 2 MG/ML
INJECTION INTRAMUSCULAR; INTRAVENOUS PRN
Status: DISCONTINUED | OUTPATIENT
Start: 2023-04-06 | End: 2023-04-06

## 2023-04-06 RX ORDER — LIDOCAINE HYDROCHLORIDE 20 MG/ML
INJECTION, SOLUTION INFILTRATION; PERINEURAL PRN
Status: DISCONTINUED | OUTPATIENT
Start: 2023-04-06 | End: 2023-04-06

## 2023-04-06 RX ORDER — PANTOPRAZOLE SODIUM 40 MG/1
40 TABLET, DELAYED RELEASE ORAL
Status: DISCONTINUED | OUTPATIENT
Start: 2023-04-07 | End: 2023-04-07 | Stop reason: HOSPADM

## 2023-04-06 RX ORDER — PROPOFOL 10 MG/ML
INJECTION, EMULSION INTRAVENOUS PRN
Status: DISCONTINUED | OUTPATIENT
Start: 2023-04-06 | End: 2023-04-06

## 2023-04-06 RX ADMIN — PROPOFOL 200 MCG/KG/MIN: 10 INJECTION, EMULSION INTRAVENOUS at 11:40

## 2023-04-06 RX ADMIN — PANTOPRAZOLE SODIUM 40 MG: 40 INJECTION, POWDER, FOR SOLUTION INTRAVENOUS at 08:23

## 2023-04-06 RX ADMIN — PROPOFOL 30 MG: 10 INJECTION, EMULSION INTRAVENOUS at 11:45

## 2023-04-06 RX ADMIN — LIDOCAINE HYDROCHLORIDE 40 MG: 20 INJECTION, SOLUTION INFILTRATION; PERINEURAL at 11:42

## 2023-04-06 RX ADMIN — SODIUM CHLORIDE: 9 INJECTION, SOLUTION INTRAVENOUS at 06:05

## 2023-04-06 RX ADMIN — NALTREXONE HYDROCHLORIDE 50 MG: 50 TABLET, FILM COATED ORAL at 12:59

## 2023-04-06 RX ADMIN — CEFTRIAXONE SODIUM 2 G: 2 INJECTION, POWDER, FOR SOLUTION INTRAMUSCULAR; INTRAVENOUS at 19:49

## 2023-04-06 RX ADMIN — ONDANSETRON 4 MG: 2 INJECTION INTRAMUSCULAR; INTRAVENOUS at 11:42

## 2023-04-06 RX ADMIN — DULOXETINE 30 MG: 30 CAPSULE, DELAYED RELEASE ORAL at 12:59

## 2023-04-06 RX ADMIN — FOLIC ACID 1 MG: 1 TABLET ORAL at 12:59

## 2023-04-06 RX ADMIN — METRONIDAZOLE 500 MG: 500 INJECTION, SOLUTION INTRAVENOUS at 08:23

## 2023-04-06 RX ADMIN — THIAMINE HCL TAB 100 MG 100 MG: 100 TAB at 12:59

## 2023-04-06 RX ADMIN — CYANOCOBALAMIN TAB 1000 MCG 1000 MCG: 1000 TAB at 12:59

## 2023-04-06 RX ADMIN — MIRTAZAPINE 15 MG: 15 TABLET, FILM COATED ORAL at 21:00

## 2023-04-06 RX ADMIN — METRONIDAZOLE 500 MG: 500 INJECTION, SOLUTION INTRAVENOUS at 21:00

## 2023-04-06 ASSESSMENT — ACTIVITIES OF DAILY LIVING (ADL)
ADLS_ACUITY_SCORE: 20
ADLS_ACUITY_SCORE: 18
ADLS_ACUITY_SCORE: 20
ADLS_ACUITY_SCORE: 18
ADLS_ACUITY_SCORE: 20
ADLS_ACUITY_SCORE: 20

## 2023-04-06 ASSESSMENT — LIFESTYLE VARIABLES: TOBACCO_USE: 0

## 2023-04-06 NOTE — ED PROVIDER NOTES
History     Chief Complaint:  Rectal Bleeding and Rectal Bleeding       HPI   Shannan Koenig is a 72 year old female with a history of gout and alcohol abuse who presents with bloody stools.  She recently moved here from [facility].  She notes that she has recently been taking Motrin around-the-clock for gout in her wrist.  She is also recently admitted for detox about 10 days ago.  Today she felt some abdominal discomfort and then noted black water in this stool.  She then had a second episode where it was black with bright red around it.  She then had 3 more episodes of bloody stool that is blood colored.  She has never had problems like this before.  She has had a colonoscopy in February 2022 that showed a couple polyps but otherwise negative.  She did drink wine on a regular basis and would go through maybe a box in about 5 days.  She had an endoscopy numerous years ago but does not know of any history of varices.  She has not had any emesis.  She is not anticoagulated..      Independent Historian: Patient  Daughter    Review of External Notes: N/A    ROS:  Review of Systems    Allergies:  Codeine  Morphine  Augmentin  Latex  Nitrofurantoin     Medications:    acyclovir (ZOVIRAX) 400 MG tablet  clobetasol (TEMOVATE) 0.05 % external cream  cyanocobalamin (CYANOCOBALAMIN) 1000 MCG tablet  DULoxetine (CYMBALTA) 30 MG capsule  folic acid (FOLVITE) 1 MG tablet  lovastatin (MEVACOR) 40 MG tablet  mirtazapine (REMERON) 15 MG tablet  multivitamin w/minerals (THERA-VIT-M) tablet  naltrexone (DEPADE/REVIA) 50 MG tablet  omeprazole (PRILOSEC) 40 MG DR capsule  thiamine (B-1) 100 MG tablet        Past Medical History:    Past Medical History:   Diagnosis Date     Anxiety      Infection due to 2019 novel coronavirus 11/2020     Infection due to 2019 novel coronavirus 01/2022     Substance abuse (H)        Past Surgical History:    Past Surgical History:   Procedure Laterality Date     APPENDECTOMY  03/23/2020       SECTION      No date given     COLONOSCOPY  2022    mpression: Two 5 - 8 mm polyps in distal ascending colon.  Patent partial cecectomy anastomosis.  Exam otherwise normal.     COLONOSCOPY  06/10/2016    Every 5 years for family history     COLONOSCOPY  2001     EXTRACAPSULAR CATARACT EXTRATION WITH INTRAOCULAR LENS IMPLANT Right 2017     EXTRACAPSULAR CATARACT EXTRATION WITH INTRAOCULAR LENS IMPLANT Left 10/19/2017     FETAL BLOOD TRANSFUSION      No date given     TONSILLECTOMY      No date given     TUBAL LIGATION      No date given     WISDOM TOOTH EXTRACTION      No date given   -wisdom tooth        Family History:    family history includes Alcoholism in her father; Allergic rhinitis in her father; Arthritis in her father and mother; Bleeding Disorder in her brother; Breast Cancer in her mother; Colon Cancer in her father and sister; Dementia in her mother; Depression in her father and mother; Hyperlipidemia in her father; Hypertension in her father and sister; Kidney Disease in her father; Obesity in her father; Seizure Disorder in her brother.    Social History:   reports that she has been smoking cigarettes. She has been smoking an average of .5 packs per day. She has never used smokeless tobacco. She reports that she does not currently use alcohol. She reports that she does not use drugs.  PCP: Pollo Terry     Physical Exam     Patient Vitals for the past 24 hrs:   BP Temp Temp src Pulse Resp SpO2 Height Weight   23 2100 107/71 -- -- 83 -- 100 % -- --   23 1910 -- -- -- 98 21 97 % -- --   23 1900 115/74 -- -- -- -- -- -- --   23 1220 92/63 98.4  F (36.9  C) Temporal (!) 126 16 97 % 1.524 m (5') 50.8 kg (112 lb)        Physical Exam  GENERAL: well developed, pleasant  HEAD: atraumatic  EYES: pupils reactive, extraocular muscles intact, conjunctivae normal  ENT:  mucus membranes moist  NECK:  trachea midline, normal range of motion  RESPIRATORY: no  tachypnea, breath sounds clear to auscultation   CVS: normal S1/S2, no murmurs, intact distal pulses  ABDOMEN: Soft tenderness to the left side of her abdomen as well as central  MUSCULOSKELETAL: no deformities  SKIN: warm and dry, no acute rashes or ulceration  NEURO: GCS 15, cranial nerves intact, alert and oriented x3  PSYCH:  Mood/affect normal        Emergency Department Course     Imaging:  CT Abdomen Pelvis w Contrast   Final Result   IMPRESSION:    1.  Sigmoid diverticulitis without abscess.   2.  Nonobstructing nephrolithiasis.        Report per radiology    Laboratory:  Labs Ordered and Resulted from Time of ED Arrival to Time of ED Departure   COMPREHENSIVE METABOLIC PANEL - Abnormal       Result Value    Sodium 143      Potassium 3.2 (*)     Chloride 110 (*)     Carbon Dioxide (CO2) 23      Anion Gap 10      Urea Nitrogen 9.5      Creatinine 0.79      Calcium 9.1      Glucose 196 (*)     Alkaline Phosphatase 73      AST 16      ALT 14      Protein Total 6.2 (*)     Albumin 3.8      Bilirubin Total 0.2      GFR Estimate 79     CBC WITH PLATELETS AND DIFFERENTIAL - Abnormal    WBC Count 19.8 (*)     RBC Count 3.77 (*)     Hemoglobin 12.0      Hematocrit 36.7      MCV 97      MCH 31.8      MCHC 32.7      RDW 13.1      Platelet Count 315      % Neutrophils 87      % Lymphocytes 5      % Monocytes 6      % Eosinophils 1      % Basophils 0      % Immature Granulocytes 1      NRBCs per 100 WBC 0      Absolute Neutrophils 17.3 (*)     Absolute Lymphocytes 1.0      Absolute Monocytes 1.2      Absolute Eosinophils 0.1      Absolute Basophils 0.1      Absolute Immature Granulocytes 0.1      Absolute NRBCs 0.0     HEMOGLOBIN   TYPE AND SCREEN, ADULT    ABO/RH(D) O POS      Antibody Screen Negative      SPECIMEN EXPIRATION DATE [id number]     ABO/RH TYPE AND SCREEN        Procedures   N/A    Emergency Department Course & Assessments:             Interventions:  Medications   cyanocobalamin (VITAMIN B-12) tablet  1,000 mcg (has no administration in time range)   DULoxetine (CYMBALTA) DR capsule 30 mg (has no administration in time range)   folic acid (FOLVITE) tablet 1 mg (has no administration in time range)   mirtazapine (REMERON) tablet 15 mg (has no administration in time range)   naltrexone (DEPADE/REVIA) tablet 50 mg (has no administration in time range)   thiamine (B-1) tablet 100 mg (has no administration in time range)   lidocaine 1 % 0.1-1 mL (has no administration in time range)   lidocaine (LMX4) cream (has no administration in time range)   sodium chloride (PF) 0.9% PF flush 3 mL (3 mLs Intracatheter $Given 4/5/23 2127)   sodium chloride (PF) 0.9% PF flush 3 mL (has no administration in time range)   melatonin tablet 1 mg (has no administration in time range)   acetaminophen (TYLENOL) tablet 650 mg (has no administration in time range)     Or   acetaminophen (TYLENOL) Suppository 650 mg (has no administration in time range)   pantoprazole (PROTONIX) IV push injection 40 mg (has no administration in time range)   sodium chloride 0.9% infusion ( Intravenous $New Bag 4/5/23 2129)   cefTRIAXone (ROCEPHIN) 2 g vial to attach to  ml bag for ADULTS or NS 50 ml bag for PEDS (has no administration in time range)   metroNIDAZOLE (FLAGYL) infusion 500 mg (500 mg Intravenous $New Bag 4/5/23 2127)   metroNIDAZOLE (FLAGYL) infusion 500 mg (has no administration in time range)   pantoprazole (PROTONIX) IV push injection 40 mg (40 mg Intravenous $Given 4/5/23 2007)   0.9% sodium chloride BOLUS (0 mLs Intravenous Stopped 4/5/23 2126)   cefTRIAXone (ROCEPHIN) 2 g vial to attach to  ml bag for ADULTS or NS 50 ml bag for PEDS (2 g Intravenous $New Bag 4/5/23 2126)   Saline Flush - CT (60 mLs Intravenous $Given 4/5/23 2016)   iopamidol (ISOVUE-370) solution 57 mL (57 mLs Intravenous $Given 4/5/23 2016)   potassium chloride ER (KLOR-CON M) CR tablet 40 mEq (40 mEq Oral $Given 4/5/23 2126)        Independent Interpretation  (X-rays, CTs, rhythm strip):  N/A    Consultations/Discussion of Management or Tests:  N/A       Social Determinants of Health affecting care:    None    Assessments:  1925    Disposition:  The patient was admitted to the hospital under the care of Dr. Lee.     Impression & Plan    CMS Diagnoses: None            Medical Decision Making:  Patient presents with 5 episodes of rectal bleeding.  She initially describes as black water followed by bloody stools.  She has moderate amount of pain on exam mainly in the left side it her abdomen and strikingly elevated white count of 19,000.  Initially thought possible upper GI bleed given her history of alcohol abuse and NSAID use and black water but have to be fairly aggressive GI bleed to turn into violeta blood.  CT shows diverticulitis and was given Rocephin and Flagyl.  Possibly the bleeding is from a diverticular source as opposed to an upper GI source.  Patient be admitted.  Did give her Protonix prior to the CT coming back.  Patient is not had any coffee-ground emesis.  Hemoglobin is stable and she is not anticoagulated.    Critical Care time:  was 0 minutes for this patient excluding procedures.    Diagnosis:    ICD-10-CM    1. Gastrointestinal hemorrhage, unspecified gastrointestinal hemorrhage type  K92.2       2. Diverticulitis of colon  K57.32            Discharge Medications:  New Prescriptions    No medications on file          Mark Keller MD    4/5/2023   Mark Keller MD Adams, Shaun L, MD  04/05/23 1786

## 2023-04-06 NOTE — ANESTHESIA PREPROCEDURE EVALUATION
Anesthesia Pre-Procedure Evaluation    Patient: Shannan Koenig   MRN: [id number] : 1950        Procedure : Procedure(s):  Esophagoscopy, gastroscopy, duodenoscopy (EGD), combined          Past Medical History:   Diagnosis Date     Anxiety      Infection due to 2019 novel coronavirus 2020     Infection due to 2019 novel coronavirus 2022     Substance abuse (H)       Past Surgical History:   Procedure Laterality Date     APPENDECTOMY  2020      SECTION      No date given     COLONOSCOPY  2022    mpression: Two 5 - 8 mm polyps in distal ascending colon.  Patent partial cecectomy anastomosis.  Exam otherwise normal.     COLONOSCOPY  06/10/2016    Every 5 years for family history     COLONOSCOPY  2001     EXTRACAPSULAR CATARACT EXTRATION WITH INTRAOCULAR LENS IMPLANT Right 2017     EXTRACAPSULAR CATARACT EXTRATION WITH INTRAOCULAR LENS IMPLANT Left 10/19/2017     FETAL BLOOD TRANSFUSION      No date given     TONSILLECTOMY      No date given     TUBAL LIGATION      No date given     WISDOM TOOTH EXTRACTION      No date given   -wisdom tooth      Allergies   Allergen Reactions     Codeine Nausea and Vomiting     Morphine      Augmentin Nausea and Vomiting     Latex Dermatitis, Rash and Swelling     Puffy weepy red eyes, nasal congestion and sneezing       Nitrofurantoin Rash      Social History     Tobacco Use     Smoking status: Every Day     Packs/day: 0.50     Types: Cigarettes     Smokeless tobacco: Never     Tobacco comments:     smoking ~ 5 cig/day   Vaping Use     Vaping status: Never Used   Substance Use Topics     Alcohol use: Not Currently     Comment: 5-6 glasses of wine each day along with hard etoh      Wt Readings from Last 1 Encounters:   23 50.8 kg (112 lb)        Anesthesia Evaluation   Pt has had prior anesthetic.     No history of anesthetic complications       ROS/MED HX  ENT/Pulmonary:    (-) tobacco use, asthma and sleep apnea   Neurologic:        Cardiovascular:       METS/Exercise Tolerance:     Hematologic:       Musculoskeletal:       GI/Hepatic: Comment: div    (+) GERD, Asymptomatic on medication,     Renal/Genitourinary:       Endo:     (+) thyroid problem,     Psychiatric/Substance Use:       Infectious Disease:       Malignancy:       Other:            Physical Exam    Airway        Mallampati: II   TM distance: > 3 FB   Neck ROM: full   Mouth opening: > 3 cm    Respiratory Devices and Support         Dental       (+) Completely normal teeth      Cardiovascular   cardiovascular exam normal          Pulmonary   pulmonary exam normal                OUTSIDE LABS:  CBC:   Lab Results   Component Value Date    WBC 11.2 (H) 04/06/2023    WBC 19.8 (H) 04/05/2023    HGB 9.0 (L) 04/06/2023    HGB 9.6 (L) 04/05/2023    HCT 27.8 (L) 04/06/2023    HCT 36.7 04/05/2023     04/06/2023     04/05/2023     BMP:   Lab Results   Component Value Date     04/06/2023     04/05/2023    POTASSIUM 3.6 04/06/2023    POTASSIUM 3.5 04/06/2023    CHLORIDE 115 (H) 04/06/2023    CHLORIDE 110 (H) 04/05/2023    CO2 18 (L) 04/06/2023    CO2 23 04/05/2023    BUN 6.3 (L) 04/06/2023    BUN 9.5 04/05/2023    CR 0.63 04/06/2023    CR 0.79 04/05/2023    GLC 81 04/06/2023     (H) 04/05/2023     COAGS: No results found for: PTT, INR, FIBR  POC: No results found for: BGM, HCG, HCGS  HEPATIC:   Lab Results   Component Value Date    ALBUMIN 3.8 04/05/2023    PROTTOTAL 6.2 (L) 04/05/2023    ALT 14 04/05/2023    AST 16 04/05/2023    GGT 35 03/26/2023    ALKPHOS 73 04/05/2023    BILITOTAL 0.2 04/05/2023     OTHER:   Lab Results   Component Value Date    ELLIE 8.1 (L) 04/06/2023    LIPASE 27 03/25/2023    TSH 2.64 03/26/2023    T4 1.29 08/20/2022    SED 4 09/12/2022       Anesthesia Plan    ASA Status:  2      Anesthesia Type: MAC.              Consents    Anesthesia Plan(s) and associated risks, benefits, and realistic alternatives discussed. Questions answered and  patient/representative(s) expressed understanding.    - Discussed:     - Discussed with:  Patient         Postoperative Care       PONV prophylaxis: Ondansetron (or other 5HT-3)     Comments:                Luda Mota

## 2023-04-06 NOTE — PLAN OF CARE
Goal Outcome Evaluation:    Summary:  GI bleed vs. Diverticular bleed   DATE & TIME: 4/6/2023 5813-9838   Cognitive Concerns/ Orientation : A&Ox4   BEHAVIOR & AGGRESSION TOOL COLOR: Green   CIWA SCORE: N/A   ABNL VS/O2: VSS RA, dry cough   MOBILITY: SBA due to possible GI bleed   PAIN MANAGMENT: C/O slight abd pain with palpation  DIET: NPO/no exceptions since midnight   BOWEL/BLADDER: pt reports 5 bloody BMS before arriving to ED, no BM this shift   ABNL LAB/BG: Hgb 9.0 Q8hr checks,  K+ 3.6 recheck in AM WBC: 11.2  DRAIN/DEVICES: R PIV infusing NS @ 100  TELEMETRY RHYTHM: N/A  SKIN: Intact   TESTS/PROCEDURES: CT completed 4/5/23-showed diverticulitis.  EGD this AM  D/C DAY/GOALS/PLACE: Pending progress  OTHER IMPORTANT INFO: GI following. No s/s of bleeding this shift        Plan of Care Reviewed With: patient    Overall Patient Progress: improvingOverall Patient Progress: improving

## 2023-04-06 NOTE — PLAN OF CARE
Goal Outcome Evaluation:         Summary:  GI bleed vs. Diverticular bleed   DATE & TIME: 4/5/23-4/6/23 2116-1545    Cognitive Concerns/ Orientation : A&Ox4   BEHAVIOR & AGGRESSION TOOL COLOR: Green   CIWA SCORE: N/A   ABNL VS/O2: VSS RA, dry cough   MOBILITY: SBA due to possible GI bleed   PAIN MANAGMENT: complained of neck/headache pain-prn tylenol suppository offered-denied Hot packs offered and helped relieve pain. Ice pack applied to right wrist due gout pain.   DIET: NPO since midnight   BOWEL/BLADDER: pt reports 5 bloody BMS before arriving to ED, no BM this shift. Urinary urgency    ABNL LAB/BG: Hgb:12 recheck 9.6 Q8hr checks,  K+ 3.5 recheck in AM WBC: 19.8  DRAIN/DEVICES: R PIV infusing NS @ 100  TELEMETRY RHYTHM: N/A  SKIN: Intact   TESTS/PROCEDURES: CT completed 4/5/23-showed diverticulitis   D/C DAY/GOALS/PLACE: Pending progress  OTHER IMPORTANT INFO: GI consult placed

## 2023-04-06 NOTE — ED NOTES
[facility]  ED Nurse Handoff Report    ED Chief complaint: Rectal Bleeding and Rectal Bleeding      ED Diagnosis:   Final diagnoses:   Gastrointestinal hemorrhage, unspecified gastrointestinal hemorrhage type       Code Status:  Admitting MD to assess.      Allergies:   Allergies   Allergen Reactions    Codeine Nausea and Vomiting    Morphine     Augmentin Nausea and Vomiting    Latex Dermatitis, Rash and Swelling     Puffy weepy red eyes, nasal congestion and sneezing      Nitrofurantoin Rash       Patient Story: Patient BIBA from home after having bright red blood per rectum x 3 in toilet. Patient recently sober and through treatment and has been home and taking increased amount of advil.   Focused Assessment:  Patient is alert and oriented x 4, calm and cooperative. VS are stable, skin is warm and dry, respirations are even and non-labored on room air. Patient denied chest pain, shortness of breath, dizziness, and nausea.       Treatments and/or interventions provided:   Medications   cefTRIAXone (ROCEPHIN) 2 g vial to attach to  ml bag for ADULTS or NS 50 ml bag for PEDS (has no administration in time range)   cyanocobalamin (VITAMIN B-12) tablet 1,000 mcg (has no administration in time range)   DULoxetine (CYMBALTA) DR capsule 30 mg (has no administration in time range)   folic acid (FOLVITE) tablet 1 mg (has no administration in time range)   mirtazapine (REMERON) tablet 15 mg (has no administration in time range)   naltrexone (DEPADE/REVIA) tablet 50 mg (has no administration in time range)   thiamine (B-1) tablet 100 mg (has no administration in time range)   lidocaine 1 % 0.1-1 mL (has no administration in time range)   lidocaine (LMX4) cream (has no administration in time range)   sodium chloride (PF) 0.9% PF flush 3 mL (has no administration in time range)   sodium chloride (PF) 0.9% PF flush 3 mL (has no administration in time range)   melatonin tablet 1 mg (has no  administration in time range)   acetaminophen (TYLENOL) tablet 650 mg (has no administration in time range)     Or   acetaminophen (TYLENOL) Suppository 650 mg (has no administration in time range)   cefTRIAXone (ROCEPHIN) 1 g vial to attach to  mL bag for ADULTS or NS 50 mL bag for PEDS (has no administration in time range)   pantoprazole (PROTONIX) IV push injection 40 mg (has no administration in time range)   potassium chloride ER (KLOR-CON M) CR tablet 40 mEq (has no administration in time range)   sodium chloride 0.9% infusion (has no administration in time range)   pantoprazole (PROTONIX) IV push injection 40 mg (40 mg Intravenous $Given 4/5/23 2007)   0.9% sodium chloride BOLUS (1,000 mLs Intravenous $New Bag 4/5/23 2007)   Saline Flush - CT (60 mLs Intravenous $Given 4/5/23 2016)   iopamidol (ISOVUE-370) solution 57 mL (57 mLs Intravenous $Given 4/5/23 2016)       Patient's response to treatments and/or interventions: Stable    To be done/followed up on inpatient unit:  Monitor    Does this patient have any cognitive concerns?:  .[facility]    Activity level - Baseline/Home:  Independent  Activity Level - Current:   Independent    Patient's Preferred language: English   Needed?: No    Isolation: None  Infection: Not Applicable  Patient tested for COVID 19 prior to admission: NO  Bariatric?: No    Vital Signs:   Vitals:    04/05/23 1220 04/05/23 1900 04/05/23 1910   BP: 92/63 115/74    Pulse: (!) 126  98   Resp: 16  21   Temp: 98.4  F (36.9  C)     TempSrc: Temporal     SpO2: 97%  97%   Weight: 50.8 kg (112 lb)     Height: 1.524 m (5')         Cardiac Rhythm:     Was the PSS-3 completed:   Yes  What interventions are required if any?               Family Comments: No family present at this time.    OBS brochure/video discussed/provided to patient/family: Yes              Name of person given brochure if not patient: NA              Relationship to patient: NA    For the majority of the shift this  patient's behavior was Green.   Behavioral interventions performed were  information and reassurance provided.  .    ED NURSE PHONE NUMBER: [phone]

## 2023-04-06 NOTE — ANESTHESIA CARE TRANSFER NOTE
Patient: Shannan Koenig    Procedure: Procedure(s):  Esophagoscopy, gastroscopy, duodenoscopy (EGD), combined       Diagnosis: Anemia, unspecified type [D64.9]  Diagnosis Additional Information: No value filed.    Anesthesia Type:   MAC     Note:    Oropharynx: oropharynx clear of all foreign objects  Level of Consciousness: awake and drowsy  Oxygen Supplementation: nasal cannula  Level of Supplemental Oxygen (L/min / FiO2): 4  Independent Airway: airway patency satisfactory and stable  Dentition: dentition unchanged  Vital Signs Stable: post-procedure vital signs reviewed and stable  Report to RN Given: handoff report given  Patient transferred to: PACU  Comments: To ENDO PACU: Arouses easily, good airway, 02 face mask, VSS  Report to RN  Handoff Report: Identifed the Patient, Identified the Reponsible Provider, Reviewed the pertinent medical history, Discussed the surgical course, Reviewed Intra-OP anesthesia mangement and issues during anesthesia, Set expectations for post-procedure period and Allowed opportunity for questions and acknowledgement of understanding      Vitals:  Vitals Value Taken Time   BP     Temp     Pulse     Resp 20 04/06/23 1200   SpO2     Vitals shown include unvalidated device data.    Electronically Signed By: EVELIA Aguirre CRNA  April 6, 2023  12:02 PM

## 2023-04-06 NOTE — PROGRESS NOTES
[facility] Brief GI Note    EGD completed, for full report please see Procedures tabs.    Findings:  EGD unremarkable, no signs of upper GI blood loss. Normal mucosa.    A/P  Several dark then red blood in the stools with slight drop in hgb. CT with sigmoid diverticulitis.  EGD without evidence of upper GI blood loss.    - low fiber diet  - treat diverticulitis with 10 days of antibiotics  - colonoscopy as outpatient in about 2 months, we will contact her to arrange    Please call with questions.    Dinora Bragg MD  [facility]  [phone]

## 2023-04-06 NOTE — PROGRESS NOTES
PRE-PROCEDURE NOTE    CHIEF COMPLAINT / REASON FOR PROCEDURE:  Black stool    History and Physical Reviewed:  yes    PRE-SEDATION ASSESSMENT:    Lung Exam:  normal  Heart Exam:  normal  Previous reaction to anesthesia/sedation:   no  Sedation plan based on assessment:  MAC  Comment(s):  Risks explained  ASA Classification:  3    IMPRESSION:  Rule out upper GI bleeding    PLAN:  egd    Dinora Bragg MD, MD

## 2023-04-06 NOTE — H&P
[facility]    History and Physical - Hospitalist Service       Date of Admission:  4/5/2023    Assessment & Plan      Shannan Koenig is a 72 year old female admitted on 4/5/2023 for GI bleed.    Melena  Suspected upper GI bleed  Patient reports taking motrin 600mg q6h x 5 days due to ongoing psuedogout pain. Today she had around 6 episodes of black stool that progressed to bright red blood. She had urgency and abdominal cramping but really no other symptoms. No fever or chills. No dizziness. No known history of ulcers and normal colonoscopy in 2016. Patient recently detoxed from alcohol 10 days ago and had not had a drink since then. Wthin the ED, vitals and labs unremarkable. BUN normal and Hb 12 down 2-3 points from baseline. No episodes of bleeding in the ED prior to admission.    - continue PPI IV BID, start IVF  - monitor Hb Q8 hours  - consult [facility]    Leukocytosis  - no signs of infection or history of cirrhosis or SBP, however start rocephin while we follow results of CT scan    CT results reviewed showing acute diverticulitis. Will start on IV flagyl.     Alcohol abuse  - Now sober 10 days  - restart home medications including naltrexone and vitamins    Hypokalemia  - RN replacement protocol    Psuedogout  - Consider better treatment options such as steroids pending work up above    Anxiety  - resume home duloxetine       Diet:  CLD, NPO at midnight  DVT Prophylaxis: Pneumatic Compression Devices  Retana Catheter: Not present  Lines: None     Cardiac Monitoring: None  Code Status:   FULL    Clinically Significant Risk Factors Present on Admission        # Hypokalemia: Lowest K = 3.2 mmol/L in last 2 days, will replace as needed                        Disposition Plan         Barbi Hidalgo DO  Hospitalist Service  [facility]  Securely message with [employer] (more info)  Text page via [employer] Paging/Directory      ______________________________________________________________________    Chief Complaint   GI b leed      History of Present Illness   Patient reports taking motrin 600mg q6h x 5 days due to ongoing psuedogout pain. Today she had around 6 episodes of black stool that progressed to bright red blood. She had urgency and abdominal cramping but really no other symptoms. No fever or chills. No dizziness. No known history of ulcers and normal colonoscopy in 2016. Patient recently detoxed from alcohol 10 days ago and had not had a drink since then.     Upon my assessment patient is comfortable in bed with nurse and daugther at bedside. She denies any further bleeding. She has some mild L sided abdominal pain but otherwise she is feeling well. She has never had an episode like this before.       Past Medical History    Past Medical History:   Diagnosis Date     Anxiety      Infection due to 2019 novel coronavirus 2020     Infection due to 2019 novel coronavirus 2022     Substance abuse (H)        Past Surgical History   Past Surgical History:   Procedure Laterality Date     APPENDECTOMY  2020      SECTION      No date given     COLONOSCOPY  2022    mpression: Two 5 - 8 mm polyps in distal ascending colon.  Patent partial cecectomy anastomosis.  Exam otherwise normal.     COLONOSCOPY  06/10/2016    Every 5 years for family history     COLONOSCOPY  2001     EXTRACAPSULAR CATARACT EXTRATION WITH INTRAOCULAR LENS IMPLANT Right 2017     EXTRACAPSULAR CATARACT EXTRATION WITH INTRAOCULAR LENS IMPLANT Left 10/19/2017     FETAL BLOOD TRANSFUSION      No date given     TONSILLECTOMY      No date given     TUBAL LIGATION      No date given     WISDOM TOOTH EXTRACTION      No date given   -wisdom tooth       Prior to Admission Medications   Prior to Admission Medications   Prescriptions Last Dose Informant Patient Reported? Taking?   DULoxetine (CYMBALTA) 30 MG capsule   No No   Sig: Take 1  capsule (30 mg) by mouth daily   acyclovir (ZOVIRAX) 400 MG tablet   Yes No   acyclovir (ZOVIRAX) 400 MG tablet   No No   Sig: Take 1 tablet (400 mg) by mouth 2 times daily   clobetasol (TEMOVATE) 0.05 % external cream   Yes No   clobetasol (TEMOVATE) 0.05 % external cream   No No   Sig: Apply topically 2 times daily   cyanocobalamin (CYANOCOBALAMIN) 1000 MCG tablet   No No   Sig: Take 1 tablet (1,000 mcg) by mouth daily   folic acid (FOLVITE) 1 MG tablet   No No   Sig: Take 1 tablet (1 mg) by mouth daily   lovastatin (MEVACOR) 40 MG tablet   Yes No   Sig: Take by mouth At Bedtime   lovastatin (MEVACOR) 40 MG tablet   No No   Sig: TAKE 1 TABLET BY MOUTH AT  BEDTIME   mirtazapine (REMERON) 15 MG tablet   No No   Sig: TAKE 1 TABLET BY MOUTH AT  BEDTIME   multivitamin w/minerals (THERA-VIT-M) tablet   No No   Sig: Take 1 tablet by mouth daily   naltrexone (DEPADE/REVIA) 50 MG tablet   No No   Sig: Take 1 tablet (50 mg) by mouth daily   omeprazole (PRILOSEC) 40 MG DR capsule   No No   Sig: Take 1 capsule (40 mg) by mouth daily   thiamine (B-1) 100 MG tablet   No No   Sig: Take 1 tablet (100 mg) by mouth daily      Facility-Administered Medications: None        Review of Systems    The 10 point Review of Systems is negative other than noted in the HPI or here.      Physical Exam   Vital Signs: Temp: 98.4  F (36.9  C) Temp src: Temporal BP: 115/74 Pulse: 98   Resp: 21 SpO2: 97 %      Weight: 112 lbs 0 oz    Constitutional: Awake, alert, cooperative, no apparent distress.  Eyes: Conjunctiva and pupils examined and normal.  HEENT: Moist mucous membranes, normal dentition.  Respiratory: Clear to auscultation bilaterally, no crackles or wheezing.  Cardiovascular: Regular rate and rhythm, normal S1 and S2, and no murmur noted.  GI: mild tenderness to palpation of the L side  Skin: No rashes, no cyanosis, no edema.  Musculoskeletal: small swelling to her R thumb  Neurologic: Cranial nerves 2-12 intact, normal strength and  sensation.  Psychiatric: Alert, oriented to person, place and time, no obvious anxiety or depression.    Medical Decision Making       55 MINUTES SPENT BY ME on the date of service doing chart review, history, exam, documentation & further activities per the note.      Data     I have personally reviewed the following data over the past 24 hrs:    19.8 (H)  \   12.0   / 315     143 110 (H) 9.5 /  196 (H)   3.2 (L) 23 0.79 \       ALT: 14 AST: 16 AP: 73 TBILI: 0.2   ALB: 3.8 TOT PROTEIN: 6.2 (L) LIPASE: N/A       Imaging results reviewed over the past 24 hrs:   No results found for this or any previous visit (from the past 24 hour(s)).

## 2023-04-06 NOTE — ANESTHESIA POSTPROCEDURE EVALUATION
Patient: Shannan Koenig    Procedure: Procedure(s):  Esophagoscopy, gastroscopy, duodenoscopy (EGD), combined       Anesthesia Type:  MAC    Note:  Disposition: Inpatient   Postop Pain Control: Uneventful            Sign Out: Well controlled pain   PONV: No   Neuro/Psych: Uneventful            Sign Out: Acceptable/Baseline neuro status   Airway/Respiratory: Uneventful            Sign Out: Acceptable/Baseline resp. status   CV/Hemodynamics: Uneventful            Sign Out: Acceptable CV status; No obvious hypovolemia; No obvious fluid overload   Other NRE: NONE   DID A NON-ROUTINE EVENT OCCUR?            Last vitals:  Vitals Value Taken Time   /59 04/06/23 1210   Temp     Pulse 81 04/06/23 1215   Resp 19 04/06/23 1216   SpO2 95 % 04/06/23 1205   Vitals shown include unvalidated device data.    Electronically Signed By: Luda Mota  April 6, 2023  12:20 PM

## 2023-04-06 NOTE — PROGRESS NOTES
[facility]    Hospitalist Progress Note    Interval History   Is awake and alert.  No acute events overnight.  Underwent EGD this morning and tolerated well.  No obvious source of bleeding noticed.    -Data reviewed today: I reviewed all new labs and imaging results over the last 24 hours. I personally reviewed no images or EKG's today.    Physical Exam   Temp: 97.7  F (36.5  C) Temp src: Oral BP: 105/67 Pulse: 77   Resp: 18 SpO2: 95 % O2 Device: None (Room air)    Vitals:    04/05/23 1220   Weight: 50.8 kg (112 lb)     Vital Signs with Ranges  Temp:  [97.7  F (36.5  C)-99.1  F (37.3  C)] 97.7  F (36.5  C)  Pulse:  [75-98] 77  Resp:  [18-24] 18  BP: ()/(59-79) 105/67  SpO2:  [94 %-100 %] 95 %  No intake/output data recorded.    Physical Exam  Constitutional:       Appearance: Normal appearance.   HENT:      Head: Normocephalic.   Eyes:      Pupils: Pupils are equal, round, and reactive to light.   Cardiovascular:      Rate and Rhythm: Normal rate and regular rhythm.      Pulses: Normal pulses.      Heart sounds: Normal heart sounds.   Pulmonary:      Effort: Pulmonary effort is normal. No respiratory distress.      Breath sounds: Normal breath sounds.   Abdominal:      General: Abdomen is flat. Bowel sounds are normal. There is no distension.      Tenderness: There is no abdominal tenderness. There is no guarding.   Musculoskeletal:         General: Normal range of motion.      Cervical back: Normal range of motion.   Skin:     General: Skin is warm and dry.   Neurological:      General: No focal deficit present.   Psychiatric:         Mood and Affect: Mood normal.           Medications     sodium chloride 100 mL/hr at 04/06/23 0605       cefTRIAXone  2 g Intravenous Q24H     cyanocobalamin  1,000 mcg Oral Daily     DULoxetine  30 mg Oral Daily     folic acid  1 mg Oral Daily     metroNIDAZOLE  500 mg Intravenous Q12H     mirtazapine  15 mg Oral At Bedtime     naltrexone  50 mg Oral  Daily     pantoprazole  40 mg Intravenous BID     sodium chloride (PF)  3 mL Intracatheter Q8H     thiamine  100 mg Oral Daily       Data   Recent Labs   Lab 04/06/23  1239 04/06/23  0638 04/06/23  0202 04/05/23  2219 04/05/23  1234   WBC  --  11.2*  --   --  19.8*   HGB 9.2* 9.0*  --  9.6* 12.0   MCV  --  98  --   --  97   PLT  --  271  --   --  315   NA  --  141  --   --  143   POTASSIUM  --  3.6 3.5  --  3.2*   CHLORIDE  --  115*  --   --  110*   CO2  --  18*  --   --  23   BUN  --  6.3*  --   --  9.5   CR  --  0.63  --   --  0.79   ANIONGAP  --  8  --   --  10   ELLIE  --  8.1*  --   --  9.1   GLC  --  81  --   --  196*   ALBUMIN  --   --   --   --  3.8   PROTTOTAL  --   --   --   --  6.2*   BILITOTAL  --   --   --   --  0.2   ALKPHOS  --   --   --   --  73   ALT  --   --   --   --  14   AST  --   --   --   --  16       Recent Results (from the past 24 hour(s))   CT Abdomen Pelvis w Contrast    Narrative    EXAM: CT ABDOMEN PELVIS W CONTRAST  LOCATION: [facility]  DATE/TIME: 4/5/2023 8:37 PM    INDICATION: abd pain, gi bleed  COMPARISON: 04/08/2020  TECHNIQUE: CT scan of the abdomen and pelvis was performed following injection of IV contrast. Multiplanar reformats were obtained. Dose reduction techniques were used.  CONTRAST: 57 mL Isovue 370    FINDINGS:   LOWER CHEST: Bibasilar linear scarring or atelectasis.    HEPATOBILIARY: Focal fatty infiltration. No biliary dilatation    PANCREAS: Unremarkable    SPLEEN: Unremarkable    ADRENAL GLANDS: Unremarkable    KIDNEYS/BLADDER: Mildly complicated 5.6 cm left upper pole renal cyst for which no additional follow-up imaging is recommended. 4-5 mm nonobstructing left lower pole renal calculus.    BOWEL: Sigmoid diverticulitis with associated mild pericolonic inflammatory stranding. No fluid collections. Right lower quadrant anastomotic suture line.    LYMPH NODES: No significant retroperitoneal adenopathy    VASCULATURE: No abdominal aortic  aneurysm. Patent portal vein.    PELVIC ORGANS: No free fluid    MUSCULOSKELETAL: No acute bony abnormalities. Chronic L5 pars defects with grade 1 anterolisthesis. Small complex fat-containing periumbilical hernia.      Impression    IMPRESSION:   1.  Sigmoid diverticulitis without abscess.  2.  Nonobstructing nephrolithiasis.         Assessment & Plan      Shannan Koenig is a 72 year old female admitted on 4/5/2023 for GI bleed.     Melena and bright red blood per rectum likely from diverticular bleed  Patient reports taking motrin 600mg q6h x 5 days due to ongoing psuedogout pain. Today she had around 6 episodes of black stool that progressed to bright red blood. She had urgency and abdominal cramping but really no other symptoms. No fever or chills. No dizziness. No known history of ulcers and normal colonoscopy in 2016. Patient recently detoxed from alcohol 10 days ago and had not had a drink since then. Wthin the ED, vitals and labs unremarkable. BUN normal and Hb 12 down 2-3 points from baseline. No episodes of bleeding in the ED prior to admission.     - continue PPI IV BID, start IVF  - monitor Hb Q8 hours  - consult [facility].  -Underwent EGD on 4/6/2023.  Showed small hiatal hernia with no signs of upper GI blood loss or ulcers or any other source of bleeding.  -GI bleed source is likely from lower GI tract.  Currently with her ongoing diverticulitis we cannot proceed with colonoscopy.  -Hemoglobin stable at 9.2 this morning.  Monitor for another 24 hours and discharge tomorrow if she remains stable from hemoglobin standpoint.  Diet has been advanced.  Discontinue fluids.     Leukocytosis with acute diverticulitis  CT abdomen showed acute diverticulitis.  -On IV ceftriaxone and Flagyl.  Will complete 10-day course.  -Leukocytosis improving.  Patient will need colonoscopy in 4 to 6 weeks after she is recovered from this.     Alcohol abuse  - Now sober 10 days  - restart home medications including naltrexone and  vitamins     Hypokalemia  - RN replacement protocol     Psuedogout  - Consider better treatment options such as steroids pending work up above     Anxiety  - resume home duloxetine        Diet:  advance diet as tolerated  DVT Prophylaxis: Pneumatic Compression Devices  Retana Catheter: Not present  Lines: None     Cardiac Monitoring: None  Code Status:   FULL       Clinically Significant Risk Factors Present on Admission        # Hypokalemia: Lowest K = 3.2 mmol/L in last 2 days, will replace as needed   # Hypocalcemia: Lowest Ca = 8.1 mg/dL in last 2 days, will monitor and replace as appropriate                        Jazzmine Govea MD, MD  [phone](p)

## 2023-04-06 NOTE — PROGRESS NOTES
RECEIVING UNIT ED HANDOFF REVIEW    ED Nurse Handoff Report was reviewed by: Leigh Vela RN on April 5, 2023 at 11:32 PM

## 2023-04-06 NOTE — CONSULTS
[facility] GASTROENTEROLOGY CONSULTATION     Shannan Koenig  [address] [facility] [address]  72 year old female    Admission Date/Time: 4/5/2023  Primary Care Provider: Pollo Terry    We were asked to see the patient in consultation by Dr. Govea for evaluation of anemia, black stool.        HPI:  Shannan Koenig is a 72 year old female who was admitted to [facility] 4/5/23 after 3 episodes of black and then bright red bloody bowel movements at home.  Patient was recently hospitalized in March 2023 with concerns for EtOH withdrawal.  She has recently moved from [place] to [place] to help care for her  who is residing in a memory care facility, he has been quite ill for over 2 years and the situation is very stressful.    She has mild left sided abdominal comfort but otherwise denies abdominal pain. She has been using NSAIDs.  Last colonoscopy Feb. 2022 in [place] per her report. No epigastric pain, nausea or vomiting.    ROS: A comprehensive ten point review of systems was negative aside from those in mentioned in the HPI.      MEDICATIONS: No current facility-administered medications on file prior to encounter.  acyclovir (ZOVIRAX) 400 MG tablet, Take 1 tablet (400 mg) by mouth 2 times daily (Patient taking differently: Take 400 mg by mouth daily)  clobetasol (TEMOVATE) 0.05 % external cream, Apply topically 2 times daily (Patient taking differently: Apply topically 2 times daily as needed)  cyanocobalamin (CYANOCOBALAMIN) 1000 MCG tablet, Take 1 tablet (1,000 mcg) by mouth daily  DULoxetine (CYMBALTA) 30 MG capsule, Take 1 capsule (30 mg) by mouth daily  folic acid (FOLVITE) 1 MG tablet, Take 1 tablet (1 mg) by mouth daily  lovastatin (MEVACOR) 40 MG tablet, TAKE 1 TABLET BY MOUTH AT  BEDTIME  mirtazapine (REMERON) 15 MG tablet, TAKE 1 TABLET BY MOUTH AT  BEDTIME  multivitamin w/minerals (THERA-VIT-M) tablet, Take 1 tablet by mouth daily  naltrexone (DEPADE/REVIA) 50 MG tablet, Take 1  tablet (50 mg) by mouth daily  omeprazole (PRILOSEC) 40 MG DR capsule, Take 1 capsule (40 mg) by mouth daily  thiamine (B-1) 100 MG tablet, Take 1 tablet (100 mg) by mouth daily        ALLERGIES:   Allergies   Allergen Reactions     Codeine Nausea and Vomiting     Morphine      Augmentin Nausea and Vomiting     Latex Dermatitis, Rash and Swelling     Puffy weepy red eyes, nasal congestion and sneezing       Nitrofurantoin Rash       Past Medical History:   Diagnosis Date     Anxiety      Infection due to 2019 novel coronavirus 2020     Infection due to 2019 novel coronavirus 2022     Substance abuse (H)        Past Surgical History:   Procedure Laterality Date     APPENDECTOMY  2020      SECTION      No date given     COLONOSCOPY  2022    mpression: Two 5 - 8 mm polyps in distal ascending colon.  Patent partial cecectomy anastomosis.  Exam otherwise normal.     COLONOSCOPY  06/10/2016    Every 5 years for family history     COLONOSCOPY  2001     EXTRACAPSULAR CATARACT EXTRATION WITH INTRAOCULAR LENS IMPLANT Right 2017     EXTRACAPSULAR CATARACT EXTRATION WITH INTRAOCULAR LENS IMPLANT Left 10/19/2017     FETAL BLOOD TRANSFUSION      No date given     TONSILLECTOMY      No date given     TUBAL LIGATION      No date given     WISDOM TOOTH EXTRACTION      No date given   -wisdom tooth         SOCIAL HISTORY:  Social History     Tobacco Use     Smoking status: Every Day     Packs/day: 0.50     Types: Cigarettes     Smokeless tobacco: Never     Tobacco comments:     smoking ~ 5 cig/day   Vaping Use     Vaping status: Never Used   Substance Use Topics     Alcohol use: Not Currently     Comment: 5-6 glasses of wine each day along with hard etoh     Drug use: Never       FAMILY HISTORY:  No known family history of GI disease.    PHYSICAL EXAM:   /69 (BP Location: Left arm)   Pulse 95   Temp 99.1  F (37.3  C) (Oral)   Resp 20   Ht 1.524 m (5')   Wt 50.8 kg (112 lb)   SpO2 94%    BMI 21.87 kg/m      Constitutional: NAD, comfortable  Cardiovascular: RRR, normal S1 and S2, no r/c/g/m  Respiratory: CTAB  Psychiatric: mentation appears normal and affect normal/bright  Head: Normocephalic. Atraumatic.    Neck: normal size, trachea midline  Eyes:  o icterus  ENT:  hearing adequate  Abdomen:   Auscultation: normal  Appearance: normal  Palpation: tender over left side  NEURO: grossly negative  SKIN: no suspicious lesions or rashes            ADDITIONAL COMMENTS:   I reviewed the patient's new clinical lab test results.   Recent Labs   Lab Test 04/06/23  0638 04/05/23  2219 04/05/23  1234 03/25/23  0910   WBC 11.2*  --  19.8* 8.5   HGB 9.0* 9.6* 12.0 15.8*   MCV 98  --  97 96     --  315 274     Recent Labs   Lab Test 04/06/23  0638 04/06/23  0202 04/05/23  1234 03/25/23  0910     --  143 141  141   POTASSIUM 3.6 3.5 3.2* 4.0  4.0   CHLORIDE 115*  --  110* 105  106   CO2 18*  --  23 23  25   BUN 6.3*  --  9.5 7.1*  7.1*   CR 0.63  --  0.79 0.69  0.71   ANIONGAP 8  --  10 13  10   ELLIE 8.1*  --  9.1 10.1  10.0   GLC 81  --  196* 106*  108*     Recent Labs   Lab Test 04/05/23  1234 03/26/23  0719 03/25/23  0944 03/25/23  0910 09/21/22  1214 05/05/20  1645   ALBUMIN 3.8 4.3  --  4.4  --   --    BILITOTAL 0.2 0.5  --  0.4  --   --    ALT 14 32  --  38*  --   --    AST 16 31  --  39*  --   --    ALKPHOS 73 72  --  84  --   --    PROTEIN  --   --  Negative  --  Negative NEGATIVE   LIPASE  --   --   --  27  --   --        4/5/23 CT:  1.  Sigmoid diverticulitis without abscess.  2.  Nonobstructing nephrolithiasis.      CONSULTATION ASSESSMENT AND PLAN:    Principal Problem:    Active Problems:    GI bleed    Assessment: Several episodes of black then red stool.  CT with sigmoid diverticulitis. The bleeding is very likely from the colonic inflammation associated with the diverticulitis. However, hgb did drop and patient has been on NSAIDs with recent ETOH history. I would recommend an  EGD to rule out peptic ulcer disease, gastritis, etc. Varices very unlikely given normal platelets and no suspicion for liver cirrhosis.    Colonoscopy contraindicated in the setting of acute diverticulitis.      Plan:   - EGD today at 10:30am  - Continue npo and IV ppi for now, further recommendations pending EGD  - will need 10 day course of antibiotics to treat the diverticulitis and follow up colonoscopy in about 2 months      Dinora Bragg MD  [facility]  Office:  [phone]    Approximately 40 minutes of total time was spent providing patient care, including patient evaluation, reviewing documentation/test results, and .

## 2023-04-06 NOTE — PHARMACY-ADMISSION MEDICATION HISTORY
Pharmacist Admission Medication History    Admission medication history is complete. The information provided in this note is only as accurate as the sources available at the time of the update.    Medication reconciliation/reorder completed by provider prior to medication history? Yes    Information Source(s): Patient and CareEverywhere/SureScripts via in-person    Changes made to PTA medication list:    Added: None    Deleted: None    Changed: Acyclovir to daily and Clobetasol cream to as needed.    Medication Affordability: Not assessed       Allergies reviewed with patient and updates made in EHR: yes    Medication History Completed By: Erika Ureña RPH 4/5/2023 8:34 PM    PTA Med List   Medication Sig Last Dose     acyclovir (ZOVIRAX) 400 MG tablet Take 1 tablet (400 mg) by mouth 2 times daily (Patient taking differently: Take 400 mg by mouth daily) 4/5/2023     clobetasol (TEMOVATE) 0.05 % external cream Apply topically 2 times daily (Patient taking differently: Apply topically 2 times daily as needed) as needed     cyanocobalamin (CYANOCOBALAMIN) 1000 MCG tablet Take 1 tablet (1,000 mcg) by mouth daily 4/5/2023     DULoxetine (CYMBALTA) 30 MG capsule Take 1 capsule (30 mg) by mouth daily 4/5/2023     folic acid (FOLVITE) 1 MG tablet Take 1 tablet (1 mg) by mouth daily 4/5/2023     lovastatin (MEVACOR) 40 MG tablet TAKE 1 TABLET BY MOUTH AT  BEDTIME 4/4/2023     mirtazapine (REMERON) 15 MG tablet TAKE 1 TABLET BY MOUTH AT  BEDTIME 4/4/2023     multivitamin w/minerals (THERA-VIT-M) tablet Take 1 tablet by mouth daily 4/5/2023     naltrexone (DEPADE/REVIA) 50 MG tablet Take 1 tablet (50 mg) by mouth daily 4/5/2023     omeprazole (PRILOSEC) 40 MG DR capsule Take 1 capsule (40 mg) by mouth daily 4/5/2023     thiamine (B-1) 100 MG tablet Take 1 tablet (100 mg) by mouth daily 4/5/2023

## 2023-04-07 VITALS
SYSTOLIC BLOOD PRESSURE: 105 MMHG | RESPIRATION RATE: 18 BRPM | HEART RATE: 90 BPM | DIASTOLIC BLOOD PRESSURE: 72 MMHG | WEIGHT: 112 LBS | HEIGHT: 60 IN | BODY MASS INDEX: 21.99 KG/M2 | OXYGEN SATURATION: 95 % | TEMPERATURE: 98 F

## 2023-04-07 LAB
ANION GAP SERPL CALCULATED.3IONS-SCNC: 11 MMOL/L (ref 7–15)
BUN SERPL-MCNC: 6.7 MG/DL (ref 8–23)
CALCIUM SERPL-MCNC: 9.2 MG/DL (ref 8.8–10.2)
CHLORIDE SERPL-SCNC: 111 MMOL/L (ref 98–107)
CREAT SERPL-MCNC: 0.63 MG/DL (ref 0.51–0.95)
DEPRECATED HCO3 PLAS-SCNC: 21 MMOL/L (ref 22–29)
ERYTHROCYTE [DISTWIDTH] IN BLOOD BY AUTOMATED COUNT: 13.1 % (ref 10–15)
GFR SERPL CREATININE-BSD FRML MDRD: >90 ML/MIN/1.73M2
GLUCOSE SERPL-MCNC: 107 MG/DL (ref 70–99)
HCT VFR BLD AUTO: 29.5 % (ref 35–47)
HGB BLD-MCNC: 9.8 G/DL (ref 11.7–15.7)
MCH RBC QN AUTO: 32.3 PG (ref 26.5–33)
MCHC RBC AUTO-ENTMCNC: 33.2 G/DL (ref 31.5–36.5)
MCV RBC AUTO: 97 FL (ref 78–100)
PLATELET # BLD AUTO: 322 10E3/UL (ref 150–450)
POTASSIUM SERPL-SCNC: 3.7 MMOL/L (ref 3.4–5.3)
RBC # BLD AUTO: 3.03 10E6/UL (ref 3.8–5.2)
SODIUM SERPL-SCNC: 143 MMOL/L (ref 136–145)
WBC # BLD AUTO: 11.7 10E3/UL (ref 4–11)

## 2023-04-07 PROCEDURE — 250N000013 HC RX MED GY IP 250 OP 250 PS 637: Performed by: HOSPITALIST

## 2023-04-07 PROCEDURE — 36415 COLL VENOUS BLD VENIPUNCTURE: CPT | Performed by: HOSPITALIST

## 2023-04-07 PROCEDURE — 99239 HOSP IP/OBS DSCHRG MGMT >30: CPT | Performed by: HOSPITALIST

## 2023-04-07 PROCEDURE — 250N000013 HC RX MED GY IP 250 OP 250 PS 637: Performed by: STUDENT IN AN ORGANIZED HEALTH CARE EDUCATION/TRAINING PROGRAM

## 2023-04-07 PROCEDURE — 85027 COMPLETE CBC AUTOMATED: CPT | Performed by: HOSPITALIST

## 2023-04-07 PROCEDURE — 82310 ASSAY OF CALCIUM: CPT | Performed by: HOSPITALIST

## 2023-04-07 RX ORDER — CLOBETASOL PROPIONATE 0.5 MG/G
CREAM TOPICAL 2 TIMES DAILY PRN
Start: 2023-04-07 | End: 2023-08-20

## 2023-04-07 RX ORDER — METRONIDAZOLE 500 MG/1
500 TABLET ORAL 3 TIMES DAILY
Qty: 24 TABLET | Refills: 0 | Status: SHIPPED | OUTPATIENT
Start: 2023-04-07 | End: 2023-04-15

## 2023-04-07 RX ORDER — CIPROFLOXACIN 500 MG/1
500 TABLET, FILM COATED ORAL 2 TIMES DAILY
Qty: 16 TABLET | Refills: 0 | Status: SHIPPED | OUTPATIENT
Start: 2023-04-07 | End: 2023-04-15

## 2023-04-07 RX ORDER — ACYCLOVIR 400 MG/1
400 TABLET ORAL DAILY
Status: ON HOLD
Start: 2023-04-07 | End: 2023-08-21

## 2023-04-07 RX ADMIN — NALTREXONE HYDROCHLORIDE 50 MG: 50 TABLET, FILM COATED ORAL at 07:56

## 2023-04-07 RX ADMIN — PANTOPRAZOLE SODIUM 40 MG: 40 TABLET, DELAYED RELEASE ORAL at 06:54

## 2023-04-07 RX ADMIN — CYANOCOBALAMIN TAB 1000 MCG 1000 MCG: 1000 TAB at 07:56

## 2023-04-07 RX ADMIN — DULOXETINE 30 MG: 30 CAPSULE, DELAYED RELEASE ORAL at 07:56

## 2023-04-07 RX ADMIN — THIAMINE HCL TAB 100 MG 100 MG: 100 TAB at 07:56

## 2023-04-07 RX ADMIN — FOLIC ACID 1 MG: 1 TABLET ORAL at 07:56

## 2023-04-07 ASSESSMENT — ACTIVITIES OF DAILY LIVING (ADL)
ADLS_ACUITY_SCORE: 20

## 2023-04-07 NOTE — PLAN OF CARE
Goal Outcome Evaluation:      Plan of Care Reviewed With: patient    Overall Patient Progress: improvingOverall Patient Progress: improving         Summary:  Diverticulitis with possible lower GI bleed   DATE & TIME: 4//2023   Cognitive Concerns/ Orientation : A&Ox4, very pleasant.   BEHAVIOR & AGGRESSION TOOL COLOR: Green   ABNL VS/O2: VSS on RA  MOBILITY: Ind, up ad keily.   PAIN MANAGMENT: Denies  DIET: Low fiber, good appetite.   BOWEL/BLADDER: Continent b/b. Voiding adeq. No BM this shift.   ABNL LAB: Hgb 9.8, improved. WBC 11.7  DRAIN/DEVICES: R PIV removed.  TELEMETRY RHYTHM: N/A  SKIN: Intact   TESTS/PROCEDURES: EGD 4/6  D/C DAY/GOALS/PLACE: Home  OTHER IMPORTANT INFO: Ok to discharge from GI. Receiving abx.     Discharge    Patient discharged to home, pt arranged for brother to transport.     Listed belongings gathered and given to patient (including from security/pharmacy). Yes  Care Plan and Patient education resolved: Yes  Prescriptions if needed, hard copies sent with patient  NA  Medication Bin checked and emptied on discharge Yes  SW/care coordinator/charge RN aware of discharge: Yes

## 2023-04-07 NOTE — PLAN OF CARE
Summary:  GI bleed vs. Diverticular bleed   DATE & TIME: 4/6/2023 7384-8999  Cognitive Concerns/ Orientation : A&Ox4   BEHAVIOR & AGGRESSION TOOL COLOR: Green   CIWA SCORE: N/A   ABNL VS/O2: VSS RA,   MOBILITY: SBA due to possible GI bleed   PAIN MANAGMENT: C/O slight R hand/wrist pain-given ice pack  DIET: low fiber  BOWEL/BLADDER: pt reports 5 bloody BMS before arriving to ED, no BM this shift   ABNL LAB/BG: Hgb 9.2 Q8hr checks,  K+ 3.6 recheck in AM WBC: 11.2, lactic acid 0.6  DRAIN/DEVICES: R PIV SL, fluids d.c   TELEMETRY RHYTHM: N/A  SKIN: Intact   TESTS/PROCEDURES: CT completed 4/5/23-showed diverticulitis.  EGD this AM-tolerated  D/C DAY/GOALS/PLACE: Possible discharge tomorrow 4/7 if hgb stable  OTHER IMPORTANT INFO: GI following

## 2023-04-07 NOTE — PROGRESS NOTES
[facility]  [facility]  Gastroenterology Progress note    Interval History:      Pt feeling well.  Tolerating diet.  No pain.  No further bleeding.      Vital Signs:      /72 (BP Location: Right arm)   Pulse 90   Temp 98  F (36.7  C) (Oral)   Resp 18   Ht 1.524 m (5')   Wt 50.8 kg (112 lb)   SpO2 95%   BMI 21.87 kg/m    Temp (24hrs), Av.2  F (36.8  C), Min:97.7  F (36.5  C), Max:98.7  F (37.1  C)    Patient Vitals for the past 72 hrs:   Weight   23 1220 50.8 kg (112 lb)       Intake/Output Summary (Last 24 hours) at 2023 0932  Last data filed at 2023 1203  Gross per 24 hour   Intake 200 ml   Output --   Net 200 ml         Constitutional: NAD, comfortable  Cardiovascular: RRR, normal S1, S2   Respiratory: CTAB  Abdomen: soft, non-tender, nondistended    Additional Comments:  ROS, FH, SH: See initial GI consult for details.    Laboratory Data:  Recent Labs   Lab Test 23  0723  2244 23  1239 23  0638 23  22123  1234   WBC 11.7*  --   --  11.2*  --  19.8*   HGB 9.8* 8.4* 9.2* 9.0*   < > 12.0   MCV 97  --   --  98  --  97     --   --  271  --  315    < > = values in this interval not displayed.     Recent Labs   Lab Test 23  0723 23  0638 23  0202 23  1234    141  --  143   POTASSIUM 3.7 3.6 3.5 3.2*   CHLORIDE 111* 115*  --  110*   CO2 21* 18*  --  23   BUN 6.7* 6.3*  --  9.5   CR 0.63 0.63  --  0.79   ANIONGAP 11 8  --  10   ELLIE 9.2 8.1*  --  9.1     Recent Labs   Lab Test 23  1234 23  0719 23  0944 23  0910 22  1214 20  1645   ALBUMIN 3.8 4.3  --  4.4  --   --    BILITOTAL 0.2 0.5  --  0.4  --   --    DBIL  --  <0.20  --   --   --   --    ALT 14 32  --  38*  --   --    AST 16 31  --  39*  --   --    ALKPHOS 73 72  --  84  --   --    PROTEIN  --   --  Negative  --  Negative NEGATIVE   LIPASE  --   --   --  27  --   --          Assessment:  72  yo female who presents with several episodes of melena followed by hematochezia.  CT shows sigmoid diverticulitis.  Bleeding suspected due to colonic inflammation associated with diverticulitis.  Significant hemoglobin drop and patient with recent NSAID use and EtOH history.    EGD 4/6/23 with a small hiatal hernia and no signs of upper GI blood loss. Hemoglobin stable at 9.8.  Plan:  -  Low fiber diet as tolerated.  -  Diverticulitis treatment with antibiotics x 10 days.  -  Outpatient colonoscopy in 2 months.  [facility] will call to arrange.  -  Will sign off.  Patient is ok for discharge from GI perspective.  Please call with questions.  Dr. Ruiz is covering the weekend.    Total Time Spent:  10 minutes      CHRISTOPHER Shoemaker  [facility]  Office:  [phone] call if needed after 11:30AM  Cell:  [phone], not available after 11:30AM at this number

## 2023-04-07 NOTE — PLAN OF CARE
Goal Outcome Evaluation:       Summary:  Diverticulitis with possible lower GI bleed   DATE & TIME: 4/6/2023 [id number]  Cognitive Concerns/ Orientation : A&Ox4   BEHAVIOR & AGGRESSION TOOL COLOR: Green   CIWA SCORE: N/A   ABNL VS/O2: VSS RA, soft BP 96/58  MOBILITY: SBA due to possible GI bleed. Strong and steady on feet this shift.  PAIN MANAGMENT: denies  DIET: low fiber  BOWEL/BLADDER: pt reports 5 bloody stools before arriving to ED, no BM this shift. Continent.  ABNL LAB/BG: Hgb 8.4- Q8hr checks,  K+ 3.6- recheck in AM, WBC 11.2, lactic acid 0.6  DRAIN/DEVICES: R PIV SL   TELEMETRY RHYTHM: N/A  SKIN: Intact   TESTS/PROCEDURES: EGD 4/6 AM  D/C DAY/GOALS/PLACE: Possible discharge 4/7 if hgb stable  OTHER IMPORTANT INFO: GI following

## 2023-04-08 NOTE — DISCHARGE SUMMARY
[facility]    Discharge Summary  Hospitalist    Date of Admission:  4/5/2023  Date of Discharge:  4/8/2023    Discharge Diagnoses      Gastrointestinal hemorrhage, unspecified gastrointestinal hemorrhage type  Diverticulitis of colon  Genital herpes simplex, unspecified site  Eczema, unspecified type    History of Present Illness   Shannan Koenig is an 72 year old female who presented with melena and hematochezia in the setting of acute diverticulitis    Hospital Course   Shannan Koenig was admitted on 4/5/2023.  The following problems were addressed during her hospitalization:    Shannan Koenig is a 72 year old female admitted on 4/5/2023 for GI bleed.     Melena and bright red blood per rectum likely from diverticular bleed  Patient reports taking motrin 600mg q6h x 5 days due to ongoing psuedogout pain. Today she had around 6 episodes of black stool that progressed to bright red blood. She had urgency and abdominal cramping but really no other symptoms. No fever or chills. No dizziness. No known history of ulcers and normal colonoscopy in 2016. Patient recently detoxed from alcohol 10 days ago and had not had a drink since then. Wthin the ED, vitals and labs unremarkable. BUN normal and Hb 12 down 2-3 points from baseline. No episodes of bleeding in the ED prior to admission.     -Started on PPI IV BID, start IVF  - monitor Hb Q8 hours  - consult [facility].  -Underwent EGD on 4/6/2023.  Showed small hiatal hernia with no signs of upper GI blood loss or ulcers or any other source of bleeding.  -GI bleed source is likely from lower GI tract.  Currently with her ongoing diverticulitis we cannot proceed with colonoscopy.  -Hemoglobin stable at 9.2 this morning.  Patient was monitored 24 hours after EGD and had no further episodes of rectal bleed and her hemoglobin remained stable.  -Patient discharged on oral antibiotics to finish a total of 10-day course.  Plan is to follow-up with GI in outpatient  basis for colonoscopy in 4 to 6 weeks.      Leukocytosis with acute diverticulitis  CT abdomen showed acute diverticulitis.  -On IV ceftriaxone and Flagyl.  Will complete 10-day course.  -Leukocytosis improving.  Patient will need colonoscopy in 4 to 6 weeks after she is recovered from this.     Alcohol abuse  - Now sober 10 days  - restart home medications including naltrexone and vitamins     Hypokalemia  - RN replacement protocol     Psuedogout  - Consider better treatment options such as steroids pending work up above     Anxiety  - resume home duloxetine        Diet:  advance diet as tolerated  DVT Prophylaxis: Pneumatic Compression Devices  Ertana Catheter: Not present  Lines: None     Cardiac Monitoring: None  Code Status:   FULL            Jazzmine Govea MD, MD  Code Status   Full Code       Primary Care Physician   Pollo Terry    Physical Exam                      Vitals:    04/05/23 1220   Weight: 50.8 kg (112 lb)     Vital Signs with Ranges     I/O last 3 completed shifts:  In: 200 [I.V.:200]  Out: -     Physical Exam  Constitutional:       Appearance: Normal appearance.   HENT:      Head: Normocephalic.   Eyes:      Pupils: Pupils are equal, round, and reactive to light.   Cardiovascular:      Rate and Rhythm: Normal rate and regular rhythm.      Pulses: Normal pulses.      Heart sounds: Normal heart sounds.   Pulmonary:      Effort: Pulmonary effort is normal. No respiratory distress.      Breath sounds: Normal breath sounds.   Abdominal:      General: Abdomen is flat. Bowel sounds are normal. There is no distension.      Tenderness: There is no abdominal tenderness. There is no guarding.   Musculoskeletal:         General: Normal range of motion.      Cervical back: Normal range of motion.   Skin:     General: Skin is warm and dry.   Neurological:      General: No focal deficit present.   Psychiatric:         Mood and Affect: Mood normal.           Discharge Disposition   Discharged to  home  Condition at discharge: Stable    Consultations This Hospital Stay   GASTROENTEROLOGY IP CONSULT    Time Spent on this Encounter   I, Jazzmine Govea MD, personally saw the patient today and spent greater than 30 minutes discharging this patient.    Discharge Orders      Reason for your hospital stay    diverticulitis     Follow-up and recommended labs and tests     Follow up with primary care provider, Pollo Terry, within 7 days for hospital follow- up.  The following labs/tests are recommended: cbc, bmp in 1 week.     Activity    Your activity upon discharge: activity as tolerated     Diet    Follow this diet upon discharge: Orders Placed This Encounter      Low Fiber Diet     Discharge Medications   Discharge Medication List as of 4/7/2023 10:16 AM      START taking these medications    Details   ciprofloxacin (CIPRO) 500 MG tablet Take 1 tablet (500 mg) by mouth 2 times daily for 8 days, Disp-16 tablet, R-0, E-Prescribe      metroNIDAZOLE (FLAGYL) 500 MG tablet Take 1 tablet (500 mg) by mouth 3 times daily for 8 days, Disp-24 tablet, R-0, E-Prescribe         CONTINUE these medications which have CHANGED    Details   acyclovir (ZOVIRAX) 400 MG tablet Take 1 tablet (400 mg) by mouth daily, No Print Out      clobetasol (TEMOVATE) 0.05 % external cream Apply topically 2 times daily as neededNo Print Out         CONTINUE these medications which have NOT CHANGED    Details   cyanocobalamin (CYANOCOBALAMIN) 1000 MCG tablet Take 1 tablet (1,000 mcg) by mouth daily, Disp-90 tablet, R-1, E-Prescribe      DULoxetine (CYMBALTA) 30 MG capsule Take 1 capsule (30 mg) by mouth daily, Disp-30 capsule, R-3, E-Prescribe      folic acid (FOLVITE) 1 MG tablet Take 1 tablet (1 mg) by mouth daily, Disp-90 tablet, R-1, E-Prescribe      lovastatin (MEVACOR) 40 MG tablet TAKE 1 TABLET BY MOUTH AT  BEDTIME, Disp-90 tablet, R-1, E-PrescribeRequesting 1 year supply      mirtazapine (REMERON) 15 MG tablet TAKE 1 TABLET BY MOUTH AT   BEDTIME, Disp-90 tablet, R-1, E-PrescribeRequesting 1 year supply      multivitamin w/minerals (THERA-VIT-M) tablet Take 1 tablet by mouth daily, Disp-90 tablet, R-3, E-Prescribe      naltrexone (DEPADE/REVIA) 50 MG tablet Take 1 tablet (50 mg) by mouth daily, Disp-30 tablet, R-3, E-Prescribe      omeprazole (PRILOSEC) 40 MG DR capsule Take 1 capsule (40 mg) by mouth daily, Disp-30 capsule, R-3, E-Prescribe      thiamine (B-1) 100 MG tablet Take 1 tablet (100 mg) by mouth daily, Disp-90 tablet, R-1, E-Prescribe           Allergies   Allergies   Allergen Reactions     Codeine Nausea and Vomiting     Morphine      Augmentin Nausea and Vomiting     Latex Dermatitis, Rash and Swelling     Puffy weepy red eyes, nasal congestion and sneezing       Nitrofurantoin Rash     Data   Recent Labs   Lab Test 04/07/23  0723 04/06/23  2244 04/06/23  1239 04/06/23  0638 04/05/23  2219 04/05/23  1234   WBC 11.7*  --   --  11.2*  --  19.8*   HGB 9.8* 8.4* 9.2* 9.0*   < > 12.0   MCV 97  --   --  98  --  97     --   --  271  --  315    < > = values in this interval not displayed.      Recent Labs   Lab Test 04/07/23  0723 04/06/23  0638 04/06/23  0202 04/05/23  1234    141  --  143   POTASSIUM 3.7 3.6 3.5 3.2*   CHLORIDE 111* 115*  --  110*   CO2 21* 18*  --  23   BUN 6.7* 6.3*  --  9.5   CR 0.63 0.63  --  0.79   ANIONGAP 11 8  --  10   ELLIE 9.2 8.1*  --  9.1   * 81  --  196*         Results for orders placed or performed during the hospital encounter of 04/05/23   CT Abdomen Pelvis w Contrast    Narrative    EXAM: CT ABDOMEN PELVIS W CONTRAST  LOCATION: [facility]  DATE/TIME: 4/5/2023 8:37 PM    INDICATION: abd pain, gi bleed  COMPARISON: 04/08/2020  TECHNIQUE: CT scan of the abdomen and pelvis was performed following injection of IV contrast. Multiplanar reformats were obtained. Dose reduction techniques were used.  CONTRAST: 57 mL Isovue 370    FINDINGS:   LOWER CHEST: Bibasilar linear  scarring or atelectasis.    HEPATOBILIARY: Focal fatty infiltration. No biliary dilatation    PANCREAS: Unremarkable    SPLEEN: Unremarkable    ADRENAL GLANDS: Unremarkable    KIDNEYS/BLADDER: Mildly complicated 5.6 cm left upper pole renal cyst for which no additional follow-up imaging is recommended. 4-5 mm nonobstructing left lower pole renal calculus.    BOWEL: Sigmoid diverticulitis with associated mild pericolonic inflammatory stranding. No fluid collections. Right lower quadrant anastomotic suture line.    LYMPH NODES: No significant retroperitoneal adenopathy    VASCULATURE: No abdominal aortic aneurysm. Patent portal vein.    PELVIC ORGANS: No free fluid    MUSCULOSKELETAL: No acute bony abnormalities. Chronic L5 pars defects with grade 1 anterolisthesis. Small complex fat-containing periumbilical hernia.      Impression    IMPRESSION:   1.  Sigmoid diverticulitis without abscess.  2.  Nonobstructing nephrolithiasis.

## 2023-04-10 ENCOUNTER — PATIENT OUTREACH (OUTPATIENT)
Dept: CARE COORDINATION | Facility: CLINIC | Age: 73
End: 2023-04-10
Payer: MEDICARE

## 2023-04-10 ENCOUNTER — TELEPHONE (OUTPATIENT)
Dept: FAMILY MEDICINE | Facility: CLINIC | Age: 73
End: 2023-04-10
Payer: MEDICARE

## 2023-04-10 NOTE — PROGRESS NOTES
[facility] Contact  [facility]/Voicemail     Clinical Data: Transitional Care Management Outreach     Outreach attempted x 2.  Left message on patient's voicemail, providing [facility]'s 24/7 scheduling and nurse triage phone number [phone] ([phone]) for questions/concerns and/or to schedule an appt with an [facility] provider, if they do not have a PCP.      Plan:  [facility] will do no further outreaches at this time.       Mallika Guzmná RN  Connected Care Resource [place], [facility]    *Connected Care Resource Team does NOT follow patient ongoing. Referrals are identified based on internal discharge reports and the outreach is to ensure patient has an understanding of their discharge instructions.

## 2023-04-10 NOTE — TELEPHONE ENCOUNTER
Patient calling to seek recommendations on IV belkys she sustained after being inpatient. Patient stated after IV line was removed her arm her arm has redness and it has not gone away. Patient stated there is little swelling and denies any variations in sensation of arm or hand. Patient also denied redness extending past the area where the IV used to be. RN recommended home care until 4/11/2023 appt with provider but to call sooner if arm gets more swollen or if sensation changes in arm or hand. Patient agreed with plan of care.     Justice L. Phoenix, RN

## 2023-04-11 ENCOUNTER — OFFICE VISIT (OUTPATIENT)
Dept: INTERNAL MEDICINE | Facility: CLINIC | Age: 73
End: 2023-04-11
Payer: MEDICARE

## 2023-04-11 VITALS
TEMPERATURE: 98.6 F | BODY MASS INDEX: 22.32 KG/M2 | OXYGEN SATURATION: 99 % | HEART RATE: 110 BPM | WEIGHT: 114.3 LBS | DIASTOLIC BLOOD PRESSURE: 82 MMHG | SYSTOLIC BLOOD PRESSURE: 122 MMHG

## 2023-04-11 DIAGNOSIS — Z09 ENCOUNTER FOR EXAMINATION FOLLOWING TREATMENT AT HOSPITAL: Primary | ICD-10-CM

## 2023-04-11 DIAGNOSIS — F10.21 ALCOHOL USE DISORDER, MODERATE, IN EARLY REMISSION (H): ICD-10-CM

## 2023-04-11 DIAGNOSIS — K57.92 ACUTE DIVERTICULITIS: ICD-10-CM

## 2023-04-11 LAB
ANION GAP SERPL CALCULATED.3IONS-SCNC: 12 MMOL/L (ref 7–15)
BUN SERPL-MCNC: 6.1 MG/DL (ref 8–23)
CALCIUM SERPL-MCNC: 9.8 MG/DL (ref 8.8–10.2)
CHLORIDE SERPL-SCNC: 106 MMOL/L (ref 98–107)
CREAT SERPL-MCNC: 0.73 MG/DL (ref 0.51–0.95)
DEPRECATED HCO3 PLAS-SCNC: 25 MMOL/L (ref 22–29)
ERYTHROCYTE [DISTWIDTH] IN BLOOD BY AUTOMATED COUNT: 13.9 % (ref 10–15)
GFR SERPL CREATININE-BSD FRML MDRD: 86 ML/MIN/1.73M2
GLUCOSE SERPL-MCNC: 92 MG/DL (ref 70–99)
HCT VFR BLD AUTO: 32.8 % (ref 35–47)
HGB BLD-MCNC: 10.6 G/DL (ref 11.7–15.7)
MCH RBC QN AUTO: 31.6 PG (ref 26.5–33)
MCHC RBC AUTO-ENTMCNC: 32.3 G/DL (ref 31.5–36.5)
MCV RBC AUTO: 98 FL (ref 78–100)
PLATELET # BLD AUTO: 507 10E3/UL (ref 150–450)
POTASSIUM SERPL-SCNC: 3.5 MMOL/L (ref 3.4–5.3)
RBC # BLD AUTO: 3.35 10E6/UL (ref 3.8–5.2)
SODIUM SERPL-SCNC: 143 MMOL/L (ref 136–145)
WBC # BLD AUTO: 12.7 10E3/UL (ref 4–11)

## 2023-04-11 PROCEDURE — 80048 BASIC METABOLIC PNL TOTAL CA: CPT | Performed by: INTERNAL MEDICINE

## 2023-04-11 PROCEDURE — 36415 COLL VENOUS BLD VENIPUNCTURE: CPT | Performed by: INTERNAL MEDICINE

## 2023-04-11 PROCEDURE — 99495 TRANSJ CARE MGMT MOD F2F 14D: CPT | Performed by: INTERNAL MEDICINE

## 2023-04-11 PROCEDURE — 85027 COMPLETE CBC AUTOMATED: CPT | Performed by: INTERNAL MEDICINE

## 2023-04-11 ASSESSMENT — PATIENT HEALTH QUESTIONNAIRE - PHQ9
10. IF YOU CHECKED OFF ANY PROBLEMS, HOW DIFFICULT HAVE THESE PROBLEMS MADE IT FOR YOU TO DO YOUR WORK, TAKE CARE OF THINGS AT HOME, OR GET ALONG WITH OTHER PEOPLE: NOT DIFFICULT AT ALL
SUM OF ALL RESPONSES TO PHQ QUESTIONS 1-9: 8
SUM OF ALL RESPONSES TO PHQ QUESTIONS 1-9: 8

## 2023-04-11 NOTE — PATIENT INSTRUCTIONS
- Follow-up with GI by contacting [facility] in [place] ([phone])  - I will send you a message on [employer] when I am able to look at the results of your tests from today

## 2023-04-11 NOTE — PROGRESS NOTES
"Assessment & Plan   Encounter for examination following treatment at hospital  Acute diverticulitis  Improving. GI follow-up phone number printed off. Discussed diverticulitis vs diverticulosis diets, [facility] handout on the topic printed off. Labs today. Continue antibiotics until complete.  - Basic metabolic panel; Future  - CBC with platelets; Future    Alcohol use disorder, moderate, in early remission (H)  She reports she's now in AA and is following with psych through an outside clinic. Congratulated her on her sobriety and encouraged her to stay plugged into those services.    MED REC REQUIRED  Post Medication Reconciliation Status:  Discharge medications reconciled, continue medications without change    Gene Berman MD  [facility]    Guru Glalagher is a 72 year old presenting for the following health issues:  Hospital F/U  This is the first time I have met Aileen.    Hospital Follow-up Visit:  Hospital/Nursing Home/IP Rehab Facility: [facility]  Date of Admission: 4/5/2023  Date of Discharge: 4/7/2023  Reason(s) for Admission: GI hemorrhage    Was your hospitalization related to COVID-19? No   Problems taking medications regularly:  None  Medication changes since discharge: None  Problems adhering to non-medication therapy:  None    Summary of hospitalization:  [facility] discharge summary reviewed  Diagnostic Tests/Treatments reviewed.  Follow up needed: hgb. BMP  Other Healthcare Providers Involved in Patient s Care:         Specialist appointment - GI  Update since discharge: improved.   Plan of care communicated with patient     I am seeing Aileen today for follow-up on a recent hospitalization. The summary of the hospitalization from the relevant discharge summary is as follows:    \"Shannan Koenig was admitted on 4/5/2023.  The following problems were addressed during her hospitalization:     Shannan Koenig is a 72 " "year old female admitted on 4/5/2023 for GI bleed.     Melena and bright red blood per rectum likely from diverticular bleed  Patient reports taking motrin 600mg q6h x 5 days due to ongoing psuedogout pain. Today she had around 6 episodes of black stool that progressed to bright red blood. She had urgency and abdominal cramping but really no other symptoms. No fever or chills. No dizziness. No known history of ulcers and normal colonoscopy in 2016. Patient recently detoxed from alcohol 10 days ago and had not had a drink since then. Wthin the ED, vitals and labs unremarkable. BUN normal and Hb 12 down 2-3 points from baseline. No episodes of bleeding in the ED prior to admission.  -Started on PPI IV BID, start IVF  - monitor Hb Q8 hours  - consult [facility].  -Underwent EGD on 4/6/2023.  Showed small hiatal hernia with no signs of upper GI blood loss or ulcers or any other source of bleeding.  -GI bleed source is likely from lower GI tract.  Currently with her ongoing diverticulitis we cannot proceed with colonoscopy.  -Hemoglobin stable at 9.2 this morning.  Patient was monitored 24 hours after EGD and had no further episodes of rectal bleed and her hemoglobin remained stable.  -Patient discharged on oral antibiotics to finish a total of 10-day course.  Plan is to follow-up with GI in outpatient basis for colonoscopy in 4 to 6 weeks.       Leukocytosis with acute diverticulitis  CT abdomen showed acute diverticulitis.  -On IV ceftriaxone and Flagyl.  Will complete 10-day course.  -Leukocytosis improving.  Patient will need colonoscopy in 4 to 6 weeks after she is recovered from this.     Alcohol abuse  - Now sober 10 days  - restart home medications including naltrexone and vitamins     Hypokalemia  - RN replacement protocol     Psuedogout  - Consider better treatment options such as steroids pending work up above     Anxiety  - resume home duloxetine\"    Today Aileen reports she's doing better. She does not like taking " so many medications. She's in AA.    Review of Systems   Constitutional, psych, gi systems are negative, except as otherwise noted.      Objective    /82   Pulse 110   Temp 98.6  F (37  C) (Temporal)   Wt 51.8 kg (114 lb 4.8 oz)   SpO2 99%   BMI 22.32 kg/m    Body mass index is 22.32 kg/m .     Physical Exam   GENERAL: alert and in no distress.  EYES: conjunctivae/corneas clear. EOMs grossly intact  HENT: Facies symmetric.  RESP: CTAB, no w/r/r.  CV: RRR, no m/r/g.  GI: NT, ND, without rebound tenderness or guarding  MSK: Moves all four extremities freely.  SKIN: No significant ulcers, lesions, or rashes on the visualized portions of the skin  NEURO: CN II-XII grossly intact.

## 2023-04-13 ENCOUNTER — TELEPHONE (OUTPATIENT)
Dept: INTERNAL MEDICINE | Facility: CLINIC | Age: 73
End: 2023-04-13
Payer: MEDICARE

## 2023-04-13 NOTE — TELEPHONE ENCOUNTER
"Dr Funk, patient called following up from recent visit     States she has only 3 Doses of Flagyl left but doesn't think she can tolerate further doses     States diverticulitis symptoms are much better. Continues to have coffee ground/black stools, \"not as bad as it was, definitely improving\" - No stomach pains, has an appetite     But the medication makes her feel dizzy/weak and shaky, no vomiting.     States symptoms definitely seem to correlate with when she takes the doses of the Flagyl     Asking if she can discontinue the abx early/stop taking now?     Ying CEDEÑO, Triage RN  [facility]     "

## 2023-04-13 NOTE — TELEPHONE ENCOUNTER
Relayed providers message to patient. Patient has not decided if she will stop antibiotics, but will think about it. Patient verbalized understanding and has no further questions at this time.

## 2023-04-13 NOTE — TELEPHONE ENCOUNTER
I would encourage her to complete the full course, though this is obviously up to her. Regardless of what she decides, she ought to monitor for clinical worsening when she stops antibiotics and present to ER if symptoms worsen.

## 2023-04-17 DIAGNOSIS — K21.9 GASTROESOPHAGEAL REFLUX DISEASE WITHOUT ESOPHAGITIS: ICD-10-CM

## 2023-04-17 DIAGNOSIS — F10.930 ALCOHOL WITHDRAWAL SYNDROME WITHOUT COMPLICATION (H): ICD-10-CM

## 2023-04-17 DIAGNOSIS — F32.1 CURRENT MODERATE EPISODE OF MAJOR DEPRESSIVE DISORDER WITHOUT PRIOR EPISODE (H): ICD-10-CM

## 2023-04-17 RX ORDER — DULOXETIN HYDROCHLORIDE 30 MG/1
30 CAPSULE, DELAYED RELEASE ORAL DAILY
Qty: 90 CAPSULE | Refills: 3 | Status: SHIPPED | OUTPATIENT
Start: 2023-04-17 | End: 2023-07-20

## 2023-04-17 RX ORDER — LANOLIN ALCOHOL/MO/W.PET/CERES
100 CREAM (GRAM) TOPICAL DAILY
Qty: 90 TABLET | Refills: 3 | Status: SHIPPED | OUTPATIENT
Start: 2023-04-17

## 2023-04-17 RX ORDER — NALTREXONE HYDROCHLORIDE 50 MG/1
50 TABLET, FILM COATED ORAL DAILY
Qty: 90 TABLET | Refills: 3 | Status: SHIPPED | OUTPATIENT
Start: 2023-04-17

## 2023-04-17 RX ORDER — FOLIC ACID 1 MG/1
1 TABLET ORAL DAILY
Qty: 90 TABLET | Refills: 3 | Status: SHIPPED | OUTPATIENT
Start: 2023-04-17 | End: 2023-08-25

## 2023-04-17 RX ORDER — MULTIPLE VITAMINS W/ MINERALS TAB 9MG-400MCG
1 TAB ORAL DAILY
Qty: 90 TABLET | Refills: 3 | Status: SHIPPED | OUTPATIENT
Start: 2023-04-17

## 2023-04-17 RX ORDER — OMEPRAZOLE 40 MG/1
40 CAPSULE, DELAYED RELEASE ORAL DAILY
Qty: 90 CAPSULE | Refills: 3 | Status: SHIPPED | OUTPATIENT
Start: 2023-04-17 | End: 2024-02-08

## 2023-04-17 NOTE — TELEPHONE ENCOUNTER
Patient called the clinic requesting Rx's, pended. She said Dr. Funk is new PCP, visit on 4/11/23. PCP updated.

## 2023-04-19 ENCOUNTER — TELEPHONE (OUTPATIENT)
Dept: INTERNAL MEDICINE | Facility: CLINIC | Age: 73
End: 2023-04-19
Payer: MEDICARE

## 2023-04-19 NOTE — TELEPHONE ENCOUNTER
Received call from pt. [facility] does not have Thiamin (B-1) 100 MG Tablet on formulary and is not covered by pt insurance. Pt states she does not take liquid forms of medication.     Pt wondering if particular recommendation for OTC or can pt choose any brand as long as 100 MG?     Amy Izquierdo RN

## 2023-04-20 NOTE — TELEPHONE ENCOUNTER
Spoke to patient  and  she understands the provider recommendation.     Tina Elliott RN  [facility]

## 2023-04-20 NOTE — TELEPHONE ENCOUNTER
Attempted to contact pt to relay Dr Funk's message from below. No answer. Left VM to call us back.     Upon callback- please relay Dr Bush message from below.         Patient Contact    Attempt # 1    Was call answered?  No.  Left message on voicemail with information to call me back.

## 2023-04-27 ENCOUNTER — ANCILLARY PROCEDURE (OUTPATIENT)
Dept: MAMMOGRAPHY | Facility: CLINIC | Age: 73
End: 2023-04-27
Attending: INTERNAL MEDICINE
Payer: MEDICARE

## 2023-04-27 DIAGNOSIS — Z12.31 ENCOUNTER FOR SCREENING MAMMOGRAM FOR MALIGNANT NEOPLASM OF BREAST: ICD-10-CM

## 2023-04-27 DIAGNOSIS — Z12.31 VISIT FOR SCREENING MAMMOGRAM: ICD-10-CM

## 2023-04-27 PROCEDURE — 77063 BREAST TOMOSYNTHESIS BI: CPT | Mod: TC | Performed by: STUDENT IN AN ORGANIZED HEALTH CARE EDUCATION/TRAINING PROGRAM

## 2023-04-27 PROCEDURE — 77067 SCR MAMMO BI INCL CAD: CPT | Mod: TC | Performed by: STUDENT IN AN ORGANIZED HEALTH CARE EDUCATION/TRAINING PROGRAM

## 2023-07-11 ENCOUNTER — APPOINTMENT (OUTPATIENT)
Dept: GENERAL RADIOLOGY | Facility: CLINIC | Age: 73
DRG: 897 | End: 2023-07-11
Attending: HOSPITALIST
Payer: MEDICARE

## 2023-07-11 ENCOUNTER — HOSPITAL ENCOUNTER (INPATIENT)
Facility: CLINIC | Age: 73
LOS: 2 days | Discharge: HOME | DRG: 897 | End: 2023-07-13
Attending: EMERGENCY MEDICINE | Admitting: HOSPITALIST
Payer: MEDICARE

## 2023-07-11 DIAGNOSIS — F10.930 ALCOHOL WITHDRAWAL, UNCOMPLICATED (H): ICD-10-CM

## 2023-07-11 LAB
ALBUMIN SERPL BCG-MCNC: 4.7 G/DL (ref 3.5–5.2)
ALBUMIN UR-MCNC: NEGATIVE MG/DL
ALP SERPL-CCNC: 141 U/L (ref 35–104)
ALT SERPL W P-5'-P-CCNC: 19 U/L (ref 0–50)
ANION GAP SERPL CALCULATED.3IONS-SCNC: 16 MMOL/L (ref 7–15)
APPEARANCE UR: CLEAR
AST SERPL W P-5'-P-CCNC: 27 U/L (ref 0–45)
ATRIAL RATE - MUSE: 111 BPM
BASOPHILS # BLD AUTO: 0.1 10E3/UL (ref 0–0.2)
BASOPHILS NFR BLD AUTO: 1 %
BILIRUB SERPL-MCNC: 0.2 MG/DL
BILIRUB UR QL STRIP: NEGATIVE
BUN SERPL-MCNC: 7.4 MG/DL (ref 8–23)
CALCIUM SERPL-MCNC: 10 MG/DL (ref 8.8–10.2)
CHLORIDE SERPL-SCNC: 103 MMOL/L (ref 98–107)
COLOR UR AUTO: ABNORMAL
CREAT SERPL-MCNC: 0.58 MG/DL (ref 0.51–0.95)
DEPRECATED HCO3 PLAS-SCNC: 23 MMOL/L (ref 22–29)
DIASTOLIC BLOOD PRESSURE - MUSE: NORMAL MMHG
EOSINOPHIL # BLD AUTO: 0.1 10E3/UL (ref 0–0.7)
EOSINOPHIL NFR BLD AUTO: 1 %
ERYTHROCYTE [DISTWIDTH] IN BLOOD BY AUTOMATED COUNT: 14.5 % (ref 10–15)
GFR SERPL CREATININE-BSD FRML MDRD: >90 ML/MIN/1.73M2
GLUCOSE SERPL-MCNC: 72 MG/DL (ref 70–99)
GLUCOSE UR STRIP-MCNC: NEGATIVE MG/DL
HCT VFR BLD AUTO: 44.5 % (ref 35–47)
HGB BLD-MCNC: 14.6 G/DL (ref 11.7–15.7)
HGB UR QL STRIP: ABNORMAL
IMM GRANULOCYTES # BLD: 0.1 10E3/UL
IMM GRANULOCYTES NFR BLD: 1 %
INTERPRETATION ECG - MUSE: NORMAL
KETONES UR STRIP-MCNC: 10 MG/DL
LACTATE SERPL-SCNC: 1.2 MMOL/L (ref 0.7–2)
LEUKOCYTE ESTERASE UR QL STRIP: NEGATIVE
LYMPHOCYTES # BLD AUTO: 2.8 10E3/UL (ref 0.8–5.3)
LYMPHOCYTES NFR BLD AUTO: 20 %
MAGNESIUM SERPL-MCNC: 2.6 MG/DL (ref 1.7–2.3)
MCH RBC QN AUTO: 28.7 PG (ref 26.5–33)
MCHC RBC AUTO-ENTMCNC: 32.8 G/DL (ref 31.5–36.5)
MCV RBC AUTO: 87 FL (ref 78–100)
MONOCYTES # BLD AUTO: 1 10E3/UL (ref 0–1.3)
MONOCYTES NFR BLD AUTO: 7 %
NEUTROPHILS # BLD AUTO: 10 10E3/UL (ref 1.6–8.3)
NEUTROPHILS NFR BLD AUTO: 70 %
NITRATE UR QL: NEGATIVE
NRBC # BLD AUTO: 0 10E3/UL
NRBC BLD AUTO-RTO: 0 /100
P AXIS - MUSE: 65 DEGREES
PH UR STRIP: 6.5 [PH] (ref 5–7)
PLAT MORPH BLD: NORMAL
PLATELET # BLD AUTO: 312 10E3/UL (ref 150–450)
POTASSIUM SERPL-SCNC: 4.5 MMOL/L (ref 3.4–5.3)
PR INTERVAL - MUSE: 144 MS
PROT SERPL-MCNC: 7.9 G/DL (ref 6.4–8.3)
QRS DURATION - MUSE: 66 MS
QT - MUSE: 308 MS
QTC - MUSE: 418 MS
R AXIS - MUSE: 78 DEGREES
RBC # BLD AUTO: 5.09 10E6/UL (ref 3.8–5.2)
RBC MORPH BLD: NORMAL
RBC URINE: 4 /HPF
SODIUM SERPL-SCNC: 142 MMOL/L (ref 136–145)
SP GR UR STRIP: 1.01 (ref 1–1.03)
SYSTOLIC BLOOD PRESSURE - MUSE: NORMAL MMHG
T AXIS - MUSE: 20 DEGREES
UROBILINOGEN UR STRIP-MCNC: NORMAL MG/DL
VENTRICULAR RATE- MUSE: 111 BPM
WBC # BLD AUTO: 14.1 10E3/UL (ref 4–11)
WBC URINE: 1 /HPF

## 2023-07-11 PROCEDURE — 258N000003 HC RX IP 258 OP 636: Performed by: HOSPITALIST

## 2023-07-11 PROCEDURE — 83735 ASSAY OF MAGNESIUM: CPT | Performed by: EMERGENCY MEDICINE

## 2023-07-11 PROCEDURE — 258N000003 HC RX IP 258 OP 636: Performed by: EMERGENCY MEDICINE

## 2023-07-11 PROCEDURE — 99285 EMERGENCY DEPT VISIT HI MDM: CPT | Mod: 25

## 2023-07-11 PROCEDURE — 120N000001 HC R&B MED SURG/OB

## 2023-07-11 PROCEDURE — 71046 X-RAY EXAM CHEST 2 VIEWS: CPT

## 2023-07-11 PROCEDURE — 80053 COMPREHEN METABOLIC PANEL: CPT | Performed by: EMERGENCY MEDICINE

## 2023-07-11 PROCEDURE — HZ2ZZZZ DETOXIFICATION SERVICES FOR SUBSTANCE ABUSE TREATMENT: ICD-10-PCS | Performed by: HOSPITALIST

## 2023-07-11 PROCEDURE — 96361 HYDRATE IV INFUSION ADD-ON: CPT

## 2023-07-11 PROCEDURE — 93005 ELECTROCARDIOGRAM TRACING: CPT

## 2023-07-11 PROCEDURE — 250N000013 HC RX MED GY IP 250 OP 250 PS 637: Performed by: HOSPITALIST

## 2023-07-11 PROCEDURE — 96374 THER/PROPH/DIAG INJ IV PUSH: CPT

## 2023-07-11 PROCEDURE — 83605 ASSAY OF LACTIC ACID: CPT | Performed by: EMERGENCY MEDICINE

## 2023-07-11 PROCEDURE — 36415 COLL VENOUS BLD VENIPUNCTURE: CPT | Performed by: EMERGENCY MEDICINE

## 2023-07-11 PROCEDURE — 99223 1ST HOSP IP/OBS HIGH 75: CPT | Mod: AI | Performed by: HOSPITALIST

## 2023-07-11 PROCEDURE — 250N000011 HC RX IP 250 OP 636: Performed by: EMERGENCY MEDICINE

## 2023-07-11 PROCEDURE — 85025 COMPLETE CBC W/AUTO DIFF WBC: CPT | Performed by: EMERGENCY MEDICINE

## 2023-07-11 PROCEDURE — 81001 URINALYSIS AUTO W/SCOPE: CPT | Performed by: EMERGENCY MEDICINE

## 2023-07-11 RX ORDER — LIDOCAINE 40 MG/G
CREAM TOPICAL
Status: DISCONTINUED | OUTPATIENT
Start: 2023-07-11 | End: 2023-07-13 | Stop reason: HOSPADM

## 2023-07-11 RX ORDER — DIAZEPAM 10 MG/2ML
10 INJECTION, SOLUTION INTRAMUSCULAR; INTRAVENOUS ONCE
Status: COMPLETED | OUTPATIENT
Start: 2023-07-11 | End: 2023-07-11

## 2023-07-11 RX ORDER — ONDANSETRON 2 MG/ML
4 INJECTION INTRAMUSCULAR; INTRAVENOUS EVERY 6 HOURS PRN
Status: DISCONTINUED | OUTPATIENT
Start: 2023-07-11 | End: 2023-07-13 | Stop reason: HOSPADM

## 2023-07-11 RX ORDER — LANOLIN ALCOHOL/MO/W.PET/CERES
1000 CREAM (GRAM) TOPICAL DAILY
Status: DISCONTINUED | OUTPATIENT
Start: 2023-07-12 | End: 2023-07-13 | Stop reason: HOSPADM

## 2023-07-11 RX ORDER — ONDANSETRON 4 MG/1
4 TABLET, ORALLY DISINTEGRATING ORAL EVERY 6 HOURS PRN
Status: DISCONTINUED | OUTPATIENT
Start: 2023-07-11 | End: 2023-07-13 | Stop reason: HOSPADM

## 2023-07-11 RX ORDER — HALOPERIDOL 5 MG/ML
2.5-5 INJECTION INTRAMUSCULAR EVERY 6 HOURS PRN
Status: DISCONTINUED | OUTPATIENT
Start: 2023-07-11 | End: 2023-07-11

## 2023-07-11 RX ORDER — DULOXETIN HYDROCHLORIDE 60 MG/1
60 CAPSULE, DELAYED RELEASE ORAL DAILY
Status: DISCONTINUED | OUTPATIENT
Start: 2023-07-12 | End: 2023-07-13 | Stop reason: HOSPADM

## 2023-07-11 RX ORDER — FLUMAZENIL 0.1 MG/ML
0.2 INJECTION, SOLUTION INTRAVENOUS
Status: DISCONTINUED | OUTPATIENT
Start: 2023-07-11 | End: 2023-07-13 | Stop reason: HOSPADM

## 2023-07-11 RX ORDER — FOLIC ACID 1 MG/1
1 TABLET ORAL DAILY
Status: DISCONTINUED | OUTPATIENT
Start: 2023-07-11 | End: 2023-07-13 | Stop reason: HOSPADM

## 2023-07-11 RX ORDER — LORAZEPAM 1 MG/1
1-2 TABLET ORAL EVERY 30 MIN PRN
Status: DISCONTINUED | OUTPATIENT
Start: 2023-07-11 | End: 2023-07-13 | Stop reason: HOSPADM

## 2023-07-11 RX ORDER — OLANZAPINE 5 MG/1
5-10 TABLET, ORALLY DISINTEGRATING ORAL EVERY 6 HOURS PRN
Status: DISCONTINUED | OUTPATIENT
Start: 2023-07-11 | End: 2023-07-13 | Stop reason: HOSPADM

## 2023-07-11 RX ORDER — ACETAMINOPHEN 325 MG/1
650 TABLET ORAL EVERY 4 HOURS PRN
Status: DISCONTINUED | OUTPATIENT
Start: 2023-07-11 | End: 2023-07-13 | Stop reason: HOSPADM

## 2023-07-11 RX ORDER — LORAZEPAM 2 MG/ML
1-2 INJECTION INTRAMUSCULAR EVERY 30 MIN PRN
Status: DISCONTINUED | OUTPATIENT
Start: 2023-07-11 | End: 2023-07-13 | Stop reason: HOSPADM

## 2023-07-11 RX ORDER — SODIUM CHLORIDE 9 MG/ML
INJECTION, SOLUTION INTRAVENOUS CONTINUOUS
Status: DISCONTINUED | OUTPATIENT
Start: 2023-07-11 | End: 2023-07-12

## 2023-07-11 RX ORDER — ACETAMINOPHEN 650 MG/1
650 SUPPOSITORY RECTAL EVERY 4 HOURS PRN
Status: DISCONTINUED | OUTPATIENT
Start: 2023-07-11 | End: 2023-07-13 | Stop reason: HOSPADM

## 2023-07-11 RX ORDER — MULTIPLE VITAMINS W/ MINERALS TAB 9MG-400MCG
1 TAB ORAL DAILY
Status: DISCONTINUED | OUTPATIENT
Start: 2023-07-11 | End: 2023-07-13 | Stop reason: HOSPADM

## 2023-07-11 RX ORDER — MIRTAZAPINE 15 MG/1
15 TABLET, FILM COATED ORAL AT BEDTIME
Status: DISCONTINUED | OUTPATIENT
Start: 2023-07-11 | End: 2023-07-13 | Stop reason: HOSPADM

## 2023-07-11 RX ORDER — PANTOPRAZOLE SODIUM 40 MG/1
40 TABLET, DELAYED RELEASE ORAL DAILY
Status: DISCONTINUED | OUTPATIENT
Start: 2023-07-11 | End: 2023-07-13 | Stop reason: HOSPADM

## 2023-07-11 RX ADMIN — ACETAMINOPHEN 650 MG: 325 TABLET, FILM COATED ORAL at 18:48

## 2023-07-11 RX ADMIN — THIAMINE HCL TAB 100 MG 100 MG: 100 TAB at 18:03

## 2023-07-11 RX ADMIN — LORAZEPAM 1 MG: 1 TABLET ORAL at 18:04

## 2023-07-11 RX ADMIN — SODIUM CHLORIDE 1000 ML: 9 INJECTION, SOLUTION INTRAVENOUS at 11:48

## 2023-07-11 RX ADMIN — DIAZEPAM 10 MG: 5 INJECTION INTRAMUSCULAR; INTRAVENOUS at 14:39

## 2023-07-11 RX ADMIN — PANTOPRAZOLE SODIUM 40 MG: 40 TABLET, DELAYED RELEASE ORAL at 18:04

## 2023-07-11 RX ADMIN — MIRTAZAPINE 15 MG: 15 TABLET, FILM COATED ORAL at 20:40

## 2023-07-11 RX ADMIN — SODIUM CHLORIDE: 9 INJECTION, SOLUTION INTRAVENOUS at 18:24

## 2023-07-11 RX ADMIN — SODIUM CHLORIDE 1000 ML: 9 INJECTION, SOLUTION INTRAVENOUS at 14:39

## 2023-07-11 RX ADMIN — MULTIPLE VITAMINS W/ MINERALS TAB 1 TABLET: TAB at 18:03

## 2023-07-11 RX ADMIN — FOLIC ACID 1 MG: 1 TABLET ORAL at 18:04

## 2023-07-11 ASSESSMENT — ACTIVITIES OF DAILY LIVING (ADL)
ADLS_ACUITY_SCORE: 35
ADLS_ACUITY_SCORE: 20

## 2023-07-11 NOTE — PROGRESS NOTES
RECEIVING UNIT ED HANDOFF REVIEW    ED Nurse Handoff Report was reviewed by: Ellen Ferro RN on July 11, 2023 at 5:01 PM

## 2023-07-11 NOTE — PLAN OF CARE
Goal Outcome Evaluation:  DATE & TIME: 7/11 evening  Cognitive Concerns/ Orientation : A&Ox4, anxious  BEHAVIOR & AGGRESSION TOOL COLOR: green  CIWA SCORE: 8-ativan given  ABNL VS/O2: vss, afebrile  MOBILITY: sba  PAIN MANAGMENT: neck pain-ice applied  DIET: regular  BOWEL/BLADDER: continent  ABNL LAB/BG:   DRAIN/DEVICES: PIVx1  TELEMETRY RHYTHM: tachy  SKIN: WDL  TESTS/PROCEDURES: chest xray completed  D/C DAY/GOALS/PLACE: 2-3 days  OTHER IMPORTANT INFO:

## 2023-07-11 NOTE — PHARMACY-ADMISSION MEDICATION HISTORY
Pharmacist Admission Medication History    Admission medication history is complete. The information provided in this note is only as accurate as the sources available at the time of the update.    Medication reconciliation/reorder completed by provider prior to medication history? No    Information Source(s): Patient, Hospital records and CareEverywhere/SureScripts via in-person    Pertinent Information: None    Changes made to PTA medication list:    Added: None    Deleted: None    Changed: Acyclovir, Duloxetine      Medication History Completed By: Rahul Rucker [facility] 7/11/2023 3:29 PM    Prior to Admission medications    Medication Sig Last Dose Taking? Auth Provider Long Term End Date   acyclovir (ZOVIRAX) 400 MG tablet Take 1 tablet (400 mg) by mouth daily  Patient taking differently: Take 400 mg by mouth daily as needed Unknown at prn Yes Jazzmine Govea MD Yes    clobetasol (TEMOVATE) 0.05 % external cream Apply topically 2 times daily as needed Unknown at prn Yes Jazzmine Govea MD     DULoxetine (CYMBALTA) 30 MG capsule Take 1 capsule (30 mg) by mouth daily  Patient taking differently: Take 60 mg by mouth daily 7/10/2023 at AM Yes Gene Funk MD Yes    folic acid (FOLVITE) 1 MG tablet Take 1 tablet (1 mg) by mouth daily 7/10/2023 at AM Yes Gene Funk MD     lovastatin (MEVACOR) 40 MG tablet TAKE 1 TABLET BY MOUTH AT  BEDTIME 7/10/2023 at HS Yes Pollo Terry MD Yes    mirtazapine (REMERON) 15 MG tablet TAKE 1 TABLET BY MOUTH AT  BEDTIME 7/10/2023 at HS Yes Pollo Terry MD Yes    multivitamin w/minerals (THERA-VIT-M) tablet Take 1 tablet by mouth daily 7/10/2023 at AM Yes Gene Funk MD     naltrexone (DEPADE/REVIA) 50 MG tablet Take 1 tablet (50 mg) by mouth daily 7/10/2023 at AM Yes Gene Funk MD Yes    omeprazole (PRILOSEC) 40 MG DR capsule Take 1 capsule (40 mg) by mouth daily 7/10/2023 at AM Yes Gene Funk MD     thiamine (B-1) 100  MG tablet Take 1 tablet (100 mg) by mouth daily 7/10/2023 at AM Yes Gene Funk MD     vitamin B-12 (CYANOCOBALAMIN) 1000 MCG tablet Take 1 tablet (1,000 mcg) by mouth daily 7/10/2023 at AM Yes Gene Funk MD

## 2023-07-11 NOTE — ED NOTES
Bed: ED04  Expected date:   Expected time:   Means of arrival:   Comments:  Amarilis - 521 - 69 M weakness eta 1400

## 2023-07-11 NOTE — PROGRESS NOTES
[facility]  ED Nurse Handoff Report    ED Chief complaint: Alcohol Problem (Alcohol withdrawal. )      ED Diagnosis:   Final diagnoses:   Alcohol withdrawal, uncomplicated (H)       Code Status: See signed and held     Allergies:   Allergies   Allergen Reactions     Codeine Nausea and Vomiting     Amoxicillin-Pot Clavulanate Nausea and Vomiting     Latex Dermatitis, Rash and Swelling     Puffy weepy red eyes, nasal congestion and sneezing       Nitrofurantoin Rash       Patient Story:Pt states she is seeking medical help for alcohol abuse. Pt states she is a daily drinker, 10 glasses of wine daily. Last drink this morning.    Focused Assessment:  Productive cough present, ST, Neuro WDL, HTN    Treatments and/or interventions provided: Valium given, NS bolus started   Patient's response to treatments and/or interventions:     To be done/followed up on inpatient unit: See signed and held     Does this patient have any cognitive concerns?: Alcohol/Drugs temporary cognitive concerns    Activity level - Baseline/Home:  Independent  Activity Level - Current:   Unknown    Patient's Preferred language: English   Needed?: No    Isolation: None  Infection: Not Applicable  Patient tested for COVID 19 prior to admission: NO  Bariatric?: No    Vital Signs:   Vitals:    07/11/23 1125   BP: (!) 153/96   BP Location: Right arm   Patient Position: Chair   Cuff Size: Adult Regular   Pulse: (!) 131   Resp: 18   Temp: 98.5  F (36.9  C)   TempSrc: Temporal   SpO2: 96%   Weight: 48.1 kg (106 lb)   Height: 1.524 m (5')       Cardiac Rhythm:  ST    Was the PSS-3 completed:   Yes  What interventions are required if any?               Family Comments: 3 family members at bedside       For the majority of the shift this patient's behavior was Green.   Behavioral interventions performed were N/A.    ED NURSE PHONE NUMBER:

## 2023-07-11 NOTE — ED PROVIDER NOTES
History     Chief Complaint:  Alcohol Problem (Alcohol withdrawal. )     KEEGAN   Shannan Koenig is a 72 year old female with a history of alcohol use disorder who presents today with concern for alcohol withdrawal.  Patient reports she drinks about 10 glasses of wine daily.  In the middle the night last night, she awoke feeling shaky and so she had 1 glass of wine.  She had another glass of wine this morning around 11 AM.  She has been feeling shaky.  No headache or vision changes or numbness or weakness in extremities or fever or chest pain or shortness of breath or abdominal pain or vomiting or diarrhea or recent black or bloody stools.  She has chronic cough which she attributes to smoking.    Independent Historian:   None - Patient Only    Medications:    Zovirax  Duloxetine  Folic acid   Lovastatin  Mirtazapine   Naltrexone   Omeprazole   Thiamine    Past Medical History:    Anxiety  Alcohol use disorder    Past Surgical History:  Appendectomy      Colonoscopy x3  EGD x2  Tonsillectomy   Tubal ligation  Albert Lea tooth extraction    Extracapsular cataract extraction with intraocular lens implant x2    Physical Exam     Patient Vitals for the past 24 hrs:   BP Temp Temp src Pulse Resp SpO2 Height Weight   23 1750 (!) 137/92 98.3  F (36.8  C) -- 101 18 96 % 1.524 m (5') --   23 1603 -- -- -- 113 15 -- -- --   23 1600 (!) 140/91 -- -- 115 28 -- -- --   23 1545 -- -- -- 120 27 -- -- --   23 1500 (!) 146/84 -- -- 106 21 -- -- --   23 1125 (!) 153/96 98.5  F (36.9  C) Temporal (!) 131 18 96 % 1.524 m (5') 48.1 kg (106 lb)      Physical Exam  VS: Reviewed per above  HENT: Mucous membranes moist, no nuchal rigidity  EYES: sclera anicteric  CV: Rate as noted, regular rhythm.   RESP: Effort normal. Breath sounds are normal bilaterally.  GI: no tenderness/rebound/guarding, not distended.  NEURO: GCS 15, cranial nerves II through XII are intact, 5 out of 5 strength in all 4  extremities, sensation is intact light touch in all 4 extremities.  Tremulous, tongue fasciculations.  MSK: No deformity of the extremities  SKIN: Warm and dry    Emergency Department Course   ECG  ECG taken at 1152, ECG read at 1152  Sinus tachycardia   Nonspecific ST abnormality   Abnormal ECG   Tachycardia is new as compared to prior, dated 09/13/22.  Rate 111 bpm. OK interval 144 ms. QRS duration 66 ms. QT/QTc 308/418 ms. P-R-T axes 65 78 20.     Imaging:  XR Chest 2 Views   Final Result   IMPRESSION: Lungs are clear. No pleural effusion. Heart size and pulmonary vascularity within normal limits.         Report per radiology    Laboratory:  Labs Ordered and Resulted from Time of ED Arrival to Time of ED Departure   COMPREHENSIVE METABOLIC PANEL - Abnormal       Result Value    Sodium 142      Potassium 4.5      Chloride 103      Carbon Dioxide (CO2) 23      Anion Gap 16 (*)     Urea Nitrogen 7.4 (*)     Creatinine 0.58      Calcium 10.0      Glucose 72      Alkaline Phosphatase 141 (*)     AST 27      ALT 19      Protein Total 7.9      Albumin 4.7      Bilirubin Total 0.2      GFR Estimate >90     MAGNESIUM - Abnormal    Magnesium 2.6 (*)    CBC WITH PLATELETS AND DIFFERENTIAL - Abnormal    WBC Count 14.1 (*)     RBC Count 5.09      Hemoglobin 14.6      Hematocrit 44.5      MCV 87      MCH 28.7      MCHC 32.8      RDW 14.5      Platelet Count 312      % Neutrophils 70      % Lymphocytes 20      % Monocytes 7      % Eosinophils 1      % Basophils 1      % Immature Granulocytes 1      NRBCs per 100 WBC 0      Absolute Neutrophils 10.0 (*)     Absolute Lymphocytes 2.8      Absolute Monocytes 1.0      Absolute Eosinophils 0.1      Absolute Basophils 0.1      Absolute Immature Granulocytes 0.1      Absolute NRBCs 0.0     ROUTINE UA WITH MICROSCOPIC REFLEX TO CULTURE - Abnormal    Color Urine Light Yellow      Appearance Urine Clear      Glucose Urine Negative      Bilirubin Urine Negative      Ketones Urine 10 (*)      Specific Gravity Urine 1.013      Blood Urine Trace (*)     pH Urine 6.5      Protein Albumin Urine Negative      Urobilinogen Urine Normal      Nitrite Urine Negative      Leukocyte Esterase Urine Negative      RBC Urine 4 (*)     WBC Urine 1     LACTIC ACID WHOLE BLOOD - Normal    Lactic Acid 1.2     RBC AND PLATELET MORPHOLOGY    Platelet Assessment        Value: Automated Count Confirmed. Platelet morphology is normal.    RBC Morphology Confirmed RBC Indices        Procedures   None    Emergency Department Course & Assessments:     Interventions:  Medications   lidocaine 1 % 0.1-1 mL (has no administration in time range)   lidocaine (LMX4) cream (has no administration in time range)   sodium chloride (PF) 0.9% PF flush 3 mL ( Intracatheter Canceled Entry 7/11/23 1820)   sodium chloride (PF) 0.9% PF flush 3 mL (has no administration in time range)   sodium chloride 0.9% infusion ( Intravenous $New Bag 7/11/23 1824)   ondansetron (ZOFRAN ODT) ODT tab 4 mg (has no administration in time range)     Or   ondansetron (ZOFRAN) injection 4 mg (has no administration in time range)   OLANZapine zydis (zyPREXA) ODT tab 5-10 mg (has no administration in time range)     Or   haloperidol lactate (HALDOL) injection 2.5-5 mg (has no administration in time range)   flumazenil (ROMAZICON) injection 0.2 mg (has no administration in time range)   melatonin tablet 5 mg (has no administration in time range)   LORazepam (ATIVAN) tablet 1-2 mg (1 mg Oral $Given 7/11/23 1804)     Or   LORazepam (ATIVAN) injection 1-2 mg ( Intravenous See Alternative 7/11/23 1804)   thiamine (B-1) tablet 100 mg (100 mg Oral $Given 7/11/23 1803)   folic acid (FOLVITE) tablet 1 mg (1 mg Oral $Given 7/11/23 1804)   multivitamin w/minerals (THERA-VIT-M) tablet 1 tablet (1 tablet Oral $Given 7/11/23 1803)   DULoxetine (CYMBALTA) DR capsule 60 mg (has no administration in time range)   mirtazapine (REMERON) tablet 15 mg (has no administration in time range)    pantoprazole (PROTONIX) EC tablet 40 mg (40 mg Oral $Given 7/11/23 1804)   cyanocobalamin (VITAMIN B-12) tablet 1,000 mcg (has no administration in time range)   acetaminophen (TYLENOL) tablet 650 mg (has no administration in time range)     Or   acetaminophen (TYLENOL) Suppository 650 mg (has no administration in time range)   0.9% sodium chloride BOLUS (1,000 mLs Intravenous $New Bag 7/11/23 1148)   0.9% sodium chloride BOLUS (1,000 mLs Intravenous $New Bag 7/11/23 1439)   diazepam (VALIUM) injection 10 mg (10 mg Intravenous $Given 7/11/23 1439)      Assessments:  1404 I obtained history and examined the patient as noted above.       Consultations/Discussion of Management or Tests:  1442 I discussed the patient with the admitting hospitalist, Dr. Degroot       Social Determinants of Health affecting care:   None    Disposition:  The patient was admitted to the hospital under the care of Dr. Degroot.     Impression & Plan    CMS Diagnoses: None    Medical Decision Making:  Patient presents to the ER for evaluation of symptoms concerning for alcohol withdrawal.  On arrival patient has regular tachycardia.  She is afebrile.  She is neurologically intact but is tremulous with tongue fasciculations.  She usually drinks heavily with minimal alcohol intake today.  Clinically this is consistent with alcohol withdrawal.  She was given IV benzodiazepines.  Labs revealed mild leukocytosis but normal lactic acidosis.  Low suspicion for occult infectious process driving clinical presentation.  Patient was admitted to hospitalist team for further cares.    Diagnosis:    ICD-10-CM    1. Alcohol withdrawal, uncomplicated (H)  F10.930          Scribe Disclosure:  Humble ORTIZ, am serving as a scribe at 2:10 PM on 7/11/2023 to document services personally performed by Jeferson Guan MD based on my observations and the provider's statements to me.     7/11/2023   Jeferson Guan MD Lindenbaum, Elan, MD  07/11/23  1925

## 2023-07-11 NOTE — H&P
[facility]    History and Physical - Hospitalist Service       Date of Admission:  7/11/2023    Assessment & Plan      72 year old female  Past medical history of alcohol abuse, generalized anxiety disorder, gastroesophageal reflux disease, GI bleed (hospitalized 4/2023), diverticulitis, nicotine dependence, hyperlipidemia, chronic insomnia, asthma, subclinical hypothyroidism, pseudogout.  Admitted with tremors and concerns of acute alcohol withdrawal.    Acute alcohol withdrawal, tremor on admission  History of alcohol abuse  Leukocytosis, indeterminate  Intermittent, productive cough  History of nicotine dependence, current smoker  Mild anion gap on admission, 16  Tachycardia  Mildly elevated alkaline phosphatase  Mild hypermagnesemia  History of GI bleed, hospitalized 4/2023, see discharge summary  Longstanding history of alcohol abuse, daily drinking since April 2023.  Increased tremor morning of admission with palpitations and desire to quit drinking.  Presented to the emergency room for help.  Mild leukocytosis and elevated alkaline phosphatase with mildly elevated anion gap on labs.  Chest x-ray ordered, EKG performed.      Admit inpatient for acute alcohol withdrawal    Monitor for signs/symptoms of acute alcohol withdrawal, follow [facility] protocol (see hospital orders); diazepam (Valium) given in ER on admission    Thiamine, folate, MVI per protocol    Chemical dependency (CD) consult to assess alcohol use and treatment options; social work also consulted    IV fluids 0.9 NS, reassess daily; monitor electrolytes    Mild leukocytosis and intermittent cough on admission, rule out respiratory infection; chest x-ray with PA/Lat views requested and PENDING; recheck AM WBC    Smoking cessation discussed and encouraged; not interested in nicotine patch on admission; follow-up with primary clinic provider     HOLD prior to admission naltrexone, reassess at discharge; CD consult as  "above    Nutrition consult to assess nutritional status    AM labs as ordered with CBC, procalcitonin, basic, magnesium, phosphorus, liver profile    Hyperlipidemia    HOLD prior to admission lovastatin for now; monitor liver function tests (LFTs), reassess at discharge    Gastroesophageal reflux disease (GERD)    Continue prior to admission omeprazole    Generalized anxiety disorder  Chronic insomnia    Continue prior to admission Cymbalta and mirtazapine (Remeron)    Comfort and reassurance offered at the bedside; abstinence from alcohol discussed and recommended as well as smoking cessation    History of subclinical hypothyroidism  History of pseudogout  History of asthma    Above problems stable, listed in medical history; monitor, follow-up with primary clinic provider    Last TSH 2.64 on 3/26/23    Chest x-ray on admission as above    Disposition    Estimated length of stay 2 to 3 days    Anticipated discharge to home    Social work consult; CD consult    Change in hospitalist provider tomorrow with one of my [facility] hospitalist colleagues assuming care.       Diet: Combination Diet Regular Diet AdultRegular  DVT Prophylaxis: Pneumatic Compression Devices  Retana Catheter: Not present  Lines: None     Cardiac Monitoring: ACTIVE order. Indication: acute alcohol withdrawal  Code Status: Full Code FULL CODE status, confirmed on admission    Clinically Significant Risk Factors Present on Admission                                Disposition Plan      Expected Discharge Date: 07/13/2023                  Clay Degroot MD  Hospitalist Service  [facility]  Securely message with [employer] (more info)  Text page via [employer] Paging/Directory     ______________________________________________________________________    Chief Complaint   Tremor, concerns of acute alcohol withdrawal, history of alcohol abuse, \"I was scared\"    History is obtained from the patient, her  at the " "bedside, ER provider, review of online medical record     History of Present Illness   Shannan Koenig is a 72 year old female who presents with tremor and concerns of acute alcohol withdrawal.  Past medical history outlined above including history of alcohol abuse dating back many years.  When patient asked if she considers  herself an alcoholic she states \"absolutely\".  Daily drinking alcohol since at least April 2023.  Recent hospitalization 4/5/2023 through 4/8/202323 for gastrointestinal hemorrhage related to acute diverticulitis, see dismissal summary for details.  Since April drinking wine, up to 10 glasses/day, between 6 and 8 ounces each by report.  Denies other alcohol use.  Reports use of naloxone in the past, started approximately March 2023 per patient.  Alcohol related problems for several years but in the last year it has been \"out of control\".  This morning woke up at 5:30 AM and had a couple coffee and at 6 AM began drinking wine.  Due to increased shakiness and stating \"I was scared\" presented the emergency room for help and concerns of a developing acute alcohol withdrawal.  Denies history of severe alcohol withdrawal such as seizure or respiratory failure requiring ventilator.  No fever, chills, or sweats.  Denies chest pain or dyspnea.  Mild palpitations.  Weight reported as stable and stable appetite.  Current smoker, 10 cigarettes/day with intermittent cough, mildly productive of yellowish sputum.  Mild leukocytosis noted on admission.    In emergency room, mildly elevated blood pressure and tachycardia, afebrile.  Blood work showed mild leukocytosis, WBC 14,100 with normal hemoglobin and platelets.  Mildly elevated alkaline phosphatase, 141 with normal AST, ALT, and bilirubin.  Glucose 72.  Normal sodium, potassium, and serum creatinine.  Elevated magnesium, 2.6, with elevated anion gap, 16.  EKG reviewed showing sinus tachycardia, heart rate 111 bpm.  Chest x-ray requested and PENDING at time " of dictation.  Hospital admission for concerns of acute alcohol withdrawal with tremor on presentation.    Past Medical History    Past Medical History:   Diagnosis Date     Anxiety      Infection due to 2019 novel coronavirus 2020     Infection due to 2019 novel coronavirus 2022     Substance abuse (H)        Past Surgical History   Past Surgical History:   Procedure Laterality Date     APPENDECTOMY  2020      SECTION      No date given     COLONOSCOPY  2022    mpression: Two 5 - 8 mm polyps in distal ascending colon.  Patent partial cecectomy anastomosis.  Exam otherwise normal.     COLONOSCOPY  06/10/2016    Every 5 years for family history     COLONOSCOPY  2001     EGD  2023    GIB, small HH otherwise normal     ESOPHAGOSCOPY, GASTROSCOPY, DUODENOSCOPY (EGD), COMBINED N/A 2023    Procedure: Esophagoscopy, gastroscopy, duodenoscopy (EGD), combined;  Surgeon: Dinora Bragg MD;  Location: [facility]     EXTRACAPSULAR CATARACT EXTRATION WITH INTRAOCULAR LENS IMPLANT Right 2017     EXTRACAPSULAR CATARACT EXTRATION WITH INTRAOCULAR LENS IMPLANT Left 10/19/2017     FETAL BLOOD TRANSFUSION      No date given     TONSILLECTOMY      No date given     TUBAL LIGATION      No date given     WISDOM TOOTH EXTRACTION      No date given   -wisdom tooth     Patient Active Problem List    Diagnosis Date Noted     Alcohol withdrawal, uncomplicated (H) 2023     Priority: Medium     Gastrointestinal hemorrhage, unspecified gastrointestinal hemorrhage type 2023     Priority: Medium     GI bleed 2023     Priority: Medium     Alcohol withdrawal syndrome without complication (H) 2023     Priority: Medium     Smoking 1/2 pack a day or less 2022     Priority: Medium     Subclinical hypothyroidism 2022     Priority: Medium     Generalized anxiety disorder 2022     Priority: Medium     Thickened endometrium 2022     Priority: Medium     Urge  incontinence of urine 05/20/2022     Priority: Medium     Age-related osteoporosis without current pathological fracture 01/16/2020     Priority: Medium     Eczema 10/16/2017     Priority: Medium     Genital HSV 10/16/2017     Priority: Medium     GERD (gastroesophageal reflux disease) 10/16/2017     Priority: Medium     MVP (mitral valve prolapse) 10/16/2017     Priority: Medium     Chronic insomnia 10/16/2017     Priority: Medium     Dyslipidemia 03/20/2013     Priority: Medium     Prior to Admission Medications   Prior to Admission Medications   Prescriptions Last Dose Informant Patient Reported? Taking?   DULoxetine (CYMBALTA) 30 MG capsule 7/10/2023 at AM  No Yes   Sig: Take 1 capsule (30 mg) by mouth daily   Patient taking differently: Take 60 mg by mouth daily   acyclovir (ZOVIRAX) 400 MG tablet Unknown at prn  No Yes   Sig: Take 1 tablet (400 mg) by mouth daily   Patient taking differently: Take 400 mg by mouth daily as needed   clobetasol (TEMOVATE) 0.05 % external cream Unknown at prn  No Yes   Sig: Apply topically 2 times daily as needed   folic acid (FOLVITE) 1 MG tablet 7/10/2023 at AM  No Yes   Sig: Take 1 tablet (1 mg) by mouth daily   lovastatin (MEVACOR) 40 MG tablet 7/10/2023 at HS Self No Yes   Sig: TAKE 1 TABLET BY MOUTH AT  BEDTIME   mirtazapine (REMERON) 15 MG tablet 7/10/2023 at HS Self No Yes   Sig: TAKE 1 TABLET BY MOUTH AT  BEDTIME   multivitamin w/minerals (THERA-VIT-M) tablet 7/10/2023 at AM  No Yes   Sig: Take 1 tablet by mouth daily   naltrexone (DEPADE/REVIA) 50 MG tablet 7/10/2023 at AM  No Yes   Sig: Take 1 tablet (50 mg) by mouth daily   omeprazole (PRILOSEC) 40 MG DR capsule 7/10/2023 at AM  No Yes   Sig: Take 1 capsule (40 mg) by mouth daily   thiamine (B-1) 100 MG tablet 7/10/2023 at AM  No Yes   Sig: Take 1 tablet (100 mg) by mouth daily   vitamin B-12 (CYANOCOBALAMIN) 1000 MCG tablet 7/10/2023 at AM  No Yes   Sig: Take 1 tablet (1,000 mcg) by mouth daily       Facility-Administered Medications: None        Review of Systems    Review of systems otherwise negative and per HPI above    Social History   I have reviewed this patient's social history and updated it with pertinent information if needed.  Social History     Tobacco Use     Smoking status: Every Day     Packs/day: 0.50     Types: Cigarettes     Smokeless tobacco: Never     Tobacco comments:     smoking ~ 5 cig/day   Vaping Use     Vaping Use: Never used   Substance Use Topics     Alcohol use: Not Currently     Comment: 5-6 glasses of wine each day along with hard etoh     Drug use: Never     Alcohol intake since April 2023 averaging 10 glasses of wine per day, between 6 and 8 ounces each  Current smoker, 10 cigarettes/day, not currently interested in smoking cessation  , lives with her     Family History   I have reviewed this patient's family history and updated it with pertinent information if needed.  Family History   Problem Relation Age of Onset     Arthritis Mother      Breast Cancer Mother      Depression Mother      Dementia Mother      Alcoholism Father      Allergic rhinitis Father      Arthritis Father      Colon Cancer Father      Depression Father      Hypertension Father      Hyperlipidemia Father      Kidney Disease Father      Obesity Father      Colon Cancer Sister      Hypertension Sister      Bleeding Disorder Brother      Seizure Disorder Brother      Reports sister with prior history of stroke and alcohol related problems    Allergies   Allergies   Allergen Reactions     Codeine Nausea and Vomiting     Amoxicillin-Pot Clavulanate Nausea and Vomiting     Latex Dermatitis, Rash and Swelling     Puffy weepy red eyes, nasal congestion and sneezing       Nitrofurantoin Rash        Physical Exam   Vital Signs: Temp: 98.5  F (36.9  C) Temp src: Temporal BP: (!) 153/96 Pulse: (!) 131   Resp: 18 SpO2: 96 % O2 Device: None (Room air)    Weight: 106 lbs 0 oz    GENERAL awake and alert,  lying in bed, mildly anxious, appearing comfortable, by her   HEAD normocephalic, atraumatic  EYES pupils equal, round, reactive to light; no conjunctival injection or jaundice  ENT mucous membranes mildly dry; no lesions or exudates  LYMPH no cervical, submandibular, supraclavicular, or axillary adenopathy  BACK normal  LUNGS moderate inspiratory effort; breath sounds symmetric; no wheezes, crackles, or rhonchi; congested cough noted with deep inspiratory efforts  HEART S1,S2 with regular rate and rhythm; no rubs or gallops  ABDOMEN soft, non-tender to palpation; no guarding, rebound, or rigidity; no palpable masses or organomegaly  MUSCULOSKELETAL range of motion of joints intact upper and lower extremities; no gross joint deformities; no calf pain or tenderness on palpation; extremities without edema  PULSES dorsalis pedis pulses present, symmetric  SKIN warm and dry  NEURO moves upper and lower extremities spontaneously and to command; no focal weakness appreciated; sensation intact to light touch upper and lower extremities  PSYCHIATRIC awake and alert; answers questions and follows simple commands    Medical Decision Making             Data     I have personally reviewed the following data over the past 24 hrs:    14.1 (H)  \   14.6   / 312     142 103 7.4 (L) /  72   4.5 23 0.58 \       ALT: 19 AST: 27 AP: 141 (H) TBILI: 0.2   ALB: 4.7 TOT PROTEIN: 7.9 LIPASE: N/A       Imaging results reviewed over the past 24 hrs:   No results found for this or any previous visit (from the past 24 hour(s)).

## 2023-07-11 NOTE — ED NOTES
Tele-PIT/Intake Evaluation      Video-Visit Details    Type of service:  Video Visit    Video Start Time (time video started): 11:32 AM  Video End Time (time video stopped): 11:36 AM   Originating Location (pt. Location): [facility]  Distant Location (provider location):  [facility]  Mode of Communication:  Video Conference via [employer]  Patient verbally consented to [place] televisit.    History:  73 yo F with hx alcohol abuse.  10 glasses of wine per day.  Wants to quit.  Last drink this morning.  Denies physical symptoms.      Exam:  Physical Exam  Vitals and nursing note reviewed.   Constitutional:       General: She is not in acute distress.     Appearance: She is not ill-appearing.   HENT:      Head: Normocephalic and atraumatic.      Right Ear: External ear normal.      Left Ear: External ear normal.      Nose: Nose normal.   Eyes:      Extraocular Movements: Extraocular movements intact.      Conjunctiva/sclera: Conjunctivae normal.   Pulmonary:      Effort: Pulmonary effort is normal. No respiratory distress.   Musculoskeletal:         General: No deformity or signs of injury.   Skin:     Coloration: Skin is not pale.      Findings: No rash.   Neurological:      Mental Status: She is alert.   Psychiatric:         Behavior: Behavior normal.         Patient Vitals for the past 24 hrs:   BP Temp Temp src Pulse Resp SpO2 Height Weight   07/11/23 1125 (!) 153/96 98.5  F (36.9  C) Temporal (!) 131 18 96 % 1.524 m (5') 48.1 kg (106 lb)       Appropriate interventions for symptom management were initiated if applicable.  Appropriate diagnostic tests were initiated if indicated.    Important information for subsequent clinician:  Labs ordered.      I briefly evaluated the patient and developed an initial plan of care. I discussed this plan and explained that this brief interaction does not constitute a full evaluation. Patient/family understands that they should wait to be fully evaluated  and discuss any test results with another clinician prior to leaving the hospital.     Mark Mari MD  07/11/23 0370

## 2023-07-11 NOTE — ED TRIAGE NOTES
Pt states she is seeking medical help for alcohol abuse. Pt states she is a daily drinker, 10 glasses of wine daily. Last drink this morning.

## 2023-07-12 LAB
ALBUMIN SERPL BCG-MCNC: 3.7 G/DL (ref 3.5–5.2)
ALP SERPL-CCNC: 117 U/L (ref 35–104)
ALT SERPL W P-5'-P-CCNC: 12 U/L (ref 0–50)
ANION GAP SERPL CALCULATED.3IONS-SCNC: 12 MMOL/L (ref 7–15)
AST SERPL W P-5'-P-CCNC: 17 U/L (ref 0–45)
BASOPHILS # BLD AUTO: 0.1 10E3/UL (ref 0–0.2)
BASOPHILS NFR BLD AUTO: 1 %
BILIRUB DIRECT SERPL-MCNC: <0.2 MG/DL (ref 0–0.3)
BILIRUB SERPL-MCNC: 0.6 MG/DL
BUN SERPL-MCNC: 5.7 MG/DL (ref 8–23)
CALCIUM SERPL-MCNC: 8.5 MG/DL (ref 8.8–10.2)
CHLORIDE SERPL-SCNC: 104 MMOL/L (ref 98–107)
CREAT SERPL-MCNC: 0.58 MG/DL (ref 0.51–0.95)
DEPRECATED HCO3 PLAS-SCNC: 20 MMOL/L (ref 22–29)
EOSINOPHIL # BLD AUTO: 0.1 10E3/UL (ref 0–0.7)
EOSINOPHIL NFR BLD AUTO: 1 %
ERYTHROCYTE [DISTWIDTH] IN BLOOD BY AUTOMATED COUNT: 14.6 % (ref 10–15)
GFR SERPL CREATININE-BSD FRML MDRD: >90 ML/MIN/1.73M2
GLUCOSE SERPL-MCNC: 105 MG/DL (ref 70–99)
HCT VFR BLD AUTO: 37.7 % (ref 35–47)
HGB BLD-MCNC: 12.3 G/DL (ref 11.7–15.7)
IMM GRANULOCYTES # BLD: 0.1 10E3/UL
IMM GRANULOCYTES NFR BLD: 1 %
LYMPHOCYTES # BLD AUTO: 2 10E3/UL (ref 0.8–5.3)
LYMPHOCYTES NFR BLD AUTO: 15 %
MAGNESIUM SERPL-MCNC: 2 MG/DL (ref 1.7–2.3)
MCH RBC QN AUTO: 29.1 PG (ref 26.5–33)
MCHC RBC AUTO-ENTMCNC: 32.6 G/DL (ref 31.5–36.5)
MCV RBC AUTO: 89 FL (ref 78–100)
MONOCYTES # BLD AUTO: 1.3 10E3/UL (ref 0–1.3)
MONOCYTES NFR BLD AUTO: 10 %
NEUTROPHILS # BLD AUTO: 9.8 10E3/UL (ref 1.6–8.3)
NEUTROPHILS NFR BLD AUTO: 72 %
NRBC # BLD AUTO: 0 10E3/UL
NRBC BLD AUTO-RTO: 0 /100
PHOSPHATE SERPL-MCNC: 2.5 MG/DL (ref 2.5–4.5)
PLATELET # BLD AUTO: 263 10E3/UL (ref 150–450)
POTASSIUM SERPL-SCNC: 3.6 MMOL/L (ref 3.4–5.3)
PROCALCITONIN SERPL IA-MCNC: 2.16 NG/ML
PROT SERPL-MCNC: 6 G/DL (ref 6.4–8.3)
RBC # BLD AUTO: 4.23 10E6/UL (ref 3.8–5.2)
SODIUM SERPL-SCNC: 136 MMOL/L (ref 136–145)
WBC # BLD AUTO: 13.4 10E3/UL (ref 4–11)

## 2023-07-12 PROCEDURE — 258N000003 HC RX IP 258 OP 636: Performed by: HOSPITALIST

## 2023-07-12 PROCEDURE — 36415 COLL VENOUS BLD VENIPUNCTURE: CPT | Performed by: HOSPITALIST

## 2023-07-12 PROCEDURE — 84145 PROCALCITONIN (PCT): CPT | Performed by: HOSPITALIST

## 2023-07-12 PROCEDURE — 85014 HEMATOCRIT: CPT | Performed by: HOSPITALIST

## 2023-07-12 PROCEDURE — 84100 ASSAY OF PHOSPHORUS: CPT | Performed by: HOSPITALIST

## 2023-07-12 PROCEDURE — 250N000013 HC RX MED GY IP 250 OP 250 PS 637: Performed by: HOSPITALIST

## 2023-07-12 PROCEDURE — 82248 BILIRUBIN DIRECT: CPT | Performed by: HOSPITALIST

## 2023-07-12 PROCEDURE — 83735 ASSAY OF MAGNESIUM: CPT | Performed by: HOSPITALIST

## 2023-07-12 PROCEDURE — 99232 SBSQ HOSP IP/OBS MODERATE 35: CPT | Performed by: INTERNAL MEDICINE

## 2023-07-12 PROCEDURE — 120N000001 HC R&B MED SURG/OB

## 2023-07-12 RX ADMIN — ACETAMINOPHEN 650 MG: 325 TABLET, FILM COATED ORAL at 07:05

## 2023-07-12 RX ADMIN — FOLIC ACID 1 MG: 1 TABLET ORAL at 07:05

## 2023-07-12 RX ADMIN — THIAMINE HCL TAB 100 MG 100 MG: 100 TAB at 07:05

## 2023-07-12 RX ADMIN — ACETAMINOPHEN 650 MG: 325 TABLET, FILM COATED ORAL at 02:29

## 2023-07-12 RX ADMIN — MIRTAZAPINE 15 MG: 15 TABLET, FILM COATED ORAL at 20:08

## 2023-07-12 RX ADMIN — MULTIPLE VITAMINS W/ MINERALS TAB 1 TABLET: TAB at 07:05

## 2023-07-12 RX ADMIN — PANTOPRAZOLE SODIUM 40 MG: 40 TABLET, DELAYED RELEASE ORAL at 07:05

## 2023-07-12 RX ADMIN — ACETAMINOPHEN 650 MG: 325 TABLET, FILM COATED ORAL at 13:17

## 2023-07-12 RX ADMIN — DULOXETINE HYDROCHLORIDE 60 MG: 60 CAPSULE, DELAYED RELEASE ORAL at 07:05

## 2023-07-12 RX ADMIN — SODIUM CHLORIDE: 9 INJECTION, SOLUTION INTRAVENOUS at 06:56

## 2023-07-12 RX ADMIN — ACETAMINOPHEN 650 MG: 325 TABLET, FILM COATED ORAL at 18:45

## 2023-07-12 RX ADMIN — CYANOCOBALAMIN TAB 1000 MCG 1000 MCG: 1000 TAB at 07:05

## 2023-07-12 ASSESSMENT — ACTIVITIES OF DAILY LIVING (ADL)
ADLS_ACUITY_SCORE: 22
ADLS_ACUITY_SCORE: 20
ADLS_ACUITY_SCORE: 22
ADLS_ACUITY_SCORE: 20

## 2023-07-12 NOTE — PLAN OF CARE
Summary:  ETOH withdrawal  DATE & TIME: 07/11 1900 - 0700  Cognitive Concerns/ Orientation : A&Ox4  BEHAVIOR & AGGRESSION TOOL COLOR: green  CIWA SCORE: 5, 2, 3  ABNL VS/O2: vss, afebrile  MOBILITY: sba  PAIN MANAGMENT: Consistent neck pain/stiffness. Alternating ice/heat and PRN tylenol.   DIET: regular  BOWEL/BLADDER: Continent  ABNL LAB/BG:   DRAIN/DEVICES: PIVx1  TELEMETRY RHYTHM: Tachy  SKIN: WDL  TESTS/PROCEDURES: None this shift  D/C DAY/GOALS/PLACE: Pending  OTHER IMPORTANT INFO:

## 2023-07-12 NOTE — PROGRESS NOTES
[facility]    Medicine Progress Note - Hospitalist Service    Date of Admission:  7/11/2023    Assessment & Plan      72 year old female  Past medical history of alcohol abuse, generalized anxiety disorder, gastroesophageal reflux disease, GI bleed (hospitalized 4/2023), diverticulitis, nicotine dependence, hyperlipidemia, chronic insomnia, asthma, subclinical hypothyroidism, pseudogout.  Admitted with tremors and concerns of acute alcohol withdrawal.    Acute alcohol withdrawal, tremor on admission  History of alcohol abuse  Leukocytosis, indeterminate  Intermittent, productive cough  History of nicotine dependence, current smoker  Mild anion gap on admission, 16  Tachycardia  Mildly elevated alkaline phosphatase  Mild hypermagnesemia  History of GI bleed, hospitalized 4/2023, see discharge summary  Longstanding history of alcohol abuse, daily drinking since April 2023.  Increased tremor morning of admission with palpitations and desire to quit drinking.  Presented to the emergency room for help.  Mild leukocytosis and elevated alkaline phosphatase with mildly elevated anion gap on labs.  Chest x-ray ordered, EKG performed.      Admit inpatient for acute alcohol withdrawal    Monitor for signs/symptoms of acute alcohol withdrawal, follow [facility] protocol (see hospital orders); diazepam (Valium) given in ER on admission    Thiamine, folate, MVI per protocol    Chemical dependency (CD) consult to assess alcohol use and treatment options; social work also consulted     monitor electrolytes    Mild leukocytosis and intermittent cough on admission, rule out respiratory infection; chest x-ray with PA/Lat views requested and nml; recheck AM WBC    Smoking cessation discussed and encouraged; not interested in nicotine patch on admission; follow-up with primary clinic provider     HOLD prior to admission naltrexone, reassess at discharge; CD consult as above    Nutrition consult to assess nutritional  status    Hyperlipidemia    HOLD prior to admission lovastatin for now; monitor liver function tests (LFTs), reassess at discharge    Gastroesophageal reflux disease (GERD)    Continue prior to admission omeprazole    Generalized anxiety disorder  Chronic insomnia    Continue prior to admission Cymbalta and mirtazapine (Remeron)    Comfort and reassurance offered at the bedside; abstinence from alcohol discussed and recommended as well as smoking cessation    History of subclinical hypothyroidism  History of pseudogout  History of asthma    Above problems stable, listed in medical history; monitor, follow-up with primary clinic provider    Last TSH 2.64 on 3/26/23      Disposition    Estimated length of stay 2 to 3 days    Anticipated discharge to home    Social work consult; CD consult           Diet: Combination Diet Regular Diet Adult    DVT Prophylaxis: Pneumatic Compression Devices  Retana Catheter: Not present  Lines: None     Cardiac Monitoring: ACTIVE order. Indication: acute alcohol withdrawal  Code Status: Full Code      Clinically Significant Risk Factors Present on Admission                                Disposition Plan     Expected Discharge Date: 07/14/2023                  Ar Salazar MD  Hospitalist Service  [facility]  Securely message with [email or web address] (more info)  Text page via [email or web address] Paging/Directory   ______________________________________________________________________    Interval History     No fevers/chills. + loose stools. No abdominal pain.    Physical Exam   Vital Signs: Temp: 99.3  F (37.4  C) Temp src: Oral BP: 114/76 Pulse: 89   Resp: 18 SpO2: 94 % O2 Device: None (Room air)    Weight: 106 lbs 0 oz    Gen - AAO x 3 in NAD.  Lungs - CTA B.  Heart - RR,S1+S2 nml, no m/g/r.  Abd - soft, NT, ND, + BS.  Ext - no edema.    Medical Decision Making     40 MINUTES SPENT BY ME on the date of service doing chart review, history, exam, documentation & further activities  per the note.      Data     I have personally reviewed the following data over the past 24 hrs:    13.4 (H)  \   12.3   / 263     136 104 5.7 (L) /  105 (H)   3.6 20 (L) 0.58 \       ALT: 12 AST: 17 AP: 117 (H) TBILI: 0.6   ALB: 3.7 TOT PROTEIN: 6.0 (L) LIPASE: N/A       Procal: 2.16 (H) CRP: N/A Lactic Acid: 1.2         Imaging results reviewed over the past 24 hrs:   Recent Results (from the past 24 hour(s))   XR Chest 2 Views    Narrative    EXAM: XR CHEST 2 VIEWS  LOCATION: [facility]  DATE: 7/11/2023    INDICATION: cough, smoker, leukocytosis  COMPARISON: 09/13/2022      Impression    IMPRESSION: Lungs are clear. No pleural effusion. Heart size and pulmonary vascularity within normal limits.

## 2023-07-12 NOTE — PLAN OF CARE
Goal Outcome Evaluation:      Plan of Care Reviewed With: patient    Overall Patient Progress: no change    DATE & TIME: 7/12/23 2146-2329  Cognitive Concerns/ Orientation : A&O x4  BEHAVIOR & AGGRESSION TOOL COLOR: green  CIWA SCORE: 2  ABNL VS/O2: VSS on RA  MOBILITY: SBA, steady gait   PAIN MANAGMENT: Consistent neck pain/stiffness. PRN Tylenol and heat applied with some relief. Improving with sitting in the chair  DIET: Regular  BOWEL/BLADDER: Continent  ABNL LAB/BG: WBC 13.4, Procal 2.16  DRAIN/DEVICES: PIV infiltrated, awaiting new. Fluids discontinued  TELEMETRY RHYTHM: NSR  SKIN: WDL  TESTS/PROCEDURES: None this shift  D/C DAY/GOALS/PLACE: Likely tomorrow  OTHER IMPORTANT INFO: Seizure precautions maintained. Chem dep consulted

## 2023-07-12 NOTE — UTILIZATION REVIEW
Admission Status; Secondary Review Determination    Under the authority of the Utilization Management Committee, the utilization review process indicated a secondary review on the above patient. The review outcome is based on review of the medical records, discussions with staff, and applying clinical experience noted on the date of the review.    (x) Inpatient Status Appropriate - This patient's medical care is consistent with medical management for inpatient care and reasonable inpatient medical practice.    RATIONALE FOR DETERMINATION: 72-year-old female with significant alcoholism, generalized anxiety disorder, hospitalized for GI bleed in April 2023, chronic insomnia, asthma, nicotine dependence, GERD who presents to hospital with significant tremors, tachycardia with concerns for acute alcohol withdrawal.  Patient found to have leukocytosis, recent cough with normal chest x-ray and requiring initial benzodiazepines 10 mg intravenously in the emergency room followed by 1 mg lorazepam.  Further complicating this is patient had persistent leukocytosis on hospital day 2 with a significant elevated procalcitonin level of 2.16 considered high risk for progression of severe sepsis.  Therefore patient unsafe for discharge and requires a minimum 2 nights in the hospital for this acute illness appropriate for inpatient care.    At the time of admission with the information available to the attending physician more than 2 nights Hospital complex care was anticipated, based on patient risk of adverse outcome if treated as outpatient and complex care required. Inpatient admission is appropriate based on the Medicare guidelines.    This document was produced using voice recognition software    The information on this document is developed by the utilization review team in order for the business office to ensure compliance. This only denotes the appropriateness of proper admission status and does not reflect the quality  of care rendered.    The definitions of Inpatient Status and Observation Status used in making the determination above are those provided in the CMS Coverage Manual, Chapter 1 and Chapter 6, section 70.4.    Sincerely,    Jules Wagner MD  Utilization Review  Physician Advisor  [facility].

## 2023-07-12 NOTE — CONSULTS
"CLINICAL NUTRITION SERVICES  -  ASSESSMENT NOTE    Recommendations by Registered Dietitian (RD):   Regular diet  Encourage oral intake - balance meals to include protein, fiber, healthy fat, carb  Continue micronutrients as ordered    Malnutrition:   % Weight Loss:  Up to 7.5% in 3 months (moderate malnutrition)  % Intake:  <75% for > 7 days (moderate malnutrition)  Subcutaneous Fat Loss:  None observed  Muscle Loss:  Temporal region mild depletion  Fluid Retention:  None noted    Malnutrition Diagnosis: Moderate malnutrition  In Context of:  Acute illness or injury  Environmental or social circumstances     REASON FOR ASSESSMENT  Shannan Koenig is a 72 year old female seen by Registered Dietitian for Provider Order - \"alcoholism, acute alcohol withdrawal; assess nutritional status\"    NUTRITION HISTORY  - Information obtained from chart, and patient report.   - Admitted with alcohol withdrawal concern. Has been drinking up to 10 large glasses of wine daily since April.   - \"Weight reported as stable and stable appetite\"     - Pt states that her appetite has been fine. She eats meals BID, either a breakfast or lunch, and dinner.   - Admits that she eats less when she is drinking. During periods of sobriety she would be hungry all the time, but didn't gain weight - noted that she would eat small/frequent meals rather than large ones.   - Suspect consuming <75% of est needs given pt only eats 2 meals/day, and eats less when drinking.   - She has been more stressed the past year, which she thinks is the cause of her weight loss from 120# --> 106#.     CURRENT NUTRITION ORDERS  Diet Order:     Regular     Current Intake/Tolerance:  Ate little for breakfast due to not feeling great and diarrhea, but she already ordered a lunch (egg salad sandwich & iced tea) and is looking forward to it.     NUTRITION FOCUSED PHYSICAL ASSESSMENT FOR DIAGNOSING MALNUTRITION)  Yes         Observed:    Muscle wasting (refer to documentation " "in Malnutrition section)    ANTHROPOMETRICS  Height: 5' 0\"  Weight: 106 lbs 0 oz (48.1 kg)   Body mass index is 20.7 kg/m .  Weight Status:  Normal BMI  IBW: 45.5 kg   % IBW: 106%  Weight History: 7% wt loss in 3 months. Pt reported wt has decreased from 120->106# in the past year.   Wt Readings from Last 10 Encounters:   07/11/23 48.1 kg (106 lb)   04/11/23 51.8 kg (114 lb 4.8 oz)   04/05/23 50.8 kg (112 lb)   03/25/23 51.3 kg (113 lb)   09/21/22 52.2 kg (115 lb)   09/13/22 52.2 kg (115 lb)   09/12/22 51.3 kg (113 lb)   09/06/22 51.3 kg (113 lb)   08/22/22 51.3 kg (113 lb 1.6 oz)   08/20/22 50.8 kg (112 lb)       LABS  Labs reviewed    MEDICATIONS  Medications reviewed  Vitamin B12  Folic Acid  Thiamine   Thera vit M   Remeron - 15 mg q HS  NaCl IVF @ 75 mL/hr     ASSESSED NUTRITION NEEDS PER APPROVED PRACTICE GUIDELINES:  Dosing Weight 48 kg   Estimated Energy Needs: 3210-6071 kcals (25-30 Kcal/Kg)  Justification: maintenance  Estimated Protein Needs: 58-72 grams protein (1.2-1.5 g pro/Kg)  Justification: preservation of lean body mass  Estimated Fluid Needs: 1 mL/kcal   Justification: maintenance    MALNUTRITION:  % Weight Loss:  Up to 7.5% in 3 months (moderate malnutrition)  % Intake:  <75% for > 7 days (moderate malnutrition)  Subcutaneous Fat Loss:  None observed  Muscle Loss:  Temporal region mild depletion  Fluid Retention:  None noted    Malnutrition Diagnosis: Moderate malnutrition  In Context of:  Acute illness or injury  Environmental or social circumstances    NUTRITION DIAGNOSIS:  Malnutrition related to reduced nutrient density of diet w/ excessive alcohol intake as evidenced by weight loss of 7% in 3 months, intakes likely meeting <75% estimated needs.     NUTRITION INTERVENTIONS  Recommendations / Nutrition Prescription  Regular diet  Encourage oral intake - balance meals to include protein, fiber, healthy fat, carb  Continue micronutrients as ordered     Implementation  Nutrition education: Per " Provider order if indicated     Nutrition Goals  Intake of at least 75% adequate trays TID.     MONITORING AND EVALUATION:  Progress towards goals will be monitored and evaluated per protocol and Practice Guidelines    Heidy Brown RD, LD  Pager: [phone]  Weekend Pager: [phone]

## 2023-07-13 VITALS
WEIGHT: 109.7 LBS | HEART RATE: 86 BPM | OXYGEN SATURATION: 94 % | BODY MASS INDEX: 21.54 KG/M2 | RESPIRATION RATE: 20 BRPM | SYSTOLIC BLOOD PRESSURE: 123 MMHG | DIASTOLIC BLOOD PRESSURE: 84 MMHG | TEMPERATURE: 98.2 F | HEIGHT: 60 IN

## 2023-07-13 PROCEDURE — 99239 HOSP IP/OBS DSCHRG MGMT >30: CPT | Performed by: INTERNAL MEDICINE

## 2023-07-13 PROCEDURE — 250N000013 HC RX MED GY IP 250 OP 250 PS 637: Performed by: HOSPITALIST

## 2023-07-13 PROCEDURE — 999N000216 HC STATISTIC ADULT CD FACE TO FACE-NO CHRG

## 2023-07-13 RX ORDER — GABAPENTIN 300 MG/1
300 CAPSULE ORAL ONCE
Qty: 1 CAPSULE | Refills: 0 | Status: SHIPPED | OUTPATIENT
Start: 2023-07-15 | End: 2023-07-20

## 2023-07-13 RX ORDER — GABAPENTIN 300 MG/1
300 CAPSULE ORAL 2 TIMES DAILY
Qty: 2 CAPSULE | Refills: 0 | Status: SHIPPED | OUTPATIENT
Start: 2023-07-14 | End: 2023-07-20

## 2023-07-13 RX ORDER — GABAPENTIN 300 MG/1
300 CAPSULE ORAL 3 TIMES DAILY
Qty: 3 CAPSULE | Refills: 0 | Status: SHIPPED | OUTPATIENT
Start: 2023-07-13 | End: 2023-07-20

## 2023-07-13 RX ADMIN — FOLIC ACID 1 MG: 1 TABLET ORAL at 08:29

## 2023-07-13 RX ADMIN — DULOXETINE HYDROCHLORIDE 60 MG: 60 CAPSULE, DELAYED RELEASE ORAL at 08:29

## 2023-07-13 RX ADMIN — THIAMINE HCL TAB 100 MG 100 MG: 100 TAB at 08:28

## 2023-07-13 RX ADMIN — CYANOCOBALAMIN TAB 1000 MCG 1000 MCG: 1000 TAB at 08:29

## 2023-07-13 RX ADMIN — MULTIPLE VITAMINS W/ MINERALS TAB 1 TABLET: TAB at 08:29

## 2023-07-13 RX ADMIN — ACETAMINOPHEN 650 MG: 325 TABLET, FILM COATED ORAL at 01:36

## 2023-07-13 RX ADMIN — PANTOPRAZOLE SODIUM 40 MG: 40 TABLET, DELAYED RELEASE ORAL at 08:29

## 2023-07-13 ASSESSMENT — ACTIVITIES OF DAILY LIVING (ADL)
ADLS_ACUITY_SCORE: 20
ADLS_ACUITY_SCORE: 22
ADLS_ACUITY_SCORE: 22
ADLS_ACUITY_SCORE: 20

## 2023-07-13 NOTE — PLAN OF CARE
Goal Outcome Evaluation:    Summary:  ETOH withdrawal  DATE & TIME: 7/12/23 5384-7998  Cognitive Concerns/ Orientation : A&O x4  BEHAVIOR & AGGRESSION TOOL COLOR: green  CIWA SCORE: 4, 3 2  ABNL VS/O2: VSS on RA  MOBILITY: SBA, steady gait   PAIN MANAGMENT: Consistent neck pain/stiffness. PRN Tylenol and heat applied- effective  DIET: Regular  BOWEL/BLADDER: Continent  ABNL LAB/BG: WBC 13.4, Procal 2.16  DRAIN/DEVICES: L PIV SL   TELEMETRY RHYTHM: NSR  SKIN: WDL  TESTS/PROCEDURES: None this shift  D/C DAY/GOALS/PLACE: Likely tomorrow  OTHER IMPORTANT INFO: Seizure precautions maintained. Chem dep consulted

## 2023-07-13 NOTE — DISCHARGE SUMMARY
[facility]  Hospitalist Discharge Summary      Date of Admission:  7/11/2023  Date of Discharge:  7/13/2023  Discharging Provider: Ar Salazar MD  Discharge Service: Hospitalist Service    Discharge Diagnoses       Clinically Significant Risk Factors     # Moderate Malnutrition: based on nutrition assessment      Follow-ups Needed After Discharge   Follow-up Appointments     Follow-up and recommended labs and tests       Follow up with primary care provider, Gene Funk, within 7 days for   hospital follow- up.  The following labs/tests are recommended: cbc, cmp.      Follow up with therapist as scheduled.            Unresulted Labs Ordered in the Past 30 Days of this Admission     No orders found from 6/11/2023 to 7/12/2023.          Discharge Disposition   Discharged to home  Condition at discharge: Stable    Hospital Course   72 year old female  Past medical history of alcohol abuse, generalized anxiety disorder, gastroesophageal reflux disease, GI bleed (hospitalized 4/2023), diverticulitis, nicotine dependence, hyperlipidemia, chronic insomnia, asthma, subclinical hypothyroidism, pseudogout.  Admitted with tremors and concerns of acute alcohol withdrawal.    Acute alcohol withdrawal, tremor on admission  History of alcohol abuse  Leukocytosis, indeterminate  Intermittent, productive cough  History of nicotine dependence, current smoker  Mild anion gap on admission, 16  Tachycardia  Mildly elevated alkaline phosphatase  Mild hypermagnesemia  History of GI bleed, hospitalized 4/2023, see discharge summary  Longstanding history of alcohol abuse, daily drinking since April 2023.  Increased tremor morning of admission with palpitations and desire to quit drinking.  Presented to the emergency room for help.  Mild leukocytosis and elevated alkaline phosphatase with mildly elevated anion gap on labs.  Chest x-ray ordered, EKG performed.      Admit inpatient for acute alcohol  withdrawal    Monitor for signs/symptoms of acute alcohol withdrawal, follow CIWA protocol (see hospital orders); diazepam (Valium) given in ER on admission    Thiamine, folate, MVI per protocol    Chemical dependency (CD) consult to assess alcohol use and treatment options; social work also consulted     monitor electrolytes    Mild leukocytosis and intermittent cough on admission, rule out respiratory infection; chest x-ray with PA/Lat views requested and nml; recheck AM WBC    Smoking cessation discussed and encouraged; not interested in nicotine patch on admission; follow-up with primary clinic provider     HOLD prior to admission naltrexone, reassess at discharge; CD consult as above    Nutrition consult to assess nutritional status    Hyperlipidemia    HOLD prior to admission lovastatin for now; monitor liver function tests (LFTs), reassess at discharge    Gastroesophageal reflux disease (GERD)    Continue prior to admission omeprazole    Generalized anxiety disorder  Chronic insomnia    Continue prior to admission Cymbalta and mirtazapine (Remeron)    Comfort and reassurance offered at the bedside; abstinence from alcohol discussed and recommended as well as smoking cessation    History of subclinical hypothyroidism  History of pseudogout  History of asthma    Above problems stable, listed in medical history; monitor, follow-up with primary clinic provider    Last TSH 2.64 on 3/26/23      Admitted as inpatient. Not requiring benzo's. Neck stiffness resolved and gabapentin aiding with w/d. Plan for discharge today and will follow with her therapist as out-pt.          Consultations This Hospital Stay   CHEMICAL DEPENDENCY IP CONSULT  CARE MANAGEMENT / SOCIAL WORK IP CONSULT  NUTRITION SERVICES ADULT IP CONSULT  VASCULAR ACCESS ADULT IP CONSULT    Code Status   Full Code    Time Spent on this Encounter   Ar ORTIZ MD, personally saw the patient today and spent greater than 30 minutes discharging this  patient.       Ar Salazar MD  [place] [address] [facility]  [address] [place] [address]  Phone: [phone]  ______________________________________________________________________    Physical Exam   Vital Signs: Temp: 98.2  F (36.8  C) Temp src: Oral BP: 123/84 Pulse: 86   Resp: 20 SpO2: 94 % O2 Device: None (Room air)    Weight: 109 lbs 11.2 oz    Gen - AAO x 3 in NAD.  Lungs - CTA B.  Heart - RR,S1+S2 nml, no m/g/r.  Abd - soft, NT, ND, + BS.  Ext - no edema.          Primary Care Physician   Gene Funk    Discharge Orders      Follow-up and recommended labs and tests     Follow up with primary care provider, Gene Funk, within 7 days for hospital follow- up.  The following labs/tests are recommended: cbc, cmp.    Follow up with therapist as scheduled.     Activity    Your activity upon discharge: activity as tolerated     Diet    Follow this diet upon discharge: Orders Placed This Encounter      Combination Diet Regular Diet Adult       Significant Results and Procedures   Results for orders placed or performed during the hospital encounter of 07/11/23   XR Chest 2 Views    Narrative    EXAM: XR CHEST 2 VIEWS  LOCATION: [facility]  DATE: 7/11/2023    INDICATION: cough, smoker, leukocytosis  COMPARISON: 09/13/2022      Impression    IMPRESSION: Lungs are clear. No pleural effusion. Heart size and pulmonary vascularity within normal limits.       Discharge Medications   Current Discharge Medication List      START taking these medications    Details   !! gabapentin (NEURONTIN) 300 MG capsule Take 1 capsule (300 mg) by mouth 3 times daily  Qty: 3 capsule, Refills: 0    Associated Diagnoses: Alcohol withdrawal, uncomplicated (H)      !! gabapentin (NEURONTIN) 300 MG capsule Take 1 capsule (300 mg) by mouth 2 times daily  Qty: 2 capsule, Refills: 0    Associated Diagnoses: Alcohol withdrawal, uncomplicated (H)      !! gabapentin (NEURONTIN) 300  MG capsule Take 1 capsule (300 mg) by mouth once for 1 dose  Qty: 1 capsule, Refills: 0    Associated Diagnoses: Alcohol withdrawal, uncomplicated (H)       !! - Potential duplicate medications found. Please discuss with provider.      CONTINUE these medications which have NOT CHANGED    Details   acyclovir (ZOVIRAX) 400 MG tablet Take 1 tablet (400 mg) by mouth daily    Associated Diagnoses: Genital herpes simplex, unspecified site      clobetasol (TEMOVATE) 0.05 % external cream Apply topically 2 times daily as needed    Associated Diagnoses: Eczema, unspecified type      DULoxetine (CYMBALTA) 30 MG capsule Take 1 capsule (30 mg) by mouth daily  Qty: 90 capsule, Refills: 3    Associated Diagnoses: Current moderate episode of major depressive disorder without prior episode (H)      folic acid (FOLVITE) 1 MG tablet Take 1 tablet (1 mg) by mouth daily  Qty: 90 tablet, Refills: 3    Associated Diagnoses: Alcohol withdrawal syndrome without complication (H)      lovastatin (MEVACOR) 40 MG tablet TAKE 1 TABLET BY MOUTH AT  BEDTIME  Qty: 90 tablet, Refills: 1    Comments: Requesting 1 year supply  Associated Diagnoses: Dyslipidemia      mirtazapine (REMERON) 15 MG tablet TAKE 1 TABLET BY MOUTH AT  BEDTIME  Qty: 90 tablet, Refills: 1    Comments: Requesting 1 year supply  Associated Diagnoses: Adjustment disorder with anxious mood; Sleep disturbances      multivitamin w/minerals (THERA-VIT-M) tablet Take 1 tablet by mouth daily  Qty: 90 tablet, Refills: 3    Associated Diagnoses: Alcohol withdrawal syndrome without complication (H)      naltrexone (DEPADE/REVIA) 50 MG tablet Take 1 tablet (50 mg) by mouth daily  Qty: 90 tablet, Refills: 3    Associated Diagnoses: Alcohol withdrawal syndrome without complication (H)      omeprazole (PRILOSEC) 40 MG DR capsule Take 1 capsule (40 mg) by mouth daily  Qty: 90 capsule, Refills: 3    Associated Diagnoses: Gastroesophageal reflux disease without esophagitis      thiamine (B-1)  100 MG tablet Take 1 tablet (100 mg) by mouth daily  Qty: 90 tablet, Refills: 3    Associated Diagnoses: Alcohol withdrawal syndrome without complication (H)      vitamin B-12 (CYANOCOBALAMIN) 1000 MCG tablet Take 1 tablet (1,000 mcg) by mouth daily  Qty: 90 tablet, Refills: 3    Associated Diagnoses: Alcohol withdrawal syndrome without complication (H)           Allergies   Allergies   Allergen Reactions     Codeine Nausea and Vomiting     Amoxicillin-Pot Clavulanate Nausea and Vomiting     Latex Dermatitis, Rash and Swelling     Puffy weepy red eyes, nasal congestion and sneezing       Nitrofurantoin Rash

## 2023-07-13 NOTE — PLAN OF CARE
Pt A&Ox4; VSS; tele SR. CIWA score 0. No seizure activity noted. Denies pain. Tolerating PO. Voiding without difficulties. Discharge orders received/instructions reviewed with pt; all questions answered. A copy of discharge AVS and medication given to pt. All belongings returned to pt. Pt discharged from floor via w/c accompanied by NA. Family providing transportation to home.

## 2023-07-13 NOTE — PLAN OF CARE
Summary:  ETOH withdrawal    DATE & TIME: 7/12/23 Night  Cognitive Concerns/ Orientation: Alert/Oriented x 4  BEHAVIOR & AGGRESSION TOOL COLOR: green  CIWA SCORE: 0, 0  ABNL VS/O2: VSS, room air  MOBILITY: SBA, steady gait   PAIN MANAGMENT: Chronic neck pain/stiffness--PRN Tylenol and heat applied- effective  DIET: Regular  BOWEL/BLADDER: Continent  ABNL LAB/BG: BUN 5.7; Alk phos 117; procalcitonin 2.16; WBC 13.4  DRAIN/DEVICES: L PIV saline locked   TELEMETRY RHYTHM: NSR  SKIN: WDL  TESTS/PROCEDURES: None   D/C DAY/GOALS/PLACE: Possibly 07/13  OTHER IMPORTANT INFO: Seizure precautions maintained; Chem dep/  SW consults

## 2023-07-13 NOTE — CONSULTS
7/13/2023    Met with pt via telephone to offer CD services. Pt reports she has a therapist that she meets with weekly and intends to continue with her scheduled appointments. Pt reports she has a AA meeting in her area she has been to and plans to attend daily. Pt reports she does not need any resources or referrals at this time. Pt is able to follow up with her current provider and sober support meetings.     Alcoholics Anonymous  http://www.aa.org  Community Drug & Alcohol Support Resources   Alcoholics Anonymous   24/7 Phone Line: [phone]   https://aam[place].org/   For additional list of online meetings: http://aa-intergroup.org     SUDHIR Dos Santos  Phone: [phone]  Email: [email or web address]

## 2023-07-18 ENCOUNTER — PATIENT OUTREACH (OUTPATIENT)
Dept: INTERNAL MEDICINE | Facility: CLINIC | Age: 73
End: 2023-07-18
Payer: MEDICARE

## 2023-07-18 NOTE — TELEPHONE ENCOUNTER
What type of discharge? Inpatient  Risk of Hospital admission or ED visit: 32%  Is a TCM episode required? No  When should the patient follow up with PCP? 14 days of discharge.    Alana Root RN  [facility]

## 2023-07-18 NOTE — TELEPHONE ENCOUNTER
ED / Discharge Outreach Protocol    Patient Contact    Attempt # 1    Was call answered?  No.  Left message on voicemail with information to call me back.    On call back, please conduct hospital follow up with patient and assist with scheduling if needed.      Regarding: diarrhea   ----- Message from Gloria Rodriguez sent at 2/1/2019  5:21 PM CST -----  Patient Name: Mary Palumbo  Specialist or PCP:Dr. Frankel   Pregnant (If Yes, how long?):n.a   Symptoms:pt has thyroid surgery today, pt is taking calcium carbonate tums and constantly having diarrhea, asking if different meds can be prescribed   Call Back #:[phone]  Is the patient’s permanent residence located in [place], [place], or a compact State? Yes [facility] [address]  Call Center Account #:[id number]

## 2023-07-19 NOTE — TELEPHONE ENCOUNTER
"Appointments in Next Year    Jul 20, 2023  4:00 PM  (Arrive by 3:45 PM)  ED/Hospital Follow Up with Gene Funk MD  [facility] ([facility] [place] [facility] ) [phone]          ED/Discharge Protocol    \"Hi, my name is Ying Rose RN, a registered nurse, and I am calling on behalf of Dr. Funk's office at [facility].  I am calling to follow up and see how things are going for you after your recent visit.\"    \"I see that you were in the (ER/UC/IP) on 7/11-7/13.    How are you doing now that you are home?\" much better     Is patient experiencing symptoms that may require a hospital visit?  No     Discharge Instructions    \"Let's review your discharge instructions.  What is/are the follow-up recommendations?  Pt. Response: follow up with PCP     \"Were you instructed to make a follow-up appointment?\"  Pt. Response: Yes.  Has appointment been made?   No.  \"Can I help you schedule that appointment?\" Yes       \"When you see the provider, I would recommend that you bring your discharge instructions with you.    Medications    \"How many new medications are you on since your hospitalization/ED visit?\"    0-1  \"How many of your current medicines changed (dose, timing, name, etc.) while you were in the hospital/ED visit?\"   0-1  \"Do you have questions about your medications?\"   No  \"Were you newly diagnosed with heart failure, COPD, diabetes or did you have a heart attack?\"   No  For patients on insulin: \"Did you start on insulin in the hospital or did you have your insulin dose changed?\"   No  Post Discharge Medication Reconciliation Status: discharge medications reconciled, continue medications without change.    Was MTM referral placed (*Make sure to put transitions as reason for referral)?   No    Call Summary    \"Do you have any questions or concerns about your condition or care plan at the moment?\"    N    Patient was in ER 5x in the past year (assess " "appropriateness of ER visits.)      \"If you have questions or things don't continue to improve, we encourage you contact us through the main clinic number,  ([phone])  .  Even if the clinic is not open, triage nurses are available 24/7 to help you.     We would like you to know that our clinic has extended hours (provide information).  We also have urgent care (provide details on closest location and hours/contact info)\"      \"Thank you for your time and take care!\"    Ying CEDEÑO Triage RN  [facility]     "

## 2023-07-20 ENCOUNTER — VIRTUAL VISIT (OUTPATIENT)
Dept: INTERNAL MEDICINE | Facility: CLINIC | Age: 73
End: 2023-07-20
Payer: MEDICARE

## 2023-07-20 DIAGNOSIS — F10.930 ALCOHOL WITHDRAWAL SYNDROME WITHOUT COMPLICATION (H): ICD-10-CM

## 2023-07-20 DIAGNOSIS — Z09 ENCOUNTER FOR EXAMINATION FOLLOWING TREATMENT AT HOSPITAL: Primary | ICD-10-CM

## 2023-07-20 PROCEDURE — 99213 OFFICE O/P EST LOW 20 MIN: CPT | Mod: 95 | Performed by: INTERNAL MEDICINE

## 2023-07-20 RX ORDER — DULOXETIN HYDROCHLORIDE 60 MG/1
60 CAPSULE, DELAYED RELEASE ORAL DAILY
COMMUNITY
Start: 2023-06-05

## 2023-07-20 NOTE — PROGRESS NOTES
"Aileen is a 72 year old who is being evaluated via a billable video visit.      How would you like to obtain your AVS? MyChart  If the video visit is dropped, the invitation should be resent by: Text to cell phone: [phone]  Will anyone else be joining your video visit? No    Assessment & Plan   Encounter for examination following treatment at hospital  Alcohol withdrawal syndrome without complication (H)  Aileen has thankfully been able to maintain her sobriety since the hospital stay - congratulated her! Discussed her appetite which hasn't quite returned but she is doing her best to maintain her caloric intake. Discussed strategies on how to do that. Recommend recheck labs at next in-person visit. Biggest thing moving forward is maintaining sobriety.    MED REC REQUIRED  Post Medication Reconciliation Status:  Discharge medications reconciled, continue medications without change    Gene Berman MD  [facility]    Subjective   Aileen is a 72 year old who presents for a virtual video visit with chief concern of:  Hospital F/U    [facility]   Hospital Follow-up Visit:  Hospital/Nursing Home/IP Rehab Facility: [facility]  Date of Admission: 07/11/2023  Date of Discharge: 07/13/2023  Reason(s) for Admission:     Was your hospitalization related to COVID-19? No   Problems taking medications regularly:  None  Medication changes since discharge: None  Problems adhering to non-medication therapy:  None    Summary of hospitalization: [facility] discharge summary reviewed  Diagnostic Tests/Treatments reviewed.  Follow up needed: none  Other Healthcare Providers Involved in Patient s Care:         None  Update since discharge: improved.   Plan of care communicated with patient    I am seeing Aileen today for follow-up on a recent hospitalization. The summary of the hospitalization from the relevant discharge summary is as follows:    \"72 year old female " Past medical history of alcohol abuse, generalized anxiety disorder, gastroesophageal reflux disease, GI bleed (hospitalized 4/2023), diverticulitis, nicotine dependence, hyperlipidemia, chronic insomnia, asthma, subclinical hypothyroidism, pseudogout.  Admitted with tremors and concerns of acute alcohol withdrawal.     Acute alcohol withdrawal, tremor on admission  History of alcohol abuse  Leukocytosis, indeterminate  Intermittent, productive cough  History of nicotine dependence, current smoker  Mild anion gap on admission, 16  Tachycardia  Mildly elevated alkaline phosphatase  Mild hypermagnesemia  History of GI bleed, hospitalized 4/2023, see discharge summary  Longstanding history of alcohol abuse, daily drinking since April 2023.  Increased tremor morning of admission with palpitations and desire to quit drinking.  Presented to the emergency room for help.  Mild leukocytosis and elevated alkaline phosphatase with mildly elevated anion gap on labs.  Chest x-ray ordered, EKG performed.       Admit inpatient for acute alcohol withdrawal    Monitor for signs/symptoms of acute alcohol withdrawal, follow [facility] protocol (see hospital orders); diazepam (Valium) given in ER on admission    Thiamine, folate, MVI per protocol    Chemical dependency (CD) consult to assess alcohol use and treatment options; social work also consulted     monitor electrolytes    Mild leukocytosis and intermittent cough on admission, rule out respiratory infection; chest x-ray with PA/Lat views requested and nml; recheck AM WBC    Smoking cessation discussed and encouraged; not interested in nicotine patch on admission; follow-up with primary clinic provider     HOLD prior to admission naltrexone, reassess at discharge; CD consult as above    Nutrition consult to assess nutritional status     Hyperlipidemia    HOLD prior to admission lovastatin for now; monitor liver function tests (LFTs), reassess at discharge     Gastroesophageal reflux  "disease (GERD)    Continue prior to admission omeprazole     Generalized anxiety disorder  Chronic insomnia    Continue prior to admission Cymbalta and mirtazapine (Remeron)    Comfort and reassurance offered at the bedside; abstinence from alcohol discussed and recommended as well as smoking cessation     History of subclinical hypothyroidism  History of pseudogout  History of asthma    Above problems stable, listed in medical history; monitor, follow-up with primary clinic provider    Last TSH 2.64 on 3/26/23     Admitted as inpatient. Not requiring benzo's. Neck stiffness resolved and gabapentin aiding with w/d. Plan for discharge today and will follow with her therapist as out-pt.\"    Today Aileen reports she has been sober since the hospital stay. She is back in AA. She tells me she's trying to repair some relationships in her life now that she's sober again. She has no other concerns.    Review of Systems   Constitutional, psych, pulmonary, gi systems are negative, except as otherwise noted.      Objective       Vitals: No vitals were obtained today due to virtual visit.    Physical Exam   GENERAL: Healthy, alert and no distress  EYES: Eyes grossly normal to inspection.  No discharge or erythema, or obvious scleral/conjunctival abnormalities.  RESP: No audible wheeze, cough, or visible cyanosis.  No visible retractions or increased work of breathing.    SKIN: Visible skin clear. No significant rash, abnormal pigmentation or lesions.  NEURO: Cranial nerves grossly intact.  Mentation and speech appropriate for age.  PSYCH: Mentation appears normal, affect normal/bright, judgement and insight intact, normal speech and appearance well-groomed.    Video-Visit Details  Type of service: Video Visit   Video Start Time: 3:13 PM  Video End Time: 3:20 PM  Originating Location (pt. Location): Home  Distant Location (provider location):  On-site  Platform used for Video Visit: Vijay"

## 2023-08-20 ENCOUNTER — HOSPITAL ENCOUNTER (INPATIENT)
Facility: CLINIC | Age: 73
LOS: 1 days | Discharge: HOME | DRG: 378 | End: 2023-08-21
Attending: EMERGENCY MEDICINE | Admitting: INTERNAL MEDICINE
Payer: MEDICARE

## 2023-08-20 ENCOUNTER — APPOINTMENT (OUTPATIENT)
Dept: CT IMAGING | Facility: CLINIC | Age: 73
DRG: 378 | End: 2023-08-20
Attending: EMERGENCY MEDICINE
Payer: MEDICARE

## 2023-08-20 ENCOUNTER — APPOINTMENT (OUTPATIENT)
Dept: INTERVENTIONAL RADIOLOGY/VASCULAR | Facility: CLINIC | Age: 73
DRG: 378 | End: 2023-08-20
Attending: RADIOLOGY
Payer: MEDICARE

## 2023-08-20 DIAGNOSIS — K92.2 ACUTE LOWER GI BLEEDING: Primary | ICD-10-CM

## 2023-08-20 DIAGNOSIS — K92.2 LOWER GI BLEED: ICD-10-CM

## 2023-08-20 DIAGNOSIS — A60.00 GENITAL HERPES SIMPLEX, UNSPECIFIED SITE: ICD-10-CM

## 2023-08-20 PROBLEM — F17.210 SMOKING 1/2 PACK A DAY OR LESS: Status: ACTIVE | Noted: 2022-09-06

## 2023-08-20 PROBLEM — E03.8 SUBCLINICAL HYPOTHYROIDISM: Status: ACTIVE | Noted: 2022-08-22

## 2023-08-20 LAB
ABO/RH(D): NORMAL
ALBUMIN SERPL BCG-MCNC: 4.2 G/DL (ref 3.5–5.2)
ALP SERPL-CCNC: 90 U/L (ref 35–104)
ALT SERPL W P-5'-P-CCNC: 13 U/L (ref 0–50)
ANION GAP SERPL CALCULATED.3IONS-SCNC: 14 MMOL/L (ref 7–15)
ANTIBODY SCREEN: NEGATIVE
APTT PPP: 28 SECONDS (ref 22–38)
AST SERPL W P-5'-P-CCNC: 20 U/L (ref 0–45)
ATRIAL RATE - MUSE: 131 BPM
BILIRUB SERPL-MCNC: 0.4 MG/DL
BUN SERPL-MCNC: 9.9 MG/DL (ref 8–23)
CALCIUM SERPL-MCNC: 9.8 MG/DL (ref 8.8–10.2)
CHLORIDE SERPL-SCNC: 106 MMOL/L (ref 98–107)
CREAT SERPL-MCNC: 0.71 MG/DL (ref 0.51–0.95)
DEPRECATED HCO3 PLAS-SCNC: 21 MMOL/L (ref 22–29)
DIASTOLIC BLOOD PRESSURE - MUSE: NORMAL MMHG
ERYTHROCYTE [DISTWIDTH] IN BLOOD BY AUTOMATED COUNT: 14.9 % (ref 10–15)
GFR SERPL CREATININE-BSD FRML MDRD: 89 ML/MIN/1.73M2
GLUCOSE SERPL-MCNC: 131 MG/DL (ref 70–99)
HCT VFR BLD AUTO: 39.1 % (ref 35–47)
HGB BLD-MCNC: 10.1 G/DL (ref 11.7–15.7)
HGB BLD-MCNC: 12.6 G/DL (ref 11.7–15.7)
HGB BLD-MCNC: 9.3 G/DL (ref 11.7–15.7)
HGB BLD-MCNC: 9.3 G/DL (ref 11.7–15.7)
INR PPP: 0.93 (ref 0.85–1.15)
INTERPRETATION ECG - MUSE: NORMAL
LACTATE SERPL-SCNC: 1.1 MMOL/L (ref 0.7–2)
LIPASE SERPL-CCNC: 21 U/L (ref 13–60)
MAGNESIUM SERPL-MCNC: 1.9 MG/DL (ref 1.7–2.3)
MCH RBC QN AUTO: 27.6 PG (ref 26.5–33)
MCHC RBC AUTO-ENTMCNC: 32.2 G/DL (ref 31.5–36.5)
MCV RBC AUTO: 86 FL (ref 78–100)
P AXIS - MUSE: 83 DEGREES
PLATELET # BLD AUTO: 276 10E3/UL (ref 150–450)
POTASSIUM SERPL-SCNC: 3.6 MMOL/L (ref 3.4–5.3)
PR INTERVAL - MUSE: 142 MS
PROT SERPL-MCNC: 7 G/DL (ref 6.4–8.3)
QRS DURATION - MUSE: 68 MS
QT - MUSE: 296 MS
QTC - MUSE: 437 MS
R AXIS - MUSE: 92 DEGREES
RBC # BLD AUTO: 4.56 10E6/UL (ref 3.8–5.2)
SODIUM SERPL-SCNC: 141 MMOL/L (ref 136–145)
SPECIMEN EXPIRATION DATE: NORMAL
SYSTOLIC BLOOD PRESSURE - MUSE: NORMAL MMHG
T AXIS - MUSE: 40 DEGREES
TROPONIN T SERPL HS-MCNC: 6 NG/L
VENTRICULAR RATE- MUSE: 131 BPM
WBC # BLD AUTO: 11.1 10E3/UL (ref 4–11)

## 2023-08-20 PROCEDURE — 46600 DIAGNOSTIC ANOSCOPY SPX: CPT

## 2023-08-20 PROCEDURE — 36415 COLL VENOUS BLD VENIPUNCTURE: CPT | Performed by: INTERNAL MEDICINE

## 2023-08-20 PROCEDURE — 250N000011 HC RX IP 250 OP 636: Performed by: EMERGENCY MEDICINE

## 2023-08-20 PROCEDURE — 99223 1ST HOSP IP/OBS HIGH 75: CPT | Mod: AI | Performed by: INTERNAL MEDICINE

## 2023-08-20 PROCEDURE — 75625 CONTRAST EXAM ABDOMINL AORTA: CPT

## 2023-08-20 PROCEDURE — 99291 CRITICAL CARE FIRST HOUR: CPT | Mod: 25

## 2023-08-20 PROCEDURE — 272N000196 HC ACCESSORY CR5

## 2023-08-20 PROCEDURE — 84484 ASSAY OF TROPONIN QUANT: CPT | Performed by: EMERGENCY MEDICINE

## 2023-08-20 PROCEDURE — 85730 THROMBOPLASTIN TIME PARTIAL: CPT | Performed by: EMERGENCY MEDICINE

## 2023-08-20 PROCEDURE — 36247 INS CATH ABD/L-EXT ART 3RD: CPT

## 2023-08-20 PROCEDURE — C9113 INJ PANTOPRAZOLE SODIUM, VIA: HCPCS | Mod: JZ | Performed by: INTERNAL MEDICINE

## 2023-08-20 PROCEDURE — 255N000002 HC RX 255 OP 636: Mod: JZ | Performed by: RADIOLOGY

## 2023-08-20 PROCEDURE — 250N000009 HC RX 250: Performed by: RADIOLOGY

## 2023-08-20 PROCEDURE — 85610 PROTHROMBIN TIME: CPT | Performed by: EMERGENCY MEDICINE

## 2023-08-20 PROCEDURE — 85018 HEMOGLOBIN: CPT | Performed by: INTERNAL MEDICINE

## 2023-08-20 PROCEDURE — 76937 US GUIDE VASCULAR ACCESS: CPT

## 2023-08-20 PROCEDURE — 272N000116 HC CATH CR1

## 2023-08-20 PROCEDURE — 83690 ASSAY OF LIPASE: CPT | Performed by: EMERGENCY MEDICINE

## 2023-08-20 PROCEDURE — 36415 COLL VENOUS BLD VENIPUNCTURE: CPT | Performed by: EMERGENCY MEDICINE

## 2023-08-20 PROCEDURE — 258N000003 HC RX IP 258 OP 636: Performed by: EMERGENCY MEDICINE

## 2023-08-20 PROCEDURE — 75774 ARTERY X-RAY EACH VESSEL: CPT | Mod: XS

## 2023-08-20 PROCEDURE — 250N000011 HC RX IP 250 OP 636: Mod: JZ | Performed by: INTERNAL MEDICINE

## 2023-08-20 PROCEDURE — 37244 VASC EMBOLIZE/OCCLUDE BLEED: CPT

## 2023-08-20 PROCEDURE — 0DJD8ZZ INSPECTION OF LOWER INTESTINAL TRACT, VIA NATURAL OR ARTIFICIAL OPENING ENDOSCOPIC: ICD-10-PCS | Performed by: EMERGENCY MEDICINE

## 2023-08-20 PROCEDURE — 96361 HYDRATE IV INFUSION ADD-ON: CPT

## 2023-08-20 PROCEDURE — 250N000011 HC RX IP 250 OP 636: Mod: JZ | Performed by: RADIOLOGY

## 2023-08-20 PROCEDURE — 80053 COMPREHEN METABOLIC PANEL: CPT | Performed by: EMERGENCY MEDICINE

## 2023-08-20 PROCEDURE — 250N000009 HC RX 250: Performed by: EMERGENCY MEDICINE

## 2023-08-20 PROCEDURE — 96360 HYDRATION IV INFUSION INIT: CPT | Mod: 59

## 2023-08-20 PROCEDURE — 85014 HEMATOCRIT: CPT | Performed by: EMERGENCY MEDICINE

## 2023-08-20 PROCEDURE — 250N000013 HC RX MED GY IP 250 OP 250 PS 637: Performed by: INTERNAL MEDICINE

## 2023-08-20 PROCEDURE — 258N000003 HC RX IP 258 OP 636: Performed by: INTERNAL MEDICINE

## 2023-08-20 PROCEDURE — 36248 INS CATH ABD/L-EXT ART ADDL: CPT

## 2023-08-20 PROCEDURE — C1769 GUIDE WIRE: HCPCS

## 2023-08-20 PROCEDURE — 272N000567 HC SHEATH CR4

## 2023-08-20 PROCEDURE — 85018 HEMOGLOBIN: CPT | Performed by: EMERGENCY MEDICINE

## 2023-08-20 PROCEDURE — 93005 ELECTROCARDIOGRAM TRACING: CPT

## 2023-08-20 PROCEDURE — G1010 CDSM STANSON: HCPCS

## 2023-08-20 PROCEDURE — 83605 ASSAY OF LACTIC ACID: CPT | Performed by: EMERGENCY MEDICINE

## 2023-08-20 PROCEDURE — 83735 ASSAY OF MAGNESIUM: CPT | Performed by: EMERGENCY MEDICINE

## 2023-08-20 PROCEDURE — 120N000001 HC R&B MED SURG/OB

## 2023-08-20 PROCEDURE — 86850 RBC ANTIBODY SCREEN: CPT | Performed by: EMERGENCY MEDICINE

## 2023-08-20 PROCEDURE — 75774 ARTERY X-RAY EACH VESSEL: CPT

## 2023-08-20 PROCEDURE — 36247 INS CATH ABD/L-EXT ART 3RD: CPT | Mod: XS

## 2023-08-20 PROCEDURE — C1760 CLOSURE DEV, VASC: HCPCS

## 2023-08-20 PROCEDURE — 272N000127 HC CATH CR16

## 2023-08-20 RX ORDER — FLUMAZENIL 0.1 MG/ML
0.2 INJECTION, SOLUTION INTRAVENOUS
Status: DISCONTINUED | OUTPATIENT
Start: 2023-08-20 | End: 2023-08-20 | Stop reason: HOSPADM

## 2023-08-20 RX ORDER — IOPAMIDOL 612 MG/ML
100 INJECTION, SOLUTION INTRAVASCULAR ONCE
Status: COMPLETED | OUTPATIENT
Start: 2023-08-20 | End: 2023-08-20

## 2023-08-20 RX ORDER — NICOTINE 21 MG/24HR
1 PATCH, TRANSDERMAL 24 HOURS TRANSDERMAL DAILY
Status: DISCONTINUED | OUTPATIENT
Start: 2023-08-20 | End: 2023-08-21 | Stop reason: HOSPADM

## 2023-08-20 RX ORDER — LIDOCAINE 40 MG/G
CREAM TOPICAL
Status: DISCONTINUED | OUTPATIENT
Start: 2023-08-20 | End: 2023-08-21 | Stop reason: HOSPADM

## 2023-08-20 RX ORDER — POLYETHYLENE GLYCOL 3350 17 G/17G
238 POWDER, FOR SOLUTION ORAL ONCE
Status: COMPLETED | OUTPATIENT
Start: 2023-08-20 | End: 2023-08-20

## 2023-08-20 RX ORDER — ONDANSETRON 2 MG/ML
4 INJECTION INTRAMUSCULAR; INTRAVENOUS EVERY 6 HOURS PRN
Status: DISCONTINUED | OUTPATIENT
Start: 2023-08-20 | End: 2023-08-21 | Stop reason: HOSPADM

## 2023-08-20 RX ORDER — NALOXONE HYDROCHLORIDE 0.4 MG/ML
0.4 INJECTION, SOLUTION INTRAMUSCULAR; INTRAVENOUS; SUBCUTANEOUS
Status: DISCONTINUED | OUTPATIENT
Start: 2023-08-20 | End: 2023-08-20 | Stop reason: HOSPADM

## 2023-08-20 RX ORDER — NALOXONE HYDROCHLORIDE 0.4 MG/ML
0.2 INJECTION, SOLUTION INTRAMUSCULAR; INTRAVENOUS; SUBCUTANEOUS
Status: DISCONTINUED | OUTPATIENT
Start: 2023-08-20 | End: 2023-08-20 | Stop reason: HOSPADM

## 2023-08-20 RX ORDER — BISACODYL 5 MG
10 TABLET, DELAYED RELEASE (ENTERIC COATED) ORAL ONCE
Status: COMPLETED | OUTPATIENT
Start: 2023-08-20 | End: 2023-08-20

## 2023-08-20 RX ORDER — PROCHLORPERAZINE MALEATE 5 MG
5 TABLET ORAL EVERY 6 HOURS PRN
Status: DISCONTINUED | OUTPATIENT
Start: 2023-08-20 | End: 2023-08-21 | Stop reason: HOSPADM

## 2023-08-20 RX ORDER — MULTIPLE VITAMINS W/ MINERALS TAB 9MG-400MCG
1 TAB ORAL DAILY
Status: DISCONTINUED | OUTPATIENT
Start: 2023-08-21 | End: 2023-08-21 | Stop reason: HOSPADM

## 2023-08-20 RX ORDER — ONDANSETRON 4 MG/1
4 TABLET, ORALLY DISINTEGRATING ORAL EVERY 6 HOURS PRN
Status: DISCONTINUED | OUTPATIENT
Start: 2023-08-20 | End: 2023-08-21 | Stop reason: HOSPADM

## 2023-08-20 RX ORDER — FOLIC ACID 1 MG/1
1 TABLET ORAL DAILY
Status: DISCONTINUED | OUTPATIENT
Start: 2023-08-21 | End: 2023-08-21 | Stop reason: HOSPADM

## 2023-08-20 RX ORDER — MIRTAZAPINE 15 MG/1
15 TABLET, FILM COATED ORAL AT BEDTIME
Status: DISCONTINUED | OUTPATIENT
Start: 2023-08-20 | End: 2023-08-21 | Stop reason: HOSPADM

## 2023-08-20 RX ORDER — HYDROXYZINE HYDROCHLORIDE 10 MG/1
10-20 TABLET, FILM COATED ORAL 2 TIMES DAILY PRN
COMMUNITY

## 2023-08-20 RX ORDER — ACETAMINOPHEN 325 MG/1
975 TABLET ORAL EVERY 8 HOURS PRN
Status: DISCONTINUED | OUTPATIENT
Start: 2023-08-20 | End: 2023-08-21 | Stop reason: HOSPADM

## 2023-08-20 RX ORDER — HYDROXYZINE HYDROCHLORIDE 10 MG/1
10-20 TABLET, FILM COATED ORAL 2 TIMES DAILY PRN
Status: DISCONTINUED | OUTPATIENT
Start: 2023-08-20 | End: 2023-08-21 | Stop reason: HOSPADM

## 2023-08-20 RX ORDER — TRAZODONE HYDROCHLORIDE 50 MG/1
50-100 TABLET, FILM COATED ORAL
Status: DISCONTINUED | OUTPATIENT
Start: 2023-08-20 | End: 2023-08-21 | Stop reason: HOSPADM

## 2023-08-20 RX ORDER — FENTANYL CITRATE 50 UG/ML
25-50 INJECTION, SOLUTION INTRAMUSCULAR; INTRAVENOUS EVERY 5 MIN PRN
Status: DISCONTINUED | OUTPATIENT
Start: 2023-08-20 | End: 2023-08-20 | Stop reason: HOSPADM

## 2023-08-20 RX ORDER — HEPARIN SODIUM 200 [USP'U]/100ML
3 INJECTION, SOLUTION INTRAVENOUS CONTINUOUS PRN
Status: DISCONTINUED | OUTPATIENT
Start: 2023-08-20 | End: 2023-08-20 | Stop reason: HOSPADM

## 2023-08-20 RX ORDER — LANOLIN ALCOHOL/MO/W.PET/CERES
1000 CREAM (GRAM) TOPICAL DAILY
Status: DISCONTINUED | OUTPATIENT
Start: 2023-08-21 | End: 2023-08-21 | Stop reason: HOSPADM

## 2023-08-20 RX ORDER — TRAZODONE HYDROCHLORIDE 50 MG/1
50-100 TABLET, FILM COATED ORAL
COMMUNITY

## 2023-08-20 RX ORDER — SODIUM CHLORIDE, SODIUM LACTATE, POTASSIUM CHLORIDE, CALCIUM CHLORIDE 600; 310; 30; 20 MG/100ML; MG/100ML; MG/100ML; MG/100ML
INJECTION, SOLUTION INTRAVENOUS CONTINUOUS
Status: DISCONTINUED | OUTPATIENT
Start: 2023-08-20 | End: 2023-08-21 | Stop reason: HOSPADM

## 2023-08-20 RX ORDER — DULOXETIN HYDROCHLORIDE 60 MG/1
60 CAPSULE, DELAYED RELEASE ORAL DAILY
Status: DISCONTINUED | OUTPATIENT
Start: 2023-08-20 | End: 2023-08-21 | Stop reason: HOSPADM

## 2023-08-20 RX ORDER — PROCHLORPERAZINE 25 MG
12.5 SUPPOSITORY, RECTAL RECTAL EVERY 12 HOURS PRN
Status: DISCONTINUED | OUTPATIENT
Start: 2023-08-20 | End: 2023-08-21 | Stop reason: HOSPADM

## 2023-08-20 RX ORDER — IOPAMIDOL 755 MG/ML
60 INJECTION, SOLUTION INTRAVASCULAR ONCE
Status: COMPLETED | OUTPATIENT
Start: 2023-08-20 | End: 2023-08-20

## 2023-08-20 RX ADMIN — BISACODYL 10 MG: 5 TABLET, COATED ORAL at 17:46

## 2023-08-20 RX ADMIN — HYDROXYZINE HYDROCHLORIDE 20 MG: 10 TABLET ORAL at 17:45

## 2023-08-20 RX ADMIN — TRAZODONE HYDROCHLORIDE 50 MG: 50 TABLET ORAL at 21:38

## 2023-08-20 RX ADMIN — IOPAMIDOL 60 ML: 755 INJECTION, SOLUTION INTRAVENOUS at 10:22

## 2023-08-20 RX ADMIN — MIRTAZAPINE 15 MG: 15 TABLET, FILM COATED ORAL at 21:38

## 2023-08-20 RX ADMIN — SODIUM CHLORIDE, POTASSIUM CHLORIDE, SODIUM LACTATE AND CALCIUM CHLORIDE: 600; 310; 30; 20 INJECTION, SOLUTION INTRAVENOUS at 13:31

## 2023-08-20 RX ADMIN — SODIUM CHLORIDE 60 ML: 9 INJECTION, SOLUTION INTRAVENOUS at 10:23

## 2023-08-20 RX ADMIN — HEPARIN SODIUM 3 BAG: 200 INJECTION, SOLUTION INTRAVENOUS at 15:17

## 2023-08-20 RX ADMIN — MIDAZOLAM 1 MG: 1 INJECTION INTRAMUSCULAR; INTRAVENOUS at 14:42

## 2023-08-20 RX ADMIN — IOPAMIDOL 70 ML: 612 INJECTION, SOLUTION INTRAVENOUS at 15:29

## 2023-08-20 RX ADMIN — SODIUM CHLORIDE 1000 ML: 9 INJECTION, SOLUTION INTRAVENOUS at 08:12

## 2023-08-20 RX ADMIN — LIDOCAINE HYDROCHLORIDE 6 ML: 10 INJECTION, SOLUTION INFILTRATION; PERINEURAL at 14:48

## 2023-08-20 RX ADMIN — MIDAZOLAM 0.5 MG: 1 INJECTION INTRAMUSCULAR; INTRAVENOUS at 14:52

## 2023-08-20 RX ADMIN — POLYETHYLENE GLYCOL 3350 238 G: 17 POWDER, FOR SOLUTION ORAL at 18:52

## 2023-08-20 RX ADMIN — DULOXETINE HYDROCHLORIDE 60 MG: 60 CAPSULE, DELAYED RELEASE ORAL at 17:45

## 2023-08-20 RX ADMIN — FENTANYL CITRATE 50 MCG: 50 INJECTION, SOLUTION INTRAMUSCULAR; INTRAVENOUS at 14:42

## 2023-08-20 RX ADMIN — ONDANSETRON 4 MG: 2 INJECTION INTRAMUSCULAR; INTRAVENOUS at 19:45

## 2023-08-20 RX ADMIN — PANTOPRAZOLE SODIUM 40 MG: 40 INJECTION, POWDER, FOR SOLUTION INTRAVENOUS at 16:43

## 2023-08-20 RX ADMIN — SODIUM CHLORIDE, POTASSIUM CHLORIDE, SODIUM LACTATE AND CALCIUM CHLORIDE 1000 ML: 600; 310; 30; 20 INJECTION, SOLUTION INTRAVENOUS at 12:34

## 2023-08-20 RX ADMIN — MIDAZOLAM 0.5 MG: 1 INJECTION INTRAMUSCULAR; INTRAVENOUS at 15:09

## 2023-08-20 RX ADMIN — FENTANYL CITRATE 25 MCG: 50 INJECTION, SOLUTION INTRAMUSCULAR; INTRAVENOUS at 14:52

## 2023-08-20 RX ADMIN — FENTANYL CITRATE 25 MCG: 50 INJECTION, SOLUTION INTRAMUSCULAR; INTRAVENOUS at 15:10

## 2023-08-20 ASSESSMENT — ACTIVITIES OF DAILY LIVING (ADL)
FALL_HISTORY_WITHIN_LAST_SIX_MONTHS: NO
ADLS_ACUITY_SCORE: 23
ADLS_ACUITY_SCORE: 35
ADLS_ACUITY_SCORE: 35
WALKING_OR_CLIMBING_STAIRS_DIFFICULTY: NO
ADLS_ACUITY_SCORE: 35
ADLS_ACUITY_SCORE: 35
ADLS_ACUITY_SCORE: 23
CONCENTRATING,_REMEMBERING_OR_MAKING_DECISIONS_DIFFICULTY: NO
DIFFICULTY_EATING/SWALLOWING: NO
CHANGE_IN_FUNCTIONAL_STATUS_SINCE_ONSET_OF_CURRENT_ILLNESS/INJURY: NO
DOING_ERRANDS_INDEPENDENTLY_DIFFICULTY: NO
TOILETING_ISSUES: NO
ADLS_ACUITY_SCORE: 35
WEAR_GLASSES_OR_BLIND: YES
ADLS_ACUITY_SCORE: 23
DRESSING/BATHING_DIFFICULTY: NO
VISION_MANAGEMENT: GLASSES

## 2023-08-20 NOTE — PHARMACY-ADMISSION MEDICATION HISTORY
Pharmacist Admission Medication History    Admission medication history is complete. The information provided in this note is only as accurate as the sources available at the time of the update.    Medication reconciliation/reorder completed by provider prior to medication history? No    Information Source(s): Patient and CareEverywhere/SureScripts via in-person    Pertinent Information: It's been a while since pt filled lovastatin but states they still take it.     Changes made to PTA medication list:  Added: hydroxyzine, trazodone   Deleted: None  Changed: None    Allergies reviewed with patient and updates made in EHR: yes    Medication History Completed By: Priscila Bridges RPH 8/20/2023 9:10 AM    Prior to Admission medications    Medication Sig Last Dose Taking? Auth Provider Long Term End Date   acyclovir (ZOVIRAX) 400 MG tablet Take 1 tablet (400 mg) by mouth daily  Patient taking differently: Take 400 mg by mouth daily as needed (cold sores)  at prn Yes Jazzmine Govea MD Yes    DULoxetine (CYMBALTA) 60 MG capsule Take 60 mg by mouth daily 8/19/2023 Yes Reported, Patient Yes    folic acid (FOLVITE) 1 MG tablet Take 1 tablet (1 mg) by mouth daily 8/19/2023 Yes Gene Funk MD     hydrOXYzine (ATARAX) 10 MG tablet Take 10-20 mg by mouth 2 times daily as needed for anxiety 8/20/2023 Yes Unknown, Entered By History No    lovastatin (MEVACOR) 40 MG tablet TAKE 1 TABLET BY MOUTH AT  BEDTIME 8/19/2023 Yes Pollo Terry MD Yes    mirtazapine (REMERON) 15 MG tablet TAKE 1 TABLET BY MOUTH AT  BEDTIME 8/19/2023 Yes Pollo Terry MD Yes    multivitamin w/minerals (THERA-VIT-M) tablet Take 1 tablet by mouth daily 8/19/2023 Yes Gene Funk MD     naltrexone (DEPADE/REVIA) 50 MG tablet Take 1 tablet (50 mg) by mouth daily 8/19/2023 Yes Gene Funk MD Yes    omeprazole (PRILOSEC) 40 MG DR capsule Take 1 capsule (40 mg) by mouth daily 8/19/2023 Yes Gene Funk MD      thiamine (B-1) 100 MG tablet Take 1 tablet (100 mg) by mouth daily 8/19/2023 Yes Gene Funk MD     traZODone (DESYREL) 50 MG tablet Take  mg by mouth nightly as needed for sleep  Yes Unknown, Entered By History Yes    vitamin B-12 (CYANOCOBALAMIN) 1000 MCG tablet Take 1 tablet (1,000 mcg) by mouth daily 8/19/2023 Yes Gene Funk MD

## 2023-08-20 NOTE — PROGRESS NOTES
Admission    Patient arrives to room 625 via cart from ED.  Care plan note: Pt A&Ox4, transferred via hovermat due to bedrest for 2 hrs post procedure.  2 Belongings bags at bedside, IV infusing LR upon arrival.    Inpatient nursing criteria listed below were met:    Did you put disposition on whiteboard and in sticky note: No  Full skin assessment done (add LDA if skin issue present). Initials of 2nd RN :Yes OO  Isolation education started/completed NA  Patient allergies verified with patient: Yes  Fall Risk? (Care plan updated, Education given and documented) Yes  Primary Care Plan initiated: Yes  Home medications documented in belongings flowsheet: Yes  Patient belongings documented in belongings flowsheet: Yes  Reminder note (belongings/ medications) placed in discharge instructions:No  Admission profile/ required documentation complete: No  If patient is a 72 hour hold/Commitment are belongings removed from room and locked up? NA

## 2023-08-20 NOTE — IR NOTE
Interventional Radiology Intra-procedural Nursing Note    Patient Name: Shannan Koenig  Medical Record Number: [id number]  Today's Date: August 20, 2023    Procedure: Visceral angiogram with IV moderate sedation  Start time: 1446  End time: 1530  Report provided to: Nichelle CANCINO  Patient depart time and location: 1540 to [address] Room 625    Note: Patient entered Interventional Radiology Suite number 2 via cart. Patient awake, alert and oriented. Assisted onto procedural table in supine position. Prepped and draped.  Dr. Fisher in room. Time out and procedure started. Vital signs stable. Telemetry reading NSR.    Procedure well tolerated by patient without complications. Procedure end with debrief by Dr. Fisher.  7 Fr sheath removed and 6 Fr angioseal deployed at 1527; hemostasis achieved. Quick clot and tegaderm dressing applied to right interventional procedure access site, dressing is c/d/I.    2 hours bedrest per Dr. Fisher, until 1730.    Administered medication totals:  Lidocaine 1% 6 mL Intradermal  Versed 2 mg IVP  Fentanyl 100 mcg IVP    Last dose of sedation administered at 1510.

## 2023-08-20 NOTE — ED NOTES
[facility]  ED Nurse Handoff Report    ED Chief complaint: Rectal Bleeding      ED Diagnosis:   Final diagnoses:   None       Code Status: Full Code    Allergies:   Allergies   Allergen Reactions    Codeine Nausea and Vomiting    Amoxicillin-Pot Clavulanate Nausea and Vomiting    Latex Dermatitis, Rash and Swelling     Puffy weepy red eyes, nasal congestion and sneezing      Nitrofurantoin Rash       Patient Story: rectal bleeding  Focused Assessment:  Patient with a history of alcohol issues been sober for 10 days trying to quit with help of AA. Presents to ED with rectal bleeding started this morning.    Treatments and/or interventions provided: See MAR  Patient's response to treatments and/or interventions: TBD    To be done/followed up on inpatient unit:  close monitor    Does this patient have any cognitive concerns?:  na    Activity level - Baseline/Home:  Independent  Activity Level - Current:   Stand with Assist    Patient's Preferred language: English   Needed?: No    Isolation: None  Infection: Not Applicable  Patient tested for COVID 19 prior to admission: NO  Bariatric?: No    Vital Signs:   Vitals:    08/20/23 0821 08/20/23 0836 08/20/23 0839 08/20/23 0854   BP: 137/88 (!) 155/93 (!) 158/100    Pulse:  87 97 102   Resp:  19 10 18   Temp:       TempSrc:       SpO2: 97% 97% 96% 97%   Weight:       Height:           Cardiac Rhythm:     Was the PSS-3 completed:   Yes  What interventions are required if any?               Family Comments: daughter at bedside  OBS brochure/video discussed/provided to patient/family: N/A              Name of person given brochure if not patient: na              Relationship to patient: na    For the majority of the shift this patient's behavior was Green.   Behavioral interventions performed were na.    ED NURSE PHONE NUMBER: na

## 2023-08-20 NOTE — PROCEDURES
[facility]    Procedure: Visceral angiogram    Date/Time: 8/20/2023 3:34 PM    Performed by: Marta Fisher DO  Authorized by: Marta Fisher DO      UNIVERSAL PROTOCOL   Site Marked: Yes  Prior Images Obtained and Reviewed:  Yes  Required items: Required blood products, implants, devices and special equipment available    Patient identity confirmed:  Verbally with patient, arm band, provided demographic data and hospital-assigned identification number  Patient was reevaluated immediately before administering moderate or deep sedation or anesthesia  Confirmation Checklist:  Patient's identity using two indicators, relevant allergies, procedure was appropriate and matched the consent or emergent situation and correct equipment/implants were available  Time out: Immediately prior to the procedure a time out was called    Universal Protocol: the Joint Commission Universal Protocol was followed    Preparation: Patient was prepped and draped in usual sterile fashion       ANESTHESIA    Anesthesia: Local infiltration  Local Anesthetic:  Lidocaine 1% without epinephrine      SEDATION  Patient Sedated: Yes    Sedation Type:  Moderate (conscious) sedation  Vital signs: Vital signs monitored during sedation    See dictated procedure note for full details.  Findings: Visceral angiogram show now active bleeding from the SMA or YAJAIRA. Selective catheterization was performed in 3 SMA branches and 1 YAJAIRA branch vessel.    Specimens: none    Complications: None    Condition: Stable    Plan: Bed rest for 2 hours.      PROCEDURE    Patient Tolerance:  Patient tolerated the procedure well with no immediate complications  Length of time physician/provider present for 1:1 monitoring during sedation: 45

## 2023-08-20 NOTE — CONSULTS
Interventional Radiology - Consultation Note:  [facility]  2023    Reason for Consult:  Lower GI bleeding   Requesting Provider:  Dr. Roy    HPI:  Shannan Koenig is a 72 year old female who presented to the ED with bright read blood per rectum that started at 6 AM.  She describes passing a large amount of bright red blood and clots with minimal stool.  She states that she had a total of 7 episodes between 6 AM and now.  She denies nausea, vomiting, or abdominal pain.       IMAGING:    Results for orders placed or performed during the hospital encounter of 23   CT Abdomen Pelvis w Contrast    Impression    IMPRESSION:   1.  Diverticulosis. No diverticulitis, colitis, or obstruction.  2.  Simple left renal cyst, no follow-up required.  3.  Atherosclerotic vascular disease.       Upon review myself there does appear to be active bleeding in the ascending colon.          PAST MEDICAL HISTORY:  Past Medical History:   Diagnosis Date     Anxiety      Infection due to 2019 novel coronavirus 2020     Infection due to 2019 novel coronavirus 2022     Substance abuse (H)        PAST SURGICAL HISTORY:  Past Surgical History:   Procedure Laterality Date     APPENDECTOMY  2020      SECTION      No date given     COLONOSCOPY  2022    mpression: Two 5 - 8 mm polyps in distal ascending colon.  Patent partial cecectomy anastomosis.  Exam otherwise normal.     COLONOSCOPY  06/10/2016    Every 5 years for family history     COLONOSCOPY  2001     EGD  2023    GIB, small HH otherwise normal     ESOPHAGOSCOPY, GASTROSCOPY, DUODENOSCOPY (EGD), COMBINED N/A 2023    Procedure: Esophagoscopy, gastroscopy, duodenoscopy (EGD), combined;  Surgeon: Dinora Bragg MD;  Location: [facility]     EXTRACAPSULAR CATARACT EXTRATION WITH INTRAOCULAR LENS IMPLANT Right 2017     EXTRACAPSULAR CATARACT EXTRATION WITH INTRAOCULAR LENS IMPLANT Left  10/19/2017     FETAL BLOOD TRANSFUSION      No date given     TONSILLECTOMY      No date given     TUBAL LIGATION      No date given     WISDOM TOOTH EXTRACTION      No date given   -wisdom tooth       ALLERGIES:  Allergies   Allergen Reactions     Amoxicillin-Pot Clavulanate Nausea and Vomiting     Codeine Nausea and Vomiting     Latex Dermatitis, Rash and Swelling     Puffy weepy red eyes, nasal congestion and sneezing       Nitrofurantoin Rash        MEDICATIONS:  Current Facility-Administered Medications   Medication     acetaminophen (TYLENOL) tablet 975 mg     [START ON 8/21/2023] DULoxetine (CYMBALTA) DR capsule 60 mg     [START ON 8/21/2023] folic acid (FOLVITE) tablet 1 mg     lactated ringers infusion     mirtazapine (REMERON) tablet 15 mg     [START ON 8/21/2023] multivitamin w/minerals (THERA-VIT-M) tablet 1 tablet     ondansetron (ZOFRAN ODT) ODT tab 4 mg    Or     ondansetron (ZOFRAN) injection 4 mg     pantoprazole (PROTONIX) IV push injection 40 mg     prochlorperazine (COMPAZINE) injection 5 mg    Or     prochlorperazine (COMPAZINE) tablet 5 mg    Or     prochlorperazine (COMPAZINE) suppository 12.5 mg     [START ON 8/21/2023] thiamine (B-1) tablet 100 mg     traZODone (DESYREL) tablet  mg     [START ON 8/21/2023] vitamin B-12 (CYANOCOBALAMIN) TABS 1,000 mcg     Current Outpatient Medications   Medication     acyclovir (ZOVIRAX) 400 MG tablet     DULoxetine (CYMBALTA) 60 MG capsule     folic acid (FOLVITE) 1 MG tablet     hydrOXYzine (ATARAX) 10 MG tablet     lovastatin (MEVACOR) 40 MG tablet     mirtazapine (REMERON) 15 MG tablet     multivitamin w/minerals (THERA-VIT-M) tablet     naltrexone (DEPADE/REVIA) 50 MG tablet     omeprazole (PRILOSEC) 40 MG DR capsule     thiamine (B-1) 100 MG tablet     traZODone (DESYREL) 50 MG tablet     vitamin B-12 (CYANOCOBALAMIN) 1000 MCG tablet        LABS:  INR (no units)   Date Value   08/20/2023 0.93     Lab Results   Component Value Date    WBC 11.1  08/20/2023     Lab Results   Component Value Date    RBC 4.56 08/20/2023     Lab Results   Component Value Date    HGB 9.3 08/20/2023     Lab Results   Component Value Date    HCT 39.1 08/20/2023     No components found for: MCT  Lab Results   Component Value Date    MCV 86 08/20/2023     Lab Results   Component Value Date    MCH 27.6 08/20/2023     Lab Results   Component Value Date    MCHC 32.2 08/20/2023     Lab Results   Component Value Date    RDW 14.9 08/20/2023     Lab Results   Component Value Date     08/20/2023       Last Comprehensive Metabolic Panel:  Sodium   Date Value Ref Range Status   08/20/2023 141 136 - 145 mmol/L Final     Potassium   Date Value Ref Range Status   08/20/2023 3.6 3.4 - 5.3 mmol/L Final   09/13/2022 3.8 3.4 - 5.3 mmol/L Final     Chloride   Date Value Ref Range Status   08/20/2023 106 98 - 107 mmol/L Final   09/13/2022 104 94 - 109 mmol/L Final     Carbon Dioxide (CO2)   Date Value Ref Range Status   08/20/2023 21 (L) 22 - 29 mmol/L Final   09/13/2022 22 20 - 32 mmol/L Final     Anion Gap   Date Value Ref Range Status   08/20/2023 14 7 - 15 mmol/L Final   09/13/2022 11 3 - 14 mmol/L Final     Glucose   Date Value Ref Range Status   08/20/2023 131 (H) 70 - 99 mg/dL Final   09/13/2022 96 70 - 99 mg/dL Final     Urea Nitrogen   Date Value Ref Range Status   08/20/2023 9.9 8.0 - 23.0 mg/dL Final   09/13/2022 8 7 - 30 mg/dL Final     Creatinine   Date Value Ref Range Status   08/20/2023 0.71 0.51 - 0.95 mg/dL Final     GFR Estimate   Date Value Ref Range Status   08/20/2023 89 >60 mL/min/1.73m2 Final   04/04/2020 60 mL/min Final     Calcium   Date Value Ref Range Status   08/20/2023 9.8 8.8 - 10.2 mg/dL Final           EXAM:  BP (!) 152/94   Pulse 93   Temp 98.1  F (36.7  C) (Oral)   Resp 13   Ht 1.524 m (5')   Wt 47.2 kg (104 lb)   SpO2 98%   BMI 20.31 kg/m       ASSESSMENT:  72 year old female with Lower GI bleeding that started early this morning. Area of Gi bleeding  noted on the CT from earlier today noted in the ascending colon.     PLAN:    Recommend proceeding with visceral angiogram. Discussed with patient and in agreement.    Discussed case with Dr. Roy.    Thank you for kindly for this consultation.     Marta Fisher,   Interventional Radiology - [facility]

## 2023-08-20 NOTE — ED TRIAGE NOTES
This morning began to have bloody diarrhea with blood clots. Denies pain. Patient does not take blood thinners. Patient reports feeling light headed- tachycardic in triage.     Triage Assessment       Row Name 08/20/23 0746       Triage Assessment (Adult)    Airway WDL WDL       Respiratory WDL    Respiratory WDL WDL       Skin Circulation/Temperature WDL    Skin Circulation/Temperature WDL WDL       Cardiac WDL    Cardiac WDL X;rhythm    Pulse Rate & Regularity --  tachycardic       Peripheral/Neurovascular WDL    Peripheral Neurovascular WDL WDL       Cognitive/Neuro/Behavioral WDL    Cognitive/Neuro/Behavioral WDL WDL

## 2023-08-20 NOTE — H&P
[facility]    History and Physical  Hospitalist       Date of Admission:  8/20/2023    Assessment & Plan   Shannan Koenig is a 72 year old female longstanding alcohol dependence/abuse, generalized anxiety disorder, GERD, history of GI bleed April 2023 without melena and hematochezia with negative upper endoscopy, diverticulitis same hospitalization, nicotine dependence, dyslipidemia, asthma, subclinical hypothyroidism, pseudogout, history of colonic polyps and family history of colon cancer who presents with bright red blood per rectum on day of admission  Presents with sinus tachycardia, two-point hemoglobin drop from 11-10 in the emergency room.  Passing bright red blood in the emergency room approximately 2 cups.  Now normotensive and tachycardia resolved.  Status post 1 L.  Suspect diverticular bleeding.  [facility] involved.  IR will be contacted.  Admit to inpatient.      Principal Problem:    Acute lower GI bleeding, suspect diverticular bleed    Assessment:   -Ongoing alcohol dependence and alcohol use.  Sober last 10 days.  No signs of alcohol withdrawal.  -Has been taking ibuprofen daily for headaches  -GI bleed April 2023 presented with melena and bright red blood per rectum.  Along with diverticulitis.  Upper endoscopy unremarkable.  Did not pursue colonoscopy given reticulitis at the time.  -Has had resolution of her diverticulitis.  Hemoglobin 9 range at that time.  In July hemoglobin 13-14 range.  -Presents with hemoglobin of 11 today down to 10 following IV fluids.  -presents with sinus tachycardia bright red blood per rectum at home with passage of clots and slightly maroon stool today in the emergency room past about 2 cups of bright red blood over the course of 3 episodes of hematochezia.  -Endoscopy in the emergency room showed bright red blood, no hemorrhoids or fissures  -benign abdominal exam.  -Received 1 L normal saline.  Consented for blood, type and  screen    Plan: [facility] consult, IR will be updated by EDMD possible IR intervention versus CTA given passage of right bright red blood about half hour ago.  LR bolus 1 L then maintenance fluids.  Every 6 hour hemoglobin checks.  Consented for blood.  A.m. labs.  Keep n.p.o. for now.  Will likely need colonoscopy prior to discharge.  Follow-up for need of IR intervention.    Addendum-orthostatic blood pressure emergency room negative.  EDMD spoke with IR who reviewed original CT from this morning.  There is a small blush of bleeding noted.  The patient will be taken to the IR suite for potential IR intervention arterial embolization.    Addendum-  -Per RN patient had another half cup of bright red blood prior to going down to IR.  She has 2 IVs  -Per IR report-Visceral angiogram show no active bleeding from the SMA or YAJAIRA. Selective catheterization was performed in 3 SMA branches and 1 YAJAIRA branch vessel.   No embolization.     Per GI. Will prep today for colonoscopy tomorrow      Dyslipidemia    Assessment: Restart lovastatin at time of discharge      Generalized anxiety disorder  Depression.  Insomnia    Assessment: Resume prior to admission Cymbalta 60 mg daily as needed Atarax and trazodone nightly as needed for insomnia      Genital HSV    Assessment: No acute flare.  On acyclovir daily      GERD (gastroesophageal reflux disease)    Assessment: She is on Prilosec 40 mg daily.  No hematemesis.  No melena.  No dysphagia odynophagia GERD or abdominal pain.  She has been taking ibuprofen daily    Plan: Tinea PPI.  Discouraged patient from taking ibuprofen.      MVP (mitral valve prolapse)    Assessment: Noted.  Normal cardiac exam.  No cardiopulmonary symptoms      Subclinical hypothyroidism    Assessment: Not on therapy.  Normal TSH in March        Smoking 1/2 pack a day or less    Assessment: Lungs clear.  No pulmonary complaints.  Encouraged cessation.  Follow-up with PCP    Alcohol  dependence    Hx Alcohol withdrawal syndrome without complication (H)    Assessment:   -Sober for 10 days.  Attending AA meetings per patient and corroborated by patient's family member at the bedside.  No signs of alcohol withdrawal at this time.  Strongly encouraged ongoing alcohol cessation.  Avoid NSAIDs.    Plan: Monitor for alcohol withdrawal, thiamine folate multivitamin, follow-up with PCP, continue naltrexone but hold while in the hospital.  He also is on B12.  Follow-up with PCP        DVT Prophylaxis: Pneumoboots  Code Status: Full Code    Disposition: Expected discharge in greater than 2 days once her acute GI bleed has been fully evaluated and she has had resolution of her bleeding  Anticipate discharge home without need for therapies    Basim Alegre MD, MD    Primary Care Physician   Gene Funk    Chief Complaint   Bright red blood per rectum    History is obtained from the patient    History of Present Illness   Shannan Koenig is a 72 year old female longstanding alcohol dependence/abuse, generalized anxiety disorder, GERD, history of GI bleed April 2023 without melena and hematochezia with negative upper endoscopy, diverticulitis same hospitalization, nicotine dependence, dyslipidemia, asthma, subclinical hypothyroidism, pseudogout, history of colonic polyps and family history of colon cancer who presents with bright red blood per rectum on day of admission    Patient states she has been in her usual state of health until today.  Her pseudogout has been resolved.  She continues to take a daily ibuprofen for headaches.  No ibuprofen today.  She has been off alcohol for 10 days.  Attending AA meetings.  She denies any chest pain or shortness of breath fevers chills.  No constipation or diarrhea.    Days she woke up feeling fine and then had 2 episodes of bright red blood per rectum.  There were some clots in the stool.  Somewhat maroon.  No rectal pain.  No dizziness feeling faint passing out  episodes palpitations.  No dysphagia odynophagia.    She states she passed 3-4 bouts of bright red blood in the emergency room about 1/2 cup per about per ER nurse.  Last was about 30 minutes ago.    She denies similar bleeding in the past.    She states she had a normal colonoscopy in 2020 for routine screening.  She had 2 benign polyps.  This was done apparently in [place].      Presented to [facility] ER for further evaluation.    In the emergency room pulse was initially 137.  Down to 89 following 1 L of normal saline.  She is normotensive in the 120s to 130s.  Respiratory 18.  Afebrile.  Normal sats  Examination was unremarkable other than anoscopy which showed blood but no fissures or hemorrhoids  BMP normal LFTs normal.  Glucose 131 lactic acid 1.1 lipase 21 troponin 6.  Initial hemoglobin 12.6 down to 10.1.  INR and PTT normal.  Type and screen completed    EKG showed sinus tachycardia with ST depression inferior lateral leads.  No chest pain in the emergency room.  Troponin negative.    CT scan abdomen pelvis with contrast shows no acute findings.    EDMD spoke with [facility] and they will be seeing the patient today.  They also have a call out to IR.  Can discuss whether patient will go to IR suite for evaluation of possible embolism versus repeat CT imaging with CTA to potentially localize and determine if ongoing bleeding occurring.    She has been consented for blood but no transfusion at this time.    Past Medical History    Past medical history reviewed with no previously diagnosed medical problems.    Past Surgical History   Past surgical history reviewed with no previous surgeries identified.    Prior to Admission Medications   Prior to Admission Medications   Prescriptions Last Dose Informant Patient Reported? Taking?   DULoxetine (CYMBALTA) 60 MG capsule 8/19/2023  Yes Yes   Sig: Take 60 mg by mouth daily   acyclovir (ZOVIRAX) 400 MG tablet  at prn  No Yes   Sig: Take  1 tablet (400 mg) by mouth daily   Patient taking differently: Take 400 mg by mouth daily as needed (cold sores)   folic acid (FOLVITE) 1 MG tablet 8/19/2023  No Yes   Sig: Take 1 tablet (1 mg) by mouth daily   hydrOXYzine (ATARAX) 10 MG tablet 8/20/2023  Yes Yes   Sig: Take 10-20 mg by mouth 2 times daily as needed for anxiety   lovastatin (MEVACOR) 40 MG tablet 8/19/2023 Self No Yes   Sig: TAKE 1 TABLET BY MOUTH AT  BEDTIME   mirtazapine (REMERON) 15 MG tablet 8/19/2023 Self No Yes   Sig: TAKE 1 TABLET BY MOUTH AT  BEDTIME   multivitamin w/minerals (THERA-VIT-M) tablet 8/19/2023  No Yes   Sig: Take 1 tablet by mouth daily   naltrexone (DEPADE/REVIA) 50 MG tablet 8/19/2023  No Yes   Sig: Take 1 tablet (50 mg) by mouth daily   omeprazole (PRILOSEC) 40 MG DR capsule 8/19/2023  No Yes   Sig: Take 1 capsule (40 mg) by mouth daily   thiamine (B-1) 100 MG tablet 8/19/2023  No Yes   Sig: Take 1 tablet (100 mg) by mouth daily   traZODone (DESYREL) 50 MG tablet   Yes Yes   Sig: Take  mg by mouth nightly as needed for sleep   vitamin B-12 (CYANOCOBALAMIN) 1000 MCG tablet 8/19/2023  No Yes   Sig: Take 1 tablet (1,000 mcg) by mouth daily      Facility-Administered Medications: None     Allergies   Allergies   Allergen Reactions    Amoxicillin-Pot Clavulanate Nausea and Vomiting    Codeine Nausea and Vomiting    Latex Dermatitis, Rash and Swelling     Puffy weepy red eyes, nasal congestion and sneezing      Nitrofurantoin Rash       Social History   I have personally reviewed the social history with the patient showing she has been sober off alcohol for 10 days.  Continues to smoke 15 cigarettes/day.  No illegal drugs.  She lives independently.  She is attending AA meetings regularly.    Family History   Family history reviewed with patient and is noncontributory.    Review of Systems   The 10 point Review of Systems is negative other than noted in the HPI or here.     Physical Exam   Temp: 98.1  F (36.7  C) Temp src:  Oral BP: 123/82 Pulse: 89   Resp: 13 SpO2: 96 % O2 Device: None (Room air)    Vital Signs with Ranges  Temp:  [98.1  F (36.7  C)] 98.1  F (36.7  C)  Pulse:  [] 89  Resp:  [10-20] 13  BP: (119-158)/() 123/82  SpO2:  [95 %-97 %] 96 %  104 lbs 0 oz    Constitutional: No acute distress,  Eyes: Normal sclera, pupils equal round reactive to light and accommodating  HEENT: Normal oral pharynx  Neck-supple, nontender, no swelling  Respiratory: Clear to auscultation bilaterally, breathing easily  Cardiovascular: Regular rate and rhythm no rubs gallops or murmurs.  She is not tachycardic  GI: Soft nontender nondistended no hepatosplenomegaly  Lymph/Hematologic: No cervical lymphadenopathy  Skin: No rash or edema  Musculoskeletal: No focal joint swelling erythema  Neurologic: Fully oriented and appropriate no tremors.  Grossly nonfocal.  Psychiatric: Normal affect.  Pleasant cooperative    Data   Data reviewed today:  I personally reviewed laboratory studies, EKG, imaging performed the emergency room.  EKG shows sinus tachycardia heart rate 130.  ST depression inferior and lateral leads.  No ST elevation.  Recent Labs   Lab 08/20/23  1050 08/20/23  0804 08/20/23  0803   WBC  --   --  11.1*   HGB 10.1*  --  12.6   MCV  --   --  86   PLT  --   --  276   INR  --  0.93  --    NA  --   --  141   POTASSIUM  --   --  3.6   CHLORIDE  --   --  106   CO2  --   --  21*   BUN  --   --  9.9   CR  --   --  0.71   ANIONGAP  --   --  14   ELLIE  --   --  9.8   GLC  --   --  131*   ALBUMIN  --   --  4.2   PROTTOTAL  --   --  7.0   BILITOTAL  --   --  0.4   ALKPHOS  --   --  90   ALT  --   --  13   AST  --   --  20   LIPASE  --   --  21       Imaging:  Recent Results (from the past 24 hour(s))   CT Abdomen Pelvis w Contrast    Narrative    EXAM: CT ABDOMEN PELVIS W CONTRAST  LOCATION: [facility]  DATE: 8/20/2023    INDICATION: Abd pain, hematochezia  COMPARISON: 04/05/2023  TECHNIQUE: CT scan of the abdomen  and pelvis was performed following injection of IV contrast. Multiplanar reformats were obtained. Dose reduction techniques were used.  CONTRAST: 60mL Isovue 370    FINDINGS:   LOWER CHEST: Normal.    HEPATOBILIARY: Normal.    PANCREAS: Normal.    SPLEEN: Normal.    ADRENAL GLANDS: Normal.    KIDNEYS/BLADDER: Simple left renal cyst, no follow-up required. 5 mm nonobstructing left renal stone.    BOWEL: Diverticulosis. No diverticulitis, colitis, or obstruction. No free air. No active extravasation of contrast, though this study was not performed as a CT abdomen and pelvis GI bleed angiogram. Unchanged right lower quadrant surgical suture line.    LYMPH NODES: Normal.    VASCULATURE: Unremarkable.    PELVIC ORGANS: Normal.    MUSCULOSKELETAL: L5 spondylolysis with grade 1 anterolisthesis of L5 on S1      Impression    IMPRESSION:   1.  Diverticulosis. No diverticulitis, colitis, or obstruction.  2.  Simple left renal cyst, no follow-up required.  3.  Atherosclerotic vascular disease.

## 2023-08-20 NOTE — PRE-PROCEDURE
GENERAL PRE-PROCEDURE:   Procedure:  Visceral angiogram  Date/Time:  8/20/2023 2:40 PM    Written consent obtained?: Yes    Risks and benefits: Risks, benefits and alternatives were discussed    Consent given by:  Patient  Patient states understanding of procedure being performed: Yes    Patient's understanding of procedure matches consent: Yes    Procedure consent matches procedure scheduled: Yes    Expected level of sedation:  Moderate  Appropriately NPO:  Yes  ASA Class:  2  Mallampati  :  Grade 2- soft palate, base of uvula, tonsillar pillars, and portion of posterior pharyngeal wall visible  Lungs:  Lungs clear with good breath sounds bilaterally  Heart:  Normal heart sounds and rate  History & Physical reviewed:  History and physical reviewed and no updates needed  Statement of review:  I have reviewed the lab findings, diagnostic data, medications, and the plan for sedation

## 2023-08-20 NOTE — ED PROVIDER NOTES
History     Chief Complaint:  Rectal Bleeding       HPI   Shannan Koenig is a 72 year old female with a history of diverticulosis and alcohol abuse who presents after waking up this morning and noticing bright red blood during a bowel movement. She had a second bowel movement and noticed dark red clots on the tissue paper, prompting concern to come to the ED. She mentions having been diagnosed with diverticulosis in April where she also had a similar amount of bleeding.  In review of the chart she has been seen by [facility] in the past.  The patient denies chest pain.  No vomiting.  No headache, focal weakness or paresthesias.  She has experienced slight abdominal cramping, but denies any significant pain. She has not drank alcohol for 10 days and is currently in AA and wanting to quit. She denies nausea, fever, tremors, recent falls or injuries, or blood thinner use.      Independent Historian:   The patient's daughter provides additional history in regards to her attempts at sobriety as well as the bleeding today.    Review of External Notes:   Discharge summary reviewed from 2023 and the patient was seen with alcohol withdrawal and electrolyte derangements.      Medications:    Zovirax  Duloxetine  Folic acid   Lovastatin  Mirtazapine   Naltrexone   Omeprazole   Thiamine     Past Medical History:    Anxiety  Alcohol use disorder  Diverticulitis     Past Surgical History:  Appendectomy      Colonoscopy x3  EGD x2  Tonsillectomy   Tubal ligation  Rebuck tooth extraction    Extracapsular cataract extraction with intraocular lens implant x2    Physical Exam   Patient Vitals for the past 24 hrs:   BP Temp Temp src Pulse Resp SpO2 Height Weight   23 1250 -- -- -- -- -- 98 % -- --   23 1245 (!) 152/94 -- -- 93 -- -- -- --   23 1114 123/82 -- -- 89 13 96 % -- --   23 1059 131/86 -- -- 91 16 96 % -- --   23 1015 119/83 -- -- 96 17 -- -- --   23 0854 -- -- -- 102 18  97 % -- --   08/20/23 0839 (!) 158/100 -- -- 97 10 96 % -- --   08/20/23 0836 (!) 155/93 -- -- 87 19 97 % -- --   08/20/23 0821 137/88 -- -- -- -- 97 % -- --   08/20/23 0806 (!) 133/98 -- -- -- -- 95 % -- --   08/20/23 0747 -- 98.1  F (36.7  C) Oral -- -- -- 1.524 m (5') 47.2 kg (104 lb)   08/20/23 0745 132/83 -- -- (!) 137 20 97 % -- --        Physical Exam  Constitutional:       General: She is in acute distress.      Appearance: Normal appearance. She is not diaphoretic.   HENT:      Head: Atraumatic.      Right Ear: External ear normal.      Left Ear: External ear normal.      Mouth/Throat:      Mouth: Mucous membranes are moist.   Eyes:      General: No scleral icterus.     Conjunctiva/sclera: Conjunctivae normal.   Cardiovascular:      Rate and Rhythm: Regular rhythm. Tachycardia present.      Heart sounds: Normal heart sounds.   Pulmonary:      Effort: No respiratory distress.      Breath sounds: Normal breath sounds.   Abdominal:      General: Abdomen is flat. There is no distension.      Tenderness: There is no abdominal tenderness.   Genitourinary:     Comments: Rectal exam with anoscope does not demonstrate any anal fissure or internal or external hemorrhoid.  No clear bleeding diathesis is seen.  She does have hematochezia with clot present.  Musculoskeletal:      Cervical back: Neck supple.   Skin:     General: Skin is warm.      Capillary Refill: Capillary refill takes less than 2 seconds.      Findings: No rash.   Neurological:      General: No focal deficit present.      Mental Status: She is alert and oriented to person, place, and time.   Psychiatric:         Mood and Affect: Mood normal.         Behavior: Behavior normal.           Emergency Department Course   ECG  ECG taken at 0746, ECG read at 0750  Sinus tachycardia  Right atrial enlargement  Rightward axis  Pulmonary disease pattern  Marked ST abnormality, possible inferior subendocardial injury  Abnormal ECG   Increased ST depression as  compared to prior, dated 7/4/23.  Rate 131 bpm. AR interval 142 ms. QRS duration 68 ms. QT/QTc 296/437 ms. P-R-T axes 83 92 40.     Imaging:  CT Abdomen Pelvis w Contrast   Final Result   IMPRESSION:    1.  Diverticulosis. No diverticulitis, colitis, or obstruction.   2.  Simple left renal cyst, no follow-up required.   3.  Atherosclerotic vascular disease.            Report per radiology    Laboratory:  Labs Ordered and Resulted from Time of ED Arrival to Time of ED Departure   CBC WITH PLATELETS - Abnormal       Result Value    WBC Count 11.1 (*)     RBC Count 4.56      Hemoglobin 12.6      Hematocrit 39.1      MCV 86      MCH 27.6      MCHC 32.2      RDW 14.9      Platelet Count 276     COMPREHENSIVE METABOLIC PANEL - Abnormal    Sodium 141      Potassium 3.6      Chloride 106      Carbon Dioxide (CO2) 21 (*)     Anion Gap 14      Urea Nitrogen 9.9      Creatinine 0.71      Calcium 9.8      Glucose 131 (*)     Alkaline Phosphatase 90      AST 20      ALT 13      Protein Total 7.0      Albumin 4.2      Bilirubin Total 0.4      GFR Estimate 89     HEMOGLOBIN - Abnormal    Hemoglobin 10.1 (*)    LIPASE - Normal    Lipase 21     INR - Normal    INR 0.93     PARTIAL THROMBOPLASTIN TIME - Normal    aPTT 28     LACTIC ACID WHOLE BLOOD - Normal    Lactic Acid 1.1     TROPONIN T, HIGH SENSITIVITY - Normal    Troponin T, High Sensitivity 6     MAGNESIUM - Normal    Magnesium 1.9     HEMOGLOBIN   TYPE AND SCREEN, ADULT    ABO/RH(D) O POS      Antibody Screen Negative      SPECIMEN EXPIRATION DATE [id number]     ABO/RH TYPE AND SCREEN        Procedures    Anoscopy   Procedure: Anoscopy  Indication: Rectal bleeding  Consent: Verbal  Procedure Detail:  A chaperone was present during the exam.  The patient was placed in the lateral decubitus position.  The anoscope was lubricated and inserted without difficulty.  Findings: Bleeding  Patient Status: The patient tolerated the procedure well: Yes. There were no  complications.  Emergency Department Course & Assessments:    Interventions:  Medications   lactated ringers BOLUS 1,000 mL (1,000 mLs Intravenous $New Bag 8/20/23 1234)   lactated ringers infusion (has no administration in time range)   acetaminophen (TYLENOL) tablet 975 mg (has no administration in time range)   ondansetron (ZOFRAN ODT) ODT tab 4 mg (has no administration in time range)     Or   ondansetron (ZOFRAN) injection 4 mg (has no administration in time range)   prochlorperazine (COMPAZINE) injection 5 mg (has no administration in time range)     Or   prochlorperazine (COMPAZINE) tablet 5 mg (has no administration in time range)     Or   prochlorperazine (COMPAZINE) suppository 12.5 mg (has no administration in time range)   DULoxetine (CYMBALTA) DR capsule 60 mg (has no administration in time range)   folic acid (FOLVITE) tablet 1 mg (has no administration in time range)   mirtazapine (REMERON) tablet 15 mg (has no administration in time range)   multivitamin w/minerals (THERA-VIT-M) tablet 1 tablet (has no administration in time range)   thiamine (B-1) tablet 100 mg (has no administration in time range)   traZODone (DESYREL) tablet  mg (has no administration in time range)   vitamin B-12 (CYANOCOBALAMIN) TABS 1,000 mcg (has no administration in time range)   pantoprazole (PROTONIX) IV push injection 40 mg (has no administration in time range)   0.9% sodium chloride BOLUS (0 mLs Intravenous Stopped 8/20/23 1013)   iopamidol (ISOVUE-370) solution 60 mL (60 mLs Intravenous $Given 8/20/23 1022)   Saline (60 mLs As instructed $Given 8/20/23 1023)        Assessments:  0806 I obtained history and examined the patient, as noted above.   I rechecked and updated the patient.      Consultations/Discussion of Management or Tests:  1213 I spoke with Dr. Alegre from the hospitalist service regarding the patient's presentation, findings here in the ED, and plan of care.     Social Determinants of Health  affecting care:   Alcoholism    Disposition:  The patient was admitted to the hospital under the care of Dr. Alegre.     Impression & Plan      Medical Decision Making:  This patient is a 72-year-old woman who presents to the emergency department with lower GI bleeding.  She does have a known history of diverticulosis which likely is the source of her bleeding.  She was initially tachycardic on arrival.  The tachycardia resolved with IV fluids.  Initial hemoglobin was 12.6 which has decreased to 10.1.  This is likely in part delusional but she has had multiple further episodes of rectal bleeding here in the ED.  Blood pressure is stable and actually slightly high.  She is not orthostatic.    CT scan does not demonstrate diverticulitis.  [facility] will be consulting, Dr. Javed.    I reviewed the CT scan with Dr. Fisher who is covering IR.  There likely is a small area of active extravasation.  The patient will be taken to the IR suite shortly.    The patient does not require blood transfusion at this point but we will continue to trend the hemoglobin measurements.  She will be admitted to the hospitalist service.  She has required very close frequent repeat evaluations.    Critical Care time was 45 minutes for this patient excluding procedures.       Diagnosis:    ICD-10-CM    1. Acute lower GI bleeding  K92.2       2. Lower GI bleed  K92.2              Scribe Disclosure:  Dougie ORTIZ, am serving as a scribe at 8:04 AM on 8/20/2023 to document services personally performed by Eric Roy MD based on my observations and the provider's statements to me.        Eric Roy MD  08/20/23 1300

## 2023-08-20 NOTE — CONSULTS
Gastroenterology Consultation    Patient: Shannan Koenig   1950  Date of Consult:  2023  Admission Date/Time: 2023  7:49 AM  Primary Care Provider:  Gene Funk    I was asked to see this patient in consultation by Dr. Alegre for evaluation of hematochezia.    HPI:  Shannan Koenig is a 72 year old female who started having hematochezia at 6 AM.  She describes passing a large amount of bright red blood and clots with minimal stool.  She states that she had a total of 7 episodes between 6 AM and now.  She denies nausea, vomiting, or abdominal pain.    In April she had an episode of bleeding, and had an unremarkable upper endoscopy at that time, with no evidence for varices or portal hypertension.  A colonoscopy was deferred at that time due to diverticulitis.    PAST MEDICAL HISTORY:  Past Medical History:   Diagnosis Date    Anxiety     Infection due to 2019 novel coronavirus 2020    Infection due to 2019 novel coronavirus 2022    Substance abuse (H)        PAST SURGICAL HISTORY:  Past Surgical History:   Procedure Laterality Date    APPENDECTOMY  2020     SECTION      No date given    COLONOSCOPY  2022    mpression: Two 5 - 8 mm polyps in distal ascending colon.  Patent partial cecectomy anastomosis.  Exam otherwise normal.    COLONOSCOPY  06/10/2016    Every 5 years for family history    COLONOSCOPY  2001    EGD  2023    GIB, small HH otherwise normal    ESOPHAGOSCOPY, GASTROSCOPY, DUODENOSCOPY (EGD), COMBINED N/A 2023    Procedure: Esophagoscopy, gastroscopy, duodenoscopy (EGD), combined;  Surgeon: Dinora Bragg MD;  Location:  GI    EXTRACAPSULAR CATARACT EXTRATION WITH INTRAOCULAR LENS IMPLANT Right 2017    EXTRACAPSULAR CATARACT EXTRATION WITH INTRAOCULAR LENS IMPLANT Left 10/19/2017    FETAL BLOOD TRANSFUSION      No date given    TONSILLECTOMY      No date given    TUBAL LIGATION      No date given    WISDOM TOOTH EXTRACTION       No date given   -wisdom tooth       MEDICATIONS:   :   Prior to Admission Medications   Prescriptions Last Dose Informant Patient Reported? Taking?   DULoxetine (CYMBALTA) 60 MG capsule 8/19/2023  Yes Yes   Sig: Take 60 mg by mouth daily   acyclovir (ZOVIRAX) 400 MG tablet  at prn  No Yes   Sig: Take 1 tablet (400 mg) by mouth daily   Patient taking differently: Take 400 mg by mouth daily as needed (cold sores)   folic acid (FOLVITE) 1 MG tablet 8/19/2023  No Yes   Sig: Take 1 tablet (1 mg) by mouth daily   hydrOXYzine (ATARAX) 10 MG tablet 8/20/2023  Yes Yes   Sig: Take 10-20 mg by mouth 2 times daily as needed for anxiety   lovastatin (MEVACOR) 40 MG tablet 8/19/2023 Self No Yes   Sig: TAKE 1 TABLET BY MOUTH AT  BEDTIME   mirtazapine (REMERON) 15 MG tablet 8/19/2023 Self No Yes   Sig: TAKE 1 TABLET BY MOUTH AT  BEDTIME   multivitamin w/minerals (THERA-VIT-M) tablet 8/19/2023  No Yes   Sig: Take 1 tablet by mouth daily   naltrexone (DEPADE/REVIA) 50 MG tablet 8/19/2023  No Yes   Sig: Take 1 tablet (50 mg) by mouth daily   omeprazole (PRILOSEC) 40 MG DR capsule 8/19/2023  No Yes   Sig: Take 1 capsule (40 mg) by mouth daily   thiamine (B-1) 100 MG tablet 8/19/2023  No Yes   Sig: Take 1 tablet (100 mg) by mouth daily   traZODone (DESYREL) 50 MG tablet   Yes Yes   Sig: Take  mg by mouth nightly as needed for sleep   vitamin B-12 (CYANOCOBALAMIN) 1000 MCG tablet 8/19/2023  No Yes   Sig: Take 1 tablet (1,000 mcg) by mouth daily      Facility-Administered Medications: None       ALLERGIES:   :   Allergies   Allergen Reactions    Amoxicillin-Pot Clavulanate Nausea and Vomiting    Codeine Nausea and Vomiting    Latex Dermatitis, Rash and Swelling     Puffy weepy red eyes, nasal congestion and sneezing      Nitrofurantoin Rash       SOCIAL HISTORY:  Social History     Tobacco Use    Smoking status: Every Day     Packs/day: 0.50     Types: Cigarettes    Smokeless tobacco: Never    Tobacco comments:     smoking ~ 5  cig/day   Vaping Use    Vaping Use: Never used   Substance Use Topics    Alcohol use: Not Currently     Comment: 5-6 glasses of wine each day along with hard etoh    Drug use: Never       FAMILY HISTORY:  No first degree relative with colon cancer, gastric cancer, crohn's disease, ulcerative colitis, or liver disease.     EXAM:  General Appearance: Alert, oriented x3, no acute distress.  BP (!) 152/94   Pulse 93   Temp 98.1  F (36.7  C) (Oral)   Resp 13   Ht 1.524 m (5')   Wt 47.2 kg (104 lb)   SpO2 98%   BMI 20.31 kg/m    Eye: sclera anicteric.  ENT: oropharynx clear, no thrush.  CV: RRR.  Resp: CTA bilaterally.  GI: Soft, NABS, NT/ND.  Musculoskeletal: no joint swelling, erythema, or warmth.  Neuro: no asterixis.      Labs and imaging reviewed    Recent Labs   Lab Test 08/20/23  1050 08/20/23  0804 08/20/23  0803 07/12/23  0724 07/11/23  1139   WBC  --   --  11.1* 13.4* 14.1*   HGB 10.1*  --  12.6 12.3 14.6   MCV  --   --  86 89 87   PLT  --   --  276 263 312   INR  --  0.93  --   --   --      Recent Labs   Lab Test 08/20/23  0803 07/12/23  0724 07/11/23  1139   POTASSIUM 3.6 3.6 4.5   CHLORIDE 106 104 103   CO2 21* 20* 23   BUN 9.9 5.7* 7.4*   ANIONGAP 14 12 16*     Recent Labs   Lab Test 08/20/23  0803 07/12/23  0724 07/11/23  1611 07/11/23  1139 03/26/23  0719 03/25/23  0944 03/25/23  0910 09/21/22  1214   ALBUMIN 4.2 3.7  --  4.7   < >  --  4.4  --    BILITOTAL 0.4 0.6  --  0.2   < >  --  0.4  --    ALT 13 12  --  19   < >  --  38*  --    AST 20 17  --  27   < >  --  39*  --    PROTEIN  --   --  Negative  --   --  Negative  --  Negative   LIPASE 21  --   --   --   --   --  27  --     < > = values in this interval not displayed.       ASSESSMENT AND PLAN: This is a pleasant 72-year-old with painless hematochezia.  This is most likely a diverticular bleed.  AVM is also possible.  Malignancy is less likely given her unremarkable colonoscopy 3 years ago.    Given the briskness of her bleeding, I do  recommend interventional radiology involvement.  Apparently IR reviewed her CT scan and a small blush of bleeding was noted.  The plan is for her to go to interventional radiology for possible embolization.    If IR is unable to embolize the source, I would recommend a colonoscopy prep so that we could perform colonoscopy in the morning.    I discussed these recommendations with Dr. Alegre.    65 minutes of total time was spent providing patient care, including patient evaluation, reviewing documentation/test results, and .          Lopez Javed MD  Thank you for the opportunity to participate in the care of this patient.   Please feel free to call with any questions or concerns.  [phone].       CC: [facility], Gene Funk      Addendum  IR note reviewed, no active bleeding.  Will prep for colonoscopy tomorrow.  Lopez Javed MD

## 2023-08-21 ENCOUNTER — ANESTHESIA EVENT (OUTPATIENT)
Dept: GASTROENTEROLOGY | Facility: CLINIC | Age: 73
DRG: 378 | End: 2023-08-21
Payer: MEDICARE

## 2023-08-21 ENCOUNTER — ANESTHESIA (OUTPATIENT)
Dept: GASTROENTEROLOGY | Facility: CLINIC | Age: 73
DRG: 378 | End: 2023-08-21
Payer: MEDICARE

## 2023-08-21 VITALS
TEMPERATURE: 97.9 F | HEART RATE: 96 BPM | OXYGEN SATURATION: 97 % | DIASTOLIC BLOOD PRESSURE: 68 MMHG | WEIGHT: 104 LBS | RESPIRATION RATE: 18 BRPM | BODY MASS INDEX: 20.42 KG/M2 | SYSTOLIC BLOOD PRESSURE: 105 MMHG | HEIGHT: 60 IN

## 2023-08-21 LAB
ANION GAP SERPL CALCULATED.3IONS-SCNC: 11 MMOL/L (ref 7–15)
BUN SERPL-MCNC: 2.7 MG/DL (ref 8–23)
CALCIUM SERPL-MCNC: 8.6 MG/DL (ref 8.8–10.2)
CHLORIDE SERPL-SCNC: 109 MMOL/L (ref 98–107)
COLONOSCOPY: NORMAL
CREAT SERPL-MCNC: 0.62 MG/DL (ref 0.51–0.95)
DEPRECATED HCO3 PLAS-SCNC: 23 MMOL/L (ref 22–29)
ERYTHROCYTE [DISTWIDTH] IN BLOOD BY AUTOMATED COUNT: 14.9 % (ref 10–15)
GFR SERPL CREATININE-BSD FRML MDRD: >90 ML/MIN/1.73M2
GLUCOSE SERPL-MCNC: 100 MG/DL (ref 70–99)
HCT VFR BLD AUTO: 27.4 % (ref 35–47)
HGB BLD-MCNC: 8.4 G/DL (ref 11.7–15.7)
HGB BLD-MCNC: 8.8 G/DL (ref 11.7–15.7)
MCH RBC QN AUTO: 27.9 PG (ref 26.5–33)
MCHC RBC AUTO-ENTMCNC: 32.1 G/DL (ref 31.5–36.5)
MCV RBC AUTO: 87 FL (ref 78–100)
PLATELET # BLD AUTO: 238 10E3/UL (ref 150–450)
POTASSIUM SERPL-SCNC: 3.1 MMOL/L (ref 3.4–5.3)
POTASSIUM SERPL-SCNC: 3.2 MMOL/L (ref 3.4–5.3)
RBC # BLD AUTO: 3.15 10E6/UL (ref 3.8–5.2)
SODIUM SERPL-SCNC: 143 MMOL/L (ref 136–145)
WBC # BLD AUTO: 7.2 10E3/UL (ref 4–11)

## 2023-08-21 PROCEDURE — 250N000013 HC RX MED GY IP 250 OP 250 PS 637: Performed by: INTERNAL MEDICINE

## 2023-08-21 PROCEDURE — 99239 HOSP IP/OBS DSCHRG MGMT >30: CPT | Performed by: INTERNAL MEDICINE

## 2023-08-21 PROCEDURE — 85014 HEMATOCRIT: CPT | Performed by: INTERNAL MEDICINE

## 2023-08-21 PROCEDURE — 250N000009 HC RX 250: Performed by: NURSE ANESTHETIST, CERTIFIED REGISTERED

## 2023-08-21 PROCEDURE — 258N000003 HC RX IP 258 OP 636: Performed by: NURSE ANESTHETIST, CERTIFIED REGISTERED

## 2023-08-21 PROCEDURE — 84132 ASSAY OF SERUM POTASSIUM: CPT | Performed by: INTERNAL MEDICINE

## 2023-08-21 PROCEDURE — 999N000010 HC STATISTIC ANES STAT CODE-CRNA PER MINUTE: Performed by: INTERNAL MEDICINE

## 2023-08-21 PROCEDURE — 250N000011 HC RX IP 250 OP 636: Mod: JZ | Performed by: NURSE ANESTHETIST, CERTIFIED REGISTERED

## 2023-08-21 PROCEDURE — 36415 COLL VENOUS BLD VENIPUNCTURE: CPT | Performed by: INTERNAL MEDICINE

## 2023-08-21 PROCEDURE — 250N000011 HC RX IP 250 OP 636: Performed by: NURSE ANESTHETIST, CERTIFIED REGISTERED

## 2023-08-21 PROCEDURE — B4141ZZ FLUOROSCOPY OF SUPERIOR MESENTERIC ARTERY USING LOW OSMOLAR CONTRAST: ICD-10-PCS | Performed by: RADIOLOGY

## 2023-08-21 PROCEDURE — 85018 HEMOGLOBIN: CPT | Performed by: INTERNAL MEDICINE

## 2023-08-21 PROCEDURE — 0DBK8ZZ EXCISION OF ASCENDING COLON, VIA NATURAL OR ARTIFICIAL OPENING ENDOSCOPIC: ICD-10-PCS | Performed by: INTERNAL MEDICINE

## 2023-08-21 PROCEDURE — B4151ZZ FLUOROSCOPY OF INFERIOR MESENTERIC ARTERY USING LOW OSMOLAR CONTRAST: ICD-10-PCS | Performed by: RADIOLOGY

## 2023-08-21 PROCEDURE — 258N000003 HC RX IP 258 OP 636: Performed by: INTERNAL MEDICINE

## 2023-08-21 PROCEDURE — 80048 BASIC METABOLIC PNL TOTAL CA: CPT | Performed by: INTERNAL MEDICINE

## 2023-08-21 PROCEDURE — 88305 TISSUE EXAM BY PATHOLOGIST: CPT | Mod: TC | Performed by: INTERNAL MEDICINE

## 2023-08-21 PROCEDURE — C9113 INJ PANTOPRAZOLE SODIUM, VIA: HCPCS | Mod: JZ | Performed by: INTERNAL MEDICINE

## 2023-08-21 PROCEDURE — 370N000017 HC ANESTHESIA TECHNICAL FEE, PER MIN: Performed by: INTERNAL MEDICINE

## 2023-08-21 PROCEDURE — 45385 COLONOSCOPY W/LESION REMOVAL: CPT | Performed by: INTERNAL MEDICINE

## 2023-08-21 PROCEDURE — 250N000011 HC RX IP 250 OP 636: Mod: JZ | Performed by: INTERNAL MEDICINE

## 2023-08-21 RX ORDER — DEXMEDETOMIDINE HYDROCHLORIDE 4 UG/ML
INJECTION, SOLUTION INTRAVENOUS PRN
Status: DISCONTINUED | OUTPATIENT
Start: 2023-08-21 | End: 2023-08-21

## 2023-08-21 RX ORDER — LIDOCAINE 40 MG/G
CREAM TOPICAL
Status: DISCONTINUED | OUTPATIENT
Start: 2023-08-21 | End: 2023-08-21 | Stop reason: HOSPADM

## 2023-08-21 RX ORDER — POTASSIUM CHLORIDE 1500 MG/1
20 TABLET, EXTENDED RELEASE ORAL ONCE
Status: COMPLETED | OUTPATIENT
Start: 2023-08-21 | End: 2023-08-21

## 2023-08-21 RX ORDER — ONDANSETRON 2 MG/ML
INJECTION INTRAMUSCULAR; INTRAVENOUS PRN
Status: DISCONTINUED | OUTPATIENT
Start: 2023-08-21 | End: 2023-08-21

## 2023-08-21 RX ORDER — PROPOFOL 10 MG/ML
INJECTION, EMULSION INTRAVENOUS CONTINUOUS PRN
Status: DISCONTINUED | OUTPATIENT
Start: 2023-08-21 | End: 2023-08-21

## 2023-08-21 RX ORDER — ONDANSETRON 2 MG/ML
4 INJECTION INTRAMUSCULAR; INTRAVENOUS
Status: DISCONTINUED | OUTPATIENT
Start: 2023-08-21 | End: 2023-08-21 | Stop reason: HOSPADM

## 2023-08-21 RX ORDER — ACYCLOVIR 400 MG/1
400 TABLET ORAL DAILY PRN
Start: 2023-08-21

## 2023-08-21 RX ORDER — LIDOCAINE HYDROCHLORIDE 20 MG/ML
INJECTION, SOLUTION INFILTRATION; PERINEURAL PRN
Status: DISCONTINUED | OUTPATIENT
Start: 2023-08-21 | End: 2023-08-21

## 2023-08-21 RX ADMIN — POTASSIUM CHLORIDE 20 MEQ: 1500 TABLET, EXTENDED RELEASE ORAL at 14:01

## 2023-08-21 RX ADMIN — SODIUM CHLORIDE, POTASSIUM CHLORIDE, SODIUM LACTATE AND CALCIUM CHLORIDE: 600; 310; 30; 20 INJECTION, SOLUTION INTRAVENOUS at 09:04

## 2023-08-21 RX ADMIN — PROPOFOL 200 MCG/KG/MIN: 10 INJECTION, EMULSION INTRAVENOUS at 12:17

## 2023-08-21 RX ADMIN — SODIUM CHLORIDE 1000 ML: 9 INJECTION, SOLUTION INTRAVENOUS at 00:52

## 2023-08-21 RX ADMIN — ONDANSETRON 4 MG: 2 INJECTION INTRAMUSCULAR; INTRAVENOUS at 12:18

## 2023-08-21 RX ADMIN — POTASSIUM CHLORIDE 20 MEQ: 1500 TABLET, EXTENDED RELEASE ORAL at 18:03

## 2023-08-21 RX ADMIN — PHENYLEPHRINE HYDROCHLORIDE 150 MCG: 10 INJECTION INTRAVENOUS at 12:27

## 2023-08-21 RX ADMIN — Medication 12 MCG: at 12:18

## 2023-08-21 RX ADMIN — MULTIPLE VITAMINS W/ MINERALS TAB 1 TABLET: TAB at 09:03

## 2023-08-21 RX ADMIN — DULOXETINE HYDROCHLORIDE 60 MG: 60 CAPSULE, DELAYED RELEASE ORAL at 09:03

## 2023-08-21 RX ADMIN — CYANOCOBALAMIN TAB 1000 MCG 1000 MCG: 1000 TAB at 09:03

## 2023-08-21 RX ADMIN — PHENYLEPHRINE HYDROCHLORIDE 150 MCG: 10 INJECTION INTRAVENOUS at 12:30

## 2023-08-21 RX ADMIN — FOLIC ACID 1 MG: 1 TABLET ORAL at 09:03

## 2023-08-21 RX ADMIN — PANTOPRAZOLE SODIUM 40 MG: 40 INJECTION, POWDER, FOR SOLUTION INTRAVENOUS at 09:02

## 2023-08-21 RX ADMIN — LIDOCAINE HYDROCHLORIDE 80 MG: 20 INJECTION, SOLUTION INFILTRATION; PERINEURAL at 12:19

## 2023-08-21 RX ADMIN — THIAMINE HCL TAB 100 MG 100 MG: 100 TAB at 09:03

## 2023-08-21 ASSESSMENT — ACTIVITIES OF DAILY LIVING (ADL)
ADLS_ACUITY_SCORE: 24
ADLS_ACUITY_SCORE: 25
ADLS_ACUITY_SCORE: 24
ADLS_ACUITY_SCORE: 25
ADLS_ACUITY_SCORE: 23
ADLS_ACUITY_SCORE: 23
ADLS_ACUITY_SCORE: 25
ADLS_ACUITY_SCORE: 24
ADLS_ACUITY_SCORE: 24

## 2023-08-21 ASSESSMENT — LIFESTYLE VARIABLES: TOBACCO_USE: 0

## 2023-08-21 NOTE — ANESTHESIA PREPROCEDURE EVALUATION
Anesthesia Pre-Procedure Evaluation    Patient: Shannan Koenig   MRN: [id number] : 1950        Procedure : Procedure(s):  Esophagoscopy, gastroscopy, duodenoscopy (EGD), combined          Past Medical History:   Diagnosis Date    Anxiety     Infection due to 2019 novel coronavirus 2020    Infection due to 2019 novel coronavirus 2022    Substance abuse (H)       Past Surgical History:   Procedure Laterality Date    APPENDECTOMY  2020     SECTION      No date given    COLONOSCOPY  2022    mpression: Two 5 - 8 mm polyps in distal ascending colon.  Patent partial cecectomy anastomosis.  Exam otherwise normal.    COLONOSCOPY  06/10/2016    Every 5 years for family history    COLONOSCOPY  2001    EGD  2023    GIB, small HH otherwise normal    ESOPHAGOSCOPY, GASTROSCOPY, DUODENOSCOPY (EGD), COMBINED N/A 2023    Procedure: Esophagoscopy, gastroscopy, duodenoscopy (EGD), combined;  Surgeon: Dinora Bragg MD;  Location:  GI    EXTRACAPSULAR CATARACT EXTRATION WITH INTRAOCULAR LENS IMPLANT Right 2017    EXTRACAPSULAR CATARACT EXTRATION WITH INTRAOCULAR LENS IMPLANT Left 10/19/2017    FETAL BLOOD TRANSFUSION      No date given    TONSILLECTOMY      No date given    TUBAL LIGATION      No date given    WISDOM TOOTH EXTRACTION      No date given   -wisdom tooth      Allergies   Allergen Reactions    Amoxicillin-Pot Clavulanate Nausea and Vomiting    Codeine Nausea and Vomiting    Latex Dermatitis, Rash and Swelling     Puffy weepy red eyes, nasal congestion and sneezing      Nitrofurantoin Rash      Social History     Tobacco Use    Smoking status: Every Day     Packs/day: 0.50     Types: Cigarettes    Smokeless tobacco: Never    Tobacco comments:     smoking ~ 5 cig/day   Substance Use Topics    Alcohol use: Not Currently     Comment: 5-6 glasses of wine each day along with hard etoh      Wt Readings from Last 1 Encounters:   23 47.2 kg (104 lb)         Anesthesia Evaluation   Pt has had prior anesthetic.     No history of anesthetic complications       ROS/MED HX  ENT/Pulmonary:    (-) tobacco use, asthma and sleep apnea   Neurologic:  - neg neurologic ROS     Cardiovascular:  - neg cardiovascular ROS     METS/Exercise Tolerance:     Hematologic:  - neg hematologic  ROS     Musculoskeletal:  - neg musculoskeletal ROS     GI/Hepatic: Comment: div    (+) GERD, Asymptomatic on medication,                  Renal/Genitourinary:  - neg Renal ROS     Endo:     (+)          thyroid problem,            Psychiatric/Substance Use:  - neg psychiatric ROS     Infectious Disease:  - neg infectious disease ROS     Malignancy:  - neg malignancy ROS     Other:  - neg other ROS          Physical Exam    Airway        Mallampati: II   TM distance: > 3 FB   Neck ROM: full   Mouth opening: > 3 cm    Respiratory Devices and Support         Dental       (+) Completely normal teeth      Cardiovascular   cardiovascular exam normal          Pulmonary   pulmonary exam normal                OUTSIDE LABS:  CBC:   Lab Results   Component Value Date    WBC 7.2 08/21/2023    WBC 11.1 (H) 08/20/2023    HGB 8.8 (L) 08/21/2023    HGB 8.4 (L) 08/21/2023    HCT 27.4 (L) 08/21/2023    HCT 39.1 08/20/2023     08/21/2023     08/20/2023     BMP:   Lab Results   Component Value Date     08/21/2023     08/20/2023    POTASSIUM 3.2 (L) 08/21/2023    POTASSIUM 3.6 08/20/2023    CHLORIDE 109 (H) 08/21/2023    CHLORIDE 106 08/20/2023    CO2 23 08/21/2023    CO2 21 (L) 08/20/2023    BUN 2.7 (L) 08/21/2023    BUN 9.9 08/20/2023    CR 0.62 08/21/2023    CR 0.71 08/20/2023     (H) 08/21/2023     (H) 08/20/2023     COAGS:   Lab Results   Component Value Date    PTT 28 08/20/2023    INR 0.93 08/20/2023     POC: No results found for: BGM, HCG, HCGS  HEPATIC:   Lab Results   Component Value Date    ALBUMIN 4.2 08/20/2023    PROTTOTAL 7.0 08/20/2023    ALT 13 08/20/2023    AST  20 08/20/2023    GGT 35 03/26/2023    ALKPHOS 90 08/20/2023    BILITOTAL 0.4 08/20/2023     OTHER:   Lab Results   Component Value Date    LACT 1.1 08/20/2023    ELLIE 8.6 (L) 08/21/2023    PHOS 2.5 07/12/2023    MAG 1.9 08/20/2023    LIPASE 21 08/20/2023    TSH 2.64 03/26/2023    T4 1.29 08/20/2022    SED 4 09/12/2022       Anesthesia Plan    ASA Status:  2       Anesthesia Type: MAC.              Consents    Anesthesia Plan(s) and associated risks, benefits, and realistic alternatives discussed. Questions answered and patient/representative(s) expressed understanding.     - Discussed: Risks, Benefits and Alternatives for BOTH SEDATION and the PROCEDURE were discussed     - Discussed with:  Patient            Postoperative Care    Pain management: Oral pain medications.   PONV prophylaxis: Ondansetron (or other 5HT-3)     Comments:                Alverto Davila MD

## 2023-08-21 NOTE — DISCHARGE SUMMARY
[facility]  Discharge Summary        Shannan Koenig MRN# [id number]   YOB: 1950 Age: 72 year old     Date of Admission: 8/20/2023  Date of Discharge: 8/21/2023  Admitting Physician: Basim Alegre MD  Discharge Physician: Julian Osorio MD     Primary Provider: Gene Funk  Primary Care Physician Phone Number: [phone]         Discharge Diagnoses:   1. Acute lower GI bleeding, suspect diverticular bleed.  2. Acute blood loss anemia.  3. Colon polyp.        Other Chronic Medical Problems:      1. Alcohol dependence with h/o EtOH w/d.  2. GERD.  3. Hyperlipidemia/dyslipidemia.  4. Depression/anxiety.  5. MVP (mitral valve prolapse.  6. Tobacco use d/o.  7. H/o subclinical hypothyroidism.  8. H/o genital HSV.       Allergies:         Allergies   Allergen Reactions    Amoxicillin-Pot Clavulanate Nausea and Vomiting    Codeine Nausea and Vomiting    Latex Dermatitis, Rash and Swelling     Puffy weepy red eyes, nasal congestion and sneezing      Nitrofurantoin Rash           Discharge Medications:        Current Discharge Medication List        CONTINUE these medications which have CHANGED    Details   acyclovir (ZOVIRAX) 400 MG tablet Take 1 tablet (400 mg) by mouth daily as needed (cold sores)    Associated Diagnoses: Genital herpes simplex, unspecified site           CONTINUE these medications which have NOT CHANGED    Details   DULoxetine (CYMBALTA) 60 MG capsule Take 60 mg by mouth daily      folic acid (FOLVITE) 1 MG tablet Take 1 tablet (1 mg) by mouth daily  Qty: 90 tablet, Refills: 3    Associated Diagnoses: Alcohol withdrawal syndrome without complication (H)      hydrOXYzine (ATARAX) 10 MG tablet Take 10-20 mg by mouth 2 times daily as needed for anxiety      lovastatin (MEVACOR) 40 MG tablet TAKE 1 TABLET BY MOUTH AT  BEDTIME  Qty: 90 tablet, Refills: 1    Comments: Requesting 1 year supply  Associated Diagnoses: Dyslipidemia      mirtazapine  (REMERON) 15 MG tablet TAKE 1 TABLET BY MOUTH AT  BEDTIME  Qty: 90 tablet, Refills: 1    Comments: Requesting 1 year supply  Associated Diagnoses: Adjustment disorder with anxious mood; Sleep disturbances      multivitamin w/minerals (THERA-VIT-M) tablet Take 1 tablet by mouth daily  Qty: 90 tablet, Refills: 3    Associated Diagnoses: Alcohol withdrawal syndrome without complication (H)      naltrexone (DEPADE/REVIA) 50 MG tablet Take 1 tablet (50 mg) by mouth daily  Qty: 90 tablet, Refills: 3    Associated Diagnoses: Alcohol withdrawal syndrome without complication (H)      omeprazole (PRILOSEC) 40 MG DR capsule Take 1 capsule (40 mg) by mouth daily  Qty: 90 capsule, Refills: 3    Associated Diagnoses: Gastroesophageal reflux disease without esophagitis      thiamine (B-1) 100 MG tablet Take 1 tablet (100 mg) by mouth daily  Qty: 90 tablet, Refills: 3    Associated Diagnoses: Alcohol withdrawal syndrome without complication (H)      traZODone (DESYREL) 50 MG tablet Take  mg by mouth nightly as needed for sleep      vitamin B-12 (CYANOCOBALAMIN) 1000 MCG tablet Take 1 tablet (1,000 mcg) by mouth daily  Qty: 90 tablet, Refills: 3    Associated Diagnoses: Alcohol withdrawal syndrome without complication (H)                 Discharge Instructions and Follow-Up:      Discharge Orders      Brief Discharge Instructions    Angiogram Discharge Instructions:  You had an angiogram procedure.  An angiogram is a procedure that uses x-rays to take pictures of your blood vessels. A long, flexible tube or catheter is inserted through the blood stream (through the procedure site) to help deliver contrast (dye) into the arteries so they can be visible on the x-ray. Angiograms are used to evaluate possible blockages in the arterial system. Please follow the below instructions after your angiogram, including monitoring of your procedure site.    Care instructions after angiogram procedure:  -  If you received sedation for your  procedure, do not drive or operate heavy machinery for the rest of the day.  -  Do not lift objects greater than 10 pounds for 2 days following angiogram procedure.  -  Avoid excessive exercise and straining for 2 days.   -  Avoid tub baths, pools, hot tubs and Jacuzzis for 3 days or until procedure site is well healed.   -  You may shower beginning tomorrow. Do not scrub procedure site until well healed; pat dry.  -  Return to your normal activities as you tolerate after the 2 day restriction.  -  You can expect to return to work 1-2 days after your procedure - depending on the nature of your profession.  -  It is normal to have some tenderness and minimal swelling at procedure puncture site. A small area of discoloration may be present. Tenderness typically subsides in 1-2 days. A small knot may also be present at puncture site for 6-8 weeks. This can be a normal part of the healing process.     Follow up:  - Follow up with your vascular surgeon or the ordering provider. [facility] may contact you to help arrange for additional follow up.    Please seek medical evaluation for:  - If you develop fevers (greater than 101 F (38.3C)).  - If you develop increasing pain, redness, purulent drainage, tenderness, or swelling at procedure site.   - If you experience any bleeding from procedure/puncture site: lie down, firmly apply pressure to puncture site and call 911.  - Seek emergent evaluation if you experience any new leg/arm pain, discoloration or numbness.      Please call Gina HUBER RN clinician at  or Aundrea HUBER NP at  for questions or concerns about the procedure or post-procedure care. You can leave a voicemail. We work Monday through Friday [phone].     An alternative number to call for questions is Interventional Radiology at      Follow-up and recommended labs and tests     Follow-up with primary care provider, Gene Funk, within 7 days for hospital follow- up.  The  following labs/tests are recommended: CBC, BMP.     Activity    Your activity upon discharge: activity as tolerated     Reason for your hospital stay    1. Acute lower GI bleeding, suspect diverticular bleed.  2. Acute blood loss anemia.  3. Colon polyp.     Diet    Follow this diet upon discharge: Orders Placed This Encounter      Regular Diet Adult             Consultations This Hospital Stay:      INTERVENTIONAL RADIOLOGY ADULT/PEDS IP CONSULT  GASTROENTEROLOGY IP CONSULT  INTERVENTIONAL RADIOLOGY ADULT/PEDS IP CONSULT        Admission History:      Please see the H&P by Basim Alegre MD on 8/20/2023 for complete details. Briefly, Shannan Koenig is a 72 year old female with including including longstanding alcohol dependence/abuse; asthma; DLD; generalized anxiety disorder; GERD; nicotine dependence; pseudogout; history of GI bleed (April 2023) without melena and hematochezia, with negative upper endoscopy during the hospitalization (did not pursue colonoscopy due to suffering acute diverticulitis at the time); history of diverticulosis with diverticulitis; history of colonic polyps; and family history of colon cancer; who presented 8/20/2023 with bright red blood per rectum.    On initially evaluation 8/20, was tachycardic, soft BP. Labs notable for CBC with hgb 12.6, WBC 11.1; BMP with bicarb 21; LFT's normal, lipase 21; trop negative; INR 0.93. CT abdomen and pelvis 8/20 showed diverticulosis; no diverticulitis, colitis, or obstruction; simple left renal cyst, no follow-up required.     Was passing bright red blood in the emergency room approximately 2 cups. Hgb dropped to 10.1  ED MD spoke with IR who reviewed original CT from earlier, and there was a small blush of bleeding noted. GI and IR contacted and pt underwent IR visceral angiogram showed no active extravasation, pseudoaneurysm or AV fistula.        Problem Oriented Hospital Course:        Acute lower GI bleeding, suspect diverticular bleed.  Acute  blood loss anemia.  Colon polyp.  GERD.  * H/o GI bleed April 2023; presented with melena and bright red blood per rectum. Upper endoscopy unremarkable.  Did not pursue colonoscopy due to suffering acute diverticulitis at the time.  * PTA on omeprazole. Noted to be taking ibuprofen daily.  * Initial presentation as above. Received IVF's in ED. Started on IV pantoprazole on admit. BP's to 70's-80's systolic evening 8/20.  * On 8/21, noted that during colon prep, output still bloody. BP's improved. Colonoscopy showed: 1. Preparation of the colon was fair. Light yellow/brown stool all over the colon suggesting againt active GI blood loss. 2. One 5 mm polyp in the ascending colon, removed with a cold snare. Resected and retrieved. 3. Diverticulosis in the sigmoid colon and in the descending colon. Suspected source of self-resolved  rectal bleeding. 4. The examination was otherwise normal on direct and retroflexion views.   Recent Labs   Lab 08/21/23  0901 08/21/23  0034 08/20/23  1804 08/20/23  1320 08/20/23  1050 08/20/23  0803   HGB 8.8* 8.4* 9.3* 9.3* 10.1* 12.6   - Continue diet as tolerated.  - Follow-up with PCP.  - Follow-up colonoscopy in 3 years.    Alcohol dependence  H/o EtOH w/d.  * On admit, noted that pt had been sober for 10 days. Was attending AA meetings per patient and corroborated by patient's family member at the bedside. PTA on naltrexone.  * No signs of alcohol withdrawal on admit.  - Continue to abstain from EtOH.  - Continue naltrexone at discharge.  - Follow-up with PCP.    Hyperlipidemia/dyslipidemia.  - Continue lovastatin at discharge.    Depression/anxiety.  - Continue duloxetine; mirtazapine; PRN hydroxyzine; PRN trazodone.    H/o genital HSV.  - Continue acyclovir prophylaxis.    MVP (mitral valve prolapse)  - Noted.    H/o subclinical hypothyroidism  * Normal TSH in March.  - Noted.     Tobacco use d/o.  - Strongly recommend tobacco cessation.        Pending Results:        Unresulted  Labs Ordered in the Past 30 Days of this Admission       No orders found for last 31 day(s).                  Discharge Disposition:      Discharged to home.        Discharge Time:      Approximately 35 minutes.          Condition and Physical on Discharge:    See progress note on the same date as this discharge summary.          Key Imaging Studies, Lab Findings and Procedures/Surgeries:        Results for orders placed or performed during the hospital encounter of 08/20/23   CT Abdomen Pelvis w Contrast    Addendum: 8/20/2023    This study was not performed as a GI Bleed CT Angio.  There is single portal venous phase  ONLY.  Suture material is seen in the RLQ.  In addition, a small  amount of hyperdense material is seen in the dependent cecum, measuring 398 HU significantly   higher than the blood pool (157), though extravasated contrast may be higher than blood pool without dilution, I personally favor that this represents ingested contents.  It is, however, very difficult to tell without a pre-contrast phase.      Narrative    EXAM: CT ABDOMEN PELVIS W CONTRAST  LOCATION: [facility]  DATE: 8/20/2023    INDICATION: Abd pain, hematochezia  COMPARISON: 04/05/2023  TECHNIQUE: CT scan of the abdomen and pelvis was performed following injection of IV contrast. Multiplanar reformats were obtained. Dose reduction techniques were used.  CONTRAST: 60mL Isovue 370    FINDINGS:   LOWER CHEST: Normal.    HEPATOBILIARY: Normal.    PANCREAS: Normal.    SPLEEN: Normal.    ADRENAL GLANDS: Normal.    KIDNEYS/BLADDER: Simple left renal cyst, no follow-up required. 5 mm nonobstructing left renal stone.    BOWEL: Diverticulosis. No diverticulitis, colitis, or obstruction. No free air. No active extravasation of contrast, though this study was not performed as a CT abdomen and pelvis GI bleed angiogram. Unchanged right lower quadrant surgical suture line.    LYMPH NODES: Normal.    VASCULATURE:  Unremarkable.    PELVIC ORGANS: Normal.    MUSCULOSKELETAL: L5 spondylolysis with grade 1 anterolisthesis of L5 on S1      Impression    IMPRESSION:   1.  Diverticulosis. No diverticulitis, colitis, or obstruction.  2.  Simple left renal cyst, no follow-up required.  3.  Atherosclerotic vascular disease.     IR Visceral Angiogram    Narrative    PROCEDURE(S):  1. Ultrasound guided right common femoral arteriotomy.   2. Catheterization and angiogram of the SMA.   3. Selective catheterization and angiogram of three SMA branch  vessels.   4. Catheterization and angiogram of the YAJAIRA.  5. Selective catheterization and angiogram of an YAJAIRA branch vessel.   6. Placement of a 6 [place] Angio-Seal closure device.  7. Moderate sedation.     DATE OF PROCEDURE: 8/20/2023 3:27 PM    OPERATORS: Marta Fisher DO    MODERATE SEDATION: Versed 2 mg IV; Fentanyl 100 mcg IV. During the  time out, immediately prior to the administration of medications, the  patient was reassessed for adequacy to receive conscious sedation.  Under physician supervision, Versed and fentanyl were administered for  moderate sedation. Pulse oximetry, heart rate and blood pressure were  continuously monitored by an independent trained observer. The  physician spent 50 minutes of face-to-face sedation time with the  patient.    CONTRAST: 70 mL Isovue IV    REFERENCED AIR KERMA: 104 mGy  FLUOROSCOPY TIME: 12.4 minutes     CLINICAL HISTORY/INDICATION: Lower GI bleeding, possible active  bleeding noted on CT.     PROCEDURES AND FINDINGS:  Following a discussion of the risks, benefits, indications, and  alternatives to treatment, informed consent was obtained. The patient  was brought to the interventional radiology suite and placed supine on  the table. The right groin was prepped and draped in a routine sterile  fashion. A timeout was performed per hospital universal protocol  policy to ensure correct patient, site, and procedure to be performed.       A  preliminary ultrasound of the right groin was performed and  demonstrates a patent right common femoral artery.  A permanent  ultrasound image was recorded.  Using a combination of fluoroscopy and  ultrasound, an access site was determined.  The overlying skin was  anesthetized with 1% Lidocaine.  Using ultrasound guidance,  access  into the right common femoral artery was obtained with visualization  of needle entry into the vessel. A 0.018 wire was advanced through the  needle and exchange was made for a 5 [place] micropuncture sheath. A  0.035 wire was advanced through the micropuncture sheath and exchange  was made for a 5 [place] vascular sheath.    A 5 [place] Omni Select catheter was advanced into the abdominal aorta  over the wire and utilized to select the superior mesenteric artery.  Digital subtraction angiography (DSA) was then performed and the  images demonstrate superior mesenteric artery is patent. SMA is  patent. No evidence of active extravasation, AV fistula or  pseudoaneurysm.     A Progreat microcatheter and microwire were then utilized to  selectively catheterize a third order SMA branch vessel. Digital  subtraction angiography was performed, images show no active  extravasation. The microcatheter and wire were pulled back and used to  selectively catheterize a second third order SMA branch vessel.  Digital subtraction angiography was performed, images show no active  extravasation. The microcatheter and wire were pulled back and used to  selectively catheterize a third order SMA branch vessel. Digital  subtraction angiography was performed, images show no active  extravasation. Microcatheter was removed.    The Omni select catheter was then used to selectively catheterize the  inferior mesenteric artery. Digital subtraction angiography was  performed, images show no active extravasation, AV fistula or  pseudoaneurysm. The microcatheter and wire were then used to  selectively catheterize a single  inferior mesenteric artery third  order branch vessel. Digital subtraction angiography was performed,  images show active extravasation.    All wires and catheters were removed. After the arteriotomy was  assessed and found to be suitable, hemostasis was achieved with a 6  [place] Angio-Seal device.    Throughout the procedure, the patient was monitored by a radiology  nurse for cardiac rhythm, blood pressure and oxygen saturation which  remained stable.  The patient tolerated the procedure well and left  the interventional radiology suite in stable condition.      Impression    IMPRESSION: Mesenteric angiography showed no active extravasation,  pseudoaneurysm or AV fistula.     Colonoscopy 8/21/2023:  Impression:               1. Preparation of the colon was fair. Light                             yellow/brown stool all over the colon suggesting                             againt active GI blood loss.                             2. One 5 mm polyp in the ascending colon, removed                             with a cold snare. Resected and retrieved.                             3. Diverticulosis in the sigmoid colon and in the                             descending colon. Suspected source of self-resolved                             rectal bleeding.                             4. The examination was otherwise normal on direct                             and retroflexion views.   Recommendation:           1. Return patient to hospital [facility] for ongoing care.                             2. High fiber diet.                             3. Continue present medications.                             4. Await pathology results.                             5. Repeat colonoscopy in 3 years for surveillance.

## 2023-08-21 NOTE — PROGRESS NOTES
[facility]    Internal Medicine Hospitalist Progress Note  08/21/2023  I evaluated patient on the above date.    Julian Osorio Jr., MD  [phone] (p)  Text Page  Vocera        Assessment & Plan New actions/orders today (08/21/2023) are underlined. All lab results in the assessment and plan were reviewed.    Shannan Koenig is a 72 year old female with including including longstanding alcohol dependence/abuse; asthma; DLD; generalized anxiety disorder; GERD; nicotine dependence; pseudogout; history of GI bleed (April 2023) without melena and hematochezia, with negative upper endoscopy during the hospitalization (did not pursue colonoscopy due to suffering acute diverticulitis at the time); history of diverticulosis with diverticulitis; history of colonic polyps; and family history of colon cancer; who presented 8/20/2023 with bright red blood per rectum.    On initially evaluation 8/20, was tachycardic, soft BP. Labs notable for CBC with hgb 12.6, WBC 11.1; BMP with bicarb 21; LFT's normal, lipase 21; trop negative; INR 0.93. CT abdomen and pelvis 8/20 showed diverticulosis; no diverticulitis, colitis, or obstruction; simple left renal cyst, no follow-up required.     Was passing bright red blood in the emergency room approximately 2 cups. Hgb dropped to 10.1  ED MD spoke with IR who reviewed original CT from earlier, and there was a small blush of bleeding noted. GI and IR contacted and pt underwent IR visceral angiogram showed no active extravasation, pseudoaneurysm or AV fistula.      Acute lower GI bleeding, suspect diverticular bleed.  Acute blood loss anemia.  GERD.  * H/o GI bleed April 2023; presented with melena and bright red blood per rectum. Upper endoscopy unremarkable.  Did not pursue colonoscopy due to suffering acute diverticulitis at the time.  * PTA on omeprazole. Noted to be taking ibuprofen daily.  * Initial presentation as above. Received IVF's in ED. Started on IV  pantoprazole on admit. BP's to 70's-80's systolic evening 8/20.  * On 8/21, noted that during colon prep, output still bloody. BP's improved.  Recent Labs   Lab 08/21/23  0901 08/21/23  0034 08/20/23  1804 08/20/23  1320 08/20/23  1050 08/20/23  0803   HGB 8.8* 8.4* 9.3* 9.3* 10.1* 12.6   - Continue IV pantoprazole 40 mg daily.  - Continue IVF's.  - Continue to monitor CBC - repeat in am.  - Consider prbc transfusion if hgb </= 7.0 or if significant bleeding with hemodynamic instability or if symptomatic. Consent obtained on admit.  - GI consulted, appreciate help, colonoscopy today 8/21.    Alcohol dependence  H/o EtOH w/d.  * On admit, noted that pt had been sober for 10 days. Was attending AA meetings per patient and corroborated by patient's family member at the bedside. PTA on naltrexone.  * No signs of alcohol withdrawal on admit.  - Monitor for signs of EtOH withdrawal.  - Continue to abstain from EtOH.  - Continue naltrexone at discharge.  - Follow-up with PCP.    Hyperlipidemia/dyslipidemia.  - Continue lovastatin at discharge.    Depression/anxiety.  - Continue duloxetine; mirtazapine; PRN hydroxyzine; PRN trazodone.    H/o genital HSV.  - Continue acyclovir prophylaxis.    MVP (mitral valve prolapse)  - Noted.    H/o subclinical hypothyroidism  * Normal TSH in March.  - Noted.     Tobacco use d/o.  - Strongly recommend tobacco cessation.      Clinically Significant Risk Factors Present on Admission                                    COVID-19 testing.  COVID-19 PCR Results          1/3/2022    15:00 1/18/2022    12:03 6/20/2022    14:14 9/13/2022    23:23 3/25/2023    13:44   COVID-19 PCR Results   SARS CoV2 PCR Negative     Negative  Positive     Positive  Negative  Negative  Negative      COVID-19 Antibody Results, Testing for Immunity           No data to display                Diet: NPO per Anesthesia Guidelines for Procedure/Surgery Except for: Meds      Prophylaxis: PCD's, ambulation.   Retana  Catheter:   Not present  Lines:   None     Code Status: Full Code    Disposition Plan   Expected discharge: 1-2d recommended to prior living arrangement pending  above .  Entered: Julian Osorio MD 08/21/2023, 9:35 AM         Interval History   Doing better.  Notes colonoscopy prep output still bloody at the end.    -Data reviewed today: I reviewed all new labs and imaging over the last 24 hours. I personally reviewed no images or EKG's today.    Physical Exam    ,   Blood pressure 100/68, pulse 98, temperature 98.7  F (37.1  C), temperature source Oral, resp. rate 16, height 1.524 m (5'), weight 47.2 kg (104 lb), SpO2 95 %, not currently breastfeeding. O2 Device: None (Room air)    Vitals:    08/20/23 0747   Weight: 47.2 kg (104 lb)     Vital Signs with Ranges  Temp:  [98  F (36.7  C)-98.7  F (37.1  C)] 98.7  F (37.1  C)  Pulse:  [] 98  Resp:  [11-26] 16  BP: ()/(41-94) 100/68  SpO2:  [94 %-99 %] 95 %  Patient Vitals for the past 24 hrs:   BP Temp Temp src Pulse Resp SpO2   08/21/23 0737 100/68 98.7  F (37.1  C) Oral 98 16 95 %   08/21/23 0054 107/68 -- -- -- -- --   08/20/23 2332 (!) 87/58 -- -- -- -- --   08/20/23 2329 (!) 73/41 98  F (36.7  C) Oral 98 16 94 %   08/20/23 1559 121/76 98.7  F (37.1  C) Oral 73 18 96 %   08/20/23 1530 -- -- -- 70 19 99 %   08/20/23 1525 123/76 -- -- 69 15 94 %   08/20/23 1520 131/84 -- -- 73 13 97 %   08/20/23 1515 133/80 -- -- 73 14 98 %   08/20/23 1510 113/77 -- -- 76 14 96 %   08/20/23 1505 117/78 -- -- 76 13 96 %   08/20/23 1500 114/74 -- -- 73 18 95 %   08/20/23 1455 118/78 -- -- 75 11 96 %   08/20/23 1450 126/80 -- -- 76 18 97 %   08/20/23 1445 130/83 -- -- 89 17 96 %   08/20/23 1440 (!) 154/90 -- -- 74 26 99 %   08/20/23 1415 -- -- -- 110 18 96 %   08/20/23 1410 -- -- -- 74 19 96 %   08/20/23 1250 -- -- -- -- -- 98 %   08/20/23 1245 (!) 152/94 -- -- 93 -- --   08/20/23 1114 123/82 -- -- 89 13 96 %   08/20/23 1059 131/86 -- -- 91 16 96 %   08/20/23 1015  119/83 -- -- 96 17 --     I/O's Last 24 hours  I/O last 3 completed shifts:  In: 3000 [P.O.:2000; IV Piggyback:1000]  Out: -     Constitutional: Awake, alert, oriented, pleasant.  Respiratory: Diminished in bases. No crackles or wheezes.  Cardiovascular: RRR, no m/r/g.  GI: Soft, nt, nd, +BS.  Skin/Integumen:   Other:        Data    Labs reviewed.  Recent Labs   Lab 08/21/23  0901 08/21/23  0034 08/20/23  1804 08/20/23  1050 08/20/23  0804 08/20/23  0803   WBC 7.2  --   --   --   --  11.1*   HGB 8.8* 8.4* 9.3*   < >  --  12.6   MCV 87  --   --   --   --  86     --   --   --   --  276   INR  --   --   --   --  0.93  --    NA  --   --   --   --   --  141   POTASSIUM  --   --   --   --   --  3.6   CHLORIDE  --   --   --   --   --  106   CO2  --   --   --   --   --  21*   BUN  --   --   --   --   --  9.9   CR  --   --   --   --   --  0.71   ANIONGAP  --   --   --   --   --  14   ELLIE  --   --   --   --   --  9.8   GLC  --   --   --   --   --  131*   ALBUMIN  --   --   --   --   --  4.2   PROTTOTAL  --   --   --   --   --  7.0   BILITOTAL  --   --   --   --   --  0.4   ALKPHOS  --   --   --   --   --  90   ALT  --   --   --   --   --  13   AST  --   --   --   --   --  20   LIPASE  --   --   --   --   --  21    < > = values in this interval not displayed.     Recent Labs   Lab Test 08/20/23  0803   TROPONIN T HIGH SENSITIVITY 6     Recent Labs   Lab 08/20/23  0803   *      No lab results found.     Recent Labs   Lab 08/20/23  0804   INR 0.93     Recent Labs   Lab 08/21/23  0901 08/20/23  0821 08/20/23  0803   WBC 7.2  --  11.1*   LACT  --  1.1  --        MICRO:  CULTURES (INCLUDING BLOOD AND URINE):  No lab results found in last 7 days.    Recent Results (from the past 24 hour(s))   CT Abdomen Pelvis w Contrast    Addendum: 8/20/2023    This study was not performed as a GI Bleed CT Angio.  There is single portal venous phase  ONLY.  Suture material is seen in the RLQ.  In addition, a small  amount of  hyperdense material is seen in the dependent cecum, measuring 398 HU significantly   higher than the blood pool (157), though extravasated contrast may be higher than blood pool without dilution, I personally favor that this represents ingested contents.  It is, however, very difficult to tell without a pre-contrast phase.      Narrative    EXAM: CT ABDOMEN PELVIS W CONTRAST  LOCATION: [facility]  DATE: 8/20/2023    INDICATION: Abd pain, hematochezia  COMPARISON: 04/05/2023  TECHNIQUE: CT scan of the abdomen and pelvis was performed following injection of IV contrast. Multiplanar reformats were obtained. Dose reduction techniques were used.  CONTRAST: 60mL Isovue 370    FINDINGS:   LOWER CHEST: Normal.    HEPATOBILIARY: Normal.    PANCREAS: Normal.    SPLEEN: Normal.    ADRENAL GLANDS: Normal.    KIDNEYS/BLADDER: Simple left renal cyst, no follow-up required. 5 mm nonobstructing left renal stone.    BOWEL: Diverticulosis. No diverticulitis, colitis, or obstruction. No free air. No active extravasation of contrast, though this study was not performed as a CT abdomen and pelvis GI bleed angiogram. Unchanged right lower quadrant surgical suture line.    LYMPH NODES: Normal.    VASCULATURE: Unremarkable.    PELVIC ORGANS: Normal.    MUSCULOSKELETAL: L5 spondylolysis with grade 1 anterolisthesis of L5 on S1      Impression    IMPRESSION:   1.  Diverticulosis. No diverticulitis, colitis, or obstruction.  2.  Simple left renal cyst, no follow-up required.  3.  Atherosclerotic vascular disease.     IR Visceral Angiogram    Narrative    PROCEDURE(S):  1. Ultrasound guided right common femoral arteriotomy.   2. Catheterization and angiogram of the SMA.   3. Selective catheterization and angiogram of three SMA branch  vessels.   4. Catheterization and angiogram of the YAJAIRA  5. Selective catheterization and angiogram of an YAJAIRA branch vessel.   6. Placement of a 6 Romanian AngioSeal closure device   7.  Moderate sedation.     DATE OF PROCEDURE: 8/20/2023 3:27 PM    OPERATORS: Marta Fisher DO    MODERATE SEDATION: Versed 2 mg IV; Fentanyl 100 mcg IV. During the  time out, immediately prior to the administration of medications, the  patient was reassessed for adequacy to receive conscious sedation.   Under physician supervision, Versed and fentanyl were administered for  moderate sedation. Pulse oximetry, heart rate and blood pressure were  continuously monitored by an independent trained observer. The  physician spent 50 minutes of face-to-face sedation time with the  patient.    CONTRAST: 70 mL Isovue IV    REFERENCED AIR KERMA: 104 mGy  FLUOROSCOPY TIME: 12.4 minutes     CLINICAL HISTORY/INDICATION: Lower GI bleeding, possible active  bleeding noted on CT.     PROCEDURES AND FINDINGS:  Following a discussion of the risks, benefits, indications, and  alternatives to treatment, informed consent was obtained. The patient  was brought to the interventional radiology suite and placed supine on  the table. The right groin was prepped and draped in a routine sterile  fashion. A timeout was performed per hospital universal protocol  policy to ensure correct patient, site, and procedure to be performed.       A preliminary ultrasound of the right groin was performed and  demonstrates a patent right common femoral artery.  A permanent  ultrasound image was recorded.  Using a combination of fluoroscopy and  ultrasound, an access site was determined.  The overlying skin was  anesthetized with 1% Lidocaine.  Using ultrasound guidance,  access  into the right common femoral artery was obtained with visualization  of needle entry into the vessel. A 0.018 wire was advanced through the  needle and exchange was made for a 5 Somali micropuncture sheath. A  0.035 wire was advanced through the micropuncture sheath and exchange  was made for a 5 Somali vascular sheath.    A 5 Somali Omni Select catheter was advanced into the  abdominal aorta  over the wire and utilized to select the superior mesenteric artery.  Digital subtraction angiography (DSA) was then performed and the  images demonstrate mesenteric artery is patent. The anterior. No  evidence of active extravasation, AV fistula or pseudoaneurysm.     A Progreat microcatheter and microwire were then utilized to  selectively catheterize a third order SMA branch vessel. Digital  subtraction angiography was performed, images show no active  extravasation. The microcatheter and wire were pulled back and used to  selectively catheterize a second third order SMA branch vessel.  Digital subtraction angiography was performed, images show no active  extravasation. The microcatheter and wire were pulled back and used to  selectively catheterize a third third order SMA branch vessel. Digital  subtraction angiography was performed, images show no active  extravasation. Microcatheter was removed.    The Omni select catheter was then used to selectively catheterize the  inferior mesenteric artery. Digital subtraction angiography was  performed, images show no active extravasation, AV fistula or  pseudoaneurysm. The microcatheter and wire were then used to  selectively catheterize a single inferior mesenteric artery third  order branch vessel. Digital subtraction angiography was performed,  images show active extravasation.    All wires and catheters were removed.  After the arteriotomy was  assessed and found to be suitable, hemostasis was achieved with a 6  Greek Angio Seal device.    Throughout the procedure, the patient was monitored by a radiology  nurse for cardiac rhythm, blood pressure and oxygen saturation which  remained stable.  The patient tolerated the procedure well and left  the interventional radiology suite in stable condition.      Impression    IMPRESSION: Mesenteric angiography showed no active extravasation,  pseudoaneurysm or AV fistula.       Medications   All medications  were reviewed.    Infusions:   lactated ringers 125 mL/hr at 08/21/23 0904     Scheduled Medications:   cyanocobalamin  1,000 mcg Oral Daily    DULoxetine  60 mg Oral Daily    folic acid  1 mg Oral Daily    mirtazapine  15 mg Oral At Bedtime    multivitamin w/minerals  1 tablet Oral Daily    nicotine  1 patch Transdermal Daily    nicotine   Transdermal Q8H    pantoprazole  40 mg Intravenous Daily with breakfast    sodium chloride (PF)  3 mL Intracatheter Q8H    thiamine  100 mg Oral Daily     PRN Medications:  acetaminophen, hydrOXYzine, lidocaine 4%, lidocaine (buffered or not buffered), melatonin, ondansetron **OR** ondansetron, prochlorperazine **OR** prochlorperazine **OR** prochlorperazine, sodium chloride (PF), traZODone

## 2023-08-21 NOTE — PLAN OF CARE
Summary:  Lower GI bleed, visceral angiogram done before arrival to unit  DATE & TIME: 8/20/2023 3559-2584    Cognitive Concerns/ Orientation : A&Ox4   BEHAVIOR & AGGRESSION TOOL COLOR: green   ABNL VS/O2: VSS on RA  MOBILITY: SBA- up to BSC  PAIN MANAGMENT: denies pain  DIET: Clears- NPO at MN  BOWEL/BLADDER: continent- up to BSC. Bowel prep this shift- bright red blood. Difficult for pt to finish prep- says in the past she is never able to finish it because it makes her too nauseous. Encouraged to drink as much as she can- drank 50-75% of it.  ABNL LAB/BG: HgB q6h checks 10.1, 9.3, 9.3 today.  DRAIN/DEVICES: PIV x2 infusing LR at 125mL/hr  TELEMETRY RHYTHM: n/a  SKIN: WDL  TESTS/PROCEDURES: Visceral angiogram done today, colonoscopy tomorrow  D/C DAY/GOALS/PLACE: possibly tomorrow after colonoscopy  OTHER IMPORTANT INFO: PRN zofran given x1- slightly effective. Pt declined compazine later in the shift, said nausea was tolerable. Pt anxious and emotional this shift. Given PRN atarax x1 and HS remeron/trazodone. Ordered nicotine patch to help with anxiety, but pt said after the atarax and talking to RN/daughter on the phone she didn't need it.

## 2023-08-21 NOTE — PROVIDER NOTIFICATION
MD Notification    Notified Person: MD    Notified Person Name: Dr. Benitez    Notification Date/Time: 8/21/23 0037    Notification Interaction: amcom    Purpose of Notification:625 MB - Pt BP 87/54. No complaints of dizziness/lightheaded. She is about 75% done with bowel prep and currently on LR@125. Please advise if orders needed or just keep monitoring? Thanks, Catrachita RN *[phone]    Orders Received:    Comments:

## 2023-08-21 NOTE — PLAN OF CARE
Goal Outcome Evaluation:       Summary:  Lower GI bleed, visceral angiogram done before arrival to unit  DATE & TIME: 8/20/2023 5740-2491    Cognitive Concerns/ Orientation : A&Ox4   BEHAVIOR & AGGRESSION TOOL COLOR: green   ABNL VS/O2: VSS on RA. BP 73/41, 87/58. 1L NS bolus given.   MOBILITY: SBA- up to BSC  PAIN MANAGMENT: denies pain  DIET: NPO  BOWEL/BLADDER: continent- up to BSC. Having bright red stool.   ABNL LAB/BG: HgB q6h checks 8.4  DRAIN/DEVICES: PIV x2 infusing LR at 125mL/hr  TELEMETRY RHYTHM: n/a  SKIN: WDL  TESTS/PROCEDURES: Visceral angiogram done, colonoscopy tomorrow  D/C DAY/GOALS/PLACE: possibly tomorrow after colonoscopy  OTHER IMPORTANT INFO: Pt felt dizzy upon standing up. Encouraged slow movements. Bed alarm on.

## 2023-08-21 NOTE — ANESTHESIA POSTPROCEDURE EVALUATION
Patient: Shannan Koenig    Procedure: Procedure(s):  Colonoscopy       Anesthesia Type:  MAC    Note:  Disposition: Inpatient   Postop Pain Control: Uneventful            Sign Out: Well controlled pain   PONV: No   Neuro/Psych: Uneventful            Sign Out: Acceptable/Baseline neuro status   Airway/Respiratory: Uneventful            Sign Out: Acceptable/Baseline resp. status   CV/Hemodynamics: Uneventful            Sign Out: Acceptable CV status   Other NRE: NONE   DID A NON-ROUTINE EVENT OCCUR?            Last vitals:  Vitals Value Taken Time   /59 08/21/23 1300   Temp     Pulse 82 08/21/23 1303   Resp 21 08/21/23 1303   SpO2 90 % 08/21/23 1303   Vitals shown include unvalidated device data.    Electronically Signed By: Alverto Davila MD  August 21, 2023  1:03 PM

## 2023-08-21 NOTE — PROGRESS NOTES
Interventional Radiology - Progress Note  Inpatient - [facility]  8/21/2023    S:  Pt found sitting in bed. Pt reports right groin is slightly tender, but doing well. No LE pain, numbness, or tingling. Discussed angiogram was without intervention.       O:  /68 (BP Location: Right arm)   Pulse 98   Temp 98.7  F (37.1  C) (Oral)   Resp 16   Ht 1.524 m (5')   Wt 47.2 kg (104 lb)   SpO2 95%   BMI 20.31 kg/m    General:  Stable.  In no acute distress.    Neuro:  A&O x 3. Moves all extremities equally.  Resp:  breathing unlabored on room air.  Abdomen:  Soft, non-distended, non-tender  Vascular:  +2/4 right femoral pulse, Soft, no ecchymosis or oozing, dressing is C/D/I  Skin:  Without excoriations, ecchymosis, erythema, lesions or open sores on visible skin.  MSK:  No gross motor weakness.  Sensation intact.  Proprioception intact.    IMAGING:  PROCEDURE(S):  1. Ultrasound guided right common femoral arteriotomy.   2. Catheterization and angiogram of the SMA.   3. Selective catheterization and angiogram of three SMA branch  vessels.   4. Catheterization and angiogram of the YAJAIRA  5. Selective catheterization and angiogram of an YAJAIRA branch vessel.   6. Placement of a 6 Cameroonian AngioSeal closure device   7. Moderate sedation.      DATE OF PROCEDURE: 8/20/2023 3:27 PM     OPERATORS: Marta Fisher DO     MODERATE SEDATION: Versed 2 mg IV; Fentanyl 100 mcg IV. During the  time out, immediately prior to the administration of medications, the  patient was reassessed for adequacy to receive conscious sedation.   Under physician supervision, Versed and fentanyl were administered for  moderate sedation. Pulse oximetry, heart rate and blood pressure were  continuously monitored by an independent trained observer. The  physician spent 50 minutes of face-to-face sedation time with the  patient.     CONTRAST: 70 mL Isovue IV     REFERENCED AIR KERMA: 104 mGy  FLUOROSCOPY TIME: 12.4 minutes      CLINICAL  HISTORY/INDICATION: Lower GI bleeding, possible active  bleeding noted on CT.      PROCEDURES AND FINDINGS:  Following a discussion of the risks, benefits, indications, and  alternatives to treatment, informed consent was obtained. The patient  was brought to the interventional radiology suite and placed supine on  the table. The right groin was prepped and draped in a routine sterile  fashion. A timeout was performed per hospital universal protocol  policy to ensure correct patient, site, and procedure to be performed.        A preliminary ultrasound of the right groin was performed and  demonstrates a patent right common femoral artery.  A permanent  ultrasound image was recorded.  Using a combination of fluoroscopy and  ultrasound, an access site was determined.  The overlying skin was  anesthetized with 1% Lidocaine.  Using ultrasound guidance,  access  into the right common femoral artery was obtained with visualization  of needle entry into the vessel. A 0.018 wire was advanced through the  needle and exchange was made for a 5 Tristanian micropuncture sheath. A  0.035 wire was advanced through the micropuncture sheath and exchange  was made for a 5 Tristanian vascular sheath.     A 5 Tristanian Omni Select catheter was advanced into the abdominal aorta  over the wire and utilized to select the superior mesenteric artery.  Digital subtraction angiography (DSA) was then performed and the  images demonstrate mesenteric artery is patent. The anterior. No  evidence of active extravasation, AV fistula or pseudoaneurysm.      A Progreat microcatheter and microwire were then utilized to  selectively catheterize a third order SMA branch vessel. Digital  subtraction angiography was performed, images show no active  extravasation. The microcatheter and wire were pulled back and used to  selectively catheterize a second third order SMA branch vessel.  Digital subtraction angiography was performed, images show no  active  extravasation. The microcatheter and wire were pulled back and used to  selectively catheterize a third third order SMA branch vessel. Digital  subtraction angiography was performed, images show no active  extravasation. Microcatheter was removed.     The Omni select catheter was then used to selectively catheterize the  inferior mesenteric artery. Digital subtraction angiography was  performed, images show no active extravasation, AV fistula or  pseudoaneurysm. The microcatheter and wire were then used to  selectively catheterize a single inferior mesenteric artery third  order branch vessel. Digital subtraction angiography was performed,  images show active extravasation.     All wires and catheters were removed.  After the arteriotomy was  assessed and found to be suitable, hemostasis was achieved with a 6  French Angio Seal device.     Throughout the procedure, the patient was monitored by a radiology  nurse for cardiac rhythm, blood pressure and oxygen saturation which  remained stable.  The patient tolerated the procedure well and left  the interventional radiology suite in stable condition.                                                                      IMPRESSION: Mesenteric angiography showed no active extravasation,  pseudoaneurysm or AV fistula.   This result has not been signed. Information might be incomplete.       LABS:  CBC RESULTS:   Recent Labs   Lab Test 08/21/23  0901   WBC 7.2   RBC 3.15*   HGB 8.8*   HCT 27.4*   MCV 87   MCH 27.9   MCHC 32.1   RDW 14.9         A: 72 year old female with Lower GI bleeding S/P visceral angiogram withOUT intervention 8/20 with Dr. Fisher- no extravasation seen on angiogram    P:    - Post procedure care education discussed with patient. All questions answered.   - Post procedure discharge instructions entered into D/C navigator.  - Recommend follow up as needed. Discussed plan for follow up with patient who expressed theirunderstanding.   -  Patient to contact [facility] with questions or concerns, number in D/C instructions.  - IR will sign off      Total time spent on the date of the encounter is 20 minutes, including time spent counseling the patient, performing a medically appropriate evaluation, reviewing prior medical history, ordering medications and tests, documenting clinical information in the medical record, and communication of results.      Cecilia Gupta PA-C  Interventional Radiology  [facility] desk phone *[phone]

## 2023-08-21 NOTE — ANESTHESIA CARE TRANSFER NOTE
Patient: Shannan Koenig    Procedure: Procedure(s):  Colonoscopy       Diagnosis: Hematochezia [K92.1]  Diagnosis Additional Information: No value filed.    Anesthesia Type:   MAC     Note:    Oropharynx: oropharynx clear of all foreign objects and spontaneously breathing  Level of Consciousness: drowsy  Oxygen Supplementation: room air    Independent Airway: airway patency satisfactory and stable  Dentition: dentition unchanged  Vital Signs Stable: post-procedure vital signs reviewed and stable  Report to RN Given: handoff report given  Patient transferred to: PACU    Handoff Report: Identifed the Patient, Identified the Reponsible Provider, Reviewed the pertinent medical history, Discussed the surgical course, Reviewed Intra-OP anesthesia mangement and issues during anesthesia, Set expectations for post-procedure period and Allowed opportunity for questions and acknowledgement of understanding      Vitals:  Vitals Value Taken Time   /63 08/21/23 1255   Temp     Pulse 81 08/21/23 1255   Resp 27 08/21/23 1257   SpO2 94 % 08/21/23 1257   Vitals shown include unvalidated device data.    Electronically Signed By: EVELIA Flower CRNA  August 21, 2023  12:57 PM

## 2023-08-21 NOTE — PLAN OF CARE
8/20/23 admit, GI bleed  8/21/23 7 a to 3 p shift    Orientation: A&O, calm and pleasant    Vitals/Tele: VSS on RA    IV Access/drains: CLARICE access infusing  cc    Diet: NPO for colonoscopy then advanced to Regular    Mobility: SBA, bed to BSC    GI/: Continent, no BM from 7 am    Wound/Skin: Pale, with R groin entry site from Angiogram    Consults: GI following    Discharge Plan: TBD    HgB from 8.4 to 8.8, K replaced  Colonoscopy was negative, no bleeding found,  Polyp was removed, discharging today waiting for  time    See Flow sheets for assessment

## 2023-08-22 ENCOUNTER — PATIENT OUTREACH (OUTPATIENT)
Dept: CARE COORDINATION | Facility: CLINIC | Age: 73
End: 2023-08-22
Payer: MEDICARE

## 2023-08-22 ENCOUNTER — TELEPHONE (OUTPATIENT)
Dept: INTERNAL MEDICINE | Facility: CLINIC | Age: 73
End: 2023-08-22
Payer: MEDICARE

## 2023-08-22 DIAGNOSIS — Z09 ENCOUNTER FOR EXAMINATION FOLLOWING TREATMENT AT HOSPITAL: Primary | ICD-10-CM

## 2023-08-22 LAB
PATH REPORT.COMMENTS IMP SPEC: NORMAL
PATH REPORT.COMMENTS IMP SPEC: NORMAL
PATH REPORT.FINAL DX SPEC: NORMAL
PATH REPORT.GROSS SPEC: NORMAL
PATH REPORT.MICROSCOPIC SPEC OTHER STN: NORMAL
PATH REPORT.RELEVANT HX SPEC: NORMAL
PHOTO IMAGE: NORMAL

## 2023-08-22 PROCEDURE — 88305 TISSUE EXAM BY PATHOLOGIST: CPT | Mod: 26 | Performed by: PATHOLOGY

## 2023-08-22 NOTE — PROGRESS NOTES
[facility] Contact  [facility]/Voicemail     Clinical Data: Post-Discharge Outreach     Outreach attempted x 2.  Left message on patient's voicemail, providing [facility]'s 24/7 scheduling and nurse triage phone number [phone] ([phone]) for questions/concerns and/or to schedule an appt with an [facility] provider, if they do not have a PCP.      Plan:  [facility] will do no further outreaches at this time.     SALBADOR Glover  [phone]  [facility]     *Connected Care Resource Team does NOT follow patient ongoing. Referrals are identified based on internal discharge reports and the outreach is to ensure patient has an understanding of their discharge instructions.

## 2023-08-22 NOTE — PLAN OF CARE
Goal Outcome Evaluation:       Pt Dc'd to home at 1830 with daughter. Prior to discontinue, pt's K+ recheck was 3.3 . MD Dr. Osorio notified and K+ protocol reinititiated per orders, dose given and ok to discharge per Dr. Osorio. All meds and belongings sent with pt, all discontinue instructions reviewed with verbal understanding given by pt.

## 2023-08-22 NOTE — TELEPHONE ENCOUNTER
TO PCP:     Pt called, recently discharged post hospital stay     States potassium labs were abnormal in hospital and was advised to have them rechecked this week     Pt is scheduled for a virtual visit on Friday, asking if you'll order a potassium lab so she can have that checked this week?     Appointments in Next Year      Aug 25, 2023  1:00 PM  (Arrive by 12:45 PM)  ED/Hospital Follow Up with Gene Funk MD  [facility] ([facility] ) [phone]          Ying CEDEÑO, Triage RN  [facility]

## 2023-08-22 NOTE — PROGRESS NOTES
Discharge    Patient discharged to home via  with family  Care plan note done    Listed belongings gathered and given to patient (including from security/pharmacy). Yes  Care Plan and Patient education resolved: Yes  Prescriptions if needed, hard copies sent with patient  Yes  Medication Bin checked and emptied on discharge Yes  SW/care coordinator/charge RN aware of discharge: Yes

## 2023-08-23 ENCOUNTER — LAB (OUTPATIENT)
Dept: LAB | Facility: CLINIC | Age: 73
End: 2023-08-23
Payer: MEDICARE

## 2023-08-23 DIAGNOSIS — Z09 ENCOUNTER FOR EXAMINATION FOLLOWING TREATMENT AT HOSPITAL: ICD-10-CM

## 2023-08-23 DIAGNOSIS — Z11.59 NEED FOR HEPATITIS C SCREENING TEST: Primary | ICD-10-CM

## 2023-08-23 LAB
ANION GAP SERPL CALCULATED.3IONS-SCNC: 10 MMOL/L (ref 7–15)
BASOPHILS # BLD AUTO: 0.1 10E3/UL (ref 0–0.2)
BASOPHILS NFR BLD AUTO: 1 %
BUN SERPL-MCNC: 10.4 MG/DL (ref 8–23)
CALCIUM SERPL-MCNC: 9.4 MG/DL (ref 8.8–10.2)
CHLORIDE SERPL-SCNC: 105 MMOL/L (ref 98–107)
CREAT SERPL-MCNC: 0.74 MG/DL (ref 0.51–0.95)
DEPRECATED HCO3 PLAS-SCNC: 25 MMOL/L (ref 22–29)
EOSINOPHIL # BLD AUTO: 0.1 10E3/UL (ref 0–0.7)
EOSINOPHIL NFR BLD AUTO: 1 %
ERYTHROCYTE [DISTWIDTH] IN BLOOD BY AUTOMATED COUNT: 15.4 % (ref 10–15)
GFR SERPL CREATININE-BSD FRML MDRD: 85 ML/MIN/1.73M2
GLUCOSE SERPL-MCNC: 194 MG/DL (ref 70–99)
HCT VFR BLD AUTO: 26.5 % (ref 35–47)
HGB BLD-MCNC: 8.7 G/DL (ref 11.7–15.7)
IMM GRANULOCYTES # BLD: 0.1 10E3/UL
IMM GRANULOCYTES NFR BLD: 1 %
LYMPHOCYTES # BLD AUTO: 2 10E3/UL (ref 0.8–5.3)
LYMPHOCYTES NFR BLD AUTO: 21 %
MCH RBC QN AUTO: 28.2 PG (ref 26.5–33)
MCHC RBC AUTO-ENTMCNC: 32.8 G/DL (ref 31.5–36.5)
MCV RBC AUTO: 86 FL (ref 78–100)
MONOCYTES # BLD AUTO: 0.9 10E3/UL (ref 0–1.3)
MONOCYTES NFR BLD AUTO: 9 %
NEUTROPHILS # BLD AUTO: 6.5 10E3/UL (ref 1.6–8.3)
NEUTROPHILS NFR BLD AUTO: 68 %
PLATELET # BLD AUTO: 295 10E3/UL (ref 150–450)
POTASSIUM SERPL-SCNC: 4 MMOL/L (ref 3.4–5.3)
RBC # BLD AUTO: 3.09 10E6/UL (ref 3.8–5.2)
SODIUM SERPL-SCNC: 140 MMOL/L (ref 136–145)
WBC # BLD AUTO: 9.6 10E3/UL (ref 4–11)

## 2023-08-23 PROCEDURE — 86803 HEPATITIS C AB TEST: CPT

## 2023-08-23 PROCEDURE — 85025 COMPLETE CBC W/AUTO DIFF WBC: CPT

## 2023-08-23 PROCEDURE — 80048 BASIC METABOLIC PNL TOTAL CA: CPT

## 2023-08-23 PROCEDURE — 36415 COLL VENOUS BLD VENIPUNCTURE: CPT

## 2023-08-23 NOTE — TELEPHONE ENCOUNTER
Labs ordered. Please assist patient in scheduled lab-only appt today or tomorrow so we can discuss at Friday virtual appt.

## 2023-08-23 NOTE — TELEPHONE ENCOUNTER
Called and informed patient she verbalized understanding  No further questions or concerns     Future Appointments 8/23/2023 - 2/19/2024        Date Visit Type Length Department Provider     8/23/2023  3:30 PM LAB 15 min  LABORATORY [facility] LAB    Location Instructions:     The clinic is located at [address]; in the [facility] in [place].&nbsp; We offer free parking in our on-site lot.              8/25/2023  1:00 PM ED/HOSP FOLLOW UP 30 min  INTERNAL MEDICINE Gene Funk MD    Location Instructions:     [facility] is in the [facility] at [address] in [place]. This just east of the [address] exit off of [address]. Free parking is available; access the lot from [address] [place] [address] or [facility].

## 2023-08-24 LAB — HCV AB SERPL QL IA: NONREACTIVE

## 2023-08-25 ENCOUNTER — VIRTUAL VISIT (OUTPATIENT)
Dept: INTERNAL MEDICINE | Facility: CLINIC | Age: 73
End: 2023-08-25
Payer: MEDICARE

## 2023-08-25 DIAGNOSIS — M11.20 PSEUDOGOUT: ICD-10-CM

## 2023-08-25 DIAGNOSIS — Z09 ENCOUNTER FOR EXAMINATION FOLLOWING TREATMENT AT HOSPITAL: Primary | ICD-10-CM

## 2023-08-25 DIAGNOSIS — K92.2 ACUTE LOWER GI BLEEDING: ICD-10-CM

## 2023-08-25 DIAGNOSIS — F10.21 SEVERE ALCOHOL DEPENDENCE IN EARLY REMISSION (H): ICD-10-CM

## 2023-08-25 DIAGNOSIS — F10.930 ALCOHOL WITHDRAWAL SYNDROME WITHOUT COMPLICATION (H): ICD-10-CM

## 2023-08-25 DIAGNOSIS — R73.9 HYPERGLYCEMIA: ICD-10-CM

## 2023-08-25 PROCEDURE — 99495 TRANSJ CARE MGMT MOD F2F 14D: CPT | Mod: 95 | Performed by: INTERNAL MEDICINE

## 2023-08-25 RX ORDER — COLCHICINE 0.6 MG/1
TABLET ORAL
Qty: 90 TABLET | OUTPATIENT
Start: 2023-08-25

## 2023-08-25 RX ORDER — COLCHICINE 0.6 MG/1
0.6 TABLET ORAL DAILY
Qty: 30 TABLET | Refills: 1 | Status: SHIPPED | OUTPATIENT
Start: 2023-08-25

## 2023-08-25 RX ORDER — FOLIC ACID 1 MG/1
1 TABLET ORAL DAILY
Qty: 90 TABLET | Refills: 3 | Status: SHIPPED | OUTPATIENT
Start: 2023-08-25 | End: 2024-09-02

## 2023-08-25 ASSESSMENT — PATIENT HEALTH QUESTIONNAIRE - PHQ9
10. IF YOU CHECKED OFF ANY PROBLEMS, HOW DIFFICULT HAVE THESE PROBLEMS MADE IT FOR YOU TO DO YOUR WORK, TAKE CARE OF THINGS AT HOME, OR GET ALONG WITH OTHER PEOPLE: NOT DIFFICULT AT ALL
SUM OF ALL RESPONSES TO PHQ QUESTIONS 1-9: 3
SUM OF ALL RESPONSES TO PHQ QUESTIONS 1-9: 3

## 2023-08-25 NOTE — PROGRESS NOTES
Aileen is a 72 year old who is being evaluated via a billable video visit.      How would you like to obtain your AVS? MyChart  If the video visit is dropped, the invitation should be resent by: Text to cell phone: [phone]  Will anyone else be joining your video visit? No    Assessment & Plan   Encounter for examination following treatment at hospital  Acute lower GI bleeding  Reports no recurrent bleeding. STRONGLY encouraged her to avoid NSAIDs as below. Will re-check CBC in 2-4 weeks to ensure it is improving, future lab order placed. ER precautions reviewed. Avoid alcohol.    Severe alcohol dependence in early remission (H)  Reports continued sobriety. Congratulated and encouraged continued sobriety.    Pseudogout  STRONGLY encouraged her to avoid NSAIDs. Will start colchicine. She plans to try Voltaren gel as well which I think would be okay given limited absorption.  - colchicine (COLCRYS) 0.6 MG tablet; Take 1 tablet (0.6 mg) by mouth daily    Hyperglycemia  Noted on recent BMP. Will continue to monitor.    MED REC REQUIRED  Post Medication Reconciliation Status:  Discharge medications reconciled and changed, see notes/orders    Gene Berman MD  [facility]   Aileen is a 72 year old who presents for a virtual video visit with chief concern of:  Hospital F/U    [facility]   Hospital Follow-up Visit:  Hospital/Nursing Home/IP Rehab Facility: [facility]  Date of Admission: 8/20/2023  Date of Discharge: 8/21/2023  Reason(s) for Admission: Acute lower GI bleeding    Was your hospitalization related to COVID-19? No   Problems taking medications regularly:  None  Medication changes since discharge: None  Problems adhering to non-medication therapy:  None    Summary of hospitalization:  [facility] discharge summary reviewed  Diagnostic Tests/Treatments reviewed.  Follow up needed: CBC, RFP  Other Healthcare Providers Involved in  "Patient s Care:         None  Update since discharge: improved.  Plan of care communicated with patient     I am seeing Aileen today for follow-up on a recent hospitalization. The summary of the hospitalization from the relevant discharge summary is as follows:    \"Please see the H&P by Basim Alegre MD on 8/20/2023 for complete details. Briefly, Shannan Koenig is a 72 year old female with including including longstanding alcohol dependence/abuse; asthma; DLD; generalized anxiety disorder; GERD; nicotine dependence; pseudogout; history of GI bleed (April 2023) without melena and hematochezia, with negative upper endoscopy during the hospitalization (did not pursue colonoscopy due to suffering acute diverticulitis at the time); history of diverticulosis with diverticulitis; history of colonic polyps; and family history of colon cancer; who presented 8/20/2023 with bright red blood per rectum.     On initially evaluation 8/20, was tachycardic, soft BP. Labs notable for CBC with hgb 12.6, WBC 11.1; BMP with bicarb 21; LFT's normal, lipase 21; trop negative; INR 0.93. CT abdomen and pelvis 8/20 showed diverticulosis; no diverticulitis, colitis, or obstruction; simple left renal cyst, no follow-up required.      Was passing bright red blood in the emergency room approximately 2 cups. Hgb dropped to 10.1  ED MD spoke with IR who reviewed original CT from earlier, and there was a small blush of bleeding noted. GI and IR contacted and pt underwent IR visceral angiogram showed no active extravasation, pseudoaneurysm or AV fistula.     Acute lower GI bleeding, suspect diverticular bleed.  Acute blood loss anemia.  Colon polyp.  GERD.  * H/o GI bleed April 2023; presented with melena and bright red blood per rectum. Upper endoscopy unremarkable.  Did not pursue colonoscopy due to suffering acute diverticulitis at the time.  * PTA on omeprazole. Noted to be taking ibuprofen daily.  * Initial presentation as above. Received " "IVF's in ED. Started on IV pantoprazole on admit. BP's to 70's-80's systolic evening 8/20.  * On 8/21, noted that during colon prep, output still bloody. BP's improved. Colonoscopy showed: 1. Preparation of the colon was fair. Light yellow/brown stool all over the colon suggesting againt active GI blood loss. 2. One 5 mm polyp in the ascending colon, removed with a cold snare. Resected and retrieved. 3. Diverticulosis in the sigmoid colon and in the descending colon. Suspected source of self-resolved  rectal bleeding. 4. The examination was otherwise normal on direct and retroflexion views.            Recent Labs   Lab 08/21/23  0901 08/21/23  0034 08/20/23  1804 08/20/23  1320 08/20/23  1050 08/20/23  0803   HGB 8.8* 8.4* 9.3* 9.3* 10.1* 12.6   - Continue diet as tolerated.  - Follow-up with PCP.  - Follow-up colonoscopy in 3 years.     Alcohol dependence  H/o EtOH w/d.  * On admit, noted that pt had been sober for 10 days. Was attending AA meetings per patient and corroborated by patient's family member at the bedside. PTA on naltrexone.  * No signs of alcohol withdrawal on admit.  - Continue to abstain from EtOH.  - Continue naltrexone at discharge.  - Follow-up with PCP.     Hyperlipidemia/dyslipidemia.  - Continue lovastatin at discharge.     Depression/anxiety.  - Continue duloxetine; mirtazapine; PRN hydroxyzine; PRN trazodone.     H/o genital HSV.  - Continue acyclovir prophylaxis.     MVP (mitral valve prolapse)  - Noted.     H/o subclinical hypothyroidism  * Normal TSH in March.  - Noted.     Tobacco use d/o.  - Strongly recommend tobacco cessation.\"    Today Aileen denies any additional bleeding since discharge. She reports she's been eating well. She reports her pseudogout in her hand is acting up but she's been unable to take Advil. Denies any alcohol use since Aug 11. She reports she's in AA and is on a waiting list to get into an intensive program.    Answers submitted by the patient for this " visit:  Patient Health Questionnaire (Submitted on 8/25/2023)  If you checked off any problems, how difficult have these problems made it for you to do your work, take care of things at home, or get along with other people?: Not difficult at all  PHQ9 TOTAL SCORE: 3    Review of Systems   Constitutional, gi, psych, MSK systems are negative, except as otherwise noted.      Objective       Vitals: No vitals were obtained today due to virtual visit.    Physical Exam   GENERAL: Healthy, alert and no distress  EYES: Eyes grossly normal to inspection.  No discharge or erythema, or obvious scleral/conjunctival abnormalities.  RESP: No audible wheeze, cough, or visible cyanosis.  No visible retractions or increased work of breathing.    SKIN: Visible skin clear. No significant rash, abnormal pigmentation or lesions.  NEURO: Cranial nerves grossly intact.  Mentation and speech appropriate for age.  PSYCH: Mentation appears normal, affect normal/bright, judgement and insight intact, normal speech and appearance well-groomed.    Video-Visit Details  Type of service: Video Visit   Video Start Time: 12:31 PM  Video End Time: 12:45 PM  Originating Location (pt. Location): Home  Distant Location (provider location):  Off-site  Platform used for Video Visit: Vijay

## 2023-08-27 ENCOUNTER — OFFICE VISIT (OUTPATIENT)
Dept: URGENT CARE | Facility: URGENT CARE | Age: 73
End: 2023-08-27
Payer: MEDICARE

## 2023-08-27 VITALS
HEART RATE: 106 BPM | DIASTOLIC BLOOD PRESSURE: 86 MMHG | TEMPERATURE: 98.8 F | OXYGEN SATURATION: 97 % | SYSTOLIC BLOOD PRESSURE: 150 MMHG

## 2023-08-27 DIAGNOSIS — F10.21 HISTORY OF ALCOHOLISM (H): ICD-10-CM

## 2023-08-27 DIAGNOSIS — M11.241 PSEUDOGOUT OF HAND, RIGHT: Primary | ICD-10-CM

## 2023-08-27 PROCEDURE — 99214 OFFICE O/P EST MOD 30 MIN: CPT | Performed by: FAMILY MEDICINE

## 2023-08-27 RX ORDER — HYDROCODONE BITARTRATE AND ACETAMINOPHEN 5; 325 MG/1; MG/1
1 TABLET ORAL EVERY 6 HOURS PRN
Qty: 6 TABLET | Refills: 0 | Status: SHIPPED | OUTPATIENT
Start: 2023-08-27 | End: 2023-08-30

## 2023-08-27 RX ORDER — PREDNISONE 20 MG/1
40 TABLET ORAL DAILY
Qty: 10 TABLET | Refills: 0 | Status: SHIPPED | OUTPATIENT
Start: 2023-08-27 | End: 2024-02-08

## 2023-08-27 NOTE — PROGRESS NOTES
ASSESSMENT/ PLAN:  Pseudogout of hand, right     - predniSONE (DELTASONE) 20 MG tablet; Take 2 tablets (40 mg) by mouth daily  - HYDROcodone-acetaminophen (NORCO) 5-325 MG tablet; Take 1 tablet by mouth every 6 hours as needed for severe pain     She has a history of pseudogout-  current symptoms are similar.   She has taken colchicine with no improvement,  feels worse with cold packs,  I recommended warm packs instead  She has voltaren topical for inflammation  Prednisone for inflammation  Narcotic medication for pain-  she will need to stop naltrexone (for alcoholic cravings) while taking the narcotic  Acetaminophen  as an alternative for pain  Given patient education materials about pseudogout     Follow-up with primary care physician concerning ongoing care           History of alcoholism (H)  On naltrexone to control cravings-  discussed she would need to stop the naltrexone for 2-3 days - since it would block to pain relief from hydrocodone       ------------------------------------------------------------------------------------------------------------------------------      SUBJECTIVE:  Chief Complaint   Patient presents with    Urgent Care     Hand pain right was given colchicine on Friday with no relief         Shannan Koenig is a 72 year old female who presents with a chief complaint of right hand base of the thumb pain, swelling, tenderness, redness, and decreased range of motion.  Symptoms began 3 day(s) ago, are severe and constant  has a history of pseudogout - - diagnosed by hand specialist who saw crystals in the joint  Context:  Started suddenly/ constant-  cold packs not helpful  There was no injury noted to the painful area       Pain exacerbated by flexion/extension Relieved by nothing.  She treated it initially with Colchicine and voltaren gel-  she feels the pain is not improved    Hx alcoholism-  on naltrexone to control cravings        Past Medical History:   Diagnosis Date    Anxiety      Infection due to 2019 novel coronavirus 11/2020    Infection due to 2019 novel coronavirus 01/2022    Substance abuse (H)      Patient Active Problem List   Diagnosis    Age-related osteoporosis without current pathological fracture    Dyslipidemia    Eczema    Generalized anxiety disorder    Genital HSV    GERD (gastroesophageal reflux disease)    MVP (mitral valve prolapse)    Chronic insomnia    Thickened endometrium    Urge incontinence of urine    Subclinical hypothyroidism    Smoking 1/2 pack a day or less    Alcohol withdrawal syndrome without complication (H)    GI bleed    Acute lower GI bleeding       ALLERGIES:  Amoxicillin-pot clavulanate, Codeine, Latex, and Nitrofurantoin    MEDs  acyclovir (ZOVIRAX) 400 MG tablet, Take 1 tablet (400 mg) by mouth daily as needed (cold sores)  colchicine (COLCRYS) 0.6 MG tablet, Take 1 tablet (0.6 mg) by mouth daily  DULoxetine (CYMBALTA) 60 MG capsule, Take 60 mg by mouth daily  folic acid (FOLVITE) 1 MG tablet, Take 1 tablet (1 mg) by mouth daily  hydrOXYzine (ATARAX) 10 MG tablet, Take 10-20 mg by mouth 2 times daily as needed for anxiety  lovastatin (MEVACOR) 40 MG tablet, TAKE 1 TABLET BY MOUTH AT  BEDTIME  mirtazapine (REMERON) 15 MG tablet, TAKE 1 TABLET BY MOUTH AT  BEDTIME  multivitamin w/minerals (THERA-VIT-M) tablet, Take 1 tablet by mouth daily  naltrexone (DEPADE/REVIA) 50 MG tablet, Take 1 tablet (50 mg) by mouth daily  omeprazole (PRILOSEC) 40 MG DR capsule, Take 1 capsule (40 mg) by mouth daily  thiamine (B-1) 100 MG tablet, Take 1 tablet (100 mg) by mouth daily  traZODone (DESYREL) 50 MG tablet, Take  mg by mouth nightly as needed for sleep  vitamin B-12 (CYANOCOBALAMIN) 1000 MCG tablet, Take 1 tablet (1,000 mcg) by mouth daily    No current facility-administered medications on file prior to visit.      Social History     Tobacco Use    Smoking status: Every Day     Packs/day: 0.50     Types: Cigarettes    Smokeless tobacco: Never    Tobacco  comments:     smoking ~ 5 cig/day   Substance Use Topics    Alcohol use: Not Currently     Comment: 5-6 glasses of wine each day along with hard etoh       Family History   Problem Relation Age of Onset    Arthritis Mother     Breast Cancer Mother     Depression Mother     Dementia Mother     Alcoholism Father     Allergic rhinitis Father     Arthritis Father     Colon Cancer Father     Depression Father     Hypertension Father     Hyperlipidemia Father     Kidney Disease Father     Obesity Father     Colon Cancer Sister     Hypertension Sister     Bleeding Disorder Brother     Seizure Disorder Brother          ROS:  CONSTITUTIONAL:NEGATIVE for fever, chills, c   EYES: NEGATIVE for vision changes or irritation  ENT/MOUTH: NEGATIVE for ear, mouth and throat problems  RESP:NEGATIVE for significant cough or SOB    EXAM:   BP (!) 150/86 (BP Location: Left arm, Patient Position: Sitting, Cuff Size: Adult Small)   Pulse 106   Temp 98.8  F (37.1  C) (Tympanic)   SpO2 97%   Breastfeeding No     right Hand  No injury to the skin noted  No contusions noted  Redness, tenderness, warmth and swelling noted in the region of the  base of the thumb joint  Referred pain  with palpation and range of motion of the remaining fingers-   pain referred to base of thumb.  Severe pain with movement of tendons with finger movement, but no pain with palpation of fingers  No pain or tenderness with palpation of the contralateral hand     GENERAL APPEARANCE: alert, severe distress with right hand movement, and cooperative  EXTREMITIES: peripheral pulses normal  SKIN: no suspicious lesions or rashes  NEURO: Normal strength and tone, sensory exam grossly normal, mentation intact and speech normal

## 2023-10-17 ENCOUNTER — TELEPHONE (OUTPATIENT)
Dept: INTERNAL MEDICINE | Facility: CLINIC | Age: 73
End: 2023-10-17
Payer: MEDICARE

## 2023-10-17 NOTE — TELEPHONE ENCOUNTER
Patient is asking if she should get the the shingles vaccine with her Covid 19 and flu shot?  Patient also asking if Dr. Funk thinks that she should get the RSV vaccine?    Informed the patient that both RSV and shingles should be done at pharmacy for Medicare coverage.    Maria Esther Camargo RN

## 2023-10-24 ENCOUNTER — ALLIED HEALTH/NURSE VISIT (OUTPATIENT)
Dept: INTERNAL MEDICINE | Facility: CLINIC | Age: 73
End: 2023-10-24
Payer: MEDICARE

## 2023-10-24 DIAGNOSIS — Z23 NEED FOR PROPHYLACTIC VACCINATION AND INOCULATION AGAINST INFLUENZA: Primary | ICD-10-CM

## 2023-10-24 DIAGNOSIS — Z23 HIGH PRIORITY FOR 2019-NCOV VACCINE: ICD-10-CM

## 2023-10-24 PROCEDURE — 99207 PR NO CHARGE NURSE ONLY: CPT

## 2023-10-24 PROCEDURE — 90662 IIV NO PRSV INCREASED AG IM: CPT

## 2023-10-24 PROCEDURE — 90480 ADMN SARSCOV2 VAC 1/ONLY CMP: CPT

## 2023-10-24 PROCEDURE — 91320 SARSCV2 VAC 30MCG TRS-SUC IM: CPT

## 2023-10-24 PROCEDURE — G0008 ADMIN INFLUENZA VIRUS VAC: HCPCS | Mod: 59

## 2023-12-19 ENCOUNTER — TELEPHONE (OUTPATIENT)
Dept: INTERNAL MEDICINE | Facility: CLINIC | Age: 73
End: 2023-12-19
Payer: MEDICARE

## 2023-12-19 NOTE — TELEPHONE ENCOUNTER
Reason for Call:  Appointment Request    Patient requesting this type of appt:  Preventive     Requested provider: Gene Funk    Reason patient unable to be scheduled: Not within requested timeframe    When does patient want to be seen/preferred time:  open     Comments: Would like to get in sooner if anything opens up     Could we send this information to you in [email or web address] or would you prefer to receive a phone call?:   Patient would prefer a phone call   Okay to leave a detailed message?: Yes at Cell number on file:    Telephone Information:   Mobile [phone]       Call taken on 12/19/2023 at 3:00 PM by Sylvia Whitten

## 2024-01-31 ENCOUNTER — OFFICE VISIT (OUTPATIENT)
Dept: URGENT CARE | Facility: URGENT CARE | Age: 74
End: 2024-01-31
Payer: MEDICARE

## 2024-01-31 VITALS
WEIGHT: 102 LBS | BODY MASS INDEX: 19.92 KG/M2 | TEMPERATURE: 97.8 F | HEART RATE: 100 BPM | RESPIRATION RATE: 18 BRPM | OXYGEN SATURATION: 96 %

## 2024-01-31 DIAGNOSIS — D72.829 LEUKOCYTOSIS, UNSPECIFIED TYPE: ICD-10-CM

## 2024-01-31 DIAGNOSIS — R05.9 COUGH, UNSPECIFIED TYPE: Primary | ICD-10-CM

## 2024-01-31 DIAGNOSIS — Z20.822 SUSPECTED 2019 NOVEL CORONAVIRUS INFECTION: ICD-10-CM

## 2024-01-31 DIAGNOSIS — R53.83 OTHER FATIGUE: ICD-10-CM

## 2024-01-31 DIAGNOSIS — Z72.0 TOBACCO ABUSE: ICD-10-CM

## 2024-01-31 LAB
ERYTHROCYTE [DISTWIDTH] IN BLOOD BY AUTOMATED COUNT: 20.7 % (ref 10–15)
FERRITIN SERPL-MCNC: 25 NG/ML (ref 11–328)
FLUAV AG SPEC QL IA: NEGATIVE
FLUBV AG SPEC QL IA: NEGATIVE
HCT VFR BLD AUTO: 33.8 % (ref 35–47)
HGB BLD-MCNC: 10.4 G/DL (ref 11.7–15.7)
HOLD SPECIMEN: NORMAL
IRON BINDING CAPACITY (ROCHE): 432 UG/DL (ref 240–430)
IRON SATN MFR SERPL: 5 % (ref 15–46)
IRON SERPL-MCNC: 20 UG/DL (ref 37–145)
MCH RBC QN AUTO: 22.2 PG (ref 26.5–33)
MCHC RBC AUTO-ENTMCNC: 30.8 G/DL (ref 31.5–36.5)
MCV RBC AUTO: 72 FL (ref 78–100)
PLATELET # BLD AUTO: 527 10E3/UL (ref 150–450)
RBC # BLD AUTO: 4.69 10E6/UL (ref 3.8–5.2)
WBC # BLD AUTO: 11.9 10E3/UL (ref 4–11)

## 2024-01-31 PROCEDURE — 85027 COMPLETE CBC AUTOMATED: CPT | Performed by: FAMILY MEDICINE

## 2024-01-31 PROCEDURE — 36415 COLL VENOUS BLD VENIPUNCTURE: CPT | Performed by: FAMILY MEDICINE

## 2024-01-31 PROCEDURE — 82728 ASSAY OF FERRITIN: CPT | Performed by: FAMILY MEDICINE

## 2024-01-31 PROCEDURE — 83540 ASSAY OF IRON: CPT | Performed by: FAMILY MEDICINE

## 2024-01-31 PROCEDURE — 87635 SARS-COV-2 COVID-19 AMP PRB: CPT | Performed by: FAMILY MEDICINE

## 2024-01-31 PROCEDURE — 87804 INFLUENZA ASSAY W/OPTIC: CPT | Performed by: FAMILY MEDICINE

## 2024-01-31 PROCEDURE — 83550 IRON BINDING TEST: CPT | Performed by: FAMILY MEDICINE

## 2024-01-31 PROCEDURE — 99214 OFFICE O/P EST MOD 30 MIN: CPT | Performed by: FAMILY MEDICINE

## 2024-01-31 RX ORDER — LEVOFLOXACIN 500 MG/1
500 TABLET, FILM COATED ORAL DAILY
Qty: 7 TABLET | Refills: 0 | Status: SHIPPED | OUTPATIENT
Start: 2024-01-31 | End: 2024-02-08

## 2024-02-01 LAB — SARS-COV-2 RNA RESP QL NAA+PROBE: NEGATIVE

## 2024-02-07 DIAGNOSIS — E78.5 DYSLIPIDEMIA: ICD-10-CM

## 2024-02-08 ENCOUNTER — OFFICE VISIT (OUTPATIENT)
Dept: INTERNAL MEDICINE | Facility: CLINIC | Age: 74
End: 2024-02-08
Payer: MEDICARE

## 2024-02-08 VITALS
SYSTOLIC BLOOD PRESSURE: 122 MMHG | DIASTOLIC BLOOD PRESSURE: 68 MMHG | TEMPERATURE: 98.6 F | HEART RATE: 111 BPM | WEIGHT: 103.8 LBS | BODY MASS INDEX: 20.27 KG/M2 | OXYGEN SATURATION: 97 %

## 2024-02-08 DIAGNOSIS — R63.4 UNEXPLAINED WEIGHT LOSS: ICD-10-CM

## 2024-02-08 DIAGNOSIS — K21.9 GASTROESOPHAGEAL REFLUX DISEASE WITHOUT ESOPHAGITIS: ICD-10-CM

## 2024-02-08 DIAGNOSIS — R73.9 HYPERGLYCEMIA: ICD-10-CM

## 2024-02-08 DIAGNOSIS — Z00.00 MEDICARE ANNUAL WELLNESS VISIT, SUBSEQUENT: Primary | ICD-10-CM

## 2024-02-08 DIAGNOSIS — F10.21 ALCOHOL USE DISORDER, SEVERE, IN EARLY REMISSION (H): ICD-10-CM

## 2024-02-08 DIAGNOSIS — Z13.220 ENCOUNTER FOR SCREENING FOR LIPID DISORDER: ICD-10-CM

## 2024-02-08 DIAGNOSIS — J90 PLEURAL EFFUSION ON RIGHT: ICD-10-CM

## 2024-02-08 DIAGNOSIS — D50.9 IRON DEFICIENCY ANEMIA, UNSPECIFIED IRON DEFICIENCY ANEMIA TYPE: ICD-10-CM

## 2024-02-08 LAB
ALBUMIN UR-MCNC: NEGATIVE MG/DL
APPEARANCE UR: CLEAR
BASOPHILS # BLD AUTO: 0 10E3/UL (ref 0–0.2)
BASOPHILS NFR BLD AUTO: 0 %
BILIRUB UR QL STRIP: NEGATIVE
COLOR UR AUTO: YELLOW
EOSINOPHIL # BLD AUTO: 0.2 10E3/UL (ref 0–0.7)
EOSINOPHIL NFR BLD AUTO: 2 %
ERYTHROCYTE [DISTWIDTH] IN BLOOD BY AUTOMATED COUNT: 20.6 % (ref 10–15)
GLUCOSE UR STRIP-MCNC: NEGATIVE MG/DL
HBA1C MFR BLD: 6 % (ref 0–5.6)
HCT VFR BLD AUTO: 32.1 % (ref 35–47)
HGB BLD-MCNC: 9.9 G/DL (ref 11.7–15.7)
HGB UR QL STRIP: NEGATIVE
IMM GRANULOCYTES # BLD: 0 10E3/UL
IMM GRANULOCYTES NFR BLD: 0 %
KETONES UR STRIP-MCNC: NEGATIVE MG/DL
LEUKOCYTE ESTERASE UR QL STRIP: ABNORMAL
LYMPHOCYTES # BLD AUTO: 1.9 10E3/UL (ref 0.8–5.3)
LYMPHOCYTES NFR BLD AUTO: 15 %
MCH RBC QN AUTO: 22.2 PG (ref 26.5–33)
MCHC RBC AUTO-ENTMCNC: 30.8 G/DL (ref 31.5–36.5)
MCV RBC AUTO: 72 FL (ref 78–100)
MONOCYTES # BLD AUTO: 0.9 10E3/UL (ref 0–1.3)
MONOCYTES NFR BLD AUTO: 7 %
MUCOUS THREADS #/AREA URNS LPF: PRESENT /LPF
NEUTROPHILS # BLD AUTO: 9.4 10E3/UL (ref 1.6–8.3)
NEUTROPHILS NFR BLD AUTO: 76 %
NITRATE UR QL: NEGATIVE
PH UR STRIP: 6 [PH] (ref 5–7)
PLATELET # BLD AUTO: 398 10E3/UL (ref 150–450)
RBC # BLD AUTO: 4.45 10E6/UL (ref 3.8–5.2)
RBC #/AREA URNS AUTO: ABNORMAL /HPF
SP GR UR STRIP: 1.02 (ref 1–1.03)
SQUAMOUS #/AREA URNS AUTO: ABNORMAL /LPF
UROBILINOGEN UR STRIP-ACNC: 0.2 E.U./DL
WBC # BLD AUTO: 12.4 10E3/UL (ref 4–11)
WBC #/AREA URNS AUTO: ABNORMAL /HPF

## 2024-02-08 PROCEDURE — 80061 LIPID PANEL: CPT | Performed by: INTERNAL MEDICINE

## 2024-02-08 PROCEDURE — 85025 COMPLETE CBC W/AUTO DIFF WBC: CPT | Performed by: INTERNAL MEDICINE

## 2024-02-08 PROCEDURE — 81001 URINALYSIS AUTO W/SCOPE: CPT | Performed by: INTERNAL MEDICINE

## 2024-02-08 PROCEDURE — 83036 HEMOGLOBIN GLYCOSYLATED A1C: CPT | Performed by: INTERNAL MEDICINE

## 2024-02-08 PROCEDURE — 36415 COLL VENOUS BLD VENIPUNCTURE: CPT | Performed by: INTERNAL MEDICINE

## 2024-02-08 PROCEDURE — G0439 PPPS, SUBSEQ VISIT: HCPCS | Performed by: INTERNAL MEDICINE

## 2024-02-08 PROCEDURE — 99214 OFFICE O/P EST MOD 30 MIN: CPT | Mod: 25 | Performed by: INTERNAL MEDICINE

## 2024-02-08 PROCEDURE — 80053 COMPREHEN METABOLIC PANEL: CPT | Performed by: INTERNAL MEDICINE

## 2024-02-08 RX ORDER — RESPIRATORY SYNCYTIAL VIRUS VACCINE 120MCG/0.5
0.5 KIT INTRAMUSCULAR ONCE
Qty: 1 EACH | Refills: 0 | Status: CANCELLED | OUTPATIENT
Start: 2024-02-08 | End: 2024-02-08

## 2024-02-08 RX ORDER — LOVASTATIN 40 MG
TABLET ORAL
Qty: 90 TABLET | Refills: 3 | Status: SHIPPED | OUTPATIENT
Start: 2024-02-08

## 2024-02-08 RX ORDER — OMEPRAZOLE 40 MG/1
40 CAPSULE, DELAYED RELEASE ORAL DAILY
Qty: 90 CAPSULE | Refills: 4 | Status: SHIPPED | OUTPATIENT
Start: 2024-02-08

## 2024-02-08 SDOH — HEALTH STABILITY: PHYSICAL HEALTH: ON AVERAGE, HOW MANY DAYS PER WEEK DO YOU ENGAGE IN MODERATE TO STRENUOUS EXERCISE (LIKE A BRISK WALK)?: 1 DAY

## 2024-02-08 ASSESSMENT — PATIENT HEALTH QUESTIONNAIRE - PHQ9
10. IF YOU CHECKED OFF ANY PROBLEMS, HOW DIFFICULT HAVE THESE PROBLEMS MADE IT FOR YOU TO DO YOUR WORK, TAKE CARE OF THINGS AT HOME, OR GET ALONG WITH OTHER PEOPLE: NOT DIFFICULT AT ALL
SUM OF ALL RESPONSES TO PHQ QUESTIONS 1-9: 7
SUM OF ALL RESPONSES TO PHQ QUESTIONS 1-9: 7

## 2024-02-08 ASSESSMENT — SOCIAL DETERMINANTS OF HEALTH (SDOH): HOW OFTEN DO YOU GET TOGETHER WITH FRIENDS OR RELATIVES?: TWICE A WEEK

## 2024-02-08 NOTE — PROGRESS NOTES
Preventive Care Visit  [facility]  Gene Berman MD, Internal Medicine  Feb 8, 2024    Assessment & Plan   Medicare annual wellness visit, subsequent  Reviewed PMH. Discussed healthcare maintenance issues, including cancer screenings, relevant immunizations (encouraged patient obtain Shingrix and Tdap through a pharmacy), and cardiac risk factor screenings such as for cholesterol, HTN, and DM.  - PRIMARY CARE FOLLOW-UP SCHEDULING; Future    Iron deficiency anemia, unspecified iron deficiency anemia type  Unclear etiology. Blood counts had been rebounding last year after she had acute GIB. She denies EtOH use or NSAIDs (cause of last GIB). Encouraged her to start PO iron tablets while we investigate the source of this ROMEO. Will screen below labs and pursue CT chest. If no etiology elucidated, may need repeat GI scopes.  - UA Macroscopic with reflex to Microscopic and Culture; Future  - Comprehensive metabolic panel; Future  - CBC with Platelets & Differential; Future  - CT Chest w/o Contrast; Future    Pleural effusion on right  Seen on recent CXR. Not appreciated on exam today. Recommended CT as below.  - CT Chest w/o Contrast; Future    Unexplained weight loss  Discussed wide DDX, including cancer (UTD on breast and colon cancer screening, but needs to be screened for lung cancer - due to possible symptoms of lung cancer, will pursue regular-dose CT chest) vs diabetes (screening today, hyperglycemia on past labs) vs other. Lab work-up as above. Imaging as discussed. Encouraged her to reach out to our clinic if she continues to lose weight.  - CT Chest w/o Contrast; Future    Alcohol use disorder, severe, in early remission (H)  Congratulated her on nearly 6 months of sobriety! Encouraged her to keep it up.    Gastroesophageal reflux disease without esophagitis  Refilled chronic medication at current dose.  - omeprazole (PRILOSEC) 40 MG DR capsule; Take 1 capsule (40 mg) by mouth  daily    Hyperglycemia  Seen on past metabolic panel. Will better characterize with A1c.  - Hemoglobin A1c; Future    Encounter for screening for lipid disorder  - Lipid panel reflex to direct LDL Non-fasting; Future    Counseling  Appropriate preventive services were discussed with this patient, including applicable screening as appropriate for fall prevention, nutrition, physical activity, Tobacco-use cessation, weight loss and cognition.  Checklist reviewing preventive services available has been given to the patient.  Reviewed patient's diet, addressing concerns and/or questions.   She is at risk for lack of exercise and has been provided with information to increase physical activity for the benefit of her well-being.   Discussed possible causes of fatigue. Patient reported safety concerns were addressed today.Information on urinary incontinence and treatment options given to patient.   The patient's PHQ-9 score is consistent with mild depression. She was provided with information regarding depression.     Gene Berman MD, MPH  [facility]  Internal Medicine    Subjective   Aileen is a 73 year old presenting for the following: Wellness Visit    Health Care Directive Patient does not have a Health Care Directive or Living Will    HPI  Aileen presents today for an AWV. We also discussed some ongoing weight loss. She's lost ~12 lbs in the last 10 months. She is not trying to lose weight. In fact, she tells me she's been hungrier than ever. Denies hemoptysis or night sweats. Unexplained ROMEO on recent UC labs. She's hesitant to start PO iron therapy due to fears of constipation.        2/8/2024   General Health   How would you rate your overall physical health? Good   Feel stress (tense, anxious, or unable to sleep) To some extent   (!) STRESS CONCERN      2/8/2024   Nutrition   Diet: Regular (no restrictions)         2/8/2024   Exercise   Days per week of moderate/strenous  exercise 1 day   (!) EXERCISE CONCERN      2/8/2024   Social Factors   Frequency of gathering with friends or relatives Twice a week   Worry food won't last until get money to buy more No   Food not last or not have enough money for food? No   Do you have housing?  Yes   Are you worried about losing your housing? No   Lack of transportation? No   Unable to get utilities (heat,electricity)? No         2/8/2024   Fall Risk   Fallen 2 or more times in the past year? No    No   Trouble with walking or balance? No    No          2/8/2024   Activities of Daily Living- Home Safety   Needs help with the following daily activites None of the above   Safety concerns in the home No grab bars in the bathroom         2/8/2024   Dental   Dentist two times every year? Yes         2/8/2024   Hearing Screening   Hearing concerns? None of the above         2/8/2024   Driving Risk Screening   Patient/family members have concerns about driving No         2/8/2024   General Alertness/Fatigue Screening   Have you been more tired than usual lately? (!) YES         2/8/2024   Urinary Incontinence Screening   Bothered by leaking urine in past 6 months Yes     Today's PHQ-9 Score:       2/8/2024     1:07 PM   PHQ-9 SCORE   PHQ-9 Total Score MyChart 7 (Mild depression)   PHQ-9 Total Score 7         2/8/2024   Substance Use   If I could quit smoking, I would Somewhat disagree   I want to quit somking, worry about health affects Completely agree   Willing to make a plan to quit smoking Completely disagree   Willing to cut down before quitting Completely agree   Alcohol more than 3/day or more than 7/wk Not Applicable   Do you have a current opioid prescription? No   How severe/bad is pain from 1 to 10? 0/10 (No Pain)   Do you use any other substances recreationally? No     Social History     Tobacco Use    Smoking status: Every Day     Packs/day: .5     Types: Cigarettes    Smokeless tobacco: Never    Tobacco comments:     smoking ~ 5 cig/day    Vaping Use    Vaping Use: Never used   Substance Use Topics    Alcohol use: Not Currently     Comment: 5-6 glasses of wine each day along with hard etoh    Drug use: Never     The 10-year ASCVD risk score (Zaheer BURGOS, et al., 2019) is: 19.2%    Values used to calculate the score:      Age: 73 years      Sex: Female      Is Non- : No      Diabetic: No      Tobacco smoker: Yes      Systolic Blood Pressure: 122 mmHg      Is BP treated: No      HDL Cholesterol: 84 mg/dL      Total Cholesterol: 212 mg/dL    Reviewed and updated as needed this visit by Provider   Tobacco  Allergies  Meds  Problems  Med Hx  Surg Hx  Fam Hx          Current providers sharing in care for this patient include:  Patient Care Team:  Gene Funk MD as PCP - General (Internal Medicine)  Clinic - [place]  Nola Fierro, EVELIA MAYFIELD as Assigned Behavioral Health Provider  Gene Funk MD as Assigned PCP    The following health maintenance items are reviewed in Epic and correct as of today:  Health Maintenance   Topic Date Due    ADVANCE CARE PLANNING  Never done    DEPRESSION ACTION PLAN  Never done    LUNG CANCER SCREENING  Never done    ZOSTER IMMUNIZATION (2 of 3) 05/11/2010    DTAP/TDAP/TD IMMUNIZATION (2 - Td or Tdap) 01/07/2019    Pneumococcal Vaccine: 65+ Years (2 of 2 - PCV) 12/23/2020    MEDICARE ANNUAL WELLNESS VISIT  12/03/2022    ANNUAL REVIEW OF HM ORDERS  09/06/2023    NICOTINE/TOBACCO CESSATION COUNSELING Q 1 YR  07/20/2024    PHQ-9  08/08/2024    FALL RISK ASSESSMENT  02/08/2025    MAMMO SCREENING  04/27/2025    LIPID  02/01/2026    GLUCOSE  08/23/2026    COLORECTAL CANCER SCREENING  08/21/2033    DEXA  12/11/2033    HEPATITIS C SCREENING  Completed    INFLUENZA VACCINE  Completed    RSV VACCINE (Pregnancy & 60+)  Completed    COVID-19 Vaccine  Completed    IPV IMMUNIZATION  Aged Out    HPV IMMUNIZATION  Aged Out    MENINGITIS IMMUNIZATION  Aged Out    RSV  MONOCLONAL ANTIBODY  Aged Out     Review of Systems  10pt ROS reviewed and all other systems negative unless otherwise stated above.     Objective    Exam  /68   Pulse 111   Temp 98.6  F (37  C) (Temporal)   Wt 47.1 kg (103 lb 12.8 oz)   SpO2 97%   BMI 20.27 kg/m     Estimated body mass index is 20.27 kg/m  as calculated from the following:    Height as of 8/20/23: 1.524 m (5').    Weight as of this encounter: 47.1 kg (103 lb 12.8 oz).    Physical Exam  GENERAL: alert and in no distress. Thin.  EYES: conjunctivae/corneas clear. EOMs grossly intact  HENT: Facies symmetric.  RESP: CTAB, no w/r/r.  CV: RRR, no m/r/g.  GI: NT, ND, without rebound tenderness or guarding  MSK: Moves all four extremities freely.  SKIN: No significant ulcers, lesions, or rashes on the visualized portions of the skin  NEURO: CN II-XII grossly intact.        2/8/2024   Mini Cog   Clock Draw Score 2 Normal   3 Item Recall 3 objects recalled   Mini Cog Total Score 5

## 2024-02-08 NOTE — PATIENT INSTRUCTIONS
- I will send you a message on [employer] when I am able to look at the results of your tests from today  - Make an appointment with our pharmacy downstairs or stop by your preferred pharmacy to discuss obtaining the Shingrix (shingles) vaccine series and Tdap (tetanus booster shot)  - Call [phone] to schedule the CT scan of your chest with our centralized imaging schedulers      An iron supplement is recommended in your care. There are lots of over-the-counter iron supplements. The most commonly used preparations are called Ferrous Gluconate and Ferrous Sulfate. Some important things to remember when it comes to iron supplementation:    -Iron is best absorbed when taken on an empty stomach, with water or fruit juice (adults: full glass or 8 ounces; children:   glass or 4 ounces), about 1 hour before or 2 hours after meals. However, to lessen the possibility of stomach upset, iron may be taken with food or immediately after meals. If you take antacids (ex: omeprazole, TUMS, ranitidine), your iron tablets should be taken two hours before or four hours after the antacids.    -Constipation and diarrhea are very common side effects. If constipation becomes a problem, I recommend starting a laxative such as MiraLax or sennosides. Please let me know if these side effects are so severe that you stop taking the iron supplement.    -Iron tablets may cause other drugs you are taking to not work as well. Some of these include tetracycline, penicillin, and ciprofloxacin and drugs used for hypothyroidism, Parkinson disease, and seizures.    -Black stools are normal when taking iron tablets. Tell me right away though if the stools are tarry-looking as well as black, if they have red streaks, or if you develop ramps, sharp pains, or soreness in the stomach.    -Taking a Vitamin C supplement at the same time as your iron supplement will help more iron get absorbed.    -Recently published studies found the paradoxical result that  taking your iron supplement every OTHER day (so Monday, then Wednesday, then Friday, etc) causes more iron absorption then taking the iron supplement every day. This every other day regimen may also cut down on side effects of the iron supplement. However, some patients struggle to remember taking a pill every other day and prefer to still take the iron daily. That's okay! Either regimen will work.

## 2024-02-09 LAB
ALBUMIN SERPL BCG-MCNC: 4.4 G/DL (ref 3.5–5.2)
ALP SERPL-CCNC: 82 U/L (ref 40–150)
ALT SERPL W P-5'-P-CCNC: 12 U/L (ref 0–50)
ANION GAP SERPL CALCULATED.3IONS-SCNC: 10 MMOL/L (ref 7–15)
AST SERPL W P-5'-P-CCNC: 19 U/L (ref 0–45)
BILIRUB SERPL-MCNC: 0.2 MG/DL
BUN SERPL-MCNC: 10.4 MG/DL (ref 8–23)
CALCIUM SERPL-MCNC: 9.6 MG/DL (ref 8.8–10.2)
CHLORIDE SERPL-SCNC: 106 MMOL/L (ref 98–107)
CHOLEST SERPL-MCNC: 168 MG/DL
CREAT SERPL-MCNC: 0.75 MG/DL (ref 0.51–0.95)
DEPRECATED HCO3 PLAS-SCNC: 25 MMOL/L (ref 22–29)
EGFRCR SERPLBLD CKD-EPI 2021: 83 ML/MIN/1.73M2
FASTING STATUS PATIENT QL REPORTED: NO
GLUCOSE SERPL-MCNC: 125 MG/DL (ref 70–99)
HDLC SERPL-MCNC: 68 MG/DL
LDLC SERPL CALC-MCNC: 86 MG/DL
NONHDLC SERPL-MCNC: 100 MG/DL
POTASSIUM SERPL-SCNC: 4.5 MMOL/L (ref 3.4–5.3)
PROT SERPL-MCNC: 7 G/DL (ref 6.4–8.3)
SODIUM SERPL-SCNC: 141 MMOL/L (ref 135–145)
TRIGL SERPL-MCNC: 68 MG/DL

## 2024-02-16 ENCOUNTER — HOSPITAL ENCOUNTER (OUTPATIENT)
Dept: CT IMAGING | Facility: CLINIC | Age: 74
Discharge: HOME | End: 2024-02-16
Attending: INTERNAL MEDICINE | Admitting: INTERNAL MEDICINE
Payer: MEDICARE

## 2024-02-16 DIAGNOSIS — J90 PLEURAL EFFUSION ON RIGHT: ICD-10-CM

## 2024-02-16 DIAGNOSIS — R63.4 UNEXPLAINED WEIGHT LOSS: ICD-10-CM

## 2024-02-16 DIAGNOSIS — D50.9 IRON DEFICIENCY ANEMIA, UNSPECIFIED IRON DEFICIENCY ANEMIA TYPE: ICD-10-CM

## 2024-02-16 PROCEDURE — 71250 CT THORAX DX C-: CPT | Mod: ME

## 2024-02-25 NOTE — PROGRESS NOTES
SUBJECTIVE: Shannan Koenig is a 73 year old female presenting with a chief complaint of cough .  Onset of symptoms was day(s) ago.  Past Medical History:   Diagnosis Date    Anxiety     Infection due to 2019 novel coronavirus 11/2020    Infection due to 2019 novel coronavirus 01/2022    Substance abuse (H)      Allergies   Allergen Reactions    Amoxicillin-Pot Clavulanate Nausea and Vomiting    Codeine Nausea and Vomiting    Latex Dermatitis, Rash and Swelling     Puffy weepy red eyes, nasal congestion and sneezing      Nitrofurantoin Rash     Social History     Tobacco Use    Smoking status: Every Day     Packs/day: .5     Types: Cigarettes    Smokeless tobacco: Never    Tobacco comments:     smoking ~ 5 cig/day   Substance Use Topics    Alcohol use: Not Currently     Comment: 5-6 glasses of wine each day along with hard etoh       ROS:  SKIN: no rash  GI: no vomiting    OBJECTIVE:  Pulse 100   Temp 97.8  F (36.6  C)   Resp 18   Wt 46.3 kg (102 lb)   SpO2 96%   BMI 19.92 kg/m  GENERAL APPEARANCE: healthy, alert and no distress  EYES: EOMI,  PERRL, conjunctiva clear  HENT: ear canals and TM's normal.  Nose and mouth without ulcers, erythema or lesions  RESP: lungs clear to auscultation - no rales, rhonchi or wheezes  CV: regular rates and rhythm, normal S1 S2, no murmur noted  SKIN: no suspicious lesions or rashes      ICD-10-CM    1. Cough, unspecified type  R05.9 CBC with Platelets and Reflex to Iron Studies     Iron & Iron Binding Capacity     Ferritin     DISCONTINUED: levofloxacin (LEVAQUIN) 500 MG tablet      2. Tobacco abuse  Z72.0 CBC with Platelets and Reflex to Iron Studies     Iron & Iron Binding Capacity     Ferritin      3. Suspected 2019 novel coronavirus infection  Z20.822 Symptomatic COVID-19 Virus (Coronavirus) by PCR Nasopharyngeal     CBC with Platelets and Reflex to Iron Studies     Iron & Iron Binding Capacity     Ferritin      4. Other fatigue  R53.83 CBC with Platelets and Reflex to Iron  Studies     Iron & Iron Binding Capacity     Ferritin     CANCELED: UA Macroscopic with reflex to Microscopic and Culture      5. Leukocytosis, unspecified type  D72.829 DISCONTINUED: levofloxacin (LEVAQUIN) 500 MG tablet     CANCELED: UA Macroscopic with reflex to Microscopic and Culture          Fluids/Rest, f/u if worse/not any better

## 2024-05-14 ENCOUNTER — ANCILLARY PROCEDURE (OUTPATIENT)
Dept: MAMMOGRAPHY | Facility: CLINIC | Age: 74
End: 2024-05-14
Attending: INTERNAL MEDICINE
Payer: MEDICARE

## 2024-05-14 DIAGNOSIS — Z12.31 VISIT FOR SCREENING MAMMOGRAM: ICD-10-CM

## 2024-05-14 PROCEDURE — 77067 SCR MAMMO BI INCL CAD: CPT | Mod: TC | Performed by: RADIOLOGY

## 2024-05-14 PROCEDURE — 77063 BREAST TOMOSYNTHESIS BI: CPT | Mod: TC | Performed by: RADIOLOGY

## 2024-09-01 DIAGNOSIS — F10.930 ALCOHOL WITHDRAWAL SYNDROME WITHOUT COMPLICATION (H): ICD-10-CM

## 2024-09-02 RX ORDER — FOLIC ACID 1 MG/1
1 TABLET ORAL DAILY
Qty: 90 TABLET | Refills: 1 | Status: SHIPPED | OUTPATIENT
Start: 2024-09-02

## 2024-11-05 ASSESSMENT — ANXIETY QUESTIONNAIRES: GAD7 TOTAL SCORE: 21

## 2025-01-09 ENCOUNTER — PATIENT OUTREACH (OUTPATIENT)
Dept: CARE COORDINATION | Facility: CLINIC | Age: 75
End: 2025-01-09
Payer: MEDICARE

## 2025-01-14 DIAGNOSIS — F10.930 ALCOHOL WITHDRAWAL SYNDROME WITHOUT COMPLICATION (H): ICD-10-CM

## 2025-01-14 RX ORDER — FOLIC ACID 1 MG/1
1 TABLET ORAL DAILY
Qty: 90 TABLET | Refills: 1 | Status: SHIPPED | OUTPATIENT
Start: 2025-01-14

## 2025-01-28 DIAGNOSIS — E78.5 DYSLIPIDEMIA: ICD-10-CM

## 2025-01-28 RX ORDER — LOVASTATIN 40 MG/1
40 TABLET ORAL AT BEDTIME
Qty: 90 TABLET | Refills: 0 | Status: SHIPPED | OUTPATIENT
Start: 2025-01-28

## 2025-03-15 ENCOUNTER — HEALTH MAINTENANCE LETTER (OUTPATIENT)
Age: 75
End: 2025-03-15

## 2025-03-15 DIAGNOSIS — K21.9 GASTROESOPHAGEAL REFLUX DISEASE WITHOUT ESOPHAGITIS: ICD-10-CM

## 2025-03-17 RX ORDER — OMEPRAZOLE 40 MG/1
40 CAPSULE, DELAYED RELEASE ORAL DAILY
Qty: 90 CAPSULE | Refills: 3 | OUTPATIENT
Start: 2025-03-17

## 2025-03-17 NOTE — TELEPHONE ENCOUNTER
Patient due for annual visit. Please contact to schedule. Patient scheduled appointment for last refill then cancelled that appointment.

## 2025-03-24 NOTE — TELEPHONE ENCOUNTER
Patient has switched providers and clinics and is no longer being seen at [facility].    Removed Dr. Funk as patients pcp.

## 2025-04-08 ENCOUNTER — E-VISIT (OUTPATIENT)
Dept: INTERNAL MEDICINE | Facility: CLINIC | Age: 75
End: 2025-04-08
Payer: MEDICARE

## 2025-04-08 DIAGNOSIS — A60.00 GENITAL HERPES SIMPLEX, UNSPECIFIED SITE: ICD-10-CM

## 2025-04-08 DIAGNOSIS — B00.1 HERPES LABIALIS: Primary | ICD-10-CM

## 2025-04-08 RX ORDER — ACYCLOVIR 400 MG/1
400 TABLET ORAL 3 TIMES DAILY
Qty: 15 TABLET | Refills: 0 | Status: SHIPPED | OUTPATIENT
Start: 2025-04-08 | End: 2025-04-13

## 2025-04-08 NOTE — PATIENT INSTRUCTIONS
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    Based on your responses, I have prescribed acyclovir to treat this. Please follow the instructions on the medication. If you experience irritation of your skin, new rash, or any other new symptoms, you should stop using this medication and contact your primary care provider.    If this treatment does not work for you or your sores are worsening instead of improving or do not resolve in 7 days, please plan to follow-up with your primary care provider. They may be able to offer refills for you for future outbreaks as well.    Thanks for choosing?us?as your health care partner,?   ?   Gene Berman MD?

## 2025-04-26 ENCOUNTER — HEALTH MAINTENANCE LETTER (OUTPATIENT)
Age: 75
End: 2025-04-26

## (undated) RX ORDER — FENTANYL CITRATE 50 UG/ML
INJECTION, SOLUTION INTRAMUSCULAR; INTRAVENOUS
Status: DISPENSED
Start: 2023-08-20

## (undated) RX ORDER — LIDOCAINE HYDROCHLORIDE 10 MG/ML
INJECTION, SOLUTION INFILTRATION; PERINEURAL
Status: DISPENSED
Start: 2023-08-20

## (undated) RX ORDER — HEPARIN SODIUM 200 [USP'U]/100ML
INJECTION, SOLUTION INTRAVENOUS
Status: DISPENSED
Start: 2023-08-20